# Patient Record
Sex: FEMALE | Race: WHITE | NOT HISPANIC OR LATINO | Employment: OTHER | URBAN - METROPOLITAN AREA
[De-identification: names, ages, dates, MRNs, and addresses within clinical notes are randomized per-mention and may not be internally consistent; named-entity substitution may affect disease eponyms.]

---

## 2017-06-14 ENCOUNTER — APPOINTMENT (OUTPATIENT)
Dept: RADIOLOGY | Facility: MEDICAL CENTER | Age: 77
End: 2017-06-14
Attending: PHYSICIAN ASSISTANT
Payer: COMMERCIAL

## 2018-01-15 ENCOUNTER — HOSPITAL ENCOUNTER (OUTPATIENT)
Dept: RADIOLOGY | Facility: MEDICAL CENTER | Age: 78
End: 2018-01-15
Attending: INTERNAL MEDICINE
Payer: MEDICARE

## 2018-01-15 DIAGNOSIS — R10.33 UMBILICAL PAIN: ICD-10-CM

## 2018-01-15 DIAGNOSIS — R10.9 STOMACH ACHE: ICD-10-CM

## 2018-01-15 DIAGNOSIS — R14.0 ABDOMINAL DISTENTION: ICD-10-CM

## 2018-01-15 DIAGNOSIS — M54.9 DORSALGIA: ICD-10-CM

## 2018-01-15 PROCEDURE — 74018 RADEX ABDOMEN 1 VIEW: CPT

## 2019-04-05 ENCOUNTER — HOSPITAL ENCOUNTER (OUTPATIENT)
Dept: RADIOLOGY | Facility: MEDICAL CENTER | Age: 79
End: 2019-04-05
Attending: NURSE PRACTITIONER
Payer: MEDICARE

## 2019-04-05 DIAGNOSIS — R10.30 LOWER ABDOMINAL PAIN: ICD-10-CM

## 2019-04-05 PROCEDURE — 76700 US EXAM ABDOM COMPLETE: CPT

## 2019-12-30 DIAGNOSIS — C21.0 ANAL CANCER (HCC): ICD-10-CM

## 2019-12-30 DIAGNOSIS — C21.0 SQUAMOUS CELL CANCER, ANUS (HCC): ICD-10-CM

## 2019-12-31 RX ORDER — LORATADINE 10 MG/1
10 TABLET ORAL
COMMUNITY
End: 2020-01-10

## 2019-12-31 RX ORDER — ATORVASTATIN CALCIUM 20 MG/1
20 TABLET, FILM COATED ORAL DAILY
COMMUNITY

## 2019-12-31 RX ORDER — METOPROLOL SUCCINATE 25 MG/1
25 TABLET, EXTENDED RELEASE ORAL
COMMUNITY
Start: 2019-05-07 | End: 2020-05-06

## 2019-12-31 RX ORDER — NITROGLYCERIN 0.4 MG/1
0.4 TABLET SUBLINGUAL
COMMUNITY
End: 2020-10-26

## 2019-12-31 RX ORDER — SUMATRIPTAN 25 MG/1
25 TABLET, FILM COATED ORAL
COMMUNITY
End: 2020-10-26

## 2020-01-03 ENCOUNTER — HOSPITAL ENCOUNTER (OUTPATIENT)
Dept: RADIOLOGY | Facility: MEDICAL CENTER | Age: 80
End: 2020-01-03
Attending: SURGERY
Payer: MEDICARE

## 2020-01-03 VITALS — WEIGHT: 129.41 LBS | BODY MASS INDEX: 23.81 KG/M2 | HEIGHT: 62 IN

## 2020-01-03 DIAGNOSIS — C21.0 ANAL SQUAMOUS CELL CARCINOMA (HCC): ICD-10-CM

## 2020-01-03 PROCEDURE — 700102 HCHG RX REV CODE 250 W/ 637 OVERRIDE(OP): Performed by: RADIOLOGY

## 2020-01-03 PROCEDURE — 700117 HCHG RX CONTRAST REV CODE 255: Performed by: SURGERY

## 2020-01-03 PROCEDURE — 72197 MRI PELVIS W/O & W/DYE: CPT

## 2020-01-03 PROCEDURE — 700111 HCHG RX REV CODE 636 W/ 250 OVERRIDE (IP): Performed by: RADIOLOGY

## 2020-01-03 PROCEDURE — A9270 NON-COVERED ITEM OR SERVICE: HCPCS | Performed by: RADIOLOGY

## 2020-01-03 PROCEDURE — A9576 INJ PROHANCE MULTIPACK: HCPCS | Performed by: SURGERY

## 2020-01-03 RX ORDER — DIAZEPAM 5 MG/1
5 TABLET ORAL
Status: COMPLETED | OUTPATIENT
Start: 2020-01-03 | End: 2020-01-03

## 2020-01-03 RX ADMIN — GLUCAGON 1 MG: 1 INJECTION, POWDER, LYOPHILIZED, FOR SOLUTION INTRAMUSCULAR; INTRAVENOUS at 10:20

## 2020-01-03 RX ADMIN — DIAZEPAM 5 MG: 5 TABLET ORAL at 09:46

## 2020-01-03 RX ADMIN — GADOTERIDOL 10 ML: 279.3 INJECTION, SOLUTION INTRAVENOUS at 11:05

## 2020-01-03 NOTE — DISCHARGE INSTRUCTIONS
MRI ADULT DISCHARGE INSTRUCTIONS    You have been medicated today for your scan. Please follow the instructions below to ensure your safe recovery. If you have any questions or problems, feel free to call us at [phone] or [phone].     1.   Have someone stay with you to assist you as needed.    2.   Do not drive or operate any mechanical devices.    3.   Do not perform any activity that requires concentration. Make no major decisions over the next 24 hours.     4.   Be careful changing positions from laying down to sitting or standing, as you may become dizzy.     5.   Do not drink alcohol for 48 hours.    6.   There are no restrictions for eating your normal meals. Drink fluids.    7.   You may continue your usual medications for pain, tranquilizers, muscle relaxants or sedatives when awake.     8.   Tomorrow, you may continue your normal daily activities.     9.   Pressure dressing on 10 - 15 minutes. If swelling or bleeding occurs when removed, continue placing direct pressure on injection site for another 5 minutes, or until bleeding stops.     Diazepam (VALIUM) Oral tablet  What is this medicine?  You were prescribed DIAZEPAM (dye AZ e dania) for the procedure you had today. This medication is a benzodiazepine. It is used to treat anxiety and nervousness. It also can help treat alcohol withdrawal, relax muscles, and treat certain types of seizures.  This medicine may be used for other purposes; ask your health care provider or pharmacist if you have questions.  What side effects may I notice from receiving this medicine?  Side effects that you should report to your doctor or health care professional as soon as possible:  • allergic reactions like skin rash, itching or hives, swelling of the face, lips, or tongue  • angry, confused, depressed, other mood changes  • breathing problems  • feeling faint or lightheaded, falls  • muscle cramps  • problems with balance, talking,  walking  • restlessness  • tremors  • trouble passing urine or change in the amount of urine  • unusually weak or tired  Side effects that usually do not require medical attention (report to your doctor or health care professional if they continue or are bothersome):  • difficulty sleeping, nightmares  • dizziness, drowsiness, clumsiness, or unsteadiness, a hangover effect  • headache  • nausea, vomiting  This list may not describe all possible side effects. Call your doctor for medical advice about side effects. You may report side effects to FDA at 1-280-VTH-1091.      I have been informed of and understand the above discharge instructions.

## 2020-01-10 ENCOUNTER — HOSPITAL ENCOUNTER (OUTPATIENT)
Dept: RADIOLOGY | Facility: MEDICAL CENTER | Age: 80
End: 2020-01-10
Attending: SURGERY
Payer: MEDICARE

## 2020-01-10 DIAGNOSIS — Z01.812 PRE-OPERATIVE LABORATORY EXAMINATION: ICD-10-CM

## 2020-01-10 DIAGNOSIS — Z01.810 PRE-OPERATIVE CARDIOVASCULAR EXAMINATION: ICD-10-CM

## 2020-01-10 DIAGNOSIS — C21.0 MALIGNANT NEOPLASM OF ANUS (HCC): ICD-10-CM

## 2020-01-10 LAB
EKG IMPRESSION: NORMAL
ERYTHROCYTE [DISTWIDTH] IN BLOOD BY AUTOMATED COUNT: 46.2 FL (ref 35.9–50)
HCT VFR BLD AUTO: 43 % (ref 37–47)
HGB BLD-MCNC: 14 G/DL (ref 12–16)
MCH RBC QN AUTO: 32.3 PG (ref 27–33)
MCHC RBC AUTO-ENTMCNC: 32.6 G/DL (ref 33.6–35)
MCV RBC AUTO: 99.1 FL (ref 81.4–97.8)
PLATELET # BLD AUTO: 272 K/UL (ref 164–446)
PMV BLD AUTO: 10 FL (ref 9–12.9)
RBC # BLD AUTO: 4.34 M/UL (ref 4.2–5.4)
WBC # BLD AUTO: 10.2 K/UL (ref 4.8–10.8)

## 2020-01-10 PROCEDURE — 36415 COLL VENOUS BLD VENIPUNCTURE: CPT

## 2020-01-10 PROCEDURE — 85027 COMPLETE CBC AUTOMATED: CPT

## 2020-01-10 PROCEDURE — 71260 CT THORAX DX C+: CPT

## 2020-01-10 PROCEDURE — 700117 HCHG RX CONTRAST REV CODE 255: Performed by: SURGERY

## 2020-01-10 PROCEDURE — 93010 ELECTROCARDIOGRAM REPORT: CPT | Performed by: INTERNAL MEDICINE

## 2020-01-10 PROCEDURE — 93005 ELECTROCARDIOGRAM TRACING: CPT

## 2020-01-10 RX ORDER — MONTELUKAST SODIUM 10 MG/1
10 TABLET ORAL DAILY
COMMUNITY
End: 2020-10-26

## 2020-01-10 RX ORDER — DILTIAZEM HYDROCHLORIDE 60 MG/1
60 TABLET, FILM COATED ORAL 2 TIMES DAILY
COMMUNITY
End: 2020-09-17

## 2020-01-10 RX ORDER — VITAMIN E 268 MG
400 CAPSULE ORAL DAILY
COMMUNITY
End: 2020-10-26

## 2020-01-10 RX ADMIN — IOHEXOL 100 ML: 350 INJECTION, SOLUTION INTRAVENOUS at 14:40

## 2020-01-10 RX ADMIN — IOHEXOL 25 ML: 240 INJECTION, SOLUTION INTRATHECAL; INTRAVASCULAR; INTRAVENOUS; ORAL at 13:30

## 2020-01-13 ENCOUNTER — ANESTHESIA EVENT (OUTPATIENT)
Dept: SURGERY | Facility: MEDICAL CENTER | Age: 80
End: 2020-01-13
Payer: MEDICARE

## 2020-01-13 ENCOUNTER — ANESTHESIA (OUTPATIENT)
Dept: SURGERY | Facility: MEDICAL CENTER | Age: 80
End: 2020-01-13
Payer: MEDICARE

## 2020-01-13 ENCOUNTER — HOSPITAL ENCOUNTER (OUTPATIENT)
Facility: MEDICAL CENTER | Age: 80
End: 2020-01-13
Attending: SURGERY | Admitting: SURGERY
Payer: MEDICARE

## 2020-01-13 VITALS
HEART RATE: 72 BPM | WEIGHT: 127.43 LBS | BODY MASS INDEX: 23.45 KG/M2 | RESPIRATION RATE: 20 BRPM | DIASTOLIC BLOOD PRESSURE: 52 MMHG | HEIGHT: 62 IN | OXYGEN SATURATION: 98 % | TEMPERATURE: 98.4 F | SYSTOLIC BLOOD PRESSURE: 119 MMHG

## 2020-01-13 PROBLEM — R11.2 PONV (POSTOPERATIVE NAUSEA AND VOMITING): Status: ACTIVE | Noted: 2020-01-13

## 2020-01-13 PROBLEM — Z98.890 PONV (POSTOPERATIVE NAUSEA AND VOMITING): Status: ACTIVE | Noted: 2020-01-13

## 2020-01-13 PROBLEM — C21.0 ANAL CANCER (HCC): Chronic | Status: ACTIVE | Noted: 2020-01-13

## 2020-01-13 PROBLEM — E78.00 HIGH CHOLESTEROL: Chronic | Status: ACTIVE | Noted: 2020-01-13

## 2020-01-13 PROBLEM — J45.909 ASTHMA: Chronic | Status: ACTIVE | Noted: 2020-01-13

## 2020-01-13 PROBLEM — K44.9 HIATAL HERNIA: Status: ACTIVE | Noted: 2020-01-13

## 2020-01-13 LAB
BUN BLD-MCNC: 13 MG/DL (ref 8–22)
CA-I BLD ISE-SCNC: 1.21 MMOL/L (ref 1.1–1.3)
CHLORIDE BLD-SCNC: 105 MMOL/L (ref 96–112)
CO2 BLD-SCNC: 26 MMOL/L (ref 20–33)
CREAT BLD-MCNC: 0.9 MG/DL (ref 0.5–1.4)
GLUCOSE BLD-MCNC: 99 MG/DL (ref 65–99)
HCT VFR BLD CALC: 45 % (ref 37–47)
HGB BLD-MCNC: 15.3 G/DL (ref 12–16)
POTASSIUM BLD-SCNC: 4.1 MMOL/L (ref 3.6–5.5)
SODIUM BLD-SCNC: 141 MMOL/L (ref 135–145)

## 2020-01-13 PROCEDURE — 700105 HCHG RX REV CODE 258: Performed by: SURGERY

## 2020-01-13 PROCEDURE — 160009 HCHG ANES TIME/MIN: Performed by: SURGERY

## 2020-01-13 PROCEDURE — 160046 HCHG PACU - 1ST 60 MINS PHASE II: Performed by: SURGERY

## 2020-01-13 PROCEDURE — 700101 HCHG RX REV CODE 250: Performed by: ANESTHESIOLOGY

## 2020-01-13 PROCEDURE — 160047 HCHG PACU  - EA ADDL 30 MINS PHASE II: Performed by: SURGERY

## 2020-01-13 PROCEDURE — 85014 HEMATOCRIT: CPT

## 2020-01-13 PROCEDURE — 160025 RECOVERY II MINUTES (STATS): Performed by: SURGERY

## 2020-01-13 PROCEDURE — 160207 HCHG ENDO MINUTES - EA ADDL 1 MIN LEVEL 3: Performed by: SURGERY

## 2020-01-13 PROCEDURE — 160048 HCHG OR STATISTICAL LEVEL 1-5: Performed by: SURGERY

## 2020-01-13 PROCEDURE — 700111 HCHG RX REV CODE 636 W/ 250 OVERRIDE (IP): Performed by: ANESTHESIOLOGY

## 2020-01-13 PROCEDURE — 160202 HCHG ENDO MINUTES - 1ST 30 MINS LEVEL 3: Performed by: SURGERY

## 2020-01-13 PROCEDURE — 80047 BASIC METABLC PNL IONIZED CA: CPT

## 2020-01-13 RX ORDER — SODIUM CHLORIDE, SODIUM LACTATE, POTASSIUM CHLORIDE, CALCIUM CHLORIDE 600; 310; 30; 20 MG/100ML; MG/100ML; MG/100ML; MG/100ML
INJECTION, SOLUTION INTRAVENOUS CONTINUOUS
Status: DISCONTINUED | OUTPATIENT
Start: 2020-01-13 | End: 2020-01-13

## 2020-01-13 RX ORDER — METOPROLOL TARTRATE 1 MG/ML
1 INJECTION, SOLUTION INTRAVENOUS
Status: DISCONTINUED | OUTPATIENT
Start: 2020-01-13 | End: 2020-01-13 | Stop reason: HOSPADM

## 2020-01-13 RX ORDER — ONDANSETRON 2 MG/ML
4 INJECTION INTRAMUSCULAR; INTRAVENOUS
Status: DISCONTINUED | OUTPATIENT
Start: 2020-01-13 | End: 2020-01-13 | Stop reason: HOSPADM

## 2020-01-13 RX ORDER — LIDOCAINE HYDROCHLORIDE 20 MG/ML
INJECTION, SOLUTION EPIDURAL; INFILTRATION; INTRACAUDAL; PERINEURAL PRN
Status: DISCONTINUED | OUTPATIENT
Start: 2020-01-13 | End: 2020-01-13 | Stop reason: SURG

## 2020-01-13 RX ORDER — SODIUM CHLORIDE, SODIUM LACTATE, POTASSIUM CHLORIDE, CALCIUM CHLORIDE 600; 310; 30; 20 MG/100ML; MG/100ML; MG/100ML; MG/100ML
INJECTION, SOLUTION INTRAVENOUS CONTINUOUS
Status: DISCONTINUED | OUTPATIENT
Start: 2020-01-13 | End: 2020-01-13 | Stop reason: HOSPADM

## 2020-01-13 RX ORDER — HYDRALAZINE HYDROCHLORIDE 20 MG/ML
5 INJECTION INTRAMUSCULAR; INTRAVENOUS
Status: DISCONTINUED | OUTPATIENT
Start: 2020-01-13 | End: 2020-01-13 | Stop reason: HOSPADM

## 2020-01-13 RX ORDER — LABETALOL HYDROCHLORIDE 5 MG/ML
5 INJECTION, SOLUTION INTRAVENOUS
Status: DISCONTINUED | OUTPATIENT
Start: 2020-01-13 | End: 2020-01-13 | Stop reason: HOSPADM

## 2020-01-13 RX ORDER — HALOPERIDOL 5 MG/ML
0.5 INJECTION INTRAMUSCULAR
Status: DISCONTINUED | OUTPATIENT
Start: 2020-01-13 | End: 2020-01-13 | Stop reason: HOSPADM

## 2020-01-13 RX ADMIN — PROPOFOL 140 MCG/KG/MIN: 10 INJECTION, EMULSION INTRAVENOUS at 10:49

## 2020-01-13 RX ADMIN — PROPOFOL 40 MG: 10 INJECTION, EMULSION INTRAVENOUS at 10:58

## 2020-01-13 RX ADMIN — PROPOFOL 40 MG: 10 INJECTION, EMULSION INTRAVENOUS at 11:07

## 2020-01-13 RX ADMIN — FENTANYL CITRATE 25 MCG: 50 INJECTION, SOLUTION INTRAMUSCULAR; INTRAVENOUS at 11:04

## 2020-01-13 RX ADMIN — FENTANYL CITRATE 25 MCG: 50 INJECTION, SOLUTION INTRAMUSCULAR; INTRAVENOUS at 10:59

## 2020-01-13 RX ADMIN — PROPOFOL 50 MG: 10 INJECTION, EMULSION INTRAVENOUS at 10:48

## 2020-01-13 RX ADMIN — SODIUM CHLORIDE, POTASSIUM CHLORIDE, SODIUM LACTATE AND CALCIUM CHLORIDE: 600; 310; 30; 20 INJECTION, SOLUTION INTRAVENOUS at 10:25

## 2020-01-13 RX ADMIN — LIDOCAINE HYDROCHLORIDE 100 MG: 20 INJECTION, SOLUTION EPIDURAL; INFILTRATION; INTRACAUDAL at 10:47

## 2020-01-13 ASSESSMENT — PAIN SCALES - GENERAL: PAIN_LEVEL: 0

## 2020-01-13 NOTE — ANESTHESIA QCDR
2019 [facility] Clinical Data Registry (for Quality Improvement)     Postoperative nausea/vomiting risk protocol (Adult = 18 yrs and Pediatric 3-17 yrs)- (430 and 463)  General inhalation anesthetic (NOT TIVA) with PONV risk factors: No  Provision of anti-emetic therapy with at least 2 different classes of agents: N/A  Patient DID NOT receive anti-emetic therapy and reason is documented in Medical Record: N/A    Multimodal Pain Management- (477)  Non-emergent surgery AND patient age >= 18: Yes  Use of Multimodal Pain Management, two or more drugs and/or interventions, NOT including systemic opioids: No  Exception: Documented allergy to multiple classes of analgesics: No       Smoking Abstinence (404)  Patient is current smoker (cigarette, pipe, e-cig, marijuanna): No  Elective Surgery:   Abstinence instructions provided prior to day of surgery:   Patient abstained from smoking on day of surgery:     Pre-Op Beta-Blocker in Isolated CABG (44)  Isolated CABG AND patient age >= 18: No  Beta-blocker admin within 24 hours of surgical incision:   Exception:of medical reason(s) for not administering beta blocker within 24 hours prior to surgical incision (e.g., not  indicated,other medical reason):     PACU assessment of acute postoperative pain prior to Anesthesia Care End- Applies to Patients Age = 18- (ABG7)  Initial PACU pain score is which of the following: < 7/10  Patient unable to report pain score: N/A    Post-anesthetic transfer of care checklist/protocol to PACU/ICU- (426 and 427)  Upon conclusion of case, patient transferred to which of the following locations: PACU/Non-ICU  Use of transfer checklist/protocol: Yes  Exclusion: Service Performed in Patient Hospital Room (and thus did not require transfer): N/A  Unplanned admission to ICU related to anesthesia service up through end of PACU care- (MD51)  Unplanned admission to ICU (not initially anticipated at anesthesia start time): No

## 2020-01-13 NOTE — OP REPORT
Operative Report     PreOp Diagnosis:   Anal squamous cell cancer     PostOp Diagnosis:   Same     Procedure(s):  Colonoscopy    Surgeon:  Dulce Jeter M.D.     Assistant:  None      Anesthesiologist:  David Magana    Type of Anesthesia:  Propofol conscious sedation     Specimen:  None     Estimated Blood Loss:  None     Findings:   Anal canal squamous cell carcinoma  Sigmoid diverticulosis    Complications:  None     Indications:  79F with new diagnosis of anal squamous cell carcinoma.  It has been 3 years since her previous colonoscopy and will undergo colonoscopy today as part of her staging.  We discussed the associated risks and complications, that include but are not limited to bleeding, infection, perforation, requirements for additional procedures or surgeries, cardiopulmonary complications, veno thromboembolic complications, etc.  She gave consent to proceed.     Operative Procedure:  The patient was brought to the operating suite and remained on her stretcher.  The patient was placed on cardiopulmonary monitors and anesthesia was induced using propofol administered by anesthesia. The patient was rolled into left lateral decubitus.  The preoperative safety checklist was performed.    A perineal visual exam demonstrated the known anal cancer protruding from the anal verge.  A CONCHITA demonstrated the anal tumor within the anal canal that extended approximately 3cm within the canal.  The colonoscope was advanced to the cecum and the appendiceal orifice and ileocecal valve were identified.  The prep was poor and consisted of green thick liquid throughout the entire colon.  The colonoscope was withdrawn and there was no evidence of any other masses or polyps.  There was sigmoid diverticulosis.    The patient tolerated the procedure well.

## 2020-01-13 NOTE — ANESTHESIA POSTPROCEDURE EVALUATION
Patient: Joys Grove    Procedure Summary     Date:  01/13/20 Room / Location:  [facility] ROOM 24 / SURGERY SAME DAY [facility]    Anesthesia Start:  1044 Anesthesia Stop:      Procedure:  COLONOSCOPY (Anus) Diagnosis:  (ANAL SQUAMOUS CELL CARCINOMA)    Surgeon:  Dulce Jeter M.D. Responsible Provider:  David Magana M.D.    Anesthesia Type:  general, MAC ASA Status:  3          Final Anesthesia Type: general, MAC  Last vitals  BP   Blood Pressure : 119/52    Temp   36.9 °C (98.4 °F)    Pulse   Pulse: 72   Resp   20    SpO2   98 %      Anesthesia Post Evaluation    Patient location during evaluation: PACU  Patient participation: complete - patient participated  Level of consciousness: awake and alert  Pain score: 0    Airway patency: patent  Anesthetic complications: no  Cardiovascular status: hemodynamically stable  Respiratory status: acceptable  Hydration status: euvolemic    PONV: none           Nurse Pain Score: 0 (NPRS)

## 2020-01-13 NOTE — OR NURSING
1118 to pacu from or s/p colonoscopy report pt awake but sleepy report recvd from Dr Magana   1136 pt states comfortable at this time o2 off 100% on roomair   1148 friend to bedside po fluids given   1200 report to toribio roldan who is assuming care of pt

## 2020-01-13 NOTE — ANESTHESIA TIME REPORT
Anesthesia Start and Stop Event Times     Date Time Event    1/13/2020 1037 Ready for Procedure     1044 Anesthesia Start     1122 Anesthesia Stop        Responsible Staff  01/13/20    Name Role Begin End    David Magana M.D. Anesth 1044 1122        Preop Diagnosis (Free Text):  Pre-op Diagnosis     ANAL SQUAMOUS CELL CARCINOMA        Preop Diagnosis (Codes):    Post op Diagnosis  Anal squamous cell carcinoma (HCC)      Premium Reason  Non-Premium    Comments:

## 2020-01-13 NOTE — DISCHARGE INSTRUCTIONS
ACTIVITY: Rest and take it easy for the first 24 hours.  A responsible adult is recommended to remain with you during that time.  It is normal to feel sleepy.  We encourage you to not do anything that requires balance, judgment or coordination.    MILD FLU-LIKE SYMPTOMS ARE NORMAL. YOU MAY EXPERIENCE GENERALIZED MUSCLE ACHES, THROAT IRRITATION, HEADACHE AND/OR SOME NAUSEA.    FOR 24 HOURS DO NOT:  Drive, operate machinery or run household appliances.  Drink beer or alcoholic beverages.   Make important decisions or sign legal documents.    SPECIAL INSTRUCTIONS: *see colonoscopy discharge instructions **    DIET: To avoid nausea, slowly advance diet as tolerated, avoiding spicy or greasy foods for the first day.  Add more substantial food to your diet according to your physician's instructions.  Babies can be fed formula or breast milk as soon as they are hungry.  INCREASE FLUIDS AND FIBER TO AVOID CONSTIPATION.    SURGICAL DRESSING/BATHING: **no restrictions *    FOLLOW-UP APPOINTMENT:  A follow-up appointment should be arranged with your doctor  call to schedule or follow up as already scheduled     You should CALL YOUR PHYSICIAN if you develop:  Fever greater than 101 degrees F.  Pain not relieved by medication, or persistent nausea or vomiting.  Excessive bleeding (blood soaking through dressing) or unexpected drainage from the wound.  Extreme redness or swelling around the incision site, drainage of pus or foul smelling drainage.  Inability to urinate or empty your bladder within 8 hours.  Problems with breathing or chest pain.    You should call 911 if you develop problems with breathing or chest pain.  If you are unable to contact your doctor or surgical center, you should go to the nearest emergency room or urgent care center.    Physician's telephone #: *Dr. Jeter [phone]**    If any questions arise, call your doctor.  If your doctor is not available, please feel free to call the Surgical Center at  [phone].  The Center is open Monday through Friday from 7AM to 7PM.  You can also call the HEALTH HOTLINE open 24 hours/day, 7 days/week and speak to a nurse at [phone], or toll free at [phone].    A registered nurse may call you a few days after your surgery to see how you are doing after your procedure.    MEDICATIONS: Resume taking daily medication.  Take prescribed pain medication with food.  If no medication is prescribed, you may take non-aspirin pain medication if needed.  PAIN MEDICATION CAN BE VERY CONSTIPATING.  Take a stool softener or laxative such as senokot, pericolace, or milk of magnesia if needed.    Prescription given for *none**.  Last pain medication given at *none **.    If your physician has prescribed pain medication that includes Acetaminophen (Tylenol), do not take additional Acetaminophen (Tylenol) while taking the prescribed medication.    Depression / Suicide Risk    As you are discharged from this [facility] facility, it is important to learn how to keep safe from harming yourself.    Recognize the warning signs:  · Abrupt changes in personality, positive or negative- including increase in energy   · Giving away possessions  · Change in eating patterns- significant weight changes-  positive or negative  · Change in sleeping patterns- unable to sleep or sleeping all the time   · Unwillingness or inability to communicate  · Depression  · Unusual sadness, discouragement and loneliness  · Talk of wanting to die  · Neglect of personal appearance   · Rebelliousness- reckless behavior  · Withdrawal from people/activities they love  · Confusion- inability to concentrate     If you or a loved one observes any of these behaviors or has concerns about self-harm, here's what you can do:  · Talk about it- your feelings and reasons for harming yourself  · Remove any means that you might use to hurt yourself (examples: pills, rope, extension cords, firearm)  · Get professional  help from the community (Mental Health, Substance Abuse, psychological counseling)  · Do not be alone:Call your Safe Contact- someone whom you trust who will be there for you.  · Call your local CRISIS HOTLINE 921-5685 or 597-128-2948  · Call your local Children's Mobile Crisis Response Team [place] (097) 347-7760 or www.G[employer]Vaani  · Call the toll free National Suicide Prevention Hotlines   · National Suicide Prevention Lifeline 019-766-JQMJ (7279)  [facility] Line Network 800-SUICIDE (441-5038)    ENDOSCOPY HOME CARE INSTRUCTIONS      COLONOSCOPY OR FLEXIBLE SIGMOIDOSCOPY  1. If you received a barium enema, take a mild laxative such as dulcolax, Carol's M.O., or Milk of Magnesia to clean out the barium.  2. Drink plenty of fluids. Eat a diet high in fiber (whole-grain breads, fresh fruit and vegetables).  3. You may notice a few drops of blood with your first bowel movement. If you develop any large amount of bleeding, black stools, a fever, or abdominal pain, call your doctor right away.  4. Call your doctor for test results   5. Don't drive or drink alcohol for 24 hours. The medication you received will make you too drowsy.  6. No heavy lifting, no Aspirin products or Aspirin for 5 days   7. Additional instructions: *none **  8. Prescriptions: *none **    ·

## 2020-01-13 NOTE — ANESTHESIA PREPROCEDURE EVALUATION
Relevant Problems   ANESTHESIA   (+) PONV (postoperative nausea and vomiting)      PULMONARY   (+) Asthma      GI   (+) Hiatal hernia      Other   (+) Anal cancer (HCC)   (+) Degeneration of lumbar intervertebral disc   (+) High cholesterol       Physical Exam    Airway   Mallampati: II  TM distance: >3 FB  Neck ROM: full       Cardiovascular - normal exam  Rhythm: regular  Rate: normal  (-) murmur     Dental - normal exam         Pulmonary - normal exam  Breath sounds clear to auscultation     Abdominal    Neurological - normal exam    Comments: A&Ox4, Grossly intact             Anesthesia Plan    ASA 3 (anal cancer. chronic anticoagulation. arrythmia.)       Plan - general and MAC       Airway plan will be natural airway        Induction: intravenous    Postoperative Plan: Postoperative administration of opioids is intended.    Pertinent diagnostic labs and testing reviewed    Informed Consent:    Anesthetic plan and risks discussed with patient.    Use of blood products discussed with: patient whom consented to blood products.

## 2020-01-14 ENCOUNTER — PATIENT OUTREACH (OUTPATIENT)
Dept: OTHER | Facility: MEDICAL CENTER | Age: 80
End: 2020-01-14

## 2020-01-14 NOTE — PROGRESS NOTES
On 1/14/2020 at 1238 this ONN called patient. Introduced self and explained role of nurse navigator as added resource and support. Patient states she is doing well. Patient states she has lots of good support. Patient states she does not drive herself to appointments but that she has sons, daughters-in-law, and friends who have all been helping out. Patient states she does not have specific questions at this point but does understand on Thursday 1/16/2020 patient has a consult with radiation oncologist Dr. Casper. Patient denies questions or concerns about appointment or getting to and from appointment. Patient took this ONN's name and number and this ONN encouraged patient to call as needed. This ONN agreed to follow back up with patient after patient meets with Dr. Casper.

## 2020-01-16 ENCOUNTER — HOSPITAL ENCOUNTER (OUTPATIENT)
Dept: RADIOLOGY | Facility: MEDICAL CENTER | Age: 80
End: 2020-01-16
Attending: RADIOLOGY
Payer: MEDICARE

## 2020-01-16 ENCOUNTER — HOSPITAL ENCOUNTER (OUTPATIENT)
Dept: RADIATION ONCOLOGY | Facility: MEDICAL CENTER | Age: 80
End: 2020-01-31
Attending: RADIOLOGY
Payer: MEDICARE

## 2020-01-16 VITALS
HEART RATE: 78 BPM | OXYGEN SATURATION: 97 % | DIASTOLIC BLOOD PRESSURE: 45 MMHG | BODY MASS INDEX: 23.5 KG/M2 | SYSTOLIC BLOOD PRESSURE: 146 MMHG | WEIGHT: 128.48 LBS | TEMPERATURE: 96.7 F

## 2020-01-16 DIAGNOSIS — C20 MALIGNANT NEOPLASM OF RECTUM (HCC): ICD-10-CM

## 2020-01-16 DIAGNOSIS — C21.0 SQUAMOUS CELL CANCER, ANUS (HCC): ICD-10-CM

## 2020-01-16 DIAGNOSIS — C21.0 ANAL CANCER (HCC): ICD-10-CM

## 2020-01-16 PROCEDURE — 99214 OFFICE O/P EST MOD 30 MIN: CPT | Mod: 25 | Performed by: RADIOLOGY

## 2020-01-16 PROCEDURE — 99205 OFFICE O/P NEW HI 60 MIN: CPT | Performed by: RADIOLOGY

## 2020-01-16 PROCEDURE — A9552 F18 FDG: HCPCS

## 2020-01-16 SDOH — HEALTH STABILITY: MENTAL HEALTH: HOW OFTEN DO YOU HAVE A DRINK CONTAINING ALCOHOL?: 4 OR MORE TIMES A WEEK

## 2020-01-16 SDOH — HEALTH STABILITY: MENTAL HEALTH: HOW MANY STANDARD DRINKS CONTAINING ALCOHOL DO YOU HAVE ON A TYPICAL DAY?: 1 OR 2

## 2020-01-16 ASSESSMENT — PAIN SCALES - GENERAL: PAINLEVEL: 1=MINIMAL PAIN

## 2020-01-16 NOTE — CONSULTS
RADIATION ONCOLOGY CONSULT    DATE OF SERVICE: 1/16/2020    IDENTIFICATION: A 79 y.o. female with   Visit Diagnoses     ICD-10-CM   1. Malignant neoplasm of rectum (HCC) C20     Anal cancer (HCC)  Staging form: Anus, AJCC 8th Edition  - Clinical: Stage IIA (cT2, cN0, cM0) - Signed by Sha Casper M.D. on 1/16/2020    She is here at the kind request of Dr. Jeter     HISTORY OF PRESENT ILLNESS:   Patient originally presented in April 2019 with some rectal bleeding.  She was originally seen by Dr. Edilson Quinn who noted anal fissure and hemorrhoids and referred her to Dr. Dulce Jeter ([facility]) for anoscopy which showed hard mass exophytic palpated from the anal verge to the top of the sphincter in the left lateral position fixed and punch biopsy December 18, 2018 shows invasive squamous cell carcinoma moderately differentiated.  She underwent MRI rectum January 3, 2020 which showed a 3.9 x 2 cm anal canal lesion semicircumferential extending from 1:00 to 8 o'clock position extending from the anal verge into the lower rectum.  Anteriorly the lesion abuts the lower vagina without definitive evidence of vaginal invasion.  CT chest abdomen pelvis January 10, 2020 showed no evidence of metastatic disease.  A hypo-dense 1.4 cm nodule below the left hemidiaphragm medial to the spleen it is unlikely to represent a solitary metastatic lesion in this location.  PET CT scan done January 16, 2020 showed abnormal oval-shaped solid mass in the left side of the anus extending to the level of the lower rectum with marketed activity.  No adenopathy or elevated raghav activity noted.  Patient currently is complaining of severe anal pain and occasional bleeding.    PROBLEM LIST:  Patient Active Problem List   Diagnosis   • Degeneration of lumbar intervertebral disc   • Anal cancer (HCC)   • Hiatal hernia   • Asthma   • High cholesterol   • PONV (postoperative nausea and vomiting)        PAST SURGICAL  HISTORY:  Past Surgical History:   Procedure Laterality Date   • COLONOSCOPY  1/13/2020    Procedure: COLONOSCOPY;  Surgeon: Dulce Jeter M.D.;  Location: SURGERY SAME DAY [facility] ORS;  Service: Gen Robotic   • LUMBAR LAMINECTOMY DISKECTOMY  3/3/2016    Procedure: LUMBAR LAMINECTOMY DISKECTOMY FOR MINIMALLY INVASIVE LEFT L4-5 LAMINOTOMY;  Surgeon: Stevo Guthrie M.D.;  Location: SURGERY [facility];  Service:    • CHOLECYSTECTOMY  6/2014   • CATARACT EXTRACTION WITH IOL Bilateral 2012   • HYSTERECTOMY, TOTAL ABDOMINAL  1983   • ABDOMINAL HYSTERECTOMY TOTAL  1983   • TUBAL LIGATION  1975   • APPENDECTOMY     • BICEPS TENDON REPAIR Right 2013, 2014    5 screws placed in second surgery   • BUNIONECTOMY     • PB REMOVAL OF TONSILS,<13 Y/O     • SHOULDER SURGERY Right        CURRENT MEDICATIONS:  Current Outpatient Medications   Medication Sig Dispense Refill   • DILTIAZem (CARDIZEM) 60 MG Tab Take 60 mg by mouth 2 Times a Day.     • montelukast (SINGULAIR) 10 MG Tab Take 10 mg by mouth every day.     • fluticasone-salmeterol (WIXELA INHUB) 500-50 MCG/DOSE AEROSOL POWDER, BREATH ACTIVATED Inhale 1 Puff by mouth every 12 hours.     • Polyethylene Glycol 3350 (MIRALAX PO) Take 1 Dose by mouth every day.     • vitamin D (CHOLECALCIFEROL) 1000 UNIT Tab Take 1,000 Units by mouth every day.     • vitamin e (VITAMIN E) 400 UNIT Cap Take 400 Units by mouth every day.     • CALCIUM PO Take 750 mg by mouth every day.     • Ibuprofen-diphenhydrAMINE HCl (ADVIL PM) 200-25 MG Cap Take 1 Dose by mouth every bedtime.     • apixaban (ELIQUIS) 5mg Tab Take 5 mg by mouth.     • atorvastatin (LIPITOR) 20 MG Tab Take 80 mg by mouth.     • metoprolol SR (TOPROL XL) 25 MG TABLET SR 24 HR Take 25 mg by mouth.     • nitroglycerin (NITROSTAT) 0.4 MG SL Tab Place 0.4 mg under tongue.     • SUMAtriptan (IMITREX) 25 MG Tab tablet Take 25 mg by mouth.     • zolpidem (AMBIEN) 10 MG Tab Take 5 mg by mouth at bedtime as needed for Sleep.        No current facility-administered medications for this encounter.        ALLERGIES:    Codeine and Morphine    FAMILY HISTORY:    family history includes Cancer in her father, maternal grandfather, and mother.    SOCIAL HISTORY:     reports that she has never smoked. She has never used smokeless tobacco. She reports current alcohol use of about 3.0 oz of alcohol per week. She reports that she does not use drugs.    REVIEW OF SYSTEMS:  A review of systems for today's date of service was reviewed and uploaded into the electronic medical record.      PHYSICAL EXAM:    ECOG PERFORMANCE STATUS:  ECOG Performance Review 1/16/2020   ECOG Performance Status Fully active, able to carry on all pre-disease performance without restriction   Some recent data might be hidden     No flowsheet data found.  /45 (BP Location: Left arm, Patient Position: Sitting, BP Cuff Size: Adult)   Pulse 78   Temp 35.9 °C (96.7 °F) (Temporal)   Wt 58.3 kg (128 lb 7.7 oz)   SpO2 97%   BMI 23.50 kg/m²   Physical Exam  Vitals signs reviewed.   Constitutional:       Appearance: Normal appearance.   HENT:      Head: Normocephalic and atraumatic.      Mouth/Throat:      Mouth: Mucous membranes are moist.   Eyes:      Pupils: Pupils are equal, round, and reactive to light.   Neck:      Musculoskeletal: Normal range of motion.   Cardiovascular:      Rate and Rhythm: Normal rate.   Pulmonary:      Effort: Pulmonary effort is normal.   Genitourinary:        Musculoskeletal: Normal range of motion.   Skin:     General: Skin is warm.   Neurological:      General: No focal deficit present.      Mental Status: She is alert.          LABORATORY DATA:   Lab Results   Component Value Date/Time    WBC 10.2 01/10/2020 1205    HEMOGLOBIN 14.0 01/10/2020 1205    HEMATOCRIT 43.0 01/10/2020 1205    MCV 99.1 (H) 01/10/2020 1205    PLATELETCT 272 01/10/2020 1205      No results found for: SODIUM, POTASSIUM, BUN, CREATININE, CALCIUM, ALBUMIN, ASTSGOT,  ALKPHOSPHAT, IFNOTAFR, LDHTOTAL    RADIOLOGY DATA:  Ct-chest,abdomen,pelvis With    Result Date: 1/10/2020  1/10/2020 2:21 PM HISTORY/REASON FOR EXAM:  New diagnosis of anal cancer, staging. TECHNIQUE/EXAM DESCRIPTION: CT scan of the chest, abdomen and pelvis with contrast. Thin-section helical scanning was obtained with intravenous contrast from the lung apices through the pubic symphysis to include the chest, abdomen and pelvis. 100 mL of Omnipaque 350 nonionic contrast was administered intravenously without complication. Low dose optimization technique was utilized for this CT exam including automated exposure control and adjustment of the mA and/or kV according to patient size. COMPARISON: None. FINDINGS: CT CHEST: Lungs: Mild biapical pleural parenchymal scarring. Mild scarring along the right minor fissure. A few areas of atelectasis/scarring in the lower lungs. No suspicious pulmonary nodules or masses. No focal consolidation or pleural effusions. Mediastinum: Unremarkable thyroid. No mediastinal mass. Nodes: No enlarged nodes by size criteria. Heart: Not enlarged. No pericardial effusion. Aorta and great vessels: No aneurysm. Atherosclerosis. CT ABDOMEN/PELVIS: Liver: No mass. No intrahepatic biliary dilatation. Gallbladder: Removed. Common bile duct: Nondilated. Pancreas: Unremarkable. Spleen: No mass. Adrenals: No mass. Kidneys: Tiny bilateral renal hypodensities, too small to characterize, likely simple cysts. Extrarenal pelvis on the right, with mild nonspecific prominence of the right ureter. No renal or ureteral stones. No hydronephrosis. Stomach, small bowel, colon: Small hiatal hernia. No bowel wall thickening or obstruction. Moderate amount of stool throughout the colon. Colonic diverticulosis. Anal lesion was better characterized on the recent MRI. Peritoneal cavity: No ascites. A small hypodense nodule below the left hemidiaphragm, medial to the spleen measures 1.4 x 1 cm and contains a thin  peripheral calcification. Lymph nodes: No enlarged nodes by size criteria. Aorta: No aneurysm. Atherosclerosis. Pelvic organs: Normal bladder. Hysterectomy. MUSCULOSKELETAL STRUCTURES: No acute fracture or destructive lesion. ACDF. Prior right rotator cuff repair. Decreased bone mineralization. Mild compression deformity of T7 vertebral body, likely chronic. Spondylosis.     1.  No definitive evidence of metastatic disease in the chest, abdomen or pelvis. 2.  A hypodense 1.4 cm nodule below the left hemidiaphragm, medial to the spleen. It is unlikely to represent a solitary metastatic lesion in this location. It can be followed. 3.  Anal malignancy was better characterized on recent MRI study.    [id number] Base To Mid-thigh    Result Date: 1/16/2020 1/16/2020 12:51 PM HISTORY/REASON FOR EXAM:  Anal carcinoma staging TECHNIQUE/EXAM DESCRIPTION AND NUMBER OF VIEWS: PET body imaging. Initially, 15.37 mCi F-18 FDG was administered intravenously under standardized conditions. Approximately 45 minutes after FDG administration, the patient was placed in the supine position on the PET CT table. Blood glucose level was 80 mg/dL. Low dose spiral CT imaging was performed from the skull base to the mid thighs. PET imaging was then performed from the skull base to the mid thighs. CT images, PET images, and PET/CT fused images were reviewed on a PACS 3D workstation. The limited non-contrast CT data are used primarily for attenuation correction and anatomic correlation.  Evaluation of solid organs and bowel are especially limited utilizing this technique. COMPARISON: CT 01/10/2020 FINDINGS: Head and neck: No focal areas of abnormal elevated activity are identified in the visualized portion of the head and neck. No abnormally enlarged lymph nodes are identified. No abnormal solid mass is appreciated. Chest: No focal areas of abnormal elevated activity are identified in the lungs, mediastinum, or chest wall. No focal pulmonary  mass is appreciated. There is no evidence of mediastinal or hilar adenopathy. No pleural fluid collection or pleural thickening is identified. No axillary adenopathy or chest wall mass is appreciated. Abdomen: No focal areas of abnormal elevated activity are identified in the abdomen. No focal mass is appreciated in the liver spleen pancreas kidneys or adrenal glands. There is no evidence of abdominal or retroperitoneal adenopathy. No free fluid or peritoneal inflammation is appreciated. Small low-density cyst or nodule just below the left hemidiaphragm medial to the spleen is again identified. There is no internal activity. Surgical clips are noted consistent with prior cholecystectomy. Pelvis: Mass centered to the left of midline in the anus that extends up into the lower edge of the rectum is again identified. There is homogeneous abnormally elevated activity throughout the entire mass measuring up to 19.9 SUV. No other sites of elevated activity are noted in the pelvis. No abnormally enlarged lymph nodes are identified. No other abnormal soft tissue masses are noted. There is diverticulosis again noted.     1.  Abnormal oval-shaped solid mass centered in the left side of the anus extending to the level of the lower rectum is again identified. This mass demonstrates markedly elevated activity consistent with anal carcinoma. 2.  No adenopathy or elevated raghav activity identified. 3.  No PET/CT evidence of distant metastatic disease in the neck chest or abdomen. 4.  Small low-attenuation nodular lesion in the left upper quadrant of the abdomen is again identified. No elevated activity is noted.    Mr-rectal/prostate Protocol    Result Date: 1/3/2020  1/3/2020 10:03 AM HISTORY/REASON FOR EXAM:  Anal cancer, staging TECHNIQUE/EXAM DESCRIPTION: MRI of the pelvis without and with contrast. The study was performed on a Gagan 3.0 Cindy MRI scanner. Sagittal and axial T2; oblique high resolution axial and coronal T2;  oblique axial diffusion-weighted imaging and ADC map with B values of 100, 400, and 1000; oblique axial dynamic postcontrast images of the prostate with kinetic curves; postcontrast T1 fat-sat sequence of the pelvis. 1 mg of glucagon was administered intravenously prior to the exam. 10 mL ProHance contrast was administered intravenously. COMPARISON: None. FINDINGS: There is a 3.9 x 2 cm lesion in the anal canal. The lesion is semicircumferential, extending from 1:00 to 8:00 position. It extends from the anal verge into the lower rectum, with distance from anal verge to its superior margin measuring 3.9 cm. Anteriorly, the lesion abuts the lower vagina, without definitive evidence of vaginal invasion. No invasion of the urethra. No enlarged pelvic lymph nodes are identified. Hysterectomy. Normal bladder. Colonic diverticulosis. No suspicious osseous lesions.     1. A T2, N0 anal cancer measuring 3.9 cm in the greatest dimension. The tumor abuts the lower vagina, without evidence of vaginal invasion. 2. No pelvic lymphadenopathy or suspicious osseous lesions.      IMPRESSION:    A 79 y.o. with  Visit Diagnoses     ICD-10-CM   1. Malignant neoplasm of rectum (HCC) C20     Anal cancer (HCC)  Staging form: Anus, AJCC 8th Edition  - Clinical: Stage IIA (cT2, cN0, cM0) - Signed by Sha Casper M.D. on 1/16/2020        RECOMMENDATIONS:   I explained the role of chemoradiation therapy for this treatment of stage II anal cancer squamous cell carcinoma.  I explained that classically the doublet of 5-FU and mitomycin has been considered the standard of care for locally advanced anal cancer for the past 4 decades.  The largest phase 3 trial completed for locally advanced anal cancer showed that 5-FU mitomycin improved disease-free survival versus 5-FU cisplatin.  In addition more recently RTOG 0529 which was a phase 2 study demonstrated the use of IMRT with 5-FU mitomycin was feasible and reduced grade 2+ hematologic and  grade 3 dermatologic and GI toxicities.  I explained that standard dose chemoradiation therapy is 50.4 Gy in 28 fractions with treatment tailored using dose painted approach to pelvic inguinal lymph nodes as well as primary tumor and mesorectum.     I explained that she could be eligible for the ECOG 2182 trial which is a de-escalation trial randomizing between standard dose radiation 50.4 Gy in 28 fractions treating the inguinal nodes to 42 Rosales in 28 fractions with concurrent chemotherapy versus de-intensified radiation to the primary tumor using 23 fractions.  I explained that typically chemotherapy is mitomycin and 5-FU or capecitabine.  I have reached out to our clinical trial coordinator Henri Levy to review eligibility.    She will see Dr. Vogel next week to discuss chemotherapy options.     Risks and side effects related to the radiation therapy include those which are:  Likely   • Tanning, redness, or darkening of skin in treatment area; including substantial juanita-anal desquamation causing pain which can become severe enough to require a break in treatment.  • Rash, itching or peeling of skin   • Temporary hair loss in the treatment area   • Temporary fatigue   • Abdominal cramps   • Frequent bowel movements, sometime with urgency, or diarrhea  • Rectal cramps and irritation with pain on defecation   • Mild rectal bleeding that does not require treatment  • Bladder irritation with a stinging sensation   • Frequency or urgency of urination   • Small amounts of blood in the urine   • In females, vaginal stenosis causing inability to have sexual activity or long term stenosis.    Less Likely    • Urinary obstruction requiring the placement of a temporary urinary catheter and/or dilatation because of stricture (narrowing)  • Less ability to control urine (increased incontinence)  • Rectal bleeding that requires medication or laser treatment to stop    Rare but serious   • Injury to the bladder, urethra,  bowel, or other tissues in the pelvis or abdomen requiring additional procedure or surgery, such as a colostomy (bag for stool).  • Intestinal obstruction requiring surgery    We will set the patient up with CT simulation with contrast early next week and plan to start 7 to 10 days later.  We will plan on 5 and a half weeks of treatment with concurrent chemotherapy.    Thank you for the opportunity to participate in her care.  If any questions or comments, please do not hesitate in calling.      CC: Dr. Vogel,  Dr. Jeter, Dr. Quinn

## 2020-01-16 NOTE — NON-PROVIDER
Patient was seen today in clinic with Dr. Casper for consult.  Vitals signs and weight were obtained and pain assessment was completed.  Allergies and medications were reviewed with the patient.  Review of systems completed.     Vitals/Pain:  Vitals:    01/16/20 0843   BP: 146/45   BP Location: Left arm   Patient Position: Sitting   BP Cuff Size: Adult   Pulse: 78   Temp: 35.9 °C (96.7 °F)   TempSrc: Temporal   SpO2: 97%   Weight: 58.3 kg (128 lb 7.7 oz)   Pain Score: 1=Minimal Pain        Allergies:   Codeine and Morphine    Current Medications:  Current Outpatient Medications   Medication Sig Dispense Refill   • DILTIAZem (CARDIZEM) 60 MG Tab Take 60 mg by mouth 2 Times a Day.     • montelukast (SINGULAIR) 10 MG Tab Take 10 mg by mouth every day.     • fluticasone-salmeterol (WIXELA INHUB) 500-50 MCG/DOSE AEROSOL POWDER, BREATH ACTIVATED Inhale 1 Puff by mouth every 12 hours.     • Polyethylene Glycol 3350 (MIRALAX PO) Take 1 Dose by mouth every day.     • vitamin D (CHOLECALCIFEROL) 1000 UNIT Tab Take 1,000 Units by mouth every day.     • vitamin e (VITAMIN E) 400 UNIT Cap Take 400 Units by mouth every day.     • CALCIUM PO Take 750 mg by mouth every day.     • Ibuprofen-diphenhydrAMINE HCl (ADVIL PM) 200-25 MG Cap Take 1 Dose by mouth every bedtime.     • apixaban (ELIQUIS) 5mg Tab Take 5 mg by mouth.     • atorvastatin (LIPITOR) 20 MG Tab Take 80 mg by mouth.     • metoprolol SR (TOPROL XL) 25 MG TABLET SR 24 HR Take 25 mg by mouth.     • nitroglycerin (NITROSTAT) 0.4 MG SL Tab Place 0.4 mg under tongue.     • SUMAtriptan (IMITREX) 25 MG Tab tablet Take 25 mg by mouth.     • zolpidem (AMBIEN) 10 MG Tab Take 5 mg by mouth at bedtime as needed for Sleep.       No current facility-administered medications for this encounter.          PCP:  Renita Townsend, Med Ass't

## 2020-01-20 ENCOUNTER — HOSPITAL ENCOUNTER (OUTPATIENT)
Dept: RADIATION ONCOLOGY | Facility: MEDICAL CENTER | Age: 80
End: 2020-01-20

## 2020-01-20 PROCEDURE — 77263 THER RADIOLOGY TX PLNG CPLX: CPT | Performed by: RADIOLOGY

## 2020-01-20 PROCEDURE — 77334 RADIATION TREATMENT AID(S): CPT | Mod: 26 | Performed by: RADIOLOGY

## 2020-01-20 PROCEDURE — 77470 SPECIAL RADIATION TREATMENT: CPT | Performed by: RADIOLOGY

## 2020-01-20 PROCEDURE — 77334 RADIATION TREATMENT AID(S): CPT | Performed by: RADIOLOGY

## 2020-01-20 PROCEDURE — 77290 THER RAD SIMULAJ FIELD CPLX: CPT | Performed by: RADIOLOGY

## 2020-01-20 PROCEDURE — 77470 SPECIAL RADIATION TREATMENT: CPT | Mod: 26 | Performed by: RADIOLOGY

## 2020-01-23 ENCOUNTER — HOSPITAL ENCOUNTER (OUTPATIENT)
Dept: RADIOLOGY | Facility: MEDICAL CENTER | Age: 80
End: 2020-01-23
Attending: INTERNAL MEDICINE
Payer: MEDICARE

## 2020-01-23 DIAGNOSIS — C21.1 MALIGNANT NEOPLASM OF ANAL CANAL (HCC): ICD-10-CM

## 2020-01-23 PROCEDURE — C1751 CATH, INF, PER/CENT/MIDLINE: HCPCS

## 2020-01-23 RX ORDER — 0.9 % SODIUM CHLORIDE 0.9 %
3 VIAL (ML) INJECTION PRN
Status: CANCELLED | OUTPATIENT
Start: 2020-01-27

## 2020-01-23 RX ORDER — SODIUM CHLORIDE 9 MG/ML
INJECTION, SOLUTION INTRAVENOUS CONTINUOUS
Status: CANCELLED | OUTPATIENT
Start: 2020-01-27

## 2020-01-23 RX ORDER — 0.9 % SODIUM CHLORIDE 0.9 %
10 VIAL (ML) INJECTION PRN
Status: CANCELLED | OUTPATIENT
Start: 2020-01-27

## 2020-01-23 RX ORDER — 0.9 % SODIUM CHLORIDE 0.9 %
VIAL (ML) INJECTION PRN
Status: CANCELLED | OUTPATIENT
Start: 2020-01-26

## 2020-01-23 RX ORDER — 0.9 % SODIUM CHLORIDE 0.9 %
20 VIAL (ML) INJECTION PRN
Status: CANCELLED | OUTPATIENT
Start: 2020-02-03

## 2020-01-23 RX ORDER — 0.9 % SODIUM CHLORIDE 0.9 %
10 VIAL (ML) INJECTION PRN
Status: CANCELLED | OUTPATIENT
Start: 2020-01-26

## 2020-01-23 RX ORDER — ONDANSETRON 2 MG/ML
8 INJECTION INTRAMUSCULAR; INTRAVENOUS ONCE
Status: CANCELLED | OUTPATIENT
Start: 2020-01-27

## 2020-01-23 RX ORDER — ONDANSETRON 2 MG/ML
4 INJECTION INTRAMUSCULAR; INTRAVENOUS PRN
Status: CANCELLED | OUTPATIENT
Start: 2020-01-27

## 2020-01-23 RX ORDER — ONDANSETRON 8 MG/1
8 TABLET, ORALLY DISINTEGRATING ORAL PRN
Status: CANCELLED | OUTPATIENT
Start: 2020-01-27

## 2020-01-23 RX ORDER — 0.9 % SODIUM CHLORIDE 0.9 %
VIAL (ML) INJECTION PRN
Status: CANCELLED | OUTPATIENT
Start: 2020-01-27

## 2020-01-23 RX ORDER — PROCHLORPERAZINE MALEATE 10 MG
10 TABLET ORAL EVERY 6 HOURS PRN
Status: CANCELLED | OUTPATIENT
Start: 2020-01-27

## 2020-01-23 RX ORDER — 0.9 % SODIUM CHLORIDE 0.9 %
3 VIAL (ML) INJECTION PRN
Status: CANCELLED | OUTPATIENT
Start: 2020-01-26

## 2020-01-24 PROCEDURE — 77301 RADIOTHERAPY DOSE PLAN IMRT: CPT | Mod: 26 | Performed by: RADIOLOGY

## 2020-01-24 PROCEDURE — 77300 RADIATION THERAPY DOSE PLAN: CPT | Mod: 26 | Performed by: RADIOLOGY

## 2020-01-24 PROCEDURE — 77300 RADIATION THERAPY DOSE PLAN: CPT | Performed by: RADIOLOGY

## 2020-01-24 PROCEDURE — 77338 DESIGN MLC DEVICE FOR IMRT: CPT | Mod: 26 | Performed by: RADIOLOGY

## 2020-01-24 PROCEDURE — 77338 DESIGN MLC DEVICE FOR IMRT: CPT | Performed by: RADIOLOGY

## 2020-01-24 PROCEDURE — 77301 RADIOTHERAPY DOSE PLAN IMRT: CPT | Performed by: RADIOLOGY

## 2020-01-24 NOTE — PROGRESS NOTES
Chart reviewed for provider order, indications and contraindications for peripherally inserted central catheter. Labs reviewed and are within procedure parameters. Nursing care plan includes knowledge deficit, potential for pain and anxiety, potential for infection. Risks and benefits of procedure explained to patient/family emphasizing risk of central bloodstream infection. POC: patient teaching, comfort measures, and sterile technique using five step bundle to prevent CR-BSI. Questions answered. Patient/Family verbalized understanding. Consent obtained/confirmed.     Vessel patency confirmed with ultrasound. Upper arm circumference 10 cm above antecubital on PICC arm is 32 cm.     Sterile procedure followed and patient full-body draped per protocol. 2 cc of 1% lidocaine injected intradermally to insertion site at LUE. 21 gauge Micro-introducer needle used to access Basilic vein. Modified Seldinger technique used to insert 5 fr double lumen 45 cm catheter with blood return noted through each lumen. Each lumen flushed using pulsatile method without resistance with 10 cc normal saline. 3CG/EKG technology used with appropriate waveform printed and placed on chart via [employer]. PICC secured with Statlock at 0 cm tricia. Biopatch and Tegaderm applied over insertion site.      # of attempts: one   BARD Power PICC LOT# [id number]     Time out and LDA documentation completed. Patient condition and procedure outcome reported to unit RN and /or ordering provider via this post-procedure note.     Patient/Family educated re: PICC care. Written post-procedure instructions given to patient/family.

## 2020-01-25 NOTE — PROGRESS NOTES
"Pharmacy Chemotherapy Verification    Patient Name: Josy Grove   Dx: anal cancer       Protocol: Mitomycin/5-FU  Fluorouracil 1000 mg/m2/day IV continuous infusion over 96 hours on Days 1-4 and 29-32  Mitomycin 10 mg/m2 IV on Days 1 and 29  NCCN Guidelines for Anal Carcinoma V.1.2020  Chester TORRES, et al. RACHEL. 2008 Apr 23;299(16):1914-21.   Stevo DECKER, et al. Lancet Oncol. 2013 May;14(6):516-24.     Allergies:  Codeine and Morphine     /47   Pulse 73   Temp 36.2 °C (97.1 °F) (Temporal)   Resp 18   Ht 1.58 m (5' 2.21\")   Wt 58.7 kg (129 lb 6.6 oz)   SpO2 96%   BMI 23.51 kg/m²    Body surface area is 1.61 meters squared.    Labs 1/27/20  ANC ~5470 Plt = 202k Hgb = 13.8   SCr = 0.92 mg/dL CrCl = ~46 ml/min   LFTs = WNL T bili = 0.4     Drug Order   (Drug name, dose, route, IV Fluid & volume, frequency, number of doses) Cycle: 1      Previous treatment: n/a     Medication = mitomycin  Base Dose= 10 mg/m2  Calc Dose: Base Dose x 1.61 m2 = 16.1 mg  Final Dose = 16.1 mg  Route = IV  Fluid & Volume =  mL  Admin Duration = Over 15 min          <10% difference, okay to treat with final dose     Medication = fluorouracil  Base Dose= 1000 mg/m2/day  Calc Dose: Base Dose x 1.61 m2 = 1610 mg/day  Final Dose = 6440 mg  Route = IV  Fluid & Volume = 50 mg/mL =  128.8 (+3 mL overfill)  Admin Duration = Over 96 hours @ 1.3 mL/hr          <10% difference, okay to treat with final dose       By my signature below, I confirm this process was performed independently with the BSA and all final chemotherapy dosing calculations congruent. I have reviewed the above chemotherapy order and that my calculation of the final dose and BSA (when applicable) corroborate those calculations of the  pharmacist. Discrepancies of 10% or greater in the written dose have been addressed and documented within the [facility] Progress notes.    Brad Rocha, PharmD          "

## 2020-01-27 ENCOUNTER — DOCUMENTATION (OUTPATIENT)
Dept: NUTRITION | Facility: MEDICAL CENTER | Age: 80
End: 2020-01-27

## 2020-01-27 ENCOUNTER — OUTPATIENT INFUSION SERVICES (OUTPATIENT)
Dept: ONCOLOGY | Facility: MEDICAL CENTER | Age: 80
End: 2020-01-27
Attending: INTERNAL MEDICINE
Payer: MEDICARE

## 2020-01-27 ENCOUNTER — HOSPITAL ENCOUNTER (OUTPATIENT)
Dept: RADIATION ONCOLOGY | Facility: MEDICAL CENTER | Age: 80
End: 2020-01-27

## 2020-01-27 VITALS
HEIGHT: 62 IN | TEMPERATURE: 97.1 F | HEART RATE: 73 BPM | OXYGEN SATURATION: 96 % | SYSTOLIC BLOOD PRESSURE: 109 MMHG | RESPIRATION RATE: 18 BRPM | BODY MASS INDEX: 23.81 KG/M2 | DIASTOLIC BLOOD PRESSURE: 47 MMHG | WEIGHT: 129.41 LBS

## 2020-01-27 DIAGNOSIS — C21.0 ANAL CANCER (HCC): ICD-10-CM

## 2020-01-27 LAB
ALBUMIN SERPL BCP-MCNC: 4 G/DL (ref 3.2–4.9)
ALBUMIN/GLOB SERPL: 1.3 G/DL
ALP SERPL-CCNC: 53 U/L (ref 30–99)
ALT SERPL-CCNC: 15 U/L (ref 2–50)
ANION GAP SERPL CALC-SCNC: 7 MMOL/L (ref 0–11.9)
AST SERPL-CCNC: 16 U/L (ref 12–45)
BASOPHILS # BLD AUTO: 0.7 % (ref 0–1.8)
BASOPHILS # BLD: 0.07 K/UL (ref 0–0.12)
BILIRUB SERPL-MCNC: 0.4 MG/DL (ref 0.1–1.5)
BUN SERPL-MCNC: 18 MG/DL (ref 8–22)
CALCIUM SERPL-MCNC: 9.2 MG/DL (ref 8.5–10.5)
CHEMOTHERAPY INFUSION START DATE: NORMAL
CHEMOTHERAPY RECORDS: 1.8
CHEMOTHERAPY RECORDS: 5040
CHEMOTHERAPY RECORDS: NORMAL
CHEMOTHERAPY RX CANCER: NORMAL
CHLORIDE SERPL-SCNC: 106 MMOL/L (ref 96–112)
CO2 SERPL-SCNC: 24 MMOL/L (ref 20–33)
CREAT SERPL-MCNC: 0.92 MG/DL (ref 0.5–1.4)
DATE 1ST CHEMO CANCER: NORMAL
EOSINOPHIL # BLD AUTO: 1.29 K/UL (ref 0–0.51)
EOSINOPHIL NFR BLD: 13.6 % (ref 0–6.9)
ERYTHROCYTE [DISTWIDTH] IN BLOOD BY AUTOMATED COUNT: 45.8 FL (ref 35.9–50)
GLOBULIN SER CALC-MCNC: 3.1 G/DL (ref 1.9–3.5)
GLUCOSE SERPL-MCNC: 115 MG/DL (ref 65–99)
HCT VFR BLD AUTO: 41.6 % (ref 37–47)
HGB BLD-MCNC: 13.8 G/DL (ref 12–16)
IMM GRANULOCYTES # BLD AUTO: 0.03 K/UL (ref 0–0.11)
IMM GRANULOCYTES NFR BLD AUTO: 0.3 % (ref 0–0.9)
LYMPHOCYTES # BLD AUTO: 1.75 K/UL (ref 1–4.8)
LYMPHOCYTES NFR BLD: 18.4 % (ref 22–41)
MCH RBC QN AUTO: 32.2 PG (ref 27–33)
MCHC RBC AUTO-ENTMCNC: 33.2 G/DL (ref 33.6–35)
MCV RBC AUTO: 97.2 FL (ref 81.4–97.8)
MONOCYTES # BLD AUTO: 0.9 K/UL (ref 0–0.85)
MONOCYTES NFR BLD AUTO: 9.5 % (ref 0–13.4)
NEUTROPHILS # BLD AUTO: 5.47 K/UL (ref 2–7.15)
NEUTROPHILS NFR BLD: 57.5 % (ref 44–72)
NRBC # BLD AUTO: 0 K/UL
NRBC BLD-RTO: 0 /100 WBC
PLATELET # BLD AUTO: 202 K/UL (ref 164–446)
PMV BLD AUTO: 9.9 FL (ref 9–12.9)
POTASSIUM SERPL-SCNC: 4.1 MMOL/L (ref 3.6–5.5)
PROT SERPL-MCNC: 7.1 G/DL (ref 6–8.2)
RAD ONC ARIA COURSE LAST TREATMENT DATE: NORMAL
RAD ONC ARIA COURSE TREATMENT ELAPSED DAYS: NORMAL
RAD ONC ARIA REFERENCE POINT DOSAGE GIVEN TO DATE: 1.54
RAD ONC ARIA REFERENCE POINT DOSAGE GIVEN TO DATE: 1.8
RAD ONC ARIA REFERENCE POINT ID: NORMAL
RAD ONC ARIA REFERENCE POINT ID: NORMAL
RAD ONC ARIA REFERENCE POINT SESSION DOSAGE GIVEN: 1.54
RAD ONC ARIA REFERENCE POINT SESSION DOSAGE GIVEN: 1.8
RBC # BLD AUTO: 4.28 M/UL (ref 4.2–5.4)
SODIUM SERPL-SCNC: 137 MMOL/L (ref 135–145)
WBC # BLD AUTO: 9.5 K/UL (ref 4.8–10.8)

## 2020-01-27 PROCEDURE — 77280 THER RAD SIMULAJ FIELD SMPL: CPT | Performed by: RADIOLOGY

## 2020-01-27 PROCEDURE — 700111 HCHG RX REV CODE 636 W/ 250 OVERRIDE (IP): Mod: JG | Performed by: INTERNAL MEDICINE

## 2020-01-27 PROCEDURE — 77386 HCHG IMRT DELIVERY COMPLEX: CPT | Performed by: RADIOLOGY

## 2020-01-27 PROCEDURE — 96375 TX/PRO/DX INJ NEW DRUG ADDON: CPT

## 2020-01-27 PROCEDURE — 700105 HCHG RX REV CODE 258: Performed by: INTERNAL MEDICINE

## 2020-01-27 PROCEDURE — 80053 COMPREHEN METABOLIC PANEL: CPT

## 2020-01-27 PROCEDURE — 96413 CHEMO IV INFUSION 1 HR: CPT

## 2020-01-27 PROCEDURE — G0498 CHEMO EXTEND IV INFUS W/PUMP: HCPCS

## 2020-01-27 PROCEDURE — 85025 COMPLETE CBC W/AUTO DIFF WBC: CPT

## 2020-01-27 RX ORDER — ONDANSETRON 2 MG/ML
8 INJECTION INTRAMUSCULAR; INTRAVENOUS ONCE
Status: COMPLETED | OUTPATIENT
Start: 2020-01-27 | End: 2020-01-27

## 2020-01-27 RX ORDER — SODIUM CHLORIDE 9 MG/ML
INJECTION, SOLUTION INTRAVENOUS CONTINUOUS
Status: DISCONTINUED | OUTPATIENT
Start: 2020-01-27 | End: 2020-01-27 | Stop reason: HOSPADM

## 2020-01-27 RX ADMIN — FLUOROURACIL 6440 MG: 50 INJECTION, SOLUTION INTRAVENOUS at 18:25

## 2020-01-27 RX ADMIN — ONDANSETRON 8 MG: 2 INJECTION INTRAMUSCULAR; INTRAVENOUS at 16:44

## 2020-01-27 RX ADMIN — SODIUM CHLORIDE: 9 INJECTION, SOLUTION INTRAVENOUS at 16:33

## 2020-01-27 RX ADMIN — MITOMYCIN 16.1 MG: 20 INJECTION, POWDER, LYOPHILIZED, FOR SOLUTION INTRAVENOUS at 17:33

## 2020-01-27 NOTE — PROGRESS NOTES
"Pharmacy Chemotherapy calculation:    DX: anal squamous cell carcinoma    Cycle 1 Day 1  Previous treatment = none    Regimen: mitomycin + fluorouracil + XRT  Fluorouracil 1000mg/m2/day IV on days 1-4 and 29-32  Mitomycin 10mg/m2(max 20mg) IVP on days 1 and 29  35 day cycle x 1 cycle  NCCN Guidelines for anal cancer V.1.2020  Chester TORRES, et al - KEVIN. 2008 Apr 23;299(16):1914-21. doi: 10.1001/[id number].  blaise Whiteside RD - Lancet Oncol. 2013 May;14(6):516-24. doi: 10.1016/-[id number]. Epub 2013 Apr 9.       /47   Pulse 73   Temp 36.2 °C (97.1 °F) (Temporal)   Resp 18   Ht 1.58 m (5' 2.21\")   Wt 58.7 kg (129 lb 6.6 oz)   SpO2 96%   BMI 23.51 kg/m²   Body surface area is 1.61 meters squared.     All lab results 1/27/20 within treatment parameters.     Mitomycin 10mg/m2 x 1.61m2 = 16.1mg    <10% difference, ok to treat with final dose = 16.1mg IV      Fluorouracil 4000mg/m2 (1000mg/m2/day) x 1.61m2 = 6440mg    <10% difference, ok to treat with final dose = 6440mg IV   CIVI via CADD pump at 1.3mL/hr over 96 hours on days 1-4      MARYLIN Turner Pharm.D.      "

## 2020-01-27 NOTE — PROGRESS NOTES
"Nutrition Services: RD Consultation/ New Chemoradiation Start  Pt presents to appointment with family members. RD met with pt to assess current intake, appetite, and nutritional status. Pt appears nourished.  States appetite has decreased a bit since last year. Mentions will \"graze\" throughout day and snack instead of big meals. Mentions has bene maintaining weight, tried to gain some weight to prepare for treatment, suspects has gained about 3 lbs in the last 2 weeks. States UBW is around 122 lbs. Denies issues with n/v, though reports either constipation or diarrhea. Mentions has constipation more often and is taking miralax to resolve. Interested in trying Boost supplemental drinks. Pt did not express any further nutrition-related questions or concerns at this time.     Assessment:  - Primary Dx: Anal cancer  - PMHx: hiatal hernia, high cholesterol  -Pertinent Meds: miralax, mitomycin  -Pertinent Lab work: reviewed  - Height:  5'2\"  -   Weight: 128 lbs on 1/16/20   -   BMI: 23.5 (WNL classification)  -Recent Weight Change: Per limited weight history per chart review, pt appears to have been maintaining weight. Weighed 127 lbs in 2016.     Plan/Recommendations:  1. Provided and discussed handout regarding general nutrition guidelines as well as nutritional recommendations for patient's with Colorectal Cancer, including tips/tricks should side effects of tx occur.   2. Discussed importance of PO intake/ nutrition in order to maintain weight and preserve lean body mass. Reviewed balanced diet with plant-based focus.   3. Provided and went over food lists including healthy snack ideas as well as foods high in protein.   4. Provided Boost supplementation samples and coupons to trial. Recommended choosing Boost Plus or Ensure Enlive for products with higher calorie/protein content.   5. Reviewed adequate hydration   6. RD provided contact information. Encouraged pt to reach out as questions/concerns arise.     Pt " reports understanding and was receptive. RD following and to make further recommendations as indicated.

## 2020-01-28 ENCOUNTER — PATIENT OUTREACH (OUTPATIENT)
Dept: OTHER | Facility: MEDICAL CENTER | Age: 80
End: 2020-01-28

## 2020-01-28 ENCOUNTER — HOSPITAL ENCOUNTER (OUTPATIENT)
Dept: RADIATION ONCOLOGY | Facility: MEDICAL CENTER | Age: 80
End: 2020-01-28

## 2020-01-28 LAB
CHEMOTHERAPY INFUSION START DATE: NORMAL
CHEMOTHERAPY RECORDS: 1.8
CHEMOTHERAPY RECORDS: 5040
CHEMOTHERAPY RECORDS: NORMAL
CHEMOTHERAPY RX CANCER: NORMAL
DATE 1ST CHEMO CANCER: NORMAL
RAD ONC ARIA COURSE LAST TREATMENT DATE: NORMAL
RAD ONC ARIA COURSE TREATMENT ELAPSED DAYS: NORMAL
RAD ONC ARIA REFERENCE POINT DOSAGE GIVEN TO DATE: 3.09
RAD ONC ARIA REFERENCE POINT DOSAGE GIVEN TO DATE: 3.6
RAD ONC ARIA REFERENCE POINT ID: NORMAL
RAD ONC ARIA REFERENCE POINT ID: NORMAL
RAD ONC ARIA REFERENCE POINT SESSION DOSAGE GIVEN: 1.54
RAD ONC ARIA REFERENCE POINT SESSION DOSAGE GIVEN: 1.8

## 2020-01-28 PROCEDURE — 77014 PR CT GUIDANCE PLACEMENT RAD THERAPY FIELDS: CPT | Mod: 26 | Performed by: RADIOLOGY

## 2020-01-28 PROCEDURE — 77386 HCHG IMRT DELIVERY COMPLEX: CPT | Performed by: RADIOLOGY

## 2020-01-28 NOTE — PROGRESS NOTES
Chemotherapy Verification - PRIMARY RN      Height = 1.58 m Weight = 58.7 kg  BSA = 1.61 m2       Medication: Mitomycin  Dose: 10 mg/m2  Calculated Dose: 16.1 mg                            (In mg/m2, AUC, mg/kg)     Medication: Fluorrouracil  Dose: 4000 mg/m2  Calculated Dose: 6440 mg                             (In mg/m2, AUC, mg/kg)      I confirm this process was performed independently with the BSA and all final chemotherapy dosing calculations congruent.  Any discrepancies of 10% or greater have been addressed with the chemotherapy pharmacist. The resolution of the discrepancy has been documented in the EPIC progress notes.

## 2020-01-28 NOTE — PROGRESS NOTES
Chemotherapy Verification - SECONDARY RN       Height = 1.58 m  Weight = 58.7 kg  BSA = 1.61 m^2       Medication: mitomycin  Dose: 10 mg/m^2  Calculated Dose: 161 mg                            (In mg/m2, AUC, mg/kg)     Medication: fluorouracil  Dose: 4000 mg/m^2 over 96 hours  Calculated Dose: 6440 mg over 96 hours                             (In mg/m2, AUC, mg/kg)      I confirm that this process was performed independently.

## 2020-01-28 NOTE — PROGRESS NOTES
Patient presents to clinic for D1C1 Mitomycin and Fluorouracil CADD pump. Plan of care reviewed with pt and family. Chemotherapy education given, pt and family verbalized understanding. Pt denies any recent infections, fevers, bruising or bleeding. R PICC in place, brisk blood return noted from dual lumens, labs drawn as ordered. Labs reviewed and verified, pt within parameters to receive treatment. Premed administered as ordered. Mytomycin infused over 30 minutes. Pt tolerated infusion well with no s/s of adverse reaction. 5FU CADD pump connected as ordered. Pt to return in 96 hours for pump disconnect. Pt given chemotherapy spill kit and encouraged to call with any questions or concerns. Pt discharged in stable, ambulatory condition.

## 2020-01-29 VITALS
OXYGEN SATURATION: 94 % | TEMPERATURE: 97.2 F | DIASTOLIC BLOOD PRESSURE: 75 MMHG | SYSTOLIC BLOOD PRESSURE: 109 MMHG | BODY MASS INDEX: 23.88 KG/M2 | WEIGHT: 131.4 LBS | HEART RATE: 68 BPM

## 2020-01-29 DIAGNOSIS — C20 MALIGNANT NEOPLASM OF RECTUM (HCC): ICD-10-CM

## 2020-01-29 LAB
CHEMOTHERAPY INFUSION START DATE: NORMAL
CHEMOTHERAPY RECORDS: 1.8
CHEMOTHERAPY RECORDS: 5040
CHEMOTHERAPY RECORDS: NORMAL
CHEMOTHERAPY RX CANCER: NORMAL
DATE 1ST CHEMO CANCER: NORMAL
RAD ONC ARIA COURSE LAST TREATMENT DATE: NORMAL
RAD ONC ARIA COURSE TREATMENT ELAPSED DAYS: NORMAL
RAD ONC ARIA REFERENCE POINT DOSAGE GIVEN TO DATE: 4.63
RAD ONC ARIA REFERENCE POINT DOSAGE GIVEN TO DATE: 5.4
RAD ONC ARIA REFERENCE POINT ID: NORMAL
RAD ONC ARIA REFERENCE POINT ID: NORMAL
RAD ONC ARIA REFERENCE POINT SESSION DOSAGE GIVEN: 1.54
RAD ONC ARIA REFERENCE POINT SESSION DOSAGE GIVEN: 1.8

## 2020-01-29 PROCEDURE — 77386 HCHG IMRT DELIVERY COMPLEX: CPT | Performed by: RADIOLOGY

## 2020-01-29 PROCEDURE — 77336 RADIATION PHYSICS CONSULT: CPT | Performed by: RADIOLOGY

## 2020-01-29 PROCEDURE — 77014 PR CT GUIDANCE PLACEMENT RAD THERAPY FIELDS: CPT | Mod: 26 | Performed by: RADIOLOGY

## 2020-01-29 RX ORDER — HYDROCORTISONE ACETATE 25 MG/1
25 SUPPOSITORY RECTAL EVERY 12 HOURS
Qty: 28 SUPPOSITORY | Refills: 0 | Status: SHIPPED | OUTPATIENT
Start: 2020-01-29 | End: 2020-02-12

## 2020-01-29 ASSESSMENT — PAIN SCALES - GENERAL: PAINLEVEL: 2=MINIMAL-SLIGHT

## 2020-01-29 NOTE — ON TREATMENT VISIT
ON TREATMENT  NOTE  RADIATION ONCOLOGY DEPARTMENT    Patient name:  Josy Grove    Primary Physician:  Jaciel Maravilla M.D. MRN: [id number]  CSN: [id number]   Referring physician:  Dulce Jeter M.D. : 1940, 79 y.o.     ENCOUNTER DATE:  20    DIAGNOSIS:  Visit Diagnoses     ICD-10-CM   1. Malignant neoplasm of rectum (HCC) C20     Anal cancer (HCC)  Staging form: Anus, AJCC 8th Edition  - Clinical: Stage IIA (cT2, cN0, cM0) - Signed by Sha Casper M.D. on 2020      TREATMENT SUMMARY:  [facility]ria Treatment Information        Some values may be hidden. Unless noted otherwise, only the newest values recorded on each date are displayed.         Aria Treatment Summary 20   Course First Treatment Date 2020   Course Last Treatment Date 2020   Anus Plan from Course C1_anus   Fraction 3 of 28   Elapsed Course Days 2 @    Prescribed Fraction Dose 180 cGy   Prescribed Total Dose 5,040 cGy   Anus Reference Point from Course C1_anus   Elapsed Course Days 2 @    Session Dose 180 cGy   Total Dose 540 cGy   Anus CP Reference Point from Course C1_anus   Elapsed Course Days 2 @    Session Dose 154 cGy   Total Dose 463 cGy             SUBJECTIVE:  Mild rectal pain.       VITAL SIGNS:  KPS: 90, Able to carry on normal activity; minor signs or symptoms of disease (ECOG equivalent 0)  Encounter Vitals  Temperature: 36.2 °C (97.2 °F)  Temp src: Temporal  Blood Pressure : 109/75  Pulse: 68  Pulse Oximetry: 94 %  Weight: 59.6 kg (131 lb 6.4 oz)  Pain Score: 2=Minimal-Slight  Pain Assessment 2020   Pain Assessment Chronic Pain Acute Pain   Pain Score 2 1   Pain Loc Groin Rectum   What increases pain? - bowel movement    What decreases pain? - nothing    Some recent data might be hidden          PHYSICAL EXAM:    Physical Exam  Constitutional:       Appearance: Normal appearance.   Abdominal:      General: There is no distension.      Palpations:  Abdomen is soft.   Skin:     Findings: No erythema.   Neurological:      Mental Status: She is alert.          Toxicity Assessment 1/29/2020   Toxicity Assessment Female Pelvis   Fatigue (lethargy, malaise, asthenia) Increased fatigue over baseline, but not altering normal activities   Radiation Dermatitis None   Anorexia None   Colitis None   Constipation None   Dehydration None   Diarrhea w/o Colostomy None   Flatulence None   Nausea None   Proctitis None   Vomiting None   RT - Pain due to RT None   Tumor Pain (onset or exacerbation of tumor pain due to treatment) Mild pain not interfering with function   Dysuria (painful urination) None   Urinary Frequency Normal   Urinary Urgency None   Bladder Spasms Absent   Incontinence None   Urinary Retention Normal   Vaginitis (not due to infection) None   Vaginal Bleeding None   Vaginal Dryness Normal       CURRENT MEDICATIONS:    Current Outpatient Medications:   •  hydrocortisone (ANUSOL-HC) 25 MG Suppos, Insert 1 Suppository in rectum every 12 hours for 14 days., Disp: 28 Suppository, Rfl: 0  •  DILTIAZem (CARDIZEM) 60 MG Tab, Take 60 mg by mouth 2 Times a Day., Disp: , Rfl:   •  montelukast (SINGULAIR) 10 MG Tab, Take 10 mg by mouth every day., Disp: , Rfl:   •  fluticasone-salmeterol (WIXELA INHUB) 500-50 MCG/DOSE AEROSOL POWDER, BREATH ACTIVATED, Inhale 1 Puff by mouth every 12 hours., Disp: , Rfl:   •  vitamin D (CHOLECALCIFEROL) 1000 UNIT Tab, Take 1,000 Units by mouth every day., Disp: , Rfl:   •  vitamin e (VITAMIN E) 400 UNIT Cap, Take 400 Units by mouth every day., Disp: , Rfl:   •  CALCIUM PO, Take 750 mg by mouth every day., Disp: , Rfl:   •  Ibuprofen-diphenhydrAMINE HCl (ADVIL PM) 200-25 MG Cap, Take 1 Dose by mouth every bedtime., Disp: , Rfl:   •  apixaban (ELIQUIS) 5mg Tab, Take 5 mg by mouth., Disp: , Rfl:   •  atorvastatin (LIPITOR) 20 MG Tab, Take 80 mg by mouth., Disp: , Rfl:   •  metoprolol SR (TOPROL XL) 25 MG TABLET SR 24 HR, Take 25 mg by  mouth., Disp: , Rfl:   •  nitroglycerin (NITROSTAT) 0.4 MG SL Tab, Place 0.4 mg under tongue., Disp: , Rfl:   •  SUMAtriptan (IMITREX) 25 MG Tab tablet, Take 25 mg by mouth., Disp: , Rfl:   •  zolpidem (AMBIEN) 10 MG Tab, Take 5 mg by mouth at bedtime as needed for Sleep., Disp: , Rfl:     LABORATORY DATA:   Lab Results   Component Value Date/Time    SODIUM 137 01/27/2020 03:15 PM    POTASSIUM 4.1 01/27/2020 03:15 PM    CHLORIDE 106 01/27/2020 03:15 PM    CO2 24 01/27/2020 03:15 PM    GLUCOSE 115 (H) 01/27/2020 03:15 PM    BUN 18 01/27/2020 03:15 PM    CREATININE 0.92 01/27/2020 03:15 PM         Lab Results   Component Value Date/Time    WBC 9.5 01/27/2020 03:15 PM    HEMOGLOBIN 13.8 01/27/2020 03:15 PM    HEMATOCRIT 41.6 01/27/2020 03:15 PM    MCV 97.2 01/27/2020 03:15 PM    PLATELETCT 202 01/27/2020 03:15 PM        RADIOLOGY DATA:  Ct-chest,abdomen,pelvis With    Result Date: 1/10/2020  1/10/2020 2:21 PM HISTORY/REASON FOR EXAM:  New diagnosis of anal cancer, staging. TECHNIQUE/EXAM DESCRIPTION: CT scan of the chest, abdomen and pelvis with contrast. Thin-section helical scanning was obtained with intravenous contrast from the lung apices through the pubic symphysis to include the chest, abdomen and pelvis. 100 mL of Omnipaque 350 nonionic contrast was administered intravenously without complication. Low dose optimization technique was utilized for this CT exam including automated exposure control and adjustment of the mA and/or kV according to patient size. COMPARISON: None. FINDINGS: CT CHEST: Lungs: Mild biapical pleural parenchymal scarring. Mild scarring along the right minor fissure. A few areas of atelectasis/scarring in the lower lungs. No suspicious pulmonary nodules or masses. No focal consolidation or pleural effusions. Mediastinum: Unremarkable thyroid. No mediastinal mass. Nodes: No enlarged nodes by size criteria. Heart: Not enlarged. No pericardial effusion. Aorta and great vessels: No aneurysm.  Atherosclerosis. CT ABDOMEN/PELVIS: Liver: No mass. No intrahepatic biliary dilatation. Gallbladder: Removed. Common bile duct: Nondilated. Pancreas: Unremarkable. Spleen: No mass. Adrenals: No mass. Kidneys: Tiny bilateral renal hypodensities, too small to characterize, likely simple cysts. Extrarenal pelvis on the right, with mild nonspecific prominence of the right ureter. No renal or ureteral stones. No hydronephrosis. Stomach, small bowel, colon: Small hiatal hernia. No bowel wall thickening or obstruction. Moderate amount of stool throughout the colon. Colonic diverticulosis. Anal lesion was better characterized on the recent MRI. Peritoneal cavity: No ascites. A small hypodense nodule below the left hemidiaphragm, medial to the spleen measures 1.4 x 1 cm and contains a thin peripheral calcification. Lymph nodes: No enlarged nodes by size criteria. Aorta: No aneurysm. Atherosclerosis. Pelvic organs: Normal bladder. Hysterectomy. MUSCULOSKELETAL STRUCTURES: No acute fracture or destructive lesion. ACDF. Prior right rotator cuff repair. Decreased bone mineralization. Mild compression deformity of T7 vertebral body, likely chronic. Spondylosis.     1.  No definitive evidence of metastatic disease in the chest, abdomen or pelvis. 2.  A hypodense 1.4 cm nodule below the left hemidiaphragm, medial to the spleen. It is unlikely to represent a solitary metastatic lesion in this location. It can be followed. 3.  Anal malignancy was better characterized on recent MRI study.    Dw-ekxev-ktizt Base To Mid-thigh    Result Date: 1/16/2020 1/16/2020 12:51 PM HISTORY/REASON FOR EXAM:  Anal carcinoma staging TECHNIQUE/EXAM DESCRIPTION AND NUMBER OF VIEWS: PET body imaging. Initially, 15.37 mCi F-18 FDG was administered intravenously under standardized conditions. Approximately 45 minutes after FDG administration, the patient was placed in the supine position on the PET CT table. Blood glucose level was 80 mg/dL. Low dose  spiral CT imaging was performed from the skull base to the mid thighs. PET imaging was then performed from the skull base to the mid thighs. CT images, PET images, and PET/CT fused images were reviewed on a PACS 3D workstation. The limited non-contrast CT data are used primarily for attenuation correction and anatomic correlation.  Evaluation of solid organs and bowel are especially limited utilizing this technique. COMPARISON: CT 01/10/2020 FINDINGS: Head and neck: No focal areas of abnormal elevated activity are identified in the visualized portion of the head and neck. No abnormally enlarged lymph nodes are identified. No abnormal solid mass is appreciated. Chest: No focal areas of abnormal elevated activity are identified in the lungs, mediastinum, or chest wall. No focal pulmonary mass is appreciated. There is no evidence of mediastinal or hilar adenopathy. No pleural fluid collection or pleural thickening is identified. No axillary adenopathy or chest wall mass is appreciated. Abdomen: No focal areas of abnormal elevated activity are identified in the abdomen. No focal mass is appreciated in the liver spleen pancreas kidneys or adrenal glands. There is no evidence of abdominal or retroperitoneal adenopathy. No free fluid or peritoneal inflammation is appreciated. Small low-density cyst or nodule just below the left hemidiaphragm medial to the spleen is again identified. There is no internal activity. Surgical clips are noted consistent with prior cholecystectomy. Pelvis: Mass centered to the left of midline in the anus that extends up into the lower edge of the rectum is again identified. There is homogeneous abnormally elevated activity throughout the entire mass measuring up to 19.9 SUV. No other sites of elevated activity are noted in the pelvis. No abnormally enlarged lymph nodes are identified. No other abnormal soft tissue masses are noted. There is diverticulosis again noted.     1.  Abnormal  oval-shaped solid mass centered in the left side of the anus extending to the level of the lower rectum is again identified. This mass demonstrates markedly elevated activity consistent with anal carcinoma. 2.  No adenopathy or elevated raghav activity identified. 3.  No PET/CT evidence of distant metastatic disease in the neck chest or abdomen. 4.  Small low-attenuation nodular lesion in the left upper quadrant of the abdomen is again identified. No elevated activity is noted.    Mr-rectal/prostate Protocol    Result Date: 1/3/2020  1/3/2020 10:03 AM HISTORY/REASON FOR EXAM:  Anal cancer, staging TECHNIQUE/EXAM DESCRIPTION: MRI of the pelvis without and with contrast. The study was performed on a Gagan 3.0 Cindy MRI scanner. Sagittal and axial T2; oblique high resolution axial and coronal T2; oblique axial diffusion-weighted imaging and ADC map with B values of 100, 400, and 1000; oblique axial dynamic postcontrast images of the prostate with kinetic curves; postcontrast T1 fat-sat sequence of the pelvis. 1 mg of glucagon was administered intravenously prior to the exam. 10 mL ProHance contrast was administered intravenously. COMPARISON: None. FINDINGS: There is a 3.9 x 2 cm lesion in the anal canal. The lesion is semicircumferential, extending from 1:00 to 8:00 position. It extends from the anal verge into the lower rectum, with distance from anal verge to its superior margin measuring 3.9 cm. Anteriorly, the lesion abuts the lower vagina, without definitive evidence of vaginal invasion. No invasion of the urethra. No enlarged pelvic lymph nodes are identified. Hysterectomy. Normal bladder. Colonic diverticulosis. No suspicious osseous lesions.     1. A T2, N0 anal cancer measuring 3.9 cm in the greatest dimension. The tumor abuts the lower vagina, without evidence of vaginal invasion. 2. No pelvic lymphadenopathy or suspicious osseous lesions.    Ir-picc Line Placement W/ Guidance > Age 5    Result Date:  1/23/2020  HISTORY/REASON FOR EXAM:   PICC placement. TECHNIQUE/EXAM DESCRIPTION AND NUMBER OF VIEWS:   PICC line insertion with ultrasound guidance.  The procedure was performed using maximal sterile barrier technique including sterile gown, mask, cap, and donning of sterile gloves following appropriate hand hygiene and/or sterile scrub. Patient skin site was prepped with 2% Chlorhexidine solution. FINDINGS:  PICC line insertion with Ultrasound Guidance was performed by qualified nursing staff without the assistance of a Radiologist. PICC positioning appropriateness confirmed by 3CG technology; chest xray only needed in the instance 3CG unable to confirm placement.              Ultrasound-guided PICC placement performed by qualified nursing staff as above.       IMPRESSION:  Anal cancer (HCC)  Staging form: Anus, AJCC 8th Edition  - Clinical: Stage IIA (cT2, cN0, cM0) - Signed by Sha Casper M.D. on 1/16/2020      PLAN:  No change in treatment plan    Disposition:  Treatment plan reviewed. Questions answered. Continue therapy outlined. Given rx for judie-sol for rectal pain.     Sha Casper M.D.    Orders Placed This Encounter   • Rad Onc Aria Session Summary   • hydrocortisone (ANUSOL-HC) 25 MG Suppos

## 2020-01-30 ENCOUNTER — HOSPITAL ENCOUNTER (OUTPATIENT)
Dept: RADIATION ONCOLOGY | Facility: MEDICAL CENTER | Age: 80
End: 2020-01-30

## 2020-01-30 LAB
CHEMOTHERAPY INFUSION START DATE: NORMAL
CHEMOTHERAPY RECORDS: 1.8
CHEMOTHERAPY RECORDS: 5040
CHEMOTHERAPY RECORDS: NORMAL
CHEMOTHERAPY RX CANCER: NORMAL
DATE 1ST CHEMO CANCER: NORMAL
RAD ONC ARIA COURSE LAST TREATMENT DATE: NORMAL
RAD ONC ARIA COURSE TREATMENT ELAPSED DAYS: NORMAL
RAD ONC ARIA REFERENCE POINT DOSAGE GIVEN TO DATE: 6.17
RAD ONC ARIA REFERENCE POINT DOSAGE GIVEN TO DATE: 7.2
RAD ONC ARIA REFERENCE POINT ID: NORMAL
RAD ONC ARIA REFERENCE POINT ID: NORMAL
RAD ONC ARIA REFERENCE POINT SESSION DOSAGE GIVEN: 1.54
RAD ONC ARIA REFERENCE POINT SESSION DOSAGE GIVEN: 1.8

## 2020-01-30 PROCEDURE — 77014 PR CT GUIDANCE PLACEMENT RAD THERAPY FIELDS: CPT | Mod: 26 | Performed by: RADIOLOGY

## 2020-01-30 PROCEDURE — 77386 HCHG IMRT DELIVERY COMPLEX: CPT | Performed by: RADIOLOGY

## 2020-01-30 NOTE — PROGRESS NOTES
"Pt presented to [facility] reporting she was told to \"check in\" by Erica VARGHESE as her 5FU pump had \"malfunctioned\" after it was initially started. Settings reviewed and remaining vol 61.7 ml, continuously running at 1.3 ml/hr. In calculating time of remaining vol, pt will not complete by tomorrow. Pt with roughly 48 hours remaining so appt for tomorrow changed and pt to return Monday for pump disconnect. Pt educated on how to stop pump as it will finish Sunday but pt requesting to come Monday as she would like the weekend off and has XRT on Monday. Time of appt on Monday confirmed with pt. Pt to return as scheduled.  "

## 2020-01-31 ENCOUNTER — HOSPITAL ENCOUNTER (OUTPATIENT)
Dept: RADIATION ONCOLOGY | Facility: MEDICAL CENTER | Age: 80
End: 2020-01-31

## 2020-01-31 LAB
CHEMOTHERAPY INFUSION START DATE: NORMAL
CHEMOTHERAPY RECORDS: 1.8
CHEMOTHERAPY RECORDS: 5040
CHEMOTHERAPY RECORDS: NORMAL
CHEMOTHERAPY RX CANCER: NORMAL
DATE 1ST CHEMO CANCER: NORMAL
RAD ONC ARIA COURSE LAST TREATMENT DATE: NORMAL
RAD ONC ARIA COURSE TREATMENT ELAPSED DAYS: NORMAL
RAD ONC ARIA REFERENCE POINT DOSAGE GIVEN TO DATE: 7.72
RAD ONC ARIA REFERENCE POINT DOSAGE GIVEN TO DATE: 9
RAD ONC ARIA REFERENCE POINT ID: NORMAL
RAD ONC ARIA REFERENCE POINT ID: NORMAL
RAD ONC ARIA REFERENCE POINT SESSION DOSAGE GIVEN: 1.54
RAD ONC ARIA REFERENCE POINT SESSION DOSAGE GIVEN: 1.8

## 2020-01-31 PROCEDURE — 77427 RADIATION TX MANAGEMENT X5: CPT | Performed by: RADIOLOGY

## 2020-01-31 PROCEDURE — 77014 PR CT GUIDANCE PLACEMENT RAD THERAPY FIELDS: CPT | Mod: 26 | Performed by: RADIOLOGY

## 2020-01-31 PROCEDURE — 77386 HCHG IMRT DELIVERY COMPLEX: CPT | Performed by: RADIOLOGY

## 2020-02-03 ENCOUNTER — HOSPITAL ENCOUNTER (OUTPATIENT)
Dept: RADIATION ONCOLOGY | Facility: MEDICAL CENTER | Age: 80
End: 2020-02-03

## 2020-02-03 ENCOUNTER — OUTPATIENT INFUSION SERVICES (OUTPATIENT)
Dept: ONCOLOGY | Facility: MEDICAL CENTER | Age: 80
End: 2020-02-03
Attending: INTERNAL MEDICINE
Payer: MEDICARE

## 2020-02-03 ENCOUNTER — HOSPITAL ENCOUNTER (OUTPATIENT)
Dept: RADIATION ONCOLOGY | Facility: MEDICAL CENTER | Age: 80
End: 2020-02-29
Attending: RADIOLOGY
Payer: MEDICARE

## 2020-02-03 VITALS
RESPIRATION RATE: 18 BRPM | BODY MASS INDEX: 22.93 KG/M2 | HEART RATE: 81 BPM | DIASTOLIC BLOOD PRESSURE: 52 MMHG | HEIGHT: 63 IN | WEIGHT: 129.41 LBS | SYSTOLIC BLOOD PRESSURE: 108 MMHG | TEMPERATURE: 98 F | OXYGEN SATURATION: 96 %

## 2020-02-03 DIAGNOSIS — C21.0 ANAL CANCER (HCC): ICD-10-CM

## 2020-02-03 LAB
CHEMOTHERAPY INFUSION START DATE: NORMAL
CHEMOTHERAPY RECORDS: 1.8
CHEMOTHERAPY RECORDS: 5040
CHEMOTHERAPY RECORDS: NORMAL
CHEMOTHERAPY RX CANCER: NORMAL
DATE 1ST CHEMO CANCER: NORMAL
RAD ONC ARIA COURSE LAST TREATMENT DATE: NORMAL
RAD ONC ARIA COURSE TREATMENT ELAPSED DAYS: NORMAL
RAD ONC ARIA REFERENCE POINT DOSAGE GIVEN TO DATE: 10.8
RAD ONC ARIA REFERENCE POINT DOSAGE GIVEN TO DATE: 9.26
RAD ONC ARIA REFERENCE POINT ID: NORMAL
RAD ONC ARIA REFERENCE POINT ID: NORMAL
RAD ONC ARIA REFERENCE POINT SESSION DOSAGE GIVEN: 1.54
RAD ONC ARIA REFERENCE POINT SESSION DOSAGE GIVEN: 1.8

## 2020-02-03 PROCEDURE — 77014 PR CT GUIDANCE PLACEMENT RAD THERAPY FIELDS: CPT | Mod: 26 | Performed by: RADIOLOGY

## 2020-02-03 PROCEDURE — 99211 OFF/OP EST MAY X REQ PHY/QHP: CPT | Mod: 25

## 2020-02-03 PROCEDURE — 77386 HCHG IMRT DELIVERY COMPLEX: CPT | Performed by: RADIOLOGY

## 2020-02-03 RX ORDER — HYDROCODONE BITARTRATE AND ACETAMINOPHEN 5; 325 MG/1; MG/1
1-2 TABLET ORAL
COMMUNITY
Start: 2020-01-29 | End: 2020-03-04

## 2020-02-03 RX ORDER — 0.9 % SODIUM CHLORIDE 0.9 %
20 VIAL (ML) INJECTION PRN
Status: DISCONTINUED | OUTPATIENT
Start: 2020-02-03 | End: 2020-02-03 | Stop reason: HOSPADM

## 2020-02-03 ASSESSMENT — PAIN SCALES - GENERAL: PAINLEVEL: 2=MINIMAL-SLIGHT

## 2020-02-03 NOTE — PROGRESS NOTES
Bran here for 5 FU pump disconnect. See chemotherapy flow sheet for volume / dose administration. Right PICC line flushed per protocol. Dressing changed using sterile technique. Next appointment scheduled. Discharged to self care; no apparent distress noted.

## 2020-02-04 ENCOUNTER — HOSPITAL ENCOUNTER (OUTPATIENT)
Dept: RADIATION ONCOLOGY | Facility: MEDICAL CENTER | Age: 80
End: 2020-02-04

## 2020-02-04 LAB
CHEMOTHERAPY INFUSION START DATE: NORMAL
CHEMOTHERAPY RECORDS: 1.8
CHEMOTHERAPY RECORDS: 5040
CHEMOTHERAPY RECORDS: NORMAL
CHEMOTHERAPY RX CANCER: NORMAL
DATE 1ST CHEMO CANCER: NORMAL
RAD ONC ARIA COURSE LAST TREATMENT DATE: NORMAL
RAD ONC ARIA COURSE TREATMENT ELAPSED DAYS: NORMAL
RAD ONC ARIA REFERENCE POINT DOSAGE GIVEN TO DATE: 10.8
RAD ONC ARIA REFERENCE POINT DOSAGE GIVEN TO DATE: 12.6
RAD ONC ARIA REFERENCE POINT ID: NORMAL
RAD ONC ARIA REFERENCE POINT ID: NORMAL
RAD ONC ARIA REFERENCE POINT SESSION DOSAGE GIVEN: 1.54
RAD ONC ARIA REFERENCE POINT SESSION DOSAGE GIVEN: 1.8

## 2020-02-04 PROCEDURE — 77386 HCHG IMRT DELIVERY COMPLEX: CPT | Performed by: RADIOLOGY

## 2020-02-04 PROCEDURE — 77014 PR CT GUIDANCE PLACEMENT RAD THERAPY FIELDS: CPT | Mod: 26 | Performed by: RADIOLOGY

## 2020-02-05 ENCOUNTER — HOSPITAL ENCOUNTER (OUTPATIENT)
Dept: RADIATION ONCOLOGY | Facility: MEDICAL CENTER | Age: 80
End: 2020-02-05

## 2020-02-05 VITALS
HEART RATE: 65 BPM | BODY MASS INDEX: 23.21 KG/M2 | OXYGEN SATURATION: 97 % | SYSTOLIC BLOOD PRESSURE: 119 MMHG | TEMPERATURE: 97.4 F | DIASTOLIC BLOOD PRESSURE: 62 MMHG | WEIGHT: 129.36 LBS

## 2020-02-05 DIAGNOSIS — C20 MALIGNANT NEOPLASM OF RECTUM (HCC): ICD-10-CM

## 2020-02-05 LAB
CHEMOTHERAPY INFUSION START DATE: NORMAL
CHEMOTHERAPY RECORDS: 1.8
CHEMOTHERAPY RECORDS: 5040
CHEMOTHERAPY RECORDS: NORMAL
CHEMOTHERAPY RX CANCER: NORMAL
DATE 1ST CHEMO CANCER: NORMAL
RAD ONC ARIA COURSE LAST TREATMENT DATE: NORMAL
RAD ONC ARIA COURSE TREATMENT ELAPSED DAYS: NORMAL
RAD ONC ARIA REFERENCE POINT DOSAGE GIVEN TO DATE: 12.17
RAD ONC ARIA REFERENCE POINT DOSAGE GIVEN TO DATE: 13.92
RAD ONC ARIA REFERENCE POINT ID: NORMAL
RAD ONC ARIA REFERENCE POINT ID: NORMAL
RAD ONC ARIA REFERENCE POINT SESSION DOSAGE GIVEN: 1.32
RAD ONC ARIA REFERENCE POINT SESSION DOSAGE GIVEN: 1.37

## 2020-02-05 PROCEDURE — 77336 RADIATION PHYSICS CONSULT: CPT | Performed by: RADIOLOGY

## 2020-02-05 PROCEDURE — 77386 HCHG IMRT DELIVERY COMPLEX: CPT | Performed by: RADIOLOGY

## 2020-02-05 PROCEDURE — 77014 PR CT GUIDANCE PLACEMENT RAD THERAPY FIELDS: CPT | Mod: 26 | Performed by: RADIOLOGY

## 2020-02-05 ASSESSMENT — PAIN SCALES - GENERAL: PAINLEVEL: 2=MINIMAL-SLIGHT

## 2020-02-05 NOTE — ON TREATMENT VISIT
ON TREATMENT  NOTE  RADIATION ONCOLOGY DEPARTMENT    Patient name:  Josy Grove    Primary Physician:  Jaciel Maravilla M.D. MRN: [id number]  CSN: [id number]   Referring physician:  Dulce Jeter M.D. : 1940, 79 y.o.     ENCOUNTER DATE:  20    DIAGNOSIS:  Visit Diagnoses     ICD-10-CM   1. Malignant neoplasm of rectum (HCC) C20     Anal cancer (HCC)  Staging form: Anus, AJCC 8th Edition  - Clinical: Stage IIA (cT2, cN0, cM0) - Signed by Sha Casper M.D. on 2020      TREATMENT SUMMARY:  Aria Treatment Information        Some values may be hidden. Unless noted otherwise, only the newest values recorded on each date are displayed.         Aria Treatment Summary 20   Course First Treatment Date 2020   Course Last Treatment Date 2020   Anus Plan from Course C1_anus   Fraction 8 of 28   Elapsed Course Days 9 @    Prescribed Fraction Dose 180 cGy   Prescribed Total Dose 5,040 cGy   Anus Reference Point from Course C1_anus   Elapsed Course Days 9 @    Session Dose 132 cGy   Total Dose 1,392 cGy   Anus CP Reference Point from Course C1_anus   Elapsed Course Days 9 @    Session Dose 137 cGy   Total Dose 1,217 cGy             SUBJECTIVE:  Mild mucositis      VITAL SIGNS:  KPS: 100, Fully active, able to carry on all pre-disease performed without restriction (ECOG equivalent 0)  Encounter Vitals  Temperature: 36.3 °C (97.4 °F)  Temp src: Temporal  Blood Pressure : 119/62  Pulse: 65  Pulse Oximetry: 97 %  Weight: 58.7 kg (129 lb 5.8 oz)  Pain Score: 2=Minimal-Slight  Pain Assessment 2020 2/3/2020   Pain Assessment Denies Pain -   Pain Score 2 2   Pain Loc Rectum -   What increases pain? BM -   What decreases pain? medication  -   Some recent data might be hidden          PHYSICAL EXAM:    Physical Exam  Constitutional:       Appearance: Normal appearance.   Abdominal:      General: There is no distension.      Palpations: Abdomen is soft.    Skin:     Findings: No erythema.   Neurological:      Mental Status: She is alert.          Toxicity Assessment 2/5/2020 1/29/2020   Toxicity Assessment Female Pelvis Female Pelvis   Fatigue (lethargy, malaise, asthenia) Increased fatigue over baseline, but not altering normal activities Increased fatigue over baseline, but not altering normal activities   Radiation Dermatitis None None   Anorexia Loss of appetite None   Colitis None None   Constipation None None   Dehydration None None   Diarrhea w/o Colostomy None None   Flatulence None None   Nausea - None   Proctitis None None   Vomiting None None   RT - Pain due to RT Mild pain not interfering with function None   Tumor Pain (onset or exacerbation of tumor pain due to treatment) Mild pain not interfering with function Mild pain not interfering with function   Dysuria (painful urination) None None   Urinary Frequency Normal Normal   Urinary Urgency None None   Bladder Spasms Absent Absent   Incontinence None None   Urinary Retention Normal Normal   Vaginitis (not due to infection) None None   Vaginal Bleeding None None   Vaginal Dryness Normal Normal       CURRENT MEDICATIONS:    Current Outpatient Medications:   •  NORCO 5-325 MG Tab per tablet, , Disp: , Rfl:   •  hydrocortisone (ANUSOL-HC) 25 MG Suppos, Insert 1 Suppository in rectum every 12 hours for 14 days., Disp: 28 Suppository, Rfl: 0  •  DILTIAZem (CARDIZEM) 60 MG Tab, Take 60 mg by mouth 2 Times a Day., Disp: , Rfl:   •  montelukast (SINGULAIR) 10 MG Tab, Take 10 mg by mouth every day., Disp: , Rfl:   •  fluticasone-salmeterol (WIXELA INHUB) 500-50 MCG/DOSE AEROSOL POWDER, BREATH ACTIVATED, Inhale 1 Puff by mouth every 12 hours., Disp: , Rfl:   •  vitamin D (CHOLECALCIFEROL) 1000 UNIT Tab, Take 1,000 Units by mouth every day., Disp: , Rfl:   •  vitamin e (VITAMIN E) 400 UNIT Cap, Take 400 Units by mouth every day., Disp: , Rfl:   •  CALCIUM PO, Take 750 mg by mouth every day., Disp: , Rfl:   •   Ibuprofen-diphenhydrAMINE HCl (ADVIL PM) 200-25 MG Cap, Take 1 Dose by mouth every bedtime., Disp: , Rfl:   •  atorvastatin (LIPITOR) 20 MG Tab, Take 80 mg by mouth., Disp: , Rfl:   •  metoprolol SR (TOPROL XL) 25 MG TABLET SR 24 HR, Take 25 mg by mouth., Disp: , Rfl:   •  nitroglycerin (NITROSTAT) 0.4 MG SL Tab, Place 0.4 mg under tongue., Disp: , Rfl:   •  SUMAtriptan (IMITREX) 25 MG Tab tablet, Take 25 mg by mouth., Disp: , Rfl:   •  zolpidem (AMBIEN) 10 MG Tab, Take 5 mg by mouth at bedtime as needed for Sleep., Disp: , Rfl:   •  apixaban (ELIQUIS) 5mg Tab, Take 5 mg by mouth., Disp: , Rfl:     LABORATORY DATA:   Lab Results   Component Value Date/Time    SODIUM 137 01/27/2020 03:15 PM    POTASSIUM 4.1 01/27/2020 03:15 PM    CHLORIDE 106 01/27/2020 03:15 PM    CO2 24 01/27/2020 03:15 PM    GLUCOSE 115 (H) 01/27/2020 03:15 PM    BUN 18 01/27/2020 03:15 PM    CREATININE 0.92 01/27/2020 03:15 PM         Lab Results   Component Value Date/Time    WBC 9.5 01/27/2020 03:15 PM    HEMOGLOBIN 13.8 01/27/2020 03:15 PM    HEMATOCRIT 41.6 01/27/2020 03:15 PM    MCV 97.2 01/27/2020 03:15 PM    PLATELETCT 202 01/27/2020 03:15 PM        RADIOLOGY DATA:  Ct-chest,abdomen,pelvis With    Result Date: 1/10/2020  1/10/2020 2:21 PM HISTORY/REASON FOR EXAM:  New diagnosis of anal cancer, staging. TECHNIQUE/EXAM DESCRIPTION: CT scan of the chest, abdomen and pelvis with contrast. Thin-section helical scanning was obtained with intravenous contrast from the lung apices through the pubic symphysis to include the chest, abdomen and pelvis. 100 mL of Omnipaque 350 nonionic contrast was administered intravenously without complication. Low dose optimization technique was utilized for this CT exam including automated exposure control and adjustment of the mA and/or kV according to patient size. COMPARISON: None. FINDINGS: CT CHEST: Lungs: Mild biapical pleural parenchymal scarring. Mild scarring along the right minor fissure. A few areas of  atelectasis/scarring in the lower lungs. No suspicious pulmonary nodules or masses. No focal consolidation or pleural effusions. Mediastinum: Unremarkable thyroid. No mediastinal mass. Nodes: No enlarged nodes by size criteria. Heart: Not enlarged. No pericardial effusion. Aorta and great vessels: No aneurysm. Atherosclerosis. CT ABDOMEN/PELVIS: Liver: No mass. No intrahepatic biliary dilatation. Gallbladder: Removed. Common bile duct: Nondilated. Pancreas: Unremarkable. Spleen: No mass. Adrenals: No mass. Kidneys: Tiny bilateral renal hypodensities, too small to characterize, likely simple cysts. Extrarenal pelvis on the right, with mild nonspecific prominence of the right ureter. No renal or ureteral stones. No hydronephrosis. Stomach, small bowel, colon: Small hiatal hernia. No bowel wall thickening or obstruction. Moderate amount of stool throughout the colon. Colonic diverticulosis. Anal lesion was better characterized on the recent MRI. Peritoneal cavity: No ascites. A small hypodense nodule below the left hemidiaphragm, medial to the spleen measures 1.4 x 1 cm and contains a thin peripheral calcification. Lymph nodes: No enlarged nodes by size criteria. Aorta: No aneurysm. Atherosclerosis. Pelvic organs: Normal bladder. Hysterectomy. MUSCULOSKELETAL STRUCTURES: No acute fracture or destructive lesion. ACDF. Prior right rotator cuff repair. Decreased bone mineralization. Mild compression deformity of T7 vertebral body, likely chronic. Spondylosis.     1.  No definitive evidence of metastatic disease in the chest, abdomen or pelvis. 2.  A hypodense 1.4 cm nodule below the left hemidiaphragm, medial to the spleen. It is unlikely to represent a solitary metastatic lesion in this location. It can be followed. 3.  Anal malignancy was better characterized on recent MRI study.    Ec-zumpb-qkbbo Base To Mid-thigh    Result Date: 1/16/2020 1/16/2020 12:51 PM HISTORY/REASON FOR EXAM:  Anal carcinoma staging  TECHNIQUE/EXAM DESCRIPTION AND NUMBER OF VIEWS: PET body imaging. Initially, 15.37 mCi F-18 FDG was administered intravenously under standardized conditions. Approximately 45 minutes after FDG administration, the patient was placed in the supine position on the PET CT table. Blood glucose level was 80 mg/dL. Low dose spiral CT imaging was performed from the skull base to the mid thighs. PET imaging was then performed from the skull base to the mid thighs. CT images, PET images, and PET/CT fused images were reviewed on a PACS 3D workstation. The limited non-contrast CT data are used primarily for attenuation correction and anatomic correlation.  Evaluation of solid organs and bowel are especially limited utilizing this technique. COMPARISON: CT 01/10/2020 FINDINGS: Head and neck: No focal areas of abnormal elevated activity are identified in the visualized portion of the head and neck. No abnormally enlarged lymph nodes are identified. No abnormal solid mass is appreciated. Chest: No focal areas of abnormal elevated activity are identified in the lungs, mediastinum, or chest wall. No focal pulmonary mass is appreciated. There is no evidence of mediastinal or hilar adenopathy. No pleural fluid collection or pleural thickening is identified. No axillary adenopathy or chest wall mass is appreciated. Abdomen: No focal areas of abnormal elevated activity are identified in the abdomen. No focal mass is appreciated in the liver spleen pancreas kidneys or adrenal glands. There is no evidence of abdominal or retroperitoneal adenopathy. No free fluid or peritoneal inflammation is appreciated. Small low-density cyst or nodule just below the left hemidiaphragm medial to the spleen is again identified. There is no internal activity. Surgical clips are noted consistent with prior cholecystectomy. Pelvis: Mass centered to the left of midline in the anus that extends up into the lower edge of the rectum is again identified. There is  homogeneous abnormally elevated activity throughout the entire mass measuring up to 19.9 SUV. No other sites of elevated activity are noted in the pelvis. No abnormally enlarged lymph nodes are identified. No other abnormal soft tissue masses are noted. There is diverticulosis again noted.     1.  Abnormal oval-shaped solid mass centered in the left side of the anus extending to the level of the lower rectum is again identified. This mass demonstrates markedly elevated activity consistent with anal carcinoma. 2.  No adenopathy or elevated raghav activity identified. 3.  No PET/CT evidence of distant metastatic disease in the neck chest or abdomen. 4.  Small low-attenuation nodular lesion in the left upper quadrant of the abdomen is again identified. No elevated activity is noted.    Ir-picc Line Placement W/ Guidance > Age 5    Result Date: 1/23/2020  HISTORY/REASON FOR EXAM:   PICC placement. TECHNIQUE/EXAM DESCRIPTION AND NUMBER OF VIEWS:   PICC line insertion with ultrasound guidance.  The procedure was performed using maximal sterile barrier technique including sterile gown, mask, cap, and donning of sterile gloves following appropriate hand hygiene and/or sterile scrub. Patient skin site was prepped with 2% Chlorhexidine solution. FINDINGS:  PICC line insertion with Ultrasound Guidance was performed by qualified nursing staff without the assistance of a Radiologist. PICC positioning appropriateness confirmed by 3CG technology; chest xray only needed in the instance 3CG unable to confirm placement.              Ultrasound-guided PICC placement performed by qualified nursing staff as above.       IMPRESSION:  Anal cancer (HCC)  Staging form: Anus, AJCC 8th Edition  - Clinical: Stage IIA (cT2, cN0, cM0) - Signed by Sha Casper M.D. on 1/16/2020      PLAN:  No change in treatment plan    Disposition:  Treatment plan reviewed. Questions answered. Continue therapy outlined. Given rx for baking soda  [email or web address].    Sha Casper M.D.    No orders of the defined types were placed in this encounter.

## 2020-02-06 ENCOUNTER — HOSPITAL ENCOUNTER (OUTPATIENT)
Dept: RADIATION ONCOLOGY | Facility: MEDICAL CENTER | Age: 80
End: 2020-02-06
Payer: MEDICARE

## 2020-02-06 LAB
CHEMOTHERAPY INFUSION START DATE: NORMAL
CHEMOTHERAPY INFUSION START DATE: NORMAL
CHEMOTHERAPY RECORDS: 1.8
CHEMOTHERAPY RECORDS: 1.8
CHEMOTHERAPY RECORDS: 5040
CHEMOTHERAPY RECORDS: 5040
CHEMOTHERAPY RECORDS: NORMAL
CHEMOTHERAPY RX CANCER: NORMAL
CHEMOTHERAPY RX CANCER: NORMAL
DATE 1ST CHEMO CANCER: NORMAL
DATE 1ST CHEMO CANCER: NORMAL
RAD ONC ARIA COURSE LAST TREATMENT DATE: NORMAL
RAD ONC ARIA COURSE LAST TREATMENT DATE: NORMAL
RAD ONC ARIA COURSE TREATMENT ELAPSED DAYS: NORMAL
RAD ONC ARIA COURSE TREATMENT ELAPSED DAYS: NORMAL
RAD ONC ARIA REFERENCE POINT DOSAGE GIVEN TO DATE: 12.35
RAD ONC ARIA REFERENCE POINT DOSAGE GIVEN TO DATE: 13.89
RAD ONC ARIA REFERENCE POINT DOSAGE GIVEN TO DATE: 14.4
RAD ONC ARIA REFERENCE POINT DOSAGE GIVEN TO DATE: 16.2
RAD ONC ARIA REFERENCE POINT ID: NORMAL
RAD ONC ARIA REFERENCE POINT SESSION DOSAGE GIVEN: 0.17
RAD ONC ARIA REFERENCE POINT SESSION DOSAGE GIVEN: 0.48
RAD ONC ARIA REFERENCE POINT SESSION DOSAGE GIVEN: 1.54
RAD ONC ARIA REFERENCE POINT SESSION DOSAGE GIVEN: 1.8

## 2020-02-06 PROCEDURE — 77386 HCHG IMRT DELIVERY COMPLEX: CPT | Performed by: RADIOLOGY

## 2020-02-06 PROCEDURE — 77014 PR CT GUIDANCE PLACEMENT RAD THERAPY FIELDS: CPT | Mod: 26 | Performed by: RADIOLOGY

## 2020-02-07 ENCOUNTER — HOSPITAL ENCOUNTER (OUTPATIENT)
Dept: RADIATION ONCOLOGY | Facility: MEDICAL CENTER | Age: 80
End: 2020-02-07
Payer: MEDICARE

## 2020-02-07 LAB
CHEMOTHERAPY INFUSION START DATE: NORMAL
CHEMOTHERAPY RECORDS: 1.8
CHEMOTHERAPY RECORDS: 5040
CHEMOTHERAPY RECORDS: NORMAL
CHEMOTHERAPY RX CANCER: NORMAL
DATE 1ST CHEMO CANCER: NORMAL
RAD ONC ARIA COURSE LAST TREATMENT DATE: NORMAL
RAD ONC ARIA COURSE TREATMENT ELAPSED DAYS: NORMAL
RAD ONC ARIA REFERENCE POINT DOSAGE GIVEN TO DATE: 15.43
RAD ONC ARIA REFERENCE POINT DOSAGE GIVEN TO DATE: 18
RAD ONC ARIA REFERENCE POINT ID: NORMAL
RAD ONC ARIA REFERENCE POINT ID: NORMAL
RAD ONC ARIA REFERENCE POINT SESSION DOSAGE GIVEN: 1.54
RAD ONC ARIA REFERENCE POINT SESSION DOSAGE GIVEN: 1.8

## 2020-02-07 PROCEDURE — 77014 PR CT GUIDANCE PLACEMENT RAD THERAPY FIELDS: CPT | Mod: 26 | Performed by: RADIOLOGY

## 2020-02-07 PROCEDURE — 77427 RADIATION TX MANAGEMENT X5: CPT | Performed by: RADIOLOGY

## 2020-02-07 PROCEDURE — 77386 HCHG IMRT DELIVERY COMPLEX: CPT | Performed by: RADIOLOGY

## 2020-02-10 ENCOUNTER — OUTPATIENT INFUSION SERVICES (OUTPATIENT)
Dept: ONCOLOGY | Facility: MEDICAL CENTER | Age: 80
End: 2020-02-10
Attending: INTERNAL MEDICINE
Payer: MEDICARE

## 2020-02-10 ENCOUNTER — HOSPITAL ENCOUNTER (OUTPATIENT)
Dept: RADIATION ONCOLOGY | Facility: MEDICAL CENTER | Age: 80
End: 2020-02-10
Payer: MEDICARE

## 2020-02-10 VITALS
RESPIRATION RATE: 18 BRPM | DIASTOLIC BLOOD PRESSURE: 46 MMHG | WEIGHT: 129.41 LBS | HEIGHT: 62 IN | SYSTOLIC BLOOD PRESSURE: 100 MMHG | BODY MASS INDEX: 23.81 KG/M2 | HEART RATE: 80 BPM | TEMPERATURE: 97.3 F | OXYGEN SATURATION: 96 %

## 2020-02-10 DIAGNOSIS — C21.0 ANAL CANCER (HCC): ICD-10-CM

## 2020-02-10 LAB
CHEMOTHERAPY INFUSION START DATE: NORMAL
CHEMOTHERAPY RECORDS: 1.8
CHEMOTHERAPY RECORDS: 5040
CHEMOTHERAPY RECORDS: NORMAL
CHEMOTHERAPY RX CANCER: NORMAL
DATE 1ST CHEMO CANCER: NORMAL
RAD ONC ARIA COURSE LAST TREATMENT DATE: NORMAL
RAD ONC ARIA COURSE TREATMENT ELAPSED DAYS: NORMAL
RAD ONC ARIA REFERENCE POINT DOSAGE GIVEN TO DATE: 16.98
RAD ONC ARIA REFERENCE POINT DOSAGE GIVEN TO DATE: 19.8
RAD ONC ARIA REFERENCE POINT ID: NORMAL
RAD ONC ARIA REFERENCE POINT ID: NORMAL
RAD ONC ARIA REFERENCE POINT SESSION DOSAGE GIVEN: 1.54
RAD ONC ARIA REFERENCE POINT SESSION DOSAGE GIVEN: 1.8

## 2020-02-10 PROCEDURE — 77014 PR CT GUIDANCE PLACEMENT RAD THERAPY FIELDS: CPT | Mod: 26 | Performed by: RADIOLOGY

## 2020-02-10 PROCEDURE — 77386 HCHG IMRT DELIVERY COMPLEX: CPT | Performed by: RADIOLOGY

## 2020-02-10 PROCEDURE — 99211 OFF/OP EST MAY X REQ PHY/QHP: CPT | Mod: 25

## 2020-02-10 RX ORDER — HEPARIN SODIUM (PORCINE) LOCK FLUSH IV SOLN 100 UNIT/ML 100 UNIT/ML
300 SOLUTION INTRAVENOUS PRN
Status: CANCELLED | OUTPATIENT
Start: 2020-03-23

## 2020-02-10 RX ORDER — 0.9 % SODIUM CHLORIDE 0.9 %
10 VIAL (ML) INJECTION PRN
Status: CANCELLED | OUTPATIENT
Start: 2020-02-29

## 2020-02-10 RX ORDER — 0.9 % SODIUM CHLORIDE 0.9 %
10 VIAL (ML) INJECTION PRN
Status: CANCELLED | OUTPATIENT
Start: 2020-03-23

## 2020-02-10 RX ORDER — 0.9 % SODIUM CHLORIDE 0.9 %
10 VIAL (ML) INJECTION PRN
Status: CANCELLED | OUTPATIENT
Start: 2020-02-10

## 2020-02-10 NOTE — PROGRESS NOTES
Pt arrived ambulatory to [facility] for weekly PICC line dressing change.  POC discussed.  Pt denies new complaints today.  LUE double lumen PICC line in place.  Dressing changed in sterile field, claves changed, and both lines flushed per policy.  Line patent with positive blood return in each lumen.  Pt tolerated well.  Next appointment confirmed.  Pt discharged from [facility] in NAD with family member.

## 2020-02-11 ENCOUNTER — DOCUMENTATION (OUTPATIENT)
Dept: NUTRITION | Facility: MEDICAL CENTER | Age: 80
End: 2020-02-11

## 2020-02-11 ENCOUNTER — HOSPITAL ENCOUNTER (OUTPATIENT)
Dept: RADIATION ONCOLOGY | Facility: MEDICAL CENTER | Age: 80
End: 2020-02-11
Payer: MEDICARE

## 2020-02-11 LAB
CHEMOTHERAPY INFUSION START DATE: NORMAL
CHEMOTHERAPY RECORDS: 1.8
CHEMOTHERAPY RECORDS: 5040
CHEMOTHERAPY RECORDS: NORMAL
CHEMOTHERAPY RX CANCER: NORMAL
DATE 1ST CHEMO CANCER: NORMAL
RAD ONC ARIA COURSE LAST TREATMENT DATE: NORMAL
RAD ONC ARIA COURSE TREATMENT ELAPSED DAYS: NORMAL
RAD ONC ARIA REFERENCE POINT DOSAGE GIVEN TO DATE: 18.52
RAD ONC ARIA REFERENCE POINT DOSAGE GIVEN TO DATE: 21.6
RAD ONC ARIA REFERENCE POINT ID: NORMAL
RAD ONC ARIA REFERENCE POINT ID: NORMAL
RAD ONC ARIA REFERENCE POINT SESSION DOSAGE GIVEN: 1.54
RAD ONC ARIA REFERENCE POINT SESSION DOSAGE GIVEN: 1.8

## 2020-02-11 PROCEDURE — 77386 HCHG IMRT DELIVERY COMPLEX: CPT | Performed by: RADIOLOGY

## 2020-02-11 PROCEDURE — 77014 PR CT GUIDANCE PLACEMENT RAD THERAPY FIELDS: CPT | Mod: 26 | Performed by: RADIOLOGY

## 2020-02-11 NOTE — PROGRESS NOTES
Nutrition Services: Brief Update  Weight: 129 lbs, weighed on 2/10/19 in Infusion  Weight History:  128 lbs on 1/27/20    Weight Change: stable at this time    RD able to visit pt following Xrt. Pt states is having difficulties with diarrhea since yesterday. Also relays poor appetite, though is still eating despite this. Mentions has a lot of support and people making her snacks and encouraging her to eat. Recalls consuming cooked zucchini and carrots with lasagna the other day. Also states is eating yogurt and drinking 1-2 Boost Plus every morning. Reports adequate hydration with water throughout the day. States is taking imodium 1-2x per day and feels that is has been helping. States has been having a  hoarse throat and is wondering if it is related to chemotherapy.     Plan/Recommend:  • Encouraged continue frequent snacks and 3x meals per day  • Discussed weight maintenance as positive  • Discussed foods that can help thicken stool, such as ripe bananas, oatmeal, white rice, benefiber supplementation, etc.  • Supported continuing adequate hydration  • Provided samples of Nuun and Ensure rapid hydration  • Supported continued use of imodium for diarrhea relief.   • Discussed use of sugar-free candies or mints to stimulate saliva production.     RD to continue to monitor throughout treatment as indicated.  Please contact [phone]

## 2020-02-12 ENCOUNTER — HOSPITAL ENCOUNTER (OUTPATIENT)
Dept: RADIATION ONCOLOGY | Facility: MEDICAL CENTER | Age: 80
End: 2020-02-12
Payer: MEDICARE

## 2020-02-12 VITALS
OXYGEN SATURATION: 99 % | HEART RATE: 76 BPM | WEIGHT: 129.5 LBS | BODY MASS INDEX: 23.53 KG/M2 | DIASTOLIC BLOOD PRESSURE: 56 MMHG | SYSTOLIC BLOOD PRESSURE: 104 MMHG

## 2020-02-12 DIAGNOSIS — C21.0 ANAL CANCER (HCC): ICD-10-CM

## 2020-02-12 LAB
CHEMOTHERAPY INFUSION START DATE: NORMAL
CHEMOTHERAPY RECORDS: 1.8
CHEMOTHERAPY RECORDS: 5040
CHEMOTHERAPY RECORDS: NORMAL
CHEMOTHERAPY RX CANCER: NORMAL
DATE 1ST CHEMO CANCER: NORMAL
RAD ONC ARIA COURSE LAST TREATMENT DATE: NORMAL
RAD ONC ARIA COURSE TREATMENT ELAPSED DAYS: NORMAL
RAD ONC ARIA REFERENCE POINT DOSAGE GIVEN TO DATE: 20.06
RAD ONC ARIA REFERENCE POINT DOSAGE GIVEN TO DATE: 23.4
RAD ONC ARIA REFERENCE POINT ID: NORMAL
RAD ONC ARIA REFERENCE POINT ID: NORMAL
RAD ONC ARIA REFERENCE POINT SESSION DOSAGE GIVEN: 1.54
RAD ONC ARIA REFERENCE POINT SESSION DOSAGE GIVEN: 1.8

## 2020-02-12 PROCEDURE — 77386 HCHG IMRT DELIVERY COMPLEX: CPT | Performed by: RADIOLOGY

## 2020-02-12 PROCEDURE — 77014 PR CT GUIDANCE PLACEMENT RAD THERAPY FIELDS: CPT | Mod: 26 | Performed by: RADIOLOGY

## 2020-02-12 PROCEDURE — 77336 RADIATION PHYSICS CONSULT: CPT | Performed by: RADIOLOGY

## 2020-02-12 RX ORDER — HYDROCORTISONE ACETATE 25 MG/1
25 SUPPOSITORY RECTAL EVERY 12 HOURS
Qty: 14 SUPPOSITORY | Refills: 0 | Status: SHIPPED | OUTPATIENT
Start: 2020-02-12 | End: 2020-02-19

## 2020-02-12 ASSESSMENT — PAIN SCALES - GENERAL: PAINLEVEL: 4=SLIGHT-MODERATE PAIN

## 2020-02-12 NOTE — ON TREATMENT VISIT
ON TREATMENT  NOTE  RADIATION ONCOLOGY DEPARTMENT    Patient name:  Josy Grove    Primary Physician:  Jaciel Maravilla M.D. MRN: [id number]  CSN: [id number]   Referring physician:  Dulce Jeter M.D. : 1940, 79 y.o.     ENCOUNTER DATE:  20    DIAGNOSIS:  Visit Diagnoses     ICD-10-CM   1. Anal cancer (HCC) C21.0     Anal cancer (HCC)  Staging form: Anus, AJCC 8th Edition  - Clinical: Stage IIA (cT2, cN0, cM0) - Signed by Sha Casper M.D. on 2020      TREATMENT SUMMARY:  Aria Treatment Information        Some values may be hidden. Unless noted otherwise, only the newest values recorded on each date are displayed.         Aria Treatment Summary 20   Course First Treatment Date 2020   Course Last Treatment Date 2020   Anus Plan from Course C1_anus   Fraction 13 of 28   Elapsed Course Days 16 @    Prescribed Fraction Dose 180 cGy   Prescribed Total Dose 5,040 cGy   Anus Reference Point from Course C1_anus   Elapsed Course Days 16 @    Session Dose 180 cGy   Total Dose 2,340 cGy   Anus CP Reference Point from Course C1_anus   Elapsed Course Days 16 @    Session Dose 154 cGy   Total Dose 2,006 cGy             SUBJECTIVE:  Small area of ulceration on right buttocks nothing internal      VITAL SIGNS:  KPS: 90, Able to carry on normal activity; minor signs or symptoms of disease (ECOG equivalent 0)  Encounter Vitals  Blood Pressure : 104/56  Pulse: 76  Pulse Oximetry: 99 %  Weight: 58.7 kg (129 lb 8 oz)  Pain Score: 4=Slight-Moderate Pain  Pain Assessment 2020   Pain Assessment - Denies Pain   Pain Score 4 2   Pain Loc Pelvic Rectum   What increases pain? - BM   What decreases pain? - medication    Some recent data might be hidden          PHYSICAL EXAM:    Physical Exam  Vitals signs and nursing note reviewed.   Constitutional:       Appearance: Normal appearance.   Neck:      Musculoskeletal: Normal range of motion.   Skin:          Neurological:      General: No focal deficit present.      Mental Status: She is alert.          Toxicity Assessment 2/12/2020 2/5/2020 1/29/2020   Toxicity Assessment Female Pelvis Female Pelvis Female Pelvis   Fatigue (lethargy, malaise, asthenia) Moderate (e.g., decrease in performance status by 1 ECOG level or 20% Karnofsky) or causing difficulty performing some activities Increased fatigue over baseline, but not altering normal activities Increased fatigue over baseline, but not altering normal activities   Radiation Dermatitis Faint erythema or dry desquamation None None   Anorexia Oral intake significantly decreased Loss of appetite None   Colitis None None None   Constipation None None None   Dehydration None None None   Diarrhea w/o Colostomy Increase of <4 stools/day over pre-treatment None None   Flatulence None None None   Nausea Able to eat - None   Proctitis None None None   Vomiting None None None   RT - Pain due to RT Mild pain not interfering with function Mild pain not interfering with function None   Tumor Pain (onset or exacerbation of tumor pain due to treatment) Mild pain not interfering with function Mild pain not interfering with function Mild pain not interfering with function   Dysuria (painful urination) Mild symptoms requiring no intervention None None   Urinary Frequency Normal Normal Normal   Urinary Urgency None None None   Bladder Spasms Absent Absent Absent   Incontinence None None None   Urinary Retention Normal Normal Normal   Vaginitis (not due to infection) Mild, not requiring treatment None None   Vaginal Bleeding None None None   Vaginal Dryness - Normal Normal       CURRENT MEDICATIONS:    Current Outpatient Medications:   •  hydrocortisone (ANUSOL-HC) 25 MG Suppos, Insert 1 Suppository in rectum every 12 hours for 7 days., Disp: 14 Suppository, Rfl: 0  •  NORCO 5-325 MG Tab per tablet, , Disp: , Rfl:   •  hydrocortisone (ANUSOL-HC) 25 MG Suppos, Insert 1 Suppository in  rectum every 12 hours for 14 days., Disp: 28 Suppository, Rfl: 0  •  DILTIAZem (CARDIZEM) 60 MG Tab, Take 60 mg by mouth 2 Times a Day., Disp: , Rfl:   •  montelukast (SINGULAIR) 10 MG Tab, Take 10 mg by mouth every day., Disp: , Rfl:   •  fluticasone-salmeterol (WIXELA INHUB) 500-50 MCG/DOSE AEROSOL POWDER, BREATH ACTIVATED, Inhale 1 Puff by mouth every 12 hours., Disp: , Rfl:   •  vitamin D (CHOLECALCIFEROL) 1000 UNIT Tab, Take 1,000 Units by mouth every day., Disp: , Rfl:   •  vitamin e (VITAMIN E) 400 UNIT Cap, Take 400 Units by mouth every day., Disp: , Rfl:   •  CALCIUM PO, Take 750 mg by mouth every day., Disp: , Rfl:   •  Ibuprofen-diphenhydrAMINE HCl (ADVIL PM) 200-25 MG Cap, Take 1 Dose by mouth every bedtime., Disp: , Rfl:   •  atorvastatin (LIPITOR) 20 MG Tab, Take 80 mg by mouth., Disp: , Rfl:   •  metoprolol SR (TOPROL XL) 25 MG TABLET SR 24 HR, Take 25 mg by mouth., Disp: , Rfl:   •  nitroglycerin (NITROSTAT) 0.4 MG SL Tab, Place 0.4 mg under tongue., Disp: , Rfl:   •  SUMAtriptan (IMITREX) 25 MG Tab tablet, Take 25 mg by mouth., Disp: , Rfl:   •  zolpidem (AMBIEN) 10 MG Tab, Take 5 mg by mouth at bedtime as needed for Sleep., Disp: , Rfl:     LABORATORY DATA:   Lab Results   Component Value Date/Time    SODIUM 137 01/27/2020 03:15 PM    POTASSIUM 4.1 01/27/2020 03:15 PM    CHLORIDE 106 01/27/2020 03:15 PM    CO2 24 01/27/2020 03:15 PM    GLUCOSE 115 (H) 01/27/2020 03:15 PM    BUN 18 01/27/2020 03:15 PM    CREATININE 0.92 01/27/2020 03:15 PM         Lab Results   Component Value Date/Time    WBC 9.5 01/27/2020 03:15 PM    HEMOGLOBIN 13.8 01/27/2020 03:15 PM    HEMATOCRIT 41.6 01/27/2020 03:15 PM    MCV 97.2 01/27/2020 03:15 PM    PLATELETCT 202 01/27/2020 03:15 PM        RADIOLOGY DATA:  Pp-zegfp-wyfzo Base To Mid-thigh    Result Date: 1/16/2020 1/16/2020 12:51 PM HISTORY/REASON FOR EXAM:  Anal carcinoma staging TECHNIQUE/EXAM DESCRIPTION AND NUMBER OF VIEWS: PET body imaging. Initially, 15.37 mCi  F-18 FDG was administered intravenously under standardized conditions. Approximately 45 minutes after FDG administration, the patient was placed in the supine position on the PET CT table. Blood glucose level was 80 mg/dL. Low dose spiral CT imaging was performed from the skull base to the mid thighs. PET imaging was then performed from the skull base to the mid thighs. CT images, PET images, and PET/CT fused images were reviewed on a PACS 3D workstation. The limited non-contrast CT data are used primarily for attenuation correction and anatomic correlation.  Evaluation of solid organs and bowel are especially limited utilizing this technique. COMPARISON: CT 01/10/2020 FINDINGS: Head and neck: No focal areas of abnormal elevated activity are identified in the visualized portion of the head and neck. No abnormally enlarged lymph nodes are identified. No abnormal solid mass is appreciated. Chest: No focal areas of abnormal elevated activity are identified in the lungs, mediastinum, or chest wall. No focal pulmonary mass is appreciated. There is no evidence of mediastinal or hilar adenopathy. No pleural fluid collection or pleural thickening is identified. No axillary adenopathy or chest wall mass is appreciated. Abdomen: No focal areas of abnormal elevated activity are identified in the abdomen. No focal mass is appreciated in the liver spleen pancreas kidneys or adrenal glands. There is no evidence of abdominal or retroperitoneal adenopathy. No free fluid or peritoneal inflammation is appreciated. Small low-density cyst or nodule just below the left hemidiaphragm medial to the spleen is again identified. There is no internal activity. Surgical clips are noted consistent with prior cholecystectomy. Pelvis: Mass centered to the left of midline in the anus that extends up into the lower edge of the rectum is again identified. There is homogeneous abnormally elevated activity throughout the entire mass measuring up to  19.9 SUV. No other sites of elevated activity are noted in the pelvis. No abnormally enlarged lymph nodes are identified. No other abnormal soft tissue masses are noted. There is diverticulosis again noted.     1.  Abnormal oval-shaped solid mass centered in the left side of the anus extending to the level of the lower rectum is again identified. This mass demonstrates markedly elevated activity consistent with anal carcinoma. 2.  No adenopathy or elevated raghav activity identified. 3.  No PET/CT evidence of distant metastatic disease in the neck chest or abdomen. 4.  Small low-attenuation nodular lesion in the left upper quadrant of the abdomen is again identified. No elevated activity is noted.    Ir-picc Line Placement W/ Guidance > Age 5    Result Date: 1/23/2020  HISTORY/REASON FOR EXAM:   PICC placement. TECHNIQUE/EXAM DESCRIPTION AND NUMBER OF VIEWS:   PICC line insertion with ultrasound guidance.  The procedure was performed using maximal sterile barrier technique including sterile gown, mask, cap, and donning of sterile gloves following appropriate hand hygiene and/or sterile scrub. Patient skin site was prepped with 2% Chlorhexidine solution. FINDINGS:  PICC line insertion with Ultrasound Guidance was performed by qualified nursing staff without the assistance of a Radiologist. PICC positioning appropriateness confirmed by 3CG technology; chest xray only needed in the instance 3CG unable to confirm placement.              Ultrasound-guided PICC placement performed by qualified nursing staff as above.       IMPRESSION:  Anal cancer (HCC)  Staging form: Anus, AJCC 8th Edition  - Clinical: Stage IIA (cT2, cN0, cM0) - Signed by Sha Casper M.D. on 1/16/2020      PLAN:  No change in treatment plan    Disposition:  Treatment plan reviewed. Questions answered. Continue therapy outlined.  Given rx for anusol internally and plan for aquaphor externally.    Sha Casper M.D.    Orders Placed This Encounter    • hydrocortisone (ANUSOL-HC) 25 MG Suppos

## 2020-02-13 ENCOUNTER — HOSPITAL ENCOUNTER (OUTPATIENT)
Dept: RADIATION ONCOLOGY | Facility: MEDICAL CENTER | Age: 80
End: 2020-02-13
Payer: MEDICARE

## 2020-02-13 LAB
CHEMOTHERAPY INFUSION START DATE: NORMAL
CHEMOTHERAPY RECORDS: 1.8
CHEMOTHERAPY RECORDS: 5040
CHEMOTHERAPY RECORDS: NORMAL
CHEMOTHERAPY RX CANCER: NORMAL
DATE 1ST CHEMO CANCER: NORMAL
RAD ONC ARIA COURSE LAST TREATMENT DATE: NORMAL
RAD ONC ARIA COURSE TREATMENT ELAPSED DAYS: NORMAL
RAD ONC ARIA REFERENCE POINT DOSAGE GIVEN TO DATE: 21.61
RAD ONC ARIA REFERENCE POINT DOSAGE GIVEN TO DATE: 25.2
RAD ONC ARIA REFERENCE POINT ID: NORMAL
RAD ONC ARIA REFERENCE POINT ID: NORMAL
RAD ONC ARIA REFERENCE POINT SESSION DOSAGE GIVEN: 1.54
RAD ONC ARIA REFERENCE POINT SESSION DOSAGE GIVEN: 1.8

## 2020-02-13 PROCEDURE — 77014 PR CT GUIDANCE PLACEMENT RAD THERAPY FIELDS: CPT | Mod: 26 | Performed by: RADIOLOGY

## 2020-02-13 PROCEDURE — 77386 HCHG IMRT DELIVERY COMPLEX: CPT | Performed by: RADIOLOGY

## 2020-02-14 ENCOUNTER — HOSPITAL ENCOUNTER (OUTPATIENT)
Dept: RADIATION ONCOLOGY | Facility: MEDICAL CENTER | Age: 80
End: 2020-02-14
Payer: MEDICARE

## 2020-02-14 ENCOUNTER — HOSPITAL ENCOUNTER (OUTPATIENT)
Dept: LAB | Facility: MEDICAL CENTER | Age: 80
End: 2020-02-14
Attending: NURSE PRACTITIONER
Payer: MEDICARE

## 2020-02-14 LAB
ALBUMIN SERPL BCP-MCNC: 3.3 G/DL (ref 3.2–4.9)
ALBUMIN/GLOB SERPL: 1.7 G/DL
ALP SERPL-CCNC: 48 U/L (ref 30–99)
ALT SERPL-CCNC: 19 U/L (ref 2–50)
ANION GAP SERPL CALC-SCNC: 9 MMOL/L (ref 0–11.9)
AST SERPL-CCNC: 19 U/L (ref 12–45)
BASOPHILS # BLD AUTO: 1.4 % (ref 0–1.8)
BASOPHILS # BLD: 0.04 K/UL (ref 0–0.12)
BILIRUB SERPL-MCNC: 0.3 MG/DL (ref 0.1–1.5)
BUN SERPL-MCNC: 14 MG/DL (ref 8–22)
CALCIUM SERPL-MCNC: 8.4 MG/DL (ref 8.5–10.5)
CHEMOTHERAPY INFUSION START DATE: NORMAL
CHEMOTHERAPY RECORDS: 1.8
CHEMOTHERAPY RECORDS: 5040
CHEMOTHERAPY RECORDS: NORMAL
CHEMOTHERAPY RX CANCER: NORMAL
CHLORIDE SERPL-SCNC: 109 MMOL/L (ref 96–112)
CO2 SERPL-SCNC: 22 MMOL/L (ref 20–33)
CREAT SERPL-MCNC: 0.87 MG/DL (ref 0.5–1.4)
DATE 1ST CHEMO CANCER: NORMAL
EOSINOPHIL # BLD AUTO: 0.23 K/UL (ref 0–0.51)
EOSINOPHIL NFR BLD: 8 % (ref 0–6.9)
ERYTHROCYTE [DISTWIDTH] IN BLOOD BY AUTOMATED COUNT: 43.7 FL (ref 35.9–50)
GLOBULIN SER CALC-MCNC: 2 G/DL (ref 1.9–3.5)
GLUCOSE SERPL-MCNC: 123 MG/DL (ref 65–99)
HCT VFR BLD AUTO: 33.1 % (ref 37–47)
HGB BLD-MCNC: 11.1 G/DL (ref 12–16)
IMM GRANULOCYTES # BLD AUTO: 0.02 K/UL (ref 0–0.11)
IMM GRANULOCYTES NFR BLD AUTO: 0.7 % (ref 0–0.9)
LYMPHOCYTES # BLD AUTO: 0.72 K/UL (ref 1–4.8)
LYMPHOCYTES NFR BLD: 25 % (ref 22–41)
MCH RBC QN AUTO: 32.2 PG (ref 27–33)
MCHC RBC AUTO-ENTMCNC: 33.5 G/DL (ref 33.6–35)
MCV RBC AUTO: 95.9 FL (ref 81.4–97.8)
MONOCYTES # BLD AUTO: 0.82 K/UL (ref 0–0.85)
MONOCYTES NFR BLD AUTO: 28.5 % (ref 0–13.4)
NEUTROPHILS # BLD AUTO: 1.05 K/UL (ref 2–7.15)
NEUTROPHILS NFR BLD: 36.4 % (ref 44–72)
NRBC # BLD AUTO: 0 K/UL
NRBC BLD-RTO: 0 /100 WBC
PLATELET # BLD AUTO: 90 K/UL (ref 164–446)
PMV BLD AUTO: 9.8 FL (ref 9–12.9)
POTASSIUM SERPL-SCNC: 3.7 MMOL/L (ref 3.6–5.5)
PROT SERPL-MCNC: 5.3 G/DL (ref 6–8.2)
RAD ONC ARIA COURSE LAST TREATMENT DATE: NORMAL
RAD ONC ARIA COURSE TREATMENT ELAPSED DAYS: NORMAL
RAD ONC ARIA REFERENCE POINT DOSAGE GIVEN TO DATE: 23.15
RAD ONC ARIA REFERENCE POINT DOSAGE GIVEN TO DATE: 27
RAD ONC ARIA REFERENCE POINT ID: NORMAL
RAD ONC ARIA REFERENCE POINT ID: NORMAL
RAD ONC ARIA REFERENCE POINT SESSION DOSAGE GIVEN: 1.54
RAD ONC ARIA REFERENCE POINT SESSION DOSAGE GIVEN: 1.8
RBC # BLD AUTO: 3.45 M/UL (ref 4.2–5.4)
SODIUM SERPL-SCNC: 140 MMOL/L (ref 135–145)
WBC # BLD AUTO: 2.9 K/UL (ref 4.8–10.8)

## 2020-02-14 PROCEDURE — 77014 PR CT GUIDANCE PLACEMENT RAD THERAPY FIELDS: CPT | Mod: 26 | Performed by: RADIOLOGY

## 2020-02-14 PROCEDURE — 77386 HCHG IMRT DELIVERY COMPLEX: CPT | Performed by: RADIOLOGY

## 2020-02-14 PROCEDURE — 85025 COMPLETE CBC W/AUTO DIFF WBC: CPT

## 2020-02-14 PROCEDURE — 80053 COMPREHEN METABOLIC PANEL: CPT

## 2020-02-14 PROCEDURE — 77427 RADIATION TX MANAGEMENT X5: CPT | Performed by: RADIOLOGY

## 2020-02-18 ENCOUNTER — OUTPATIENT INFUSION SERVICES (OUTPATIENT)
Dept: ONCOLOGY | Facility: MEDICAL CENTER | Age: 80
End: 2020-02-18
Attending: INTERNAL MEDICINE
Payer: MEDICARE

## 2020-02-18 ENCOUNTER — HOSPITAL ENCOUNTER (OUTPATIENT)
Dept: RADIATION ONCOLOGY | Facility: MEDICAL CENTER | Age: 80
End: 2020-02-18
Payer: MEDICARE

## 2020-02-18 VITALS
HEIGHT: 62 IN | TEMPERATURE: 97.5 F | HEART RATE: 112 BPM | BODY MASS INDEX: 24.02 KG/M2 | WEIGHT: 130.51 LBS | SYSTOLIC BLOOD PRESSURE: 98 MMHG | RESPIRATION RATE: 18 BRPM | DIASTOLIC BLOOD PRESSURE: 54 MMHG | OXYGEN SATURATION: 95 %

## 2020-02-18 LAB
CHEMOTHERAPY INFUSION START DATE: NORMAL
CHEMOTHERAPY RECORDS: 1.8
CHEMOTHERAPY RECORDS: 5040
CHEMOTHERAPY RECORDS: NORMAL
CHEMOTHERAPY RX CANCER: NORMAL
DATE 1ST CHEMO CANCER: NORMAL
RAD ONC ARIA COURSE LAST TREATMENT DATE: NORMAL
RAD ONC ARIA COURSE TREATMENT ELAPSED DAYS: NORMAL
RAD ONC ARIA REFERENCE POINT DOSAGE GIVEN TO DATE: 24.69
RAD ONC ARIA REFERENCE POINT DOSAGE GIVEN TO DATE: 28.8
RAD ONC ARIA REFERENCE POINT ID: NORMAL
RAD ONC ARIA REFERENCE POINT ID: NORMAL
RAD ONC ARIA REFERENCE POINT SESSION DOSAGE GIVEN: 1.54
RAD ONC ARIA REFERENCE POINT SESSION DOSAGE GIVEN: 1.8

## 2020-02-18 PROCEDURE — 77386 HCHG IMRT DELIVERY COMPLEX: CPT | Performed by: RADIOLOGY

## 2020-02-18 PROCEDURE — 77014 PR CT GUIDANCE PLACEMENT RAD THERAPY FIELDS: CPT | Mod: 26 | Performed by: RADIOLOGY

## 2020-02-18 PROCEDURE — 99211 OFF/OP EST MAY X REQ PHY/QHP: CPT

## 2020-02-19 ENCOUNTER — HOSPITAL ENCOUNTER (OUTPATIENT)
Dept: RADIATION ONCOLOGY | Facility: MEDICAL CENTER | Age: 80
End: 2020-02-19
Payer: MEDICARE

## 2020-02-19 VITALS
BODY MASS INDEX: 23.78 KG/M2 | HEART RATE: 77 BPM | TEMPERATURE: 98.2 F | SYSTOLIC BLOOD PRESSURE: 116 MMHG | DIASTOLIC BLOOD PRESSURE: 72 MMHG | OXYGEN SATURATION: 99 % | WEIGHT: 129.25 LBS

## 2020-02-19 DIAGNOSIS — C21.0 ANAL CANCER (HCC): ICD-10-CM

## 2020-02-19 LAB
CHEMOTHERAPY INFUSION START DATE: NORMAL
CHEMOTHERAPY RECORDS: 1.8
CHEMOTHERAPY RECORDS: 5040
CHEMOTHERAPY RECORDS: NORMAL
CHEMOTHERAPY RX CANCER: NORMAL
DATE 1ST CHEMO CANCER: NORMAL
RAD ONC ARIA COURSE LAST TREATMENT DATE: NORMAL
RAD ONC ARIA COURSE TREATMENT ELAPSED DAYS: NORMAL
RAD ONC ARIA REFERENCE POINT DOSAGE GIVEN TO DATE: 26.24
RAD ONC ARIA REFERENCE POINT DOSAGE GIVEN TO DATE: 30.6
RAD ONC ARIA REFERENCE POINT ID: NORMAL
RAD ONC ARIA REFERENCE POINT ID: NORMAL
RAD ONC ARIA REFERENCE POINT SESSION DOSAGE GIVEN: 1.54
RAD ONC ARIA REFERENCE POINT SESSION DOSAGE GIVEN: 1.8

## 2020-02-19 PROCEDURE — 77014 PR CT GUIDANCE PLACEMENT RAD THERAPY FIELDS: CPT | Mod: 26 | Performed by: RADIOLOGY

## 2020-02-19 PROCEDURE — 77386 HCHG IMRT DELIVERY COMPLEX: CPT | Performed by: RADIOLOGY

## 2020-02-19 RX ORDER — CEPHALEXIN 500 MG/1
500 CAPSULE ORAL 4 TIMES DAILY
COMMUNITY
End: 2020-09-17

## 2020-02-19 RX ORDER — ONDANSETRON 8 MG/1
8 TABLET, ORALLY DISINTEGRATING ORAL EVERY 8 HOURS PRN
COMMUNITY
End: 2020-09-17

## 2020-02-19 ASSESSMENT — PAIN SCALES - GENERAL: PAINLEVEL: 5=MODERATE PAIN

## 2020-02-19 NOTE — ON TREATMENT VISIT
ON TREATMENT  NOTE  RADIATION ONCOLOGY DEPARTMENT    Patient name:  Josy Grove    Primary Physician:  Jaciel Maravilla M.D. MRN: [id number]  CSN: [id number]   Referring physician:  Dulce Jeter M.D. : 1940, 79 y.o.     ENCOUNTER DATE:  20    DIAGNOSIS:  Visit Diagnoses     ICD-10-CM   1. Anal cancer (HCC) C21.0     Anal cancer (HCC)  Staging form: Anus, AJCC 8th Edition  - Clinical: Stage IIA (cT2, cN0, cM0) - Signed by Sha Casper M.D. on 2020      TREATMENT SUMMARY:  Aria Treatment Information        Some values may be hidden. Unless noted otherwise, only the newest values recorded on each date are displayed.         Aria Treatment Summary 20   Course First Treatment Date 2020   Course Last Treatment Date 2020   Anus Plan from Course C1_anus   Fraction 17 of 28   Elapsed Course Days  @    Prescribed Fraction Dose 180 cGy   Prescribed Total Dose 5,040 cGy   Anus Reference Point from Course C1_anus   Elapsed Course Days  @    Session Dose 180 cGy   Total Dose 3,060 cGy   Anus CP Reference Point from Course C1_anus   Elapsed Course Days  @    Session Dose 154 cGy   Total Dose 2,624 cGy             SUBJECTIVE:  Increased moist desquamation in vagina and anus      VITAL SIGNS:  KPS: 100, Fully active, able to carry on all pre-disease performed without restriction (ECOG equivalent 0)  Encounter Vitals  Temperature: 36.8 °C (98.2 °F)  Temp src: Temporal  Blood Pressure : 116/72  Pulse: 77  Pulse Oximetry: 99 %  Weight: 58.6 kg (129 lb 4 oz)  Pain Score: 5=Moderate Pain  Pain Assessment 2020   Pain Score 5 4   Pain Loc Rectum Pelvic   What increases pain? bowel movement  -   What decreases pain? supp, pain medication -   Some recent data might be hidden          PHYSICAL EXAM:    Physical Exam  Constitutional:       Appearance: Normal appearance.   Abdominal:      General: There is no distension.      Palpations:  Abdomen is soft.   Skin:     Findings: No erythema.   Neurological:      Mental Status: She is alert.          Toxicity Assessment 2/19/2020 2/12/2020 2/5/2020 1/29/2020   Toxicity Assessment Female Pelvis Female Pelvis Female Pelvis Female Pelvis   Fatigue (lethargy, malaise, asthenia) Moderate (e.g., decrease in performance status by 1 ECOG level or 20% Karnofsky) or causing difficulty performing some activities Moderate (e.g., decrease in performance status by 1 ECOG level or 20% Karnofsky) or causing difficulty performing some activities Increased fatigue over baseline, but not altering normal activities Increased fatigue over baseline, but not altering normal activities   Radiation Dermatitis Faint erythema or dry desquamation Faint erythema or dry desquamation None None   Anorexia Loss of appetite Oral intake significantly decreased Loss of appetite None   Colitis None None None None   Constipation Requiring stool softener or dietary modification None None None   Dehydration Requiring IV fluid replacement (brief) None None None   Diarrhea w/o Colostomy None Increase of <4 stools/day over pre-treatment None None   Flatulence None None None None   Nausea None Able to eat - None   Proctitis None None None None   Vomiting None None None None   RT - Pain due to RT None Mild pain not interfering with function Mild pain not interfering with function None   Tumor Pain (onset or exacerbation of tumor pain due to treatment) Mild pain not interfering with function Mild pain not interfering with function Mild pain not interfering with function Mild pain not interfering with function   Dysuria (painful urination) None Mild symptoms requiring no intervention None None   Urinary Frequency Increase in frequency up to 2x normal Normal Normal Normal   Urinary Urgency None None None None   Bladder Spasms Absent Absent Absent Absent   Incontinence None None None None   Urinary Retention Normal Normal Normal Normal   Vaginitis  (not due to infection) None Mild, not requiring treatment None None   Vaginal Bleeding None None None None   Vaginal Dryness Normal - Normal Normal       CURRENT MEDICATIONS:    Current Outpatient Medications:   •  apixaban (ELIQUIS) 5mg Tab, Take 5 mg by mouth 2 Times a Day., Disp: , Rfl:   •  cephALEXin (KEFLEX) 500 MG Cap, Take 500 mg by mouth 4 times a day., Disp: , Rfl:   •  ondansetron (ZOFRAN ODT) 8 MG TABLET DISPERSIBLE, Take 8 mg by mouth every 8 hours as needed for Nausea., Disp: , Rfl:   •  mupirocin (BACTROBAN) 2 % Ointment, Apply  to affected area(s) every day., Disp: , Rfl:   •  hydrocortisone (ANUSOL-HC) 25 MG Suppos, Insert 1 Suppository in rectum every 12 hours for 7 days., Disp: 14 Suppository, Rfl: 0  •  NORCO 5-325 MG Tab per tablet, , Disp: , Rfl:   •  DILTIAZem (CARDIZEM) 60 MG Tab, Take 60 mg by mouth 2 Times a Day., Disp: , Rfl:   •  montelukast (SINGULAIR) 10 MG Tab, Take 10 mg by mouth every day., Disp: , Rfl:   •  fluticasone-salmeterol (WIXELA INHUB) 500-50 MCG/DOSE AEROSOL POWDER, BREATH ACTIVATED, Inhale 1 Puff by mouth every 12 hours., Disp: , Rfl:   •  vitamin D (CHOLECALCIFEROL) 1000 UNIT Tab, Take 1,000 Units by mouth every day., Disp: , Rfl:   •  vitamin e (VITAMIN E) 400 UNIT Cap, Take 400 Units by mouth every day., Disp: , Rfl:   •  CALCIUM PO, Take 750 mg by mouth every day., Disp: , Rfl:   •  Ibuprofen-diphenhydrAMINE HCl (ADVIL PM) 200-25 MG Cap, Take 1 Dose by mouth every bedtime., Disp: , Rfl:   •  atorvastatin (LIPITOR) 20 MG Tab, Take 80 mg by mouth., Disp: , Rfl:   •  metoprolol SR (TOPROL XL) 25 MG TABLET SR 24 HR, Take 25 mg by mouth., Disp: , Rfl:   •  nitroglycerin (NITROSTAT) 0.4 MG SL Tab, Place 0.4 mg under tongue., Disp: , Rfl:   •  SUMAtriptan (IMITREX) 25 MG Tab tablet, Take 25 mg by mouth., Disp: , Rfl:   •  zolpidem (AMBIEN) 10 MG Tab, Take 5 mg by mouth at bedtime as needed for Sleep., Disp: , Rfl:     LABORATORY DATA:   Lab Results   Component Value  Date/Time    SODIUM 140 02/14/2020 12:23 PM    POTASSIUM 3.7 02/14/2020 12:23 PM    CHLORIDE 109 02/14/2020 12:23 PM    CO2 22 02/14/2020 12:23 PM    GLUCOSE 123 (H) 02/14/2020 12:23 PM    BUN 14 02/14/2020 12:23 PM    CREATININE 0.87 02/14/2020 12:23 PM         Lab Results   Component Value Date/Time    WBC 2.9 (L) 02/14/2020 12:23 PM    HEMOGLOBIN 11.1 (L) 02/14/2020 12:23 PM    HEMATOCRIT 33.1 (L) 02/14/2020 12:23 PM    MCV 95.9 02/14/2020 12:23 PM    PLATELETCT 90 (L) 02/14/2020 12:23 PM        RADIOLOGY DATA:  Ir-picc Line Placement W/ Guidance > Age 5    Result Date: 1/23/2020  HISTORY/REASON FOR EXAM:   PICC placement. TECHNIQUE/EXAM DESCRIPTION AND NUMBER OF VIEWS:   PICC line insertion with ultrasound guidance.  The procedure was performed using maximal sterile barrier technique including sterile gown, mask, cap, and donning of sterile gloves following appropriate hand hygiene and/or sterile scrub. Patient skin site was prepped with 2% Chlorhexidine solution. FINDINGS:  PICC line insertion with Ultrasound Guidance was performed by qualified nursing staff without the assistance of a Radiologist. PICC positioning appropriateness confirmed by 3CG technology; chest xray only needed in the instance 3CG unable to confirm placement.              Ultrasound-guided PICC placement performed by qualified nursing staff as above.       IMPRESSION:  Anal cancer (HCC)  Staging form: Anus, AJCC 8th Edition  - Clinical: Stage IIA (cT2, cN0, cM0) - Signed by Sha Casper M.D. on 1/16/2020      PLAN:  No change in treatment plan    Disposition:  Treatment plan reviewed. Questions answered. Continue therapy outlined. Given lidocaine and silavdene.     Sha Casper M.D.    Orders Placed This Encounter   • apixaban (ELIQUIS) 5mg Tab   • cephALEXin (KEFLEX) 500 MG Cap   • ondansetron (ZOFRAN ODT) 8 MG TABLET DISPERSIBLE   • mupirocin (BACTROBAN) 2 % Ointment

## 2020-02-20 ENCOUNTER — HOSPITAL ENCOUNTER (OUTPATIENT)
Dept: RADIATION ONCOLOGY | Facility: MEDICAL CENTER | Age: 80
End: 2020-02-20
Payer: MEDICARE

## 2020-02-20 LAB
CHEMOTHERAPY INFUSION START DATE: NORMAL
CHEMOTHERAPY RECORDS: 1.8
CHEMOTHERAPY RECORDS: 5040
CHEMOTHERAPY RECORDS: NORMAL
CHEMOTHERAPY RX CANCER: NORMAL
DATE 1ST CHEMO CANCER: NORMAL
RAD ONC ARIA COURSE LAST TREATMENT DATE: NORMAL
RAD ONC ARIA COURSE TREATMENT ELAPSED DAYS: NORMAL
RAD ONC ARIA REFERENCE POINT DOSAGE GIVEN TO DATE: 27.78
RAD ONC ARIA REFERENCE POINT DOSAGE GIVEN TO DATE: 32.4
RAD ONC ARIA REFERENCE POINT ID: NORMAL
RAD ONC ARIA REFERENCE POINT ID: NORMAL
RAD ONC ARIA REFERENCE POINT SESSION DOSAGE GIVEN: 1.54
RAD ONC ARIA REFERENCE POINT SESSION DOSAGE GIVEN: 1.8

## 2020-02-20 PROCEDURE — 77014 PR CT GUIDANCE PLACEMENT RAD THERAPY FIELDS: CPT | Mod: 26 | Performed by: RADIOLOGY

## 2020-02-20 PROCEDURE — 77336 RADIATION PHYSICS CONSULT: CPT | Performed by: RADIOLOGY

## 2020-02-20 PROCEDURE — 77386 HCHG IMRT DELIVERY COMPLEX: CPT | Performed by: RADIOLOGY

## 2020-02-21 ENCOUNTER — HOSPITAL ENCOUNTER (OUTPATIENT)
Dept: RADIATION ONCOLOGY | Facility: MEDICAL CENTER | Age: 80
End: 2020-02-21
Payer: MEDICARE

## 2020-02-21 LAB
CHEMOTHERAPY INFUSION START DATE: NORMAL
CHEMOTHERAPY RECORDS: 1.8
CHEMOTHERAPY RECORDS: 5040
CHEMOTHERAPY RECORDS: NORMAL
CHEMOTHERAPY RX CANCER: NORMAL
DATE 1ST CHEMO CANCER: NORMAL
RAD ONC ARIA COURSE LAST TREATMENT DATE: NORMAL
RAD ONC ARIA COURSE TREATMENT ELAPSED DAYS: NORMAL
RAD ONC ARIA REFERENCE POINT DOSAGE GIVEN TO DATE: 29.32
RAD ONC ARIA REFERENCE POINT DOSAGE GIVEN TO DATE: 34.2
RAD ONC ARIA REFERENCE POINT ID: NORMAL
RAD ONC ARIA REFERENCE POINT ID: NORMAL
RAD ONC ARIA REFERENCE POINT SESSION DOSAGE GIVEN: 1.54
RAD ONC ARIA REFERENCE POINT SESSION DOSAGE GIVEN: 1.8

## 2020-02-21 PROCEDURE — 77386 HCHG IMRT DELIVERY COMPLEX: CPT | Performed by: RADIOLOGY

## 2020-02-21 PROCEDURE — 77014 PR CT GUIDANCE PLACEMENT RAD THERAPY FIELDS: CPT | Mod: 26 | Performed by: RADIOLOGY

## 2020-02-21 RX ORDER — ONDANSETRON 2 MG/ML
4 INJECTION INTRAMUSCULAR; INTRAVENOUS PRN
Status: CANCELLED | OUTPATIENT
Start: 2020-02-24

## 2020-02-21 RX ORDER — PROCHLORPERAZINE MALEATE 10 MG
10 TABLET ORAL EVERY 6 HOURS PRN
Status: CANCELLED | OUTPATIENT
Start: 2020-02-24

## 2020-02-21 RX ORDER — 0.9 % SODIUM CHLORIDE 0.9 %
20 VIAL (ML) INJECTION PRN
Status: CANCELLED | OUTPATIENT
Start: 2020-02-28

## 2020-02-21 RX ORDER — SODIUM CHLORIDE 9 MG/ML
INJECTION, SOLUTION INTRAVENOUS CONTINUOUS
Status: CANCELLED | OUTPATIENT
Start: 2020-02-24

## 2020-02-21 RX ORDER — 0.9 % SODIUM CHLORIDE 0.9 %
10 VIAL (ML) INJECTION PRN
Status: CANCELLED | OUTPATIENT
Start: 2020-02-23

## 2020-02-21 RX ORDER — 0.9 % SODIUM CHLORIDE 0.9 %
10 VIAL (ML) INJECTION PRN
Status: CANCELLED | OUTPATIENT
Start: 2020-02-24

## 2020-02-21 RX ORDER — ONDANSETRON 8 MG/1
8 TABLET, ORALLY DISINTEGRATING ORAL PRN
Status: CANCELLED | OUTPATIENT
Start: 2020-02-24

## 2020-02-21 RX ORDER — 0.9 % SODIUM CHLORIDE 0.9 %
VIAL (ML) INJECTION PRN
Status: CANCELLED | OUTPATIENT
Start: 2020-02-24

## 2020-02-21 RX ORDER — 0.9 % SODIUM CHLORIDE 0.9 %
3 VIAL (ML) INJECTION PRN
Status: CANCELLED | OUTPATIENT
Start: 2020-02-23

## 2020-02-21 RX ORDER — ONDANSETRON 2 MG/ML
8 INJECTION INTRAMUSCULAR; INTRAVENOUS ONCE
Status: CANCELLED | OUTPATIENT
Start: 2020-02-24

## 2020-02-21 RX ORDER — 0.9 % SODIUM CHLORIDE 0.9 %
3 VIAL (ML) INJECTION PRN
Status: CANCELLED | OUTPATIENT
Start: 2020-02-24

## 2020-02-21 RX ORDER — 0.9 % SODIUM CHLORIDE 0.9 %
VIAL (ML) INJECTION PRN
Status: CANCELLED | OUTPATIENT
Start: 2020-02-23

## 2020-02-24 ENCOUNTER — HOSPITAL ENCOUNTER (OUTPATIENT)
Dept: RADIATION ONCOLOGY | Facility: MEDICAL CENTER | Age: 80
End: 2020-02-24
Payer: MEDICARE

## 2020-02-24 ENCOUNTER — OUTPATIENT INFUSION SERVICES (OUTPATIENT)
Dept: ONCOLOGY | Facility: MEDICAL CENTER | Age: 80
End: 2020-02-24
Attending: INTERNAL MEDICINE
Payer: MEDICARE

## 2020-02-24 VITALS
DIASTOLIC BLOOD PRESSURE: 52 MMHG | HEART RATE: 62 BPM | BODY MASS INDEX: 23.12 KG/M2 | WEIGHT: 125.66 LBS | RESPIRATION RATE: 18 BRPM | HEIGHT: 62 IN | OXYGEN SATURATION: 98 % | TEMPERATURE: 98.7 F | SYSTOLIC BLOOD PRESSURE: 111 MMHG

## 2020-02-24 DIAGNOSIS — C21.0 ANAL CANCER (HCC): ICD-10-CM

## 2020-02-24 DIAGNOSIS — D64.9 ANEMIA, UNSPECIFIED TYPE: ICD-10-CM

## 2020-02-24 LAB
ABO + RH BLD: NORMAL
ABO GROUP BLD: NORMAL
ALBUMIN SERPL BCP-MCNC: 2.2 G/DL (ref 3.2–4.9)
ALBUMIN/GLOB SERPL: 1.3 G/DL
ALP SERPL-CCNC: 33 U/L (ref 30–99)
ALT SERPL-CCNC: 12 U/L (ref 2–50)
ANION GAP SERPL CALC-SCNC: 6 MMOL/L (ref 0–11.9)
AST SERPL-CCNC: 10 U/L (ref 12–45)
BARCODED ABORH UBTYP: 7300
BARCODED ABORH UBTYP: 9500
BARCODED PRD CODE UBPRD: NORMAL
BARCODED PRD CODE UBPRD: NORMAL
BARCODED UNIT NUM UBUNT: NORMAL
BARCODED UNIT NUM UBUNT: NORMAL
BASOPHILS # BLD AUTO: 0.4 % (ref 0–1.8)
BASOPHILS # BLD: 0.02 K/UL (ref 0–0.12)
BILIRUB SERPL-MCNC: 0.2 MG/DL (ref 0.1–1.5)
BLD GP AB SCN SERPL QL: NORMAL
BUN SERPL-MCNC: 13 MG/DL (ref 8–22)
CALCIUM SERPL-MCNC: 5.7 MG/DL (ref 8.5–10.5)
CHEMOTHERAPY INFUSION START DATE: NORMAL
CHEMOTHERAPY RECORDS: 1.8
CHEMOTHERAPY RECORDS: 5040
CHEMOTHERAPY RECORDS: NORMAL
CHEMOTHERAPY RX CANCER: NORMAL
CHLORIDE SERPL-SCNC: 122 MMOL/L (ref 96–112)
CO2 SERPL-SCNC: 17 MMOL/L (ref 20–33)
COMPONENT R 8504R: NORMAL
COMPONENT R 8504R: NORMAL
CREAT SERPL-MCNC: 0.54 MG/DL (ref 0.5–1.4)
DATE 1ST CHEMO CANCER: NORMAL
EOSINOPHIL # BLD AUTO: 0.54 K/UL (ref 0–0.51)
EOSINOPHIL NFR BLD: 11.3 % (ref 0–6.9)
ERYTHROCYTE [DISTWIDTH] IN BLOOD BY AUTOMATED COUNT: 49.9 FL (ref 35.9–50)
GLOBULIN SER CALC-MCNC: 1.7 G/DL (ref 1.9–3.5)
GLUCOSE SERPL-MCNC: 85 MG/DL (ref 65–99)
HCT VFR BLD AUTO: 22.8 % (ref 37–47)
HGB BLD-MCNC: 7.5 G/DL (ref 12–16)
IMM GRANULOCYTES # BLD AUTO: 0.02 K/UL (ref 0–0.11)
IMM GRANULOCYTES NFR BLD AUTO: 0.4 % (ref 0–0.9)
LYMPHOCYTES # BLD AUTO: 0.42 K/UL (ref 1–4.8)
LYMPHOCYTES NFR BLD: 8.8 % (ref 22–41)
MCH RBC QN AUTO: 32.6 PG (ref 27–33)
MCHC RBC AUTO-ENTMCNC: 32.9 G/DL (ref 33.6–35)
MCV RBC AUTO: 99.1 FL (ref 81.4–97.8)
MONOCYTES # BLD AUTO: 0.47 K/UL (ref 0–0.85)
MONOCYTES NFR BLD AUTO: 9.8 % (ref 0–13.4)
NEUTROPHILS # BLD AUTO: 3.32 K/UL (ref 2–7.15)
NEUTROPHILS NFR BLD: 69.3 % (ref 44–72)
NRBC # BLD AUTO: 0 K/UL
NRBC BLD-RTO: 0 /100 WBC
PLATELET # BLD AUTO: 157 K/UL (ref 164–446)
PMV BLD AUTO: 9.2 FL (ref 9–12.9)
POTASSIUM SERPL-SCNC: 2.6 MMOL/L (ref 3.6–5.5)
PRODUCT TYPE UPROD: NORMAL
PRODUCT TYPE UPROD: NORMAL
PROT SERPL-MCNC: 3.9 G/DL (ref 6–8.2)
RAD ONC ARIA COURSE LAST TREATMENT DATE: NORMAL
RAD ONC ARIA COURSE TREATMENT ELAPSED DAYS: NORMAL
RAD ONC ARIA REFERENCE POINT DOSAGE GIVEN TO DATE: 30.87
RAD ONC ARIA REFERENCE POINT DOSAGE GIVEN TO DATE: 36
RAD ONC ARIA REFERENCE POINT ID: NORMAL
RAD ONC ARIA REFERENCE POINT ID: NORMAL
RAD ONC ARIA REFERENCE POINT SESSION DOSAGE GIVEN: 1.54
RAD ONC ARIA REFERENCE POINT SESSION DOSAGE GIVEN: 1.8
RBC # BLD AUTO: 2.3 M/UL (ref 4.2–5.4)
RH BLD: NORMAL
SODIUM SERPL-SCNC: 145 MMOL/L (ref 135–145)
UNIT STATUS USTAT: NORMAL
UNIT STATUS USTAT: NORMAL
WBC # BLD AUTO: 4.8 K/UL (ref 4.8–10.8)

## 2020-02-24 PROCEDURE — P9016 RBC LEUKOCYTES REDUCED: HCPCS | Mod: 91

## 2020-02-24 PROCEDURE — 77427 RADIATION TX MANAGEMENT X5: CPT | Performed by: RADIOLOGY

## 2020-02-24 PROCEDURE — 77386 HCHG IMRT DELIVERY COMPLEX: CPT | Performed by: RADIOLOGY

## 2020-02-24 PROCEDURE — 77014 PR CT GUIDANCE PLACEMENT RAD THERAPY FIELDS: CPT | Mod: 26 | Performed by: RADIOLOGY

## 2020-02-24 PROCEDURE — 85025 COMPLETE CBC W/AUTO DIFF WBC: CPT

## 2020-02-24 PROCEDURE — 700111 HCHG RX REV CODE 636 W/ 250 OVERRIDE (IP): Performed by: INTERNAL MEDICINE

## 2020-02-24 PROCEDURE — 700105 HCHG RX REV CODE 258: Performed by: INTERNAL MEDICINE

## 2020-02-24 PROCEDURE — 83735 ASSAY OF MAGNESIUM: CPT

## 2020-02-24 PROCEDURE — 96366 THER/PROPH/DIAG IV INF ADDON: CPT

## 2020-02-24 PROCEDURE — 96365 THER/PROPH/DIAG IV INF INIT: CPT

## 2020-02-24 PROCEDURE — 86900 BLOOD TYPING SEROLOGIC ABO: CPT

## 2020-02-24 PROCEDURE — 700102 HCHG RX REV CODE 250 W/ 637 OVERRIDE(OP): Performed by: INTERNAL MEDICINE

## 2020-02-24 PROCEDURE — 86923 COMPATIBILITY TEST ELECTRIC: CPT | Mod: 91

## 2020-02-24 PROCEDURE — 86901 BLOOD TYPING SEROLOGIC RH(D): CPT

## 2020-02-24 PROCEDURE — A9270 NON-COVERED ITEM OR SERVICE: HCPCS | Performed by: INTERNAL MEDICINE

## 2020-02-24 PROCEDURE — 80053 COMPREHEN METABOLIC PANEL: CPT

## 2020-02-24 PROCEDURE — 36430 TRANSFUSION BLD/BLD COMPNT: CPT

## 2020-02-24 PROCEDURE — 306780 HCHG STAT FOR TRANSFUSION PER CASE

## 2020-02-24 PROCEDURE — 36415 COLL VENOUS BLD VENIPUNCTURE: CPT

## 2020-02-24 PROCEDURE — 306396 CUSHION, WAFFLE ADULT 17X17: Performed by: INTERNAL MEDICINE

## 2020-02-24 PROCEDURE — 86850 RBC ANTIBODY SCREEN: CPT

## 2020-02-24 RX ORDER — POTASSIUM CHLORIDE 20 MEQ/1
40 TABLET, EXTENDED RELEASE ORAL ONCE
Status: COMPLETED | OUTPATIENT
Start: 2020-02-24 | End: 2020-02-24

## 2020-02-24 RX ORDER — ACETAMINOPHEN 325 MG/1
650 TABLET ORAL PRN
Status: CANCELLED | OUTPATIENT
Start: 2020-02-24

## 2020-02-24 RX ORDER — ACETAMINOPHEN 325 MG/1
650 TABLET ORAL ONCE
Status: COMPLETED | OUTPATIENT
Start: 2020-02-24 | End: 2020-02-24

## 2020-02-24 RX ORDER — POTASSIUM CHLORIDE 7.45 MG/ML
10 INJECTION INTRAVENOUS
Status: COMPLETED | OUTPATIENT
Start: 2020-02-24 | End: 2020-02-24

## 2020-02-24 RX ORDER — DIPHENHYDRAMINE HCL 25 MG
25 TABLET ORAL ONCE
Status: CANCELLED | OUTPATIENT
Start: 2020-02-24

## 2020-02-24 RX ORDER — DIPHENHYDRAMINE HYDROCHLORIDE 50 MG/ML
25 INJECTION INTRAMUSCULAR; INTRAVENOUS PRN
Status: CANCELLED | OUTPATIENT
Start: 2020-02-24

## 2020-02-24 RX ORDER — SODIUM CHLORIDE 9 MG/ML
500 INJECTION, SOLUTION INTRAVENOUS ONCE
Status: COMPLETED | OUTPATIENT
Start: 2020-02-24 | End: 2020-02-24

## 2020-02-24 RX ORDER — SODIUM CHLORIDE 9 MG/ML
500 INJECTION, SOLUTION INTRAVENOUS ONCE
Status: CANCELLED | OUTPATIENT
Start: 2020-02-24

## 2020-02-24 RX ORDER — ACETAMINOPHEN 325 MG/1
650 TABLET ORAL ONCE
Status: CANCELLED | OUTPATIENT
Start: 2020-02-24

## 2020-02-24 RX ORDER — DIPHENHYDRAMINE HCL 25 MG
25 TABLET ORAL ONCE
Status: COMPLETED | OUTPATIENT
Start: 2020-02-24 | End: 2020-02-24

## 2020-02-24 RX ADMIN — ACETAMINOPHEN 650 MG: 325 TABLET ORAL at 14:42

## 2020-02-24 RX ADMIN — SODIUM CHLORIDE 500 ML: 9 INJECTION, SOLUTION INTRAVENOUS at 15:15

## 2020-02-24 RX ADMIN — POTASSIUM CHLORIDE 10 MEQ: 10 INJECTION, SOLUTION INTRAVENOUS at 13:19

## 2020-02-24 RX ADMIN — POTASSIUM CHLORIDE 40 MEQ: 1500 TABLET, EXTENDED RELEASE ORAL at 13:17

## 2020-02-24 RX ADMIN — POTASSIUM CHLORIDE 10 MEQ: 10 INJECTION, SOLUTION INTRAVENOUS at 15:29

## 2020-02-24 RX ADMIN — POTASSIUM CHLORIDE 10 MEQ: 10 INJECTION, SOLUTION INTRAVENOUS at 17:00

## 2020-02-24 RX ADMIN — POTASSIUM CHLORIDE 10 MEQ: 10 INJECTION, SOLUTION INTRAVENOUS at 14:27

## 2020-02-24 RX ADMIN — DIPHENHYDRAMINE HCL 25 MG: 25 TABLET ORAL at 14:42

## 2020-02-24 NOTE — PROGRESS NOTES
Here for chemotherapy today. Denies any complaints. PICC in place with brisk blood return. Labs collected and sent. Awaiting results. Continue to monitor.

## 2020-02-24 NOTE — PROGRESS NOTES
Labs from [facility] in lab called in. Potassium critical at 2.6, calcium critical at 5.7, and hemoglobin critical at 7.5. Patient denies any symptoms. Dr. Vogel notified and would like us to do blood products today and replace potassium using IV and PO medications. He would like the patient to start taking calcium 1500mg tonight at home. MD would like us to give chemotherapy today as well. Patient notified of plan and agrees with plan.

## 2020-02-24 NOTE — PROGRESS NOTES
Spoke with Dr. Vogel again about doing everything today. MD fine doing chemotherapy tomorrow and 2 units blood and IV potassium today. Charge notified and schedulers notified.

## 2020-02-24 NOTE — PROGRESS NOTES
"Pharmacy Chemotherapy calculation:    DX: anal squamous cell carcinoma     - delay for 1 day for blood transfusion and electrolyte replacement  Previous treatment = C1 D1 = 1/27/20    Regimen: mitomycin + fluorouracil + XRT  Fluorouracil 1000mg/m2/day IV on days 1-4 and 29-32  Mitomycin 10mg/m2(max 20mg) IVP on days 1 and 29  35 day cycle x 1 cycle  NCCN Guidelines for anal cancer V.1.2020  Chester TORRES, et al - KEVIN. 2008 Apr 23;299(16):1914-21. doi: 10.1001/kevin.[id number].  Stevo DECKER et al - Lancet Oncol. 2013 May;14(6):516-24. doi: 10.1016/-[id number]. Epub 2013 Apr 9.       /96   Pulse 77   Temp 36.4 °C (97.5 °F) (Temporal)   Resp 18   Ht 1.58 m (5' 2.21\")   Wt 57 kg (125 lb 10.6 oz)   SpO2 98%   BMI 22.83 kg/m²   Body surface area is 1.58 meters squared.     All lab results 2/24/20 within treatment parameters. Except hgb < 8   - HOLD chemo 2/24/20 for transfusion and electrolyte replacement.     = 0mg IV      = 0mg IV         MARYLIN Turner Pharm.D.      "

## 2020-02-24 NOTE — PROGRESS NOTES
PICC dressing changed per protocol. Tolerated well. Still awaiting lab results. Continue to monitor.

## 2020-02-25 ENCOUNTER — HOSPITAL ENCOUNTER (OUTPATIENT)
Dept: RADIATION ONCOLOGY | Facility: MEDICAL CENTER | Age: 80
End: 2020-02-25
Payer: MEDICARE

## 2020-02-25 ENCOUNTER — OUTPATIENT INFUSION SERVICES (OUTPATIENT)
Dept: ONCOLOGY | Facility: MEDICAL CENTER | Age: 80
End: 2020-02-25
Attending: INTERNAL MEDICINE
Payer: MEDICARE

## 2020-02-25 VITALS
DIASTOLIC BLOOD PRESSURE: 46 MMHG | TEMPERATURE: 97.2 F | HEART RATE: 77 BPM | WEIGHT: 127.43 LBS | BODY MASS INDEX: 23.45 KG/M2 | SYSTOLIC BLOOD PRESSURE: 110 MMHG | HEIGHT: 62 IN | RESPIRATION RATE: 18 BRPM | OXYGEN SATURATION: 94 %

## 2020-02-25 DIAGNOSIS — C21.0 ANAL CANCER (HCC): ICD-10-CM

## 2020-02-25 LAB
CHEMOTHERAPY INFUSION START DATE: NORMAL
CHEMOTHERAPY RECORDS: 1.8
CHEMOTHERAPY RECORDS: 5040
CHEMOTHERAPY RECORDS: NORMAL
CHEMOTHERAPY RX CANCER: NORMAL
DATE 1ST CHEMO CANCER: NORMAL
MAGNESIUM SERPL-MCNC: 1.4 MG/DL (ref 1.5–2.5)
RAD ONC ARIA COURSE LAST TREATMENT DATE: NORMAL
RAD ONC ARIA COURSE TREATMENT ELAPSED DAYS: NORMAL
RAD ONC ARIA REFERENCE POINT DOSAGE GIVEN TO DATE: 32.41
RAD ONC ARIA REFERENCE POINT DOSAGE GIVEN TO DATE: 37.8
RAD ONC ARIA REFERENCE POINT ID: NORMAL
RAD ONC ARIA REFERENCE POINT ID: NORMAL
RAD ONC ARIA REFERENCE POINT SESSION DOSAGE GIVEN: 1.54
RAD ONC ARIA REFERENCE POINT SESSION DOSAGE GIVEN: 1.8

## 2020-02-25 PROCEDURE — 96366 THER/PROPH/DIAG IV INF ADDON: CPT

## 2020-02-25 PROCEDURE — 96375 TX/PRO/DX INJ NEW DRUG ADDON: CPT

## 2020-02-25 PROCEDURE — 700111 HCHG RX REV CODE 636 W/ 250 OVERRIDE (IP): Performed by: INTERNAL MEDICINE

## 2020-02-25 PROCEDURE — G0498 CHEMO EXTEND IV INFUS W/PUMP: HCPCS

## 2020-02-25 PROCEDURE — 77386 HCHG IMRT DELIVERY COMPLEX: CPT | Performed by: RADIOLOGY

## 2020-02-25 PROCEDURE — 700105 HCHG RX REV CODE 258: Performed by: INTERNAL MEDICINE

## 2020-02-25 PROCEDURE — 96367 TX/PROPH/DG ADDL SEQ IV INF: CPT

## 2020-02-25 PROCEDURE — 77014 PR CT GUIDANCE PLACEMENT RAD THERAPY FIELDS: CPT | Mod: 26 | Performed by: RADIOLOGY

## 2020-02-25 PROCEDURE — 96413 CHEMO IV INFUSION 1 HR: CPT

## 2020-02-25 PROCEDURE — 96409 CHEMO IV PUSH SNGL DRUG: CPT

## 2020-02-25 RX ORDER — 0.9 % SODIUM CHLORIDE 0.9 %
10 VIAL (ML) INJECTION PRN
Status: CANCELLED | OUTPATIENT
Start: 2020-03-30

## 2020-02-25 RX ORDER — HEPARIN SODIUM (PORCINE) LOCK FLUSH IV SOLN 100 UNIT/ML 100 UNIT/ML
300 SOLUTION INTRAVENOUS PRN
Status: CANCELLED | OUTPATIENT
Start: 2020-03-30

## 2020-02-25 RX ORDER — MAGNESIUM SULFATE HEPTAHYDRATE 40 MG/ML
2 INJECTION, SOLUTION INTRAVENOUS ONCE
Status: COMPLETED | OUTPATIENT
Start: 2020-02-25 | End: 2020-02-25

## 2020-02-25 RX ORDER — POTASSIUM CHLORIDE 20 MEQ/1
40 TABLET, EXTENDED RELEASE ORAL ONCE
Status: DISCONTINUED | OUTPATIENT
Start: 2020-02-25 | End: 2020-02-25

## 2020-02-25 RX ORDER — ONDANSETRON 2 MG/ML
8 INJECTION INTRAMUSCULAR; INTRAVENOUS ONCE
Status: COMPLETED | OUTPATIENT
Start: 2020-02-25 | End: 2020-02-25

## 2020-02-25 RX ADMIN — MITOMYCIN 15.9 MG: 20 INJECTION, POWDER, LYOPHILIZED, FOR SOLUTION INTRAVENOUS at 12:56

## 2020-02-25 RX ADMIN — MAGNESIUM SULFATE 2 G: 2 INJECTION INTRAVENOUS at 13:47

## 2020-02-25 RX ADMIN — ONDANSETRON 8 MG: 2 INJECTION INTRAMUSCULAR; INTRAVENOUS at 12:03

## 2020-02-25 RX ADMIN — FLUOROURACIL 6360 MG: 50 INJECTION, SOLUTION INTRAVENOUS at 13:57

## 2020-02-25 NOTE — PROGRESS NOTES
Tolerated all blood products with no reactions. PICC line flushed and secured. Discharged home to self care in no distress at this time. Returns tomorrow for chemo.

## 2020-02-25 NOTE — PROGRESS NOTES
Chemotherapy Verification - SECONDARY RN       Height = 158 cm  Weight = 57.8 kg  BSA = 1.59 m2       Medication: Mitomycin  Dose: 10 mg/m2  Calculated Dose: 15.9 mg                             (In mg/m2, AUC, mg/kg)     Medication: Home infusion 5 FU  Dose: 4000 mg/m2  Calculated Dose: 6360 mg                             (In mg/m2, AUC, mg/kg)      I confirm that this process was performed independently.

## 2020-02-25 NOTE — PROGRESS NOTES
"Pharmacy Chemotherapy calculation:    DX: anal squamous cell carcinoma    Cycle 1 Day 29 - delayed for 1 day for blood transfusion and electrolyte replacement  Previous treatment = C1 D1 = 1/27/20    Regimen: mitomycin + fluorouracil + XRT  Fluorouracil 1000mg/m2/day IV on days 1-4 and 29-32  Mitomycin 10mg/m2(max 20mg) IVP on days 1 and 29  35 day cycle x 1 cycle  NCCN Guidelines for anal cancer V.1.2020  Chester TORRES, et al - KEVIN. 2008 Apr 23;299(16):1914-21. doi: 10.1001/kevin.299.16.1914.  blaise Whiteside RD - Lancet Oncol. 2013 May;14(6):516-24. doi: 10.1016/-[id number]. Epub 2013 Apr 9.       /46   Pulse 77   Temp 36.2 °C (97.2 °F) (Temporal)   Resp 18   Ht 1.58 m (5' 2.21\") Comment: took 1 inch off for shoes  Wt 57.8 kg (127 lb 6.8 oz)   SpO2 94%   BMI 23.15 kg/m²   Body surface area is 1.59 meters squared.     All lab results 2/24/20 within treatment parameters. Except hgb < 8 - transfused 2/24/20, potassium replaced 2/24/20.       Mitomycin 10mg/m2 x 1.59m2 = 15.9mg    <10% difference, ok to treat with final dose = 15.9mg IV      Fluorouracil 4000mg/m2 (1000mg/m2/day) x 1.59m2 = 6360mg    <10% difference, ok to treat with final dose = 6360mg IV   CIVI via CADD pump at 1.3mL/hr over 96 hours on days 29-32      MARYLIN Turner, Pharm.D.      "

## 2020-02-25 NOTE — PROGRESS NOTES
"Pharmacy Chemotherapy Verification:    Patient Name: Josy Grove   Dx: anal cancer       Protocol: Mitomycin/5-FU  Fluorouracil 1000 mg/m2/day IV continuous infusion over 96 hours on Days 1-4 and 29-32  Mitomycin 10 mg/m2 IV on Days 1 and 29  NCCN Guidelines for Anal Carcinoma V.1.2020  Chester TORRES, et al. RACHEL. 2008 Apr 23;299(16):1914-21.   Stevo DECKER, et al. Lancet Oncol. 2013 May;14(6):516-24.     Allergies:  Codeine and Morphine       /46   Pulse 77   Temp 36.2 °C (97.2 °F) (Temporal)   Resp 18   Ht 1.58 m (5' 2.21\") Comment: took 1 inch off for shoes  Wt 57.8 kg (127 lb 6.8 oz)   SpO2 94%   BMI 23.15 kg/m²    Body surface area is 1.59 meters squared.    Labs 02/24/20:  ANC ~3300 Plt = 157k Hgb = 7.5 (2 units PRBC)   SCr = 0.54 mg/dL CrCl = ~59 ml/min (min SCr 0.7 mg/dL)   LFTs = WNL T bili = 0.4  K = 2.6 (KCl 40 mEq IV plus 40 mEq PO) Corrected Ca = 7.14 (Calcium 1500 mg PO)  Mg = 1.4 (Mg 2 gm)   Dr. Vogel was notified of labs, replacement ordered on 2/24.  Ok to proceed with chemo 2/25 w/o repeat labs.     Drug Order   (Drug name, dose, route, IV Fluid & volume, frequency, number of doses) Cycle 1, Day 29      Previous treatment: C1D1 = 01/27/20     Medication = mitomycin (Mutamycin)  Base Dose= 10 mg/m2  Calc Dose: Base Dose x 1.59 m2 = 15.9 mg  Final Dose = 15.9 mg  Route = IV  Fluid & Volume =  mL  Admin Duration = Over 15 min          <10% difference, okay to treat with final dose     Medication = fluorouracil (5-FU)  Base Dose= 1000 mg/m2/day  Calc Dose: Base Dose x 1.59 m2 = 1590 mg/day  Final Dose = 6360 mg  Route = IV  Fluid & Volume = 50 mg/mL =  127.2 (+3 mL overfill)  Admin Duration = Over 96 hours @ 1.3 mL/hr          <10% difference, okay to treat with final dose       By my signature below, I confirm this process was performed independently with the BSA and all final chemotherapy dosing calculations congruent. I have reviewed the above chemotherapy order and that my calculation " of the final dose and BSA (when applicable) corroborate those calculations of the  pharmacist. Discrepancies of 10% or greater in the written dose have been addressed and documented within the EPIC Progress notes.    Troy Delgadillo, PharmD, BCOP

## 2020-02-25 NOTE — PROGRESS NOTES
Chemotherapy Verification - PRIMARY RN      Height = 158cm  Weight = 57.8kg  BSA = 1.59m2       Medication: mitomycin  Dose: 10 mg/m2  Calculated Dose: 15.9mg                                Medication: fluorouracil  Dose: 4000 mg/m2  Calculated Dose: 6360mg over 96 hours                                  I confirm this process was performed independently with the BSA and all final chemotherapy dosing calculations congruent.  Any discrepancies of 10% or greater have been addressed with the chemotherapy pharmacist. The resolution of the discrepancy has been documented in the EPIC progress notes.

## 2020-02-26 ENCOUNTER — HOSPITAL ENCOUNTER (OUTPATIENT)
Dept: RADIATION ONCOLOGY | Facility: MEDICAL CENTER | Age: 80
End: 2020-02-26
Payer: MEDICARE

## 2020-02-26 VITALS
OXYGEN SATURATION: 95 % | SYSTOLIC BLOOD PRESSURE: 113 MMHG | HEART RATE: 60 BPM | BODY MASS INDEX: 22.92 KG/M2 | DIASTOLIC BLOOD PRESSURE: 58 MMHG | WEIGHT: 126.17 LBS | TEMPERATURE: 98.4 F

## 2020-02-26 DIAGNOSIS — C21.0 ANAL CANCER (HCC): ICD-10-CM

## 2020-02-26 LAB
CHEMOTHERAPY INFUSION START DATE: NORMAL
CHEMOTHERAPY RECORDS: 1.8
CHEMOTHERAPY RECORDS: 5040
CHEMOTHERAPY RECORDS: NORMAL
CHEMOTHERAPY RX CANCER: NORMAL
DATE 1ST CHEMO CANCER: NORMAL
RAD ONC ARIA COURSE LAST TREATMENT DATE: NORMAL
RAD ONC ARIA COURSE TREATMENT ELAPSED DAYS: NORMAL
RAD ONC ARIA REFERENCE POINT DOSAGE GIVEN TO DATE: 33.95
RAD ONC ARIA REFERENCE POINT DOSAGE GIVEN TO DATE: 39.6
RAD ONC ARIA REFERENCE POINT ID: NORMAL
RAD ONC ARIA REFERENCE POINT ID: NORMAL
RAD ONC ARIA REFERENCE POINT SESSION DOSAGE GIVEN: 1.54
RAD ONC ARIA REFERENCE POINT SESSION DOSAGE GIVEN: 1.8

## 2020-02-26 PROCEDURE — 77014 PR CT GUIDANCE PLACEMENT RAD THERAPY FIELDS: CPT | Mod: 26 | Performed by: RADIOLOGY

## 2020-02-26 PROCEDURE — 77386 HCHG IMRT DELIVERY COMPLEX: CPT | Performed by: RADIOLOGY

## 2020-02-26 RX ORDER — LIDOCAINE 50 MG/G
OINTMENT TOPICAL
Qty: 35.44 G | Refills: 4 | Status: SHIPPED | OUTPATIENT
Start: 2020-02-26 | End: 2020-02-26 | Stop reason: SDUPTHER

## 2020-02-26 RX ORDER — LIDOCAINE 50 MG/G
OINTMENT TOPICAL
Qty: 35.44 G | Refills: 4 | Status: SHIPPED | OUTPATIENT
Start: 2020-02-26 | End: 2020-03-11

## 2020-02-26 RX ORDER — LIDOCAINE 50 MG/G
OINTMENT TOPICAL PRN
COMMUNITY
End: 2020-02-26 | Stop reason: SDUPTHER

## 2020-02-26 ASSESSMENT — PAIN SCALES - GENERAL: PAINLEVEL: 4=SLIGHT-MODERATE PAIN

## 2020-02-26 NOTE — ON TREATMENT VISIT
ON TREATMENT  NOTE  RADIATION ONCOLOGY DEPARTMENT    Patient name:  Josy Grove    Primary Physician:  Jaciel Maravilla M.D. MRN: [id number]  CSN: [id number]   Referring physician:  Dulce Jeter M.D. : 1940, 79 y.o.     ENCOUNTER DATE:  20    DIAGNOSIS:  Visit Diagnoses     ICD-10-CM   1. Anal cancer (HCC) C21.0     Anal cancer (HCC)  Staging form: Anus, AJCC 8th Edition  - Clinical: Stage IIA (cT2, cN0, cM0) - Signed by Sha Casper M.D. on 2020      TREATMENT SUMMARY:  Aria Treatment Information        Some values may be hidden. Unless noted otherwise, only the newest values recorded on each date are displayed.         Aria Treatment Summary 20   Course First Treatment Date 2020   Course Last Treatment Date 2020   Anus Plan from Course C1_anus   Fraction  28   Elapsed Course Days  @    Prescribed Fraction Dose 180 cGy   Prescribed Total Dose 5,040 cGy   Anus Reference Point from Course C1_anus   Elapsed Course Days  @ [id number]   Session Dose 180 cGy   Total Dose 3,960 cGy   Anus CP Reference Point from Course C1_anus   Elapsed Course Days  @ [id number]   Session Dose 154 cGy   Total Dose 3,395 cGy             SUBJECTIVE:  Increased anal and vaginal pain, using lidocaine and silvadene and norco PO      VITAL SIGNS:  KPS: 100, Fully active, able to carry on all pre-disease performed without restriction (ECOG equivalent 0)  Encounter Vitals  Temperature: 36.9 °C (98.4 °F)  Temp src: Temporal  Blood Pressure : 113/58  Pulse: 60  Pulse Oximetry: 95 %  Weight: 57.2 kg (126 lb 2.7 oz)  Pain Score: 4=Slight-Moderate Pain  Pain Assessment 2020   Pain Assessment Chronic Pain -   Pain Score 4 5   Pain Loc Rectum Rectum   What increases pain? bowel movements bowel movement    What decreases pain? pain medication  supp, pain medication   Some recent data might be hidden          PHYSICAL EXAM:    Physical Exam  Vitals signs reviewed. Exam  conducted with a chaperone present.   Constitutional:       Appearance: Normal appearance.   HENT:      Head: Normocephalic and atraumatic.   Eyes:      Pupils: Pupils are equal, round, and reactive to light.   Genitourinary:      Neurological:      Mental Status: She is alert.          Toxicity Assessment 2/26/2020 2/19/2020 2/12/2020 2/5/2020 1/29/2020   Toxicity Assessment Female Pelvis Female Pelvis Female Pelvis Female Pelvis Female Pelvis   Fatigue (lethargy, malaise, asthenia) Moderate (e.g., decrease in performance status by 1 ECOG level or 20% Karnofsky) or causing difficulty performing some activities Moderate (e.g., decrease in performance status by 1 ECOG level or 20% Karnofsky) or causing difficulty performing some activities Moderate (e.g., decrease in performance status by 1 ECOG level or 20% Karnofsky) or causing difficulty performing some activities Increased fatigue over baseline, but not altering normal activities Increased fatigue over baseline, but not altering normal activities   Radiation Dermatitis Faint erythema or dry desquamation Faint erythema or dry desquamation Faint erythema or dry desquamation None None   Anorexia Loss of appetite Loss of appetite Oral intake significantly decreased Loss of appetite None   Colitis None None None None None   Constipation Requiring stool softener or dietary modification Requiring stool softener or dietary modification None None None   Dehydration None Requiring IV fluid replacement (brief) None None None   Diarrhea w/o Colostomy None None Increase of <4 stools/day over pre-treatment None None   Flatulence None None None None None   Nausea None None Able to eat - None   Proctitis None None None None None   Vomiting None None None None None   RT - Pain due to RT Mild pain not interfering with function None Mild pain not interfering with function Mild pain not interfering with function None   Tumor Pain (onset or exacerbation of tumor pain due to  treatment) Mild pain not interfering with function Mild pain not interfering with function Mild pain not interfering with function Mild pain not interfering with function Mild pain not interfering with function   Dysuria (painful urination) Mild symptoms requiring no intervention None Mild symptoms requiring no intervention None None   Urinary Frequency Increase in frequency up to 2x normal Increase in frequency up to 2x normal Normal Normal Normal   Urinary Urgency Present None None None None   Bladder Spasms Absent Absent Absent Absent Absent   Incontinence None None None None None   Urinary Retention Normal Normal Normal Normal Normal   Vaginitis (not due to infection) None None Mild, not requiring treatment None None   Vaginal Bleeding None None None None None   Vaginal Dryness Normal Normal - Normal Normal       CURRENT MEDICATIONS:    Current Outpatient Medications:   •  lidocaine (XYLOCAINE) 5 % Ointment, Apply to affected area up to three times per day as needed for topical anesthesia, Disp: 35.44 g, Rfl: 4  •  apixaban (ELIQUIS) 5mg Tab, Take 5 mg by mouth 2 Times a Day., Disp: , Rfl:   •  cephALEXin (KEFLEX) 500 MG Cap, Take 500 mg by mouth 4 times a day., Disp: , Rfl:   •  ondansetron (ZOFRAN ODT) 8 MG TABLET DISPERSIBLE, Take 8 mg by mouth every 8 hours as needed for Nausea., Disp: , Rfl:   •  mupirocin (BACTROBAN) 2 % Ointment, Apply  to affected area(s) every day., Disp: , Rfl:   •  NORCO 5-325 MG Tab per tablet, , Disp: , Rfl:   •  DILTIAZem (CARDIZEM) 60 MG Tab, Take 60 mg by mouth 2 Times a Day., Disp: , Rfl:   •  montelukast (SINGULAIR) 10 MG Tab, Take 10 mg by mouth every day., Disp: , Rfl:   •  fluticasone-salmeterol (WIXELA INHUB) 500-50 MCG/DOSE AEROSOL POWDER, BREATH ACTIVATED, Inhale 1 Puff by mouth every 12 hours., Disp: , Rfl:   •  vitamin D (CHOLECALCIFEROL) 1000 UNIT Tab, Take 1,000 Units by mouth every day., Disp: , Rfl:   •  vitamin e (VITAMIN E) 400 UNIT Cap, Take 400 Units by mouth  every day., Disp: , Rfl:   •  CALCIUM PO, Take 750 mg by mouth every day., Disp: , Rfl:   •  Ibuprofen-diphenhydrAMINE HCl (ADVIL PM) 200-25 MG Cap, Take 1 Dose by mouth every bedtime., Disp: , Rfl:   •  atorvastatin (LIPITOR) 20 MG Tab, Take 80 mg by mouth., Disp: , Rfl:   •  metoprolol SR (TOPROL XL) 25 MG TABLET SR 24 HR, Take 25 mg by mouth., Disp: , Rfl:   •  nitroglycerin (NITROSTAT) 0.4 MG SL Tab, Place 0.4 mg under tongue., Disp: , Rfl:   •  SUMAtriptan (IMITREX) 25 MG Tab tablet, Take 25 mg by mouth., Disp: , Rfl:   •  zolpidem (AMBIEN) 10 MG Tab, Take 5 mg by mouth at bedtime as needed for Sleep., Disp: , Rfl:     LABORATORY DATA:   Lab Results   Component Value Date/Time    SODIUM 145 02/24/2020 11:24 AM    POTASSIUM 2.6 (LL) 02/24/2020 11:24 AM    CHLORIDE 122 (H) 02/24/2020 11:24 AM    CO2 17 (L) 02/24/2020 11:24 AM    GLUCOSE 85 02/24/2020 11:24 AM    BUN 13 02/24/2020 11:24 AM    CREATININE 0.54 02/24/2020 11:24 AM         Lab Results   Component Value Date/Time    WBC 4.8 02/24/2020 11:24 AM    HEMOGLOBIN 7.5 (L) 02/24/2020 11:24 AM    HEMATOCRIT 22.8 (L) 02/24/2020 11:24 AM    MCV 99.1 (H) 02/24/2020 11:24 AM    PLATELETCT 157 (L) 02/24/2020 11:24 AM        RADIOLOGY DATA:  No results found.    IMPRESSION:  Anal cancer (HCC)  Staging form: Anus, AJCC 8th Edition  - Clinical: Stage IIA (cT2, cN0, cM0) - Signed by Sha Casper M.D. on 1/16/2020      PLAN:  No change in treatment plan    Disposition:  Treatment plan reviewed. Questions answered. Continue therapy outlined.  Give rx for 5% lidocaine    Sha Casper M.D.    Orders Placed This Encounter   • DISCONTD: lidocaine (XYLOCAINE) 5 % Ointment   • DISCONTD: lidocaine (XYLOCAINE) 5 % Ointment   • lidocaine (XYLOCAINE) 5 % Ointment

## 2020-02-26 NOTE — PROGRESS NOTES
Order received for labs on disconnect day and pt may keep PICC as labs will need to be monitored post completion of chemotherapy. Carmen VARGHESE for cycle 2 updated and will contact pt with POC scheduled for this weekend.

## 2020-02-26 NOTE — PROGRESS NOTES
Pt ambulatory to [facility] for Day 29, cycle 1 of Mitomycin and Fluorouracil CADD pump.  POC reviewed with patient and family.  Pt denies any recent infections, fevers or illness.  MALI PICC line CDI, both ports flushing easily with brisk blood return.  Labs reviewed from 2/24/2020, hgb 7.5, Dr Vogel aware and ok to proceed with treatment.  Premed administered per MD order, Mytomycin infused over 15 minutes, pt tolerated well with no ss adverse reaction.  5FU CADD pump connected as ordered, pt to return in 96 hours for pump disconnect. Magnesium ran from 02/24, level 1.4, replaced per protocol.   Pt tolerated all treatments well with no s/s adverse reaction.  Pt has chemotherapy spill kit at home from previous appointment.  Pt discharged home with family in NAD, will return to [facility] on Saturday for pump disconnect.

## 2020-02-27 ENCOUNTER — HOSPITAL ENCOUNTER (OUTPATIENT)
Dept: RADIATION ONCOLOGY | Facility: MEDICAL CENTER | Age: 80
End: 2020-02-27
Payer: MEDICARE

## 2020-02-27 LAB
CHEMOTHERAPY INFUSION START DATE: NORMAL
CHEMOTHERAPY RECORDS: 1.8
CHEMOTHERAPY RECORDS: 5040
CHEMOTHERAPY RECORDS: NORMAL
CHEMOTHERAPY RX CANCER: NORMAL
DATE 1ST CHEMO CANCER: NORMAL
RAD ONC ARIA COURSE LAST TREATMENT DATE: NORMAL
RAD ONC ARIA COURSE TREATMENT ELAPSED DAYS: NORMAL
RAD ONC ARIA REFERENCE POINT DOSAGE GIVEN TO DATE: 35.5
RAD ONC ARIA REFERENCE POINT DOSAGE GIVEN TO DATE: 41.4
RAD ONC ARIA REFERENCE POINT ID: NORMAL
RAD ONC ARIA REFERENCE POINT ID: NORMAL
RAD ONC ARIA REFERENCE POINT SESSION DOSAGE GIVEN: 1.54
RAD ONC ARIA REFERENCE POINT SESSION DOSAGE GIVEN: 1.8

## 2020-02-27 PROCEDURE — 77014 PR CT GUIDANCE PLACEMENT RAD THERAPY FIELDS: CPT | Mod: 26 | Performed by: RADIOLOGY

## 2020-02-27 PROCEDURE — 77386 HCHG IMRT DELIVERY COMPLEX: CPT | Performed by: RADIOLOGY

## 2020-02-27 PROCEDURE — 77336 RADIATION PHYSICS CONSULT: CPT | Performed by: RADIOLOGY

## 2020-02-28 ENCOUNTER — HOSPITAL ENCOUNTER (OUTPATIENT)
Dept: RADIATION ONCOLOGY | Facility: MEDICAL CENTER | Age: 80
End: 2020-02-28
Payer: MEDICARE

## 2020-02-28 ENCOUNTER — DOCUMENTATION (OUTPATIENT)
Dept: NUTRITION | Facility: MEDICAL CENTER | Age: 80
End: 2020-02-28

## 2020-02-28 LAB
CHEMOTHERAPY INFUSION START DATE: NORMAL
CHEMOTHERAPY RECORDS: 1.8
CHEMOTHERAPY RECORDS: 5040
CHEMOTHERAPY RECORDS: NORMAL
CHEMOTHERAPY RX CANCER: NORMAL
DATE 1ST CHEMO CANCER: NORMAL
RAD ONC ARIA COURSE LAST TREATMENT DATE: NORMAL
RAD ONC ARIA COURSE TREATMENT ELAPSED DAYS: NORMAL
RAD ONC ARIA REFERENCE POINT DOSAGE GIVEN TO DATE: 37.04
RAD ONC ARIA REFERENCE POINT DOSAGE GIVEN TO DATE: 43.2
RAD ONC ARIA REFERENCE POINT ID: NORMAL
RAD ONC ARIA REFERENCE POINT ID: NORMAL
RAD ONC ARIA REFERENCE POINT SESSION DOSAGE GIVEN: 1.54
RAD ONC ARIA REFERENCE POINT SESSION DOSAGE GIVEN: 1.8

## 2020-02-28 PROCEDURE — 77014 PR CT GUIDANCE PLACEMENT RAD THERAPY FIELDS: CPT | Mod: 26 | Performed by: RADIOLOGY

## 2020-02-28 PROCEDURE — 77386 HCHG IMRT DELIVERY COMPLEX: CPT | Performed by: RADIOLOGY

## 2020-02-28 NOTE — PROGRESS NOTES
Nutrition Services: Brief Update  Weight: 126 pounds/57.2 kg  Weight Change: Weight down a total of 2 pounds since start of chemo in the last month, 1.5%. Insignificant but worth noting.     I met with patient this morning prior to her XRT.  Patient reports that her appetite continues to be diminished and that she has struggled with constipation in the last few days.  She reports that her daughter-in-law has been preparing meals for her and that she is an excellent cook so she is encouraged to eat.  She drinks boost supplements most days and drinks 5-6 cups of water + hydration beverages (Nuun and Ensure rapid hydration provided at last RD visit).  I encouraged frequent meals and snacks and advised boost daily.  She reports that apricots helped to relieve her constipation.  Of note, at previous RD visit patient was struggling with diarrhea and was taking imodium.  Discussed importance of hydration.     Plan/Recommend:  · Encouraged continue frequent snacks and 3x meals per day.  · Advised consuming boost supplement 1x/day.   · Supported continuing adequate hydration    RD to continue to monitor.  Please contact [phone]

## 2020-02-29 ENCOUNTER — OUTPATIENT INFUSION SERVICES (OUTPATIENT)
Dept: ONCOLOGY | Facility: MEDICAL CENTER | Age: 80
End: 2020-02-29
Attending: INTERNAL MEDICINE
Payer: MEDICARE

## 2020-02-29 VITALS
WEIGHT: 124.34 LBS | BODY MASS INDEX: 22.88 KG/M2 | HEIGHT: 62 IN | SYSTOLIC BLOOD PRESSURE: 118 MMHG | RESPIRATION RATE: 18 BRPM | HEART RATE: 89 BPM | DIASTOLIC BLOOD PRESSURE: 59 MMHG | OXYGEN SATURATION: 94 % | TEMPERATURE: 97.6 F

## 2020-02-29 DIAGNOSIS — C21.0 ANAL CANCER (HCC): ICD-10-CM

## 2020-02-29 LAB
ALBUMIN SERPL BCP-MCNC: 3.3 G/DL (ref 3.2–4.9)
ALBUMIN/GLOB SERPL: 1.1 G/DL
ALP SERPL-CCNC: 47 U/L (ref 30–99)
ALT SERPL-CCNC: 15 U/L (ref 2–50)
ANION GAP SERPL CALC-SCNC: 11 MMOL/L (ref 0–11.9)
AST SERPL-CCNC: 16 U/L (ref 12–45)
BASOPHILS # BLD AUTO: 0.6 % (ref 0–1.8)
BASOPHILS # BLD: 0.02 K/UL (ref 0–0.12)
BILIRUB SERPL-MCNC: 0.6 MG/DL (ref 0.1–1.5)
BUN SERPL-MCNC: 22 MG/DL (ref 8–22)
CALCIUM SERPL-MCNC: 9.5 MG/DL (ref 8.5–10.5)
CHLORIDE SERPL-SCNC: 104 MMOL/L (ref 96–112)
CO2 SERPL-SCNC: 23 MMOL/L (ref 20–33)
CREAT SERPL-MCNC: 0.87 MG/DL (ref 0.5–1.4)
EOSINOPHIL # BLD AUTO: 0.43 K/UL (ref 0–0.51)
EOSINOPHIL NFR BLD: 12.7 % (ref 0–6.9)
ERYTHROCYTE [DISTWIDTH] IN BLOOD BY AUTOMATED COUNT: 49.1 FL (ref 35.9–50)
GLOBULIN SER CALC-MCNC: 3 G/DL (ref 1.9–3.5)
GLUCOSE SERPL-MCNC: 111 MG/DL (ref 65–99)
HCT VFR BLD AUTO: 41.2 % (ref 37–47)
HGB BLD-MCNC: 13.6 G/DL (ref 12–16)
IMM GRANULOCYTES # BLD AUTO: 0.01 K/UL (ref 0–0.11)
IMM GRANULOCYTES NFR BLD AUTO: 0.3 % (ref 0–0.9)
LYMPHOCYTES # BLD AUTO: 0.35 K/UL (ref 1–4.8)
LYMPHOCYTES NFR BLD: 10.4 % (ref 22–41)
MCH RBC QN AUTO: 31.1 PG (ref 27–33)
MCHC RBC AUTO-ENTMCNC: 33.8 G/DL (ref 33.6–35)
MCV RBC AUTO: 92.3 FL (ref 81.4–97.8)
MONOCYTES # BLD AUTO: 0.32 K/UL (ref 0–0.85)
MONOCYTES NFR BLD AUTO: 9.5 % (ref 0–13.4)
NEUTROPHILS # BLD AUTO: 2.25 K/UL (ref 2–7.15)
NEUTROPHILS NFR BLD: 66.5 % (ref 44–72)
NRBC # BLD AUTO: 0 K/UL
NRBC BLD-RTO: 0 /100 WBC
PLATELET # BLD AUTO: 192 K/UL (ref 164–446)
PMV BLD AUTO: 9.2 FL (ref 9–12.9)
POTASSIUM SERPL-SCNC: 3.5 MMOL/L (ref 3.6–5.5)
PROT SERPL-MCNC: 6.3 G/DL (ref 6–8.2)
RBC # BLD AUTO: 4.27 M/UL (ref 4.2–5.4)
SODIUM SERPL-SCNC: 138 MMOL/L (ref 135–145)
WBC # BLD AUTO: 3.4 K/UL (ref 4.8–10.8)

## 2020-02-29 PROCEDURE — 36592 COLLECT BLOOD FROM PICC: CPT

## 2020-02-29 PROCEDURE — 80053 COMPREHEN METABOLIC PANEL: CPT

## 2020-02-29 PROCEDURE — A9270 NON-COVERED ITEM OR SERVICE: HCPCS | Performed by: INTERNAL MEDICINE

## 2020-02-29 PROCEDURE — 85025 COMPLETE CBC W/AUTO DIFF WBC: CPT

## 2020-02-29 PROCEDURE — 700102 HCHG RX REV CODE 250 W/ 637 OVERRIDE(OP): Performed by: INTERNAL MEDICINE

## 2020-02-29 RX ORDER — LIDOCAINE 50 MG/G
1 OINTMENT TOPICAL PRN
COMMUNITY
Start: 2020-02-28 | End: 2020-06-11 | Stop reason: SDUPTHER

## 2020-02-29 RX ORDER — HYDROCORTISONE ACETATE 25 MG
1 SUPPOSITORY, RECTAL RECTAL PRN
COMMUNITY
Start: 2020-02-12 | End: 2020-03-20 | Stop reason: SDUPTHER

## 2020-02-29 RX ORDER — PROCHLORPERAZINE MALEATE 10 MG
1 TABLET ORAL EVERY 6 HOURS PRN
COMMUNITY
Start: 2020-01-27 | End: 2020-09-17

## 2020-02-29 RX ORDER — SILVER SULFADIAZINE 1 %
1 CREAM (GRAM) TOPICAL PRN
COMMUNITY
Start: 2020-02-19 | End: 2020-09-17

## 2020-02-29 RX ORDER — POTASSIUM CHLORIDE 20 MEQ/1
40 TABLET, EXTENDED RELEASE ORAL ONCE
Status: COMPLETED | OUTPATIENT
Start: 2020-02-29 | End: 2020-02-29

## 2020-02-29 RX ORDER — LIDOCAINE HYDROCHLORIDE 20 MG/ML
15 SOLUTION OROPHARYNGEAL PRN
COMMUNITY
Start: 2020-02-07 | End: 2020-09-17

## 2020-02-29 RX ADMIN — POTASSIUM CHLORIDE 40 MEQ: 1500 TABLET, EXTENDED RELEASE ORAL at 14:35

## 2020-02-29 ASSESSMENT — FIBROSIS 4 INDEX: FIB4 SCORE: 1.45

## 2020-03-01 NOTE — PROGRESS NOTES
Pt presented to infusion for 5FU pump disconnect, lab drawn and poss electrolytes. Pump still had some volume to be given on arrival, labs drawn and dressing changed in sterile fashion. Potassium replaced orally per protocol orders. Pump disconnected once total volume to be delivered had been given. Both lumens flushed and brisk blood return observed, claves changed. Pt is keep PICC line for now until cleared by MD to have removed, printout given with dressing change appts. Left with friend to self care.

## 2020-03-02 ENCOUNTER — APPOINTMENT (OUTPATIENT)
Dept: ONCOLOGY | Facility: MEDICAL CENTER | Age: 80
End: 2020-03-02
Attending: INTERNAL MEDICINE
Payer: MEDICARE

## 2020-03-02 ENCOUNTER — HOSPITAL ENCOUNTER (OUTPATIENT)
Dept: RADIATION ONCOLOGY | Facility: MEDICAL CENTER | Age: 80
End: 2020-03-31
Attending: RADIOLOGY
Payer: MEDICARE

## 2020-03-02 ENCOUNTER — HOSPITAL ENCOUNTER (OUTPATIENT)
Dept: RADIATION ONCOLOGY | Facility: MEDICAL CENTER | Age: 80
End: 2020-03-02
Payer: MEDICARE

## 2020-03-02 LAB
CHEMOTHERAPY INFUSION START DATE: NORMAL
CHEMOTHERAPY RECORDS: 1.8
CHEMOTHERAPY RECORDS: 5040
CHEMOTHERAPY RECORDS: NORMAL
CHEMOTHERAPY RX CANCER: NORMAL
DATE 1ST CHEMO CANCER: NORMAL
RAD ONC ARIA COURSE LAST TREATMENT DATE: NORMAL
RAD ONC ARIA COURSE TREATMENT ELAPSED DAYS: NORMAL
RAD ONC ARIA REFERENCE POINT DOSAGE GIVEN TO DATE: 38.58
RAD ONC ARIA REFERENCE POINT DOSAGE GIVEN TO DATE: 45
RAD ONC ARIA REFERENCE POINT ID: NORMAL
RAD ONC ARIA REFERENCE POINT ID: NORMAL
RAD ONC ARIA REFERENCE POINT SESSION DOSAGE GIVEN: 1.54
RAD ONC ARIA REFERENCE POINT SESSION DOSAGE GIVEN: 1.8

## 2020-03-02 PROCEDURE — 77014 PR CT GUIDANCE PLACEMENT RAD THERAPY FIELDS: CPT | Mod: 26 | Performed by: RADIOLOGY

## 2020-03-02 PROCEDURE — 77427 RADIATION TX MANAGEMENT X5: CPT | Performed by: RADIOLOGY

## 2020-03-02 PROCEDURE — 77386 HCHG IMRT DELIVERY COMPLEX: CPT | Performed by: RADIOLOGY

## 2020-03-03 ENCOUNTER — HOSPITAL ENCOUNTER (OUTPATIENT)
Dept: RADIATION ONCOLOGY | Facility: MEDICAL CENTER | Age: 80
End: 2020-03-03
Payer: MEDICARE

## 2020-03-03 LAB
CHEMOTHERAPY INFUSION START DATE: NORMAL
CHEMOTHERAPY RECORDS: 1.8
CHEMOTHERAPY RECORDS: 5040
CHEMOTHERAPY RECORDS: NORMAL
CHEMOTHERAPY RX CANCER: NORMAL
DATE 1ST CHEMO CANCER: NORMAL
RAD ONC ARIA COURSE LAST TREATMENT DATE: NORMAL
RAD ONC ARIA COURSE TREATMENT ELAPSED DAYS: NORMAL
RAD ONC ARIA REFERENCE POINT DOSAGE GIVEN TO DATE: 40.13
RAD ONC ARIA REFERENCE POINT DOSAGE GIVEN TO DATE: 46.8
RAD ONC ARIA REFERENCE POINT ID: NORMAL
RAD ONC ARIA REFERENCE POINT ID: NORMAL
RAD ONC ARIA REFERENCE POINT SESSION DOSAGE GIVEN: 1.54
RAD ONC ARIA REFERENCE POINT SESSION DOSAGE GIVEN: 1.8

## 2020-03-03 PROCEDURE — 77014 PR CT GUIDANCE PLACEMENT RAD THERAPY FIELDS: CPT | Mod: 26 | Performed by: RADIOLOGY

## 2020-03-03 PROCEDURE — 77386 HCHG IMRT DELIVERY COMPLEX: CPT | Performed by: RADIOLOGY

## 2020-03-04 ENCOUNTER — APPOINTMENT (OUTPATIENT)
Dept: RADIATION ONCOLOGY | Facility: MEDICAL CENTER | Age: 80
End: 2020-03-04
Payer: MEDICARE

## 2020-03-04 VITALS
HEART RATE: 85 BPM | WEIGHT: 124.08 LBS | SYSTOLIC BLOOD PRESSURE: 104 MMHG | DIASTOLIC BLOOD PRESSURE: 60 MMHG | TEMPERATURE: 97.5 F | BODY MASS INDEX: 22.4 KG/M2 | OXYGEN SATURATION: 95 %

## 2020-03-04 DIAGNOSIS — C20 MALIGNANT NEOPLASM OF RECTUM (HCC): ICD-10-CM

## 2020-03-04 DIAGNOSIS — C21.0 ANAL CANCER (HCC): Chronic | ICD-10-CM

## 2020-03-04 LAB
CHEMOTHERAPY INFUSION START DATE: NORMAL
CHEMOTHERAPY RECORDS: 1.8
CHEMOTHERAPY RECORDS: 5040
CHEMOTHERAPY RECORDS: NORMAL
CHEMOTHERAPY RX CANCER: NORMAL
DATE 1ST CHEMO CANCER: NORMAL
RAD ONC ARIA COURSE LAST TREATMENT DATE: NORMAL
RAD ONC ARIA COURSE TREATMENT ELAPSED DAYS: NORMAL
RAD ONC ARIA REFERENCE POINT DOSAGE GIVEN TO DATE: 41.67
RAD ONC ARIA REFERENCE POINT DOSAGE GIVEN TO DATE: 48.6
RAD ONC ARIA REFERENCE POINT ID: NORMAL
RAD ONC ARIA REFERENCE POINT ID: NORMAL
RAD ONC ARIA REFERENCE POINT SESSION DOSAGE GIVEN: 1.54
RAD ONC ARIA REFERENCE POINT SESSION DOSAGE GIVEN: 1.8

## 2020-03-04 PROCEDURE — 77386 HCHG IMRT DELIVERY COMPLEX: CPT | Performed by: RADIOLOGY

## 2020-03-04 PROCEDURE — 77014 PR CT GUIDANCE PLACEMENT RAD THERAPY FIELDS: CPT | Mod: 26 | Performed by: RADIOLOGY

## 2020-03-04 RX ORDER — OXYCODONE AND ACETAMINOPHEN 10; 325 MG/1; MG/1
1 TABLET ORAL EVERY 4 HOURS PRN
Qty: 84 TAB | Refills: 0 | Status: SHIPPED | OUTPATIENT
Start: 2020-03-04 | End: 2020-03-18

## 2020-03-04 RX ORDER — PHENAZOPYRIDINE HYDROCHLORIDE 200 MG/1
200 TABLET, FILM COATED ORAL 3 TIMES DAILY PRN
Qty: 42 TAB | Refills: 0 | Status: SHIPPED | OUTPATIENT
Start: 2020-03-04 | End: 2020-03-18

## 2020-03-04 RX ORDER — PHENAZOPYRIDINE HYDROCHLORIDE 200 MG/1
200 TABLET, FILM COATED ORAL 3 TIMES DAILY PRN
Qty: 42 TAB | Refills: 0 | Status: SHIPPED | OUTPATIENT
Start: 2020-03-04 | End: 2020-03-04 | Stop reason: SDUPTHER

## 2020-03-04 RX ORDER — HYDROCODONE BITARTRATE AND ACETAMINOPHEN 7.5; 325 MG/1; MG/1
TABLET ORAL
COMMUNITY
Start: 2020-03-02 | End: 2020-03-04

## 2020-03-04 ASSESSMENT — PAIN SCALES - GENERAL: PAINLEVEL: 5=MODERATE PAIN

## 2020-03-04 ASSESSMENT — FIBROSIS 4 INDEX: FIB4 SCORE: 1.7

## 2020-03-04 NOTE — ON TREATMENT VISIT
ON TREATMENT  NOTE  RADIATION ONCOLOGY DEPARTMENT    Patient name:  Josy Grove    Primary Physician:  Jaciel Maravilla M.D. MRN: [id number]  CSN: [id number]   Referring physician:  Dulce Jeter M.D. : 1940, 79 y.o.     ENCOUNTER DATE:  20    DIAGNOSIS:  Visit Diagnoses     ICD-10-CM   1. Malignant neoplasm of rectum (HCC) C20   2. Anal cancer (HCC) C21.0     Anal cancer (HCC)  Staging form: Anus, AJCC 8th Edition  - Clinical: Stage IIA (cT2, cN0, cM0) - Signed by hSa Casper M.D. on 2020      TREATMENT SUMMARY:  [facility] Treatment Information        Some values may be hidden. Unless noted otherwise, only the newest values recorded on each date are displayed.         [facility] Treatment Summary 3/4/20   Course First Treatment Date 2020   Course Last Treatment Date 2020   Anus Plan from Course C1_anus   Fraction  of 28   Elapsed Course Days 37 @    Prescribed Fraction Dose 180 cGy   Prescribed Total Dose 5,040 cGy   Anus Reference Point from Course C1_anus   Elapsed Course Days 37 @ [id number]   Session Dose 180 cGy   Total Dose 4,860 cGy   Anus CP Reference Point from Course C1_anus   Elapsed Course Days 37 @    Session Dose 154 cGy   Total Dose 4,167 cGy             SUBJECTIVE:  Increased pain using norco 7.5mg q 4hrs, using 2-3 immodium per day,      VITAL SIGNS:  KPS: 90, Able to carry on normal activity; minor signs or symptoms of disease (ECOG equivalent 0)  Encounter Vitals  Temperature: 36.4 °C (97.5 °F)  Temp src: Temporal  Blood Pressure : 104/60  Pulse: 85  Pulse Oximetry: 95 %  Weight: 56.3 kg (124 lb 1.3 oz)  Pain Score: 5=Moderate Pain  Pain Assessment 3/4/2020 2020   Pain Assessment Chronic Pain Chronic Pain   Pain Score 5 4   Pain Loc Rectum Rectum   What increases pain? bowel movements  bowel movements   What decreases pain? pain medication  pain medication    Some recent data might be hidden          PHYSICAL EXAM:    Physical  Exam  Vitals signs and nursing note reviewed.   Constitutional:       Appearance: Normal appearance.   Neurological:      Mental Status: She is alert.          Toxicity Assessment 3/4/2020 2/26/2020 2/19/2020 2/12/2020 2/5/2020 1/29/2020   Toxicity Assessment Female Pelvis Female Pelvis Female Pelvis Female Pelvis Female Pelvis Female Pelvis   Fatigue (lethargy, malaise, asthenia) Moderate (e.g., decrease in performance status by 1 ECOG level or 20% [place]) or causing difficulty performing some activities Moderate (e.g., decrease in performance status by 1 ECOG level or 20% [place]) or causing difficulty performing some activities Moderate (e.g., decrease in performance status by 1 ECOG level or 20% [place]) or causing difficulty performing some activities Moderate (e.g., decrease in performance status by 1 ECOG level or 20% [place]) or causing difficulty performing some activities Increased fatigue over baseline, but not altering normal activities Increased fatigue over baseline, but not altering normal activities   Radiation Dermatitis Faint erythema or dry desquamation Faint erythema or dry desquamation Faint erythema or dry desquamation Faint erythema or dry desquamation None None   Anorexia Loss of appetite Loss of appetite Loss of appetite Oral intake significantly decreased Loss of appetite None   Colitis None None None None None None   Constipation Requiring stool softener or dietary modification Requiring stool softener or dietary modification Requiring stool softener or dietary modification None None None   Dehydration None None Requiring IV fluid replacement (brief) None None None   Diarrhea w/o Colostomy Increase of 4-6 stools/day, or nocternal stools None None Increase of <4 stools/day over pre-treatment None None   Flatulence None None None None None None   Nausea Able to eat None None Able to eat - None   Proctitis None None None None None None   Vomiting None None None None None None   RT -  Pain due to RT Moderate pain, pain or analgesics interfering with function, but not interfering with activities of daily living Mild pain not interfering with function None Mild pain not interfering with function Mild pain not interfering with function None   Tumor Pain (onset or exacerbation of tumor pain due to treatment) Moderate pain, pain or analgesics interfering with function, but not interfering with activities of daily living Mild pain not interfering with function Mild pain not interfering with function Mild pain not interfering with function Mild pain not interfering with function Mild pain not interfering with function   Dysuria (painful urination) Mild symptoms requiring no intervention Mild symptoms requiring no intervention None Mild symptoms requiring no intervention None None   Urinary Frequency Normal Increase in frequency up to 2x normal Increase in frequency up to 2x normal Normal Normal Normal   Urinary Urgency None Present None None None None   Bladder Spasms Absent Absent Absent Absent Absent Absent   Incontinence None None None None None None   Urinary Retention Hesitency or dribbling, but no significant residual urine and/or retention occuring during the immediate postoperative period Normal Normal Normal Normal Normal   Vaginitis (not due to infection) None None None Mild, not requiring treatment None None   Vaginal Bleeding None None None None None None   Vaginal Dryness Normal Normal Normal - Normal Normal       CURRENT MEDICATIONS:    Current Outpatient Medications:   •  oxyCODONE-acetaminophen (PERCOCET-10)  MG Tab, Take 1 Tab by mouth every four hours as needed for Severe Pain for up to 14 days., Disp: 84 Tab, Rfl: 0  •  phenazopyridine (PYRIDIUM) 200 MG Tab, Take 1 Tab by mouth 3 times a day as needed for up to 14 days., Disp: 42 Tab, Rfl: 0  •  ANUCORT-HC 25 MG Suppos, Insert 1 Suppository in rectum as needed., Disp: , Rfl:   •  prochlorperazine (COMPAZINE) 10 MG Tab, Take 1  tablet by mouth every 6 hours as needed., Disp: , Rfl:   •  lidocaine (XYLOCAINE) 2 % Solution, Take 15 mL by mouth as needed., Disp: , Rfl:   •  SSD 1 % Cream, Apply 1 Application to affected area(s) as needed., Disp: , Rfl:   •  lidocaine (XYLOCAINE) 5 % Ointment, Apply 1 Application to affected area(s) as needed., Disp: , Rfl:   •  lidocaine (XYLOCAINE) 5 % Ointment, Apply to affected area up to three times per day as needed for topical anesthesia, Disp: 35.44 g, Rfl: 4  •  apixaban (ELIQUIS) 5mg Tab, Take 5 mg by mouth 2 Times a Day., Disp: , Rfl:   •  cephALEXin (KEFLEX) 500 MG Cap, Take 500 mg by mouth 4 times a day., Disp: , Rfl:   •  ondansetron (ZOFRAN ODT) 8 MG TABLET DISPERSIBLE, Take 8 mg by mouth every 8 hours as needed for Nausea., Disp: , Rfl:   •  mupirocin (BACTROBAN) 2 % Ointment, Apply  to affected area(s) every day., Disp: , Rfl:   •  DILTIAZem (CARDIZEM) 60 MG Tab, Take 60 mg by mouth 2 Times a Day., Disp: , Rfl:   •  montelukast (SINGULAIR) 10 MG Tab, Take 10 mg by mouth every day., Disp: , Rfl:   •  fluticasone-salmeterol (WIXELA INHUB) 500-50 MCG/DOSE AEROSOL POWDER, BREATH ACTIVATED, Inhale 1 Puff by mouth every 12 hours., Disp: , Rfl:   •  vitamin D (CHOLECALCIFEROL) 1000 UNIT Tab, Take 1,000 Units by mouth every day., Disp: , Rfl:   •  vitamin e (VITAMIN E) 400 UNIT Cap, Take 400 Units by mouth every day., Disp: , Rfl:   •  CALCIUM PO, Take 750 mg by mouth every day., Disp: , Rfl:   •  Ibuprofen-diphenhydrAMINE HCl (ADVIL PM) 200-25 MG Cap, Take 1 Dose by mouth every bedtime., Disp: , Rfl:   •  atorvastatin (LIPITOR) 20 MG Tab, Take 80 mg by mouth., Disp: , Rfl:   •  metoprolol SR (TOPROL XL) 25 MG TABLET SR 24 HR, Take 25 mg by mouth., Disp: , Rfl:   •  nitroglycerin (NITROSTAT) 0.4 MG SL Tab, Place 0.4 mg under tongue., Disp: , Rfl:   •  SUMAtriptan (IMITREX) 25 MG Tab tablet, Take 25 mg by mouth., Disp: , Rfl:   •  zolpidem (AMBIEN) 10 MG Tab, Take 5 mg by mouth at bedtime as needed for  Sleep., Disp: , Rfl:     LABORATORY DATA:   Lab Results   Component Value Date/Time    SODIUM 138 02/29/2020 01:42 PM    POTASSIUM 3.5 (L) 02/29/2020 01:42 PM    CHLORIDE 104 02/29/2020 01:42 PM    CO2 23 02/29/2020 01:42 PM    GLUCOSE 111 (H) 02/29/2020 01:42 PM    BUN 22 02/29/2020 01:42 PM    CREATININE 0.87 02/29/2020 01:42 PM         Lab Results   Component Value Date/Time    WBC 3.4 (L) 02/29/2020 01:42 PM    HEMOGLOBIN 13.6 02/29/2020 01:42 PM    HEMATOCRIT 41.2 02/29/2020 01:42 PM    MCV 92.3 02/29/2020 01:42 PM    PLATELETCT 192 02/29/2020 01:42 PM        RADIOLOGY DATA:  No results found.    IMPRESSION:  Anal cancer (HCC)  Staging form: Anus, AJCC 8th Edition  - Clinical: Stage IIA (cT2, cN0, cM0) - Signed by Sha Casper M.D. on 1/16/2020      PLAN:  No change in treatment plan    Disposition:  Treatment plan reviewed. Questions answered. Continue therapy outlined. Recommend percocet instead of norco for better pain control. Recommend pyridum 300mg TID.     Sha Casper M.D.    Orders Placed This Encounter   • Rad Onc [facility] Session Summary   • DISCONTD: HYDROcodone-acetaminophen (NORCO) 7.5-325 MG per tablet   • DISCONTD: phenazopyridine (PYRIDIUM) 200 MG Tab   • oxyCODONE-acetaminophen (PERCOCET-10)  MG Tab   • phenazopyridine (PYRIDIUM) 200 MG Tab

## 2020-03-05 ENCOUNTER — DOCUMENTATION (OUTPATIENT)
Dept: NUTRITION | Facility: MEDICAL CENTER | Age: 80
End: 2020-03-05

## 2020-03-05 ENCOUNTER — HOSPITAL ENCOUNTER (OUTPATIENT)
Dept: RADIATION ONCOLOGY | Facility: MEDICAL CENTER | Age: 80
End: 2020-03-05
Payer: MEDICARE

## 2020-03-05 LAB
CHEMOTHERAPY INFUSION START DATE: NORMAL
CHEMOTHERAPY INFUSION START DATE: NORMAL
CHEMOTHERAPY INFUSION STOP DATE: NORMAL
CHEMOTHERAPY RECORDS: 1.8
CHEMOTHERAPY RECORDS: 1.8
CHEMOTHERAPY RECORDS: 5040
CHEMOTHERAPY RECORDS: 5040
CHEMOTHERAPY RECORDS: NORMAL
CHEMOTHERAPY RX CANCER: NORMAL
CHEMOTHERAPY RX CANCER: NORMAL
DATE 1ST CHEMO CANCER: NORMAL
DATE 1ST CHEMO CANCER: NORMAL
RAD ONC ARIA COURSE LAST TREATMENT DATE: NORMAL
RAD ONC ARIA COURSE LAST TREATMENT DATE: NORMAL
RAD ONC ARIA COURSE TREATMENT ELAPSED DAYS: NORMAL
RAD ONC ARIA COURSE TREATMENT ELAPSED DAYS: NORMAL
RAD ONC ARIA REFERENCE POINT DOSAGE GIVEN TO DATE: 43.21
RAD ONC ARIA REFERENCE POINT DOSAGE GIVEN TO DATE: 43.21
RAD ONC ARIA REFERENCE POINT DOSAGE GIVEN TO DATE: 50.4
RAD ONC ARIA REFERENCE POINT DOSAGE GIVEN TO DATE: 50.4
RAD ONC ARIA REFERENCE POINT ID: NORMAL
RAD ONC ARIA REFERENCE POINT SESSION DOSAGE GIVEN: 1.54
RAD ONC ARIA REFERENCE POINT SESSION DOSAGE GIVEN: 1.8

## 2020-03-05 PROCEDURE — 77427 RADIATION TX MANAGEMENT X5: CPT | Performed by: RADIOLOGY

## 2020-03-05 PROCEDURE — 77386 HCHG IMRT DELIVERY COMPLEX: CPT | Performed by: RADIOLOGY

## 2020-03-05 PROCEDURE — 77014 PR CT GUIDANCE PLACEMENT RAD THERAPY FIELDS: CPT | Mod: 26 | Performed by: RADIOLOGY

## 2020-03-05 PROCEDURE — 77336 RADIATION PHYSICS CONSULT: CPT | Performed by: RADIOLOGY

## 2020-03-05 NOTE — PROGRESS NOTES
Nutrition Services: Brief Update  Weight: 56.3 kg/124 pounds  Weight Change: down 2 pounds in the last week, 1.5%, significant.     I met with patient and he son this morning following final scheduled radiation treatment.  Patient continues to struggle with ongoing diarrhea at this time. She reports eating smaller portions and that she just started taking Imodium again.  Discussed typical dosing for imodium.  Discussed BRAT style diet and fiber as well as hydration.  Discussed ensure and high calorie/fat foods and encouraged patient to eat every 2 hours, using clock or timer as a reminder.  All questions and concerns were addressed at this time.      Plan/Recommend:  • Advised eating every 2 hours as discussed above.   • Encouraged Ensure (or comparable supplement) minimum of 1x/day.  • Aim for minimum of 6-8 cups of water per day.     RD to continue to monitor throughout treatment.  Please contact [phone]     Cell Phone/PDA (specify)/Clothing

## 2020-03-09 ENCOUNTER — TELEPHONE (OUTPATIENT)
Dept: ONCOLOGY | Facility: MEDICAL CENTER | Age: 80
End: 2020-03-09

## 2020-03-09 NOTE — TELEPHONE ENCOUNTER
Patient unavailable at time of call. Left message with RN's extension and to call back regarding appointment. RN notified schedulers of no show, schedulers to attempt to contact patient as well.

## 2020-03-20 DIAGNOSIS — C21.0 ANAL CANCER (HCC): Chronic | ICD-10-CM

## 2020-03-20 DIAGNOSIS — C21.0 ANAL CARCINOMA (HCC): ICD-10-CM

## 2020-03-20 PROCEDURE — 99212 OFFICE O/P EST SF 10 MIN: CPT | Performed by: RADIOLOGY

## 2020-03-20 RX ORDER — HYDROCORTISONE ACETATE 25 MG
25 SUPPOSITORY, RECTAL RECTAL PRN
Qty: 10 SUPPOSITORY | Refills: 3 | Status: SHIPPED | OUTPATIENT
Start: 2020-03-20 | End: 2020-04-03

## 2020-03-20 NOTE — PROGRESS NOTES
Telephone visit    Patient improving post treatment. Rx for anusol for proctitis eRx to [facility]. PETCT and CONCHITA in 3 months follow up ~June 2020.

## 2020-03-23 ENCOUNTER — APPOINTMENT (OUTPATIENT)
Dept: ONCOLOGY | Facility: MEDICAL CENTER | Age: 80
End: 2020-03-23
Attending: INTERNAL MEDICINE
Payer: MEDICARE

## 2020-03-25 ENCOUNTER — APPOINTMENT (OUTPATIENT)
Dept: ONCOLOGY | Facility: MEDICAL CENTER | Age: 80
End: 2020-03-25
Attending: INTERNAL MEDICINE
Payer: MEDICARE

## 2020-03-25 ENCOUNTER — HOSPITAL ENCOUNTER (OUTPATIENT)
Dept: LAB | Facility: MEDICAL CENTER | Age: 80
End: 2020-03-25
Attending: INTERNAL MEDICINE
Payer: MEDICARE

## 2020-03-25 LAB
ALBUMIN SERPL BCP-MCNC: 3.4 G/DL (ref 3.2–4.9)
ALBUMIN/GLOB SERPL: 1.4 G/DL
ALP SERPL-CCNC: 62 U/L (ref 30–99)
ALT SERPL-CCNC: 18 U/L (ref 2–50)
ANION GAP SERPL CALC-SCNC: 15 MMOL/L (ref 7–16)
AST SERPL-CCNC: 27 U/L (ref 12–45)
BASOPHILS # BLD AUTO: 0.4 % (ref 0–1.8)
BASOPHILS # BLD: 0.02 K/UL (ref 0–0.12)
BILIRUB SERPL-MCNC: 0.3 MG/DL (ref 0.1–1.5)
BUN SERPL-MCNC: 14 MG/DL (ref 8–22)
CALCIUM SERPL-MCNC: 7.9 MG/DL (ref 8.5–10.5)
CHLORIDE SERPL-SCNC: 101 MMOL/L (ref 96–112)
CO2 SERPL-SCNC: 20 MMOL/L (ref 20–33)
CREAT SERPL-MCNC: 0.67 MG/DL (ref 0.5–1.4)
EOSINOPHIL # BLD AUTO: 0.26 K/UL (ref 0–0.51)
EOSINOPHIL NFR BLD: 5.8 % (ref 0–6.9)
ERYTHROCYTE [DISTWIDTH] IN BLOOD BY AUTOMATED COUNT: 58.4 FL (ref 35.9–50)
GLOBULIN SER CALC-MCNC: 2.5 G/DL (ref 1.9–3.5)
GLUCOSE SERPL-MCNC: 92 MG/DL (ref 65–99)
HCT VFR BLD AUTO: 39.7 % (ref 37–47)
HGB BLD-MCNC: 12.7 G/DL (ref 12–16)
IMM GRANULOCYTES # BLD AUTO: 0.04 K/UL (ref 0–0.11)
IMM GRANULOCYTES NFR BLD AUTO: 0.9 % (ref 0–0.9)
LYMPHOCYTES # BLD AUTO: 1.55 K/UL (ref 1–4.8)
LYMPHOCYTES NFR BLD: 34.8 % (ref 22–41)
MCH RBC QN AUTO: 31.2 PG (ref 27–33)
MCHC RBC AUTO-ENTMCNC: 32 G/DL (ref 33.6–35)
MCV RBC AUTO: 97.5 FL (ref 81.4–97.8)
MONOCYTES # BLD AUTO: 0.74 K/UL (ref 0–0.85)
MONOCYTES NFR BLD AUTO: 16.6 % (ref 0–13.4)
NEUTROPHILS # BLD AUTO: 1.85 K/UL (ref 2–7.15)
NEUTROPHILS NFR BLD: 41.5 % (ref 44–72)
NRBC # BLD AUTO: 0 K/UL
NRBC BLD-RTO: 0 /100 WBC
PLATELET # BLD AUTO: 202 K/UL (ref 164–446)
PMV BLD AUTO: 10.5 FL (ref 9–12.9)
POTASSIUM SERPL-SCNC: 4.5 MMOL/L (ref 3.6–5.5)
PROT SERPL-MCNC: 5.9 G/DL (ref 6–8.2)
RBC # BLD AUTO: 4.07 M/UL (ref 4.2–5.4)
SODIUM SERPL-SCNC: 136 MMOL/L (ref 135–145)
WBC # BLD AUTO: 4.5 K/UL (ref 4.8–10.8)

## 2020-03-25 PROCEDURE — 80053 COMPREHEN METABOLIC PANEL: CPT

## 2020-03-25 PROCEDURE — 85025 COMPLETE CBC W/AUTO DIFF WBC: CPT

## 2020-03-30 ENCOUNTER — APPOINTMENT (OUTPATIENT)
Dept: ONCOLOGY | Facility: MEDICAL CENTER | Age: 80
End: 2020-03-30
Attending: INTERNAL MEDICINE
Payer: MEDICARE

## 2020-04-02 ENCOUNTER — TELEPHONE (OUTPATIENT)
Dept: NUTRITION | Facility: MEDICAL CENTER | Age: 80
End: 2020-04-02

## 2020-04-02 NOTE — TELEPHONE ENCOUNTER
Nutrition Services: Brief Update  Weight: 114 pounds (reported by patient via home scale, no clothing)  Weight Change: This is a severe decrease 10 pounds/8% in the last month if correct however I questions complete accuracy given the fact that different scales were used.     I called patient this afternoon for nutrition update.  Patient reports that she has no appetite but is eating despite this since her  has been encouraging her frequently and always puts food in front of her.  There is a bowl out on the counter with pretzels, nuts and dried fruit available to her at all times.  Patient notes that she can not eat large portions but instead has been grazing frequently.  Patient notes that her weight has been fairly stable at home.  She notes occasional nausea but that this is controlled with her prescribed antiemetics.  Patient reports improvement with her bowel movements in the last 3-4 days with occasional diarrhea.  She takes imodium for her diarrhea and has relief.     24 Hour Recall:  Breakfast: 1 Waffle and 1 piece of nelson  Snack: Fruit and grazing on mixed nuts, pretzels and/or dried fruit  Lunch: 1/2 avocado sandwich + few potato chips  Snack: Fruit and grazing on mixed nuts, pretzels and/or dried fruit.  Typically sips on Ensure 1 cup  Dinner: Light option such as casserole and vegetables.     Plan/Recommend:  • Encouraged patient to continue grazing frequently, every 2 hours during the day.  • Encouraged high calorie items such as avocado, nuts, butter, oil etc.  • Encouraged patient to continue with ensure 1x/day minimum.  • Asked that patient not weigh herself daily but to aim for 2x/week and monitor trend.  • Provided RD contact information and encouraged to to call should questions or concerns arise or weight trending down.  Reiterated that we do not want to lose any further weight and our biggest goal is to keep her nourished and stable.     RD to continue to monitor.  Please contact PRN  [phone]

## 2020-05-07 ENCOUNTER — TELEPHONE (OUTPATIENT)
Dept: NUTRITION | Facility: MEDICAL CENTER | Age: 80
End: 2020-05-07

## 2020-05-07 NOTE — TELEPHONE ENCOUNTER
Nutrition Services: Brief Update  Weight: 114 pounds per reported home scale  Weight Change: Stable with home scale reported weight from last month.    I called patient this morning for update.  Patient seems frustrated and emotional on the phone.  Notes that she has no appetite but that she has been drinking two Ensure per day.  Diarrhea is improved and she continues to take imodium.  Patient crying on the phone.  She reports that she has been prescribed an appetite stimulant but that she wants to check with her cardiologist prior to taking these.  I encouraged patient to call us with questions or concerns but seemed eager to get off the phone as she was emotional this morning.     RD to continue [phone]

## 2020-05-14 ENCOUNTER — TELEPHONE (OUTPATIENT)
Dept: RADIATION ONCOLOGY | Facility: MEDICAL CENTER | Age: 80
End: 2020-05-14

## 2020-05-14 DIAGNOSIS — K62.7 RADIATION PROCTITIS: ICD-10-CM

## 2020-05-14 RX ORDER — HYDROCORTISONE 100 MG/60ML
100 SUSPENSION RECTAL EVERY 12 HOURS
Qty: 28 ENEMA | Refills: 1 | Status: SHIPPED | OUTPATIENT
Start: 2020-05-14 | End: 2020-05-28

## 2020-05-22 ENCOUNTER — TELEPHONE (OUTPATIENT)
Dept: NUTRITION | Facility: MEDICAL CENTER | Age: 80
End: 2020-05-22

## 2020-05-22 NOTE — TELEPHONE ENCOUNTER
Nutrition Services: Telephone Encounter  Consult received for nutrition assessment from RN, reports pt having diarrhea that is increasing with Boost/Ensure shakes.    Called pt, reports diarrhea has improved significantly over last 2-3 days since pt has cut out Boost and Ensure shakes. Previously was drinking Boost shake mid morning and Ensure shake in afternoon. Pt reports did not start appetite stimulant. Diet recall as follows:    B - shredded wheat with banana  L - 1/2 sandwich and chips  Snack - peanut butter pretzels or apple  D - much smaller portion, nothing sounds good at this time  Snack - ice cream    Recommendations/Plan:  · Discussed adding in high protein snacks in between meals such as greek yogurt, apple with peanut butter, string cheese, etc.  · Discussed way to increase calories such as adding butter to cooking and using avocado which pt likes.  · Discussed benecalorie supplement and encouraged to try.  · Discussed trying to incorporate Boost/Ensure shakes back into diet slowly in ~ 1 week.    Please have patient contact RD: [phone]    RD will attempt to follow-up during MD visit.

## 2020-06-08 ENCOUNTER — HOSPITAL ENCOUNTER (OUTPATIENT)
Dept: RADIOLOGY | Facility: MEDICAL CENTER | Age: 80
End: 2020-06-08
Attending: RADIOLOGY
Payer: MEDICARE

## 2020-06-08 DIAGNOSIS — C21.0 ANAL CANCER (HCC): Chronic | ICD-10-CM

## 2020-06-08 DIAGNOSIS — C20 MALIGNANT NEOPLASM OF RECTUM (HCC): ICD-10-CM

## 2020-06-08 PROCEDURE — A9552 F18 FDG: HCPCS

## 2020-06-11 ENCOUNTER — HOSPITAL ENCOUNTER (OUTPATIENT)
Dept: LAB | Facility: MEDICAL CENTER | Age: 80
End: 2020-06-11
Attending: INTERNAL MEDICINE
Payer: MEDICARE

## 2020-06-11 ENCOUNTER — DOCUMENTATION (OUTPATIENT)
Dept: NUTRITION | Facility: MEDICAL CENTER | Age: 80
End: 2020-06-11

## 2020-06-11 ENCOUNTER — HOSPITAL ENCOUNTER (OUTPATIENT)
Dept: RADIATION ONCOLOGY | Facility: MEDICAL CENTER | Age: 80
End: 2020-06-30
Attending: RADIOLOGY
Payer: MEDICARE

## 2020-06-11 VITALS
BODY MASS INDEX: 21.1 KG/M2 | WEIGHT: 116.84 LBS | DIASTOLIC BLOOD PRESSURE: 80 MMHG | TEMPERATURE: 98 F | OXYGEN SATURATION: 99 % | HEART RATE: 93 BPM | SYSTOLIC BLOOD PRESSURE: 101 MMHG

## 2020-06-11 DIAGNOSIS — C21.0 ANAL CANCER (HCC): Chronic | ICD-10-CM

## 2020-06-11 LAB
ALBUMIN SERPL BCP-MCNC: 3.9 G/DL (ref 3.2–4.9)
ALBUMIN/GLOB SERPL: 1.5 G/DL
ALP SERPL-CCNC: 56 U/L (ref 30–99)
ALT SERPL-CCNC: 25 U/L (ref 2–50)
ANION GAP SERPL CALC-SCNC: 13 MMOL/L (ref 7–16)
AST SERPL-CCNC: 22 U/L (ref 12–45)
BILIRUB SERPL-MCNC: 0.3 MG/DL (ref 0.1–1.5)
BUN SERPL-MCNC: 14 MG/DL (ref 8–22)
CALCIUM SERPL-MCNC: 9.4 MG/DL (ref 8.5–10.5)
CHLORIDE SERPL-SCNC: 108 MMOL/L (ref 96–112)
CO2 SERPL-SCNC: 23 MMOL/L (ref 20–33)
CREAT SERPL-MCNC: 0.69 MG/DL (ref 0.5–1.4)
GLOBULIN SER CALC-MCNC: 2.6 G/DL (ref 1.9–3.5)
GLUCOSE SERPL-MCNC: 83 MG/DL (ref 65–99)
POTASSIUM SERPL-SCNC: 4.1 MMOL/L (ref 3.6–5.5)
PROT SERPL-MCNC: 6.5 G/DL (ref 6–8.2)
SODIUM SERPL-SCNC: 144 MMOL/L (ref 135–145)

## 2020-06-11 PROCEDURE — 80053 COMPREHEN METABOLIC PANEL: CPT

## 2020-06-11 PROCEDURE — 99212 OFFICE O/P EST SF 10 MIN: CPT | Performed by: RADIOLOGY

## 2020-06-11 RX ORDER — OXYCODONE AND ACETAMINOPHEN 10; 325 MG/1; MG/1
TABLET ORAL
COMMUNITY
Start: 2020-03-18 | End: 2020-09-17

## 2020-06-11 RX ORDER — DICYCLOMINE HYDROCHLORIDE 10 MG/1
10 CAPSULE ORAL
Qty: 120 CAP | Refills: 0 | Status: SHIPPED | OUTPATIENT
Start: 2020-06-11 | End: 2020-07-20

## 2020-06-11 RX ORDER — LIDOCAINE 50 MG/G
OINTMENT TOPICAL
Qty: 1 TUBE | Refills: 3 | Status: ON HOLD | OUTPATIENT
Start: 2020-06-11 | End: 2020-10-23

## 2020-06-11 RX ORDER — DIGOXIN 125 MCG
125 TABLET ORAL
COMMUNITY

## 2020-06-11 RX ORDER — TIOTROPIUM BROMIDE 18 UG/1
18 CAPSULE ORAL; RESPIRATORY (INHALATION) DAILY
COMMUNITY
End: 2021-02-20

## 2020-06-11 ASSESSMENT — FIBROSIS 4 INDEX: FIB4 SCORE: 2.49

## 2020-06-11 ASSESSMENT — PAIN SCALES - GENERAL: PAINLEVEL: NO PAIN

## 2020-06-11 NOTE — PROGRESS NOTES
RADIATION ONCOLOGY FOLLOW-UP    DATE OF SERVICE: 6/11/2020    IDENTIFICATION:   A 79 y.o. female with Malignant neoplasm of rectum (HCC).    Visit Diagnoses     ICD-10-CM   1. Anal cancer (HCC)  C21.0     Anal cancer (HCC)  Staging form: Anus, AJCC 8th Edition  - Clinical: Stage IIA (cT2, cN0, cM0) - Signed by Sha Casper M.D. on 1/16/2020      RADIATION SUMMARY:  [facility] Treatment Information        Some values may be hidden. Unless noted otherwise, only the newest values recorded on each date are displayed.         [facility] Treatment Summary 3/5/20   Course First Treatment Date 01/27/2020    Course Last Treatment Date 03/05/2020    Anus Plan from Course C1_anus   Fraction 28 of 28    Elapsed Course Days 38 @ [id number]    Prescribed Fraction Dose 180 cGy    Prescribed Total Dose 5,040 cGy    Anus Reference Point from Course C1_anus   Elapsed Course Days 38 @ [id number]    Session Dose -    Total Dose 5,040 cGy    Anus CP Reference Point from Course C1_anus   Elapsed Course Days 38 @ [id number]    Session Dose -    Total Dose 4,321 cGy     Some values recorded on this date have been omitted.              HISTORY OF PRESENT ILLNESS:   Patient originally presented in April 2019 with some rectal bleeding.  She was originally seen by Dr. Edilson Quinn who noted anal fissure and hemorrhoids and referred her to Dr. Dulce Jeter ([facility]) for anoscopy which showed hard mass exophytic palpated from the anal verge to the top of the sphincter in the left lateral position fixed and punch biopsy December 18, 2018 shows invasive squamous cell carcinoma moderately differentiated.  She underwent MRI rectum January 3, 2020 which showed a 3.9 x 2 cm anal canal lesion semicircumferential extending from 1:00 to 8 o'clock position extending from the anal verge into the lower rectum.  Anteriorly the lesion abuts the lower vagina without definitive evidence of vaginal invasion.  CT chest abdomen pelvis  January 10, 2020 showed no evidence of metastatic disease.  A hypo-dense 1.4 cm nodule below the left hemidiaphragm medial to the spleen it is unlikely to represent a solitary metastatic lesion in this location.  PET CT scan done January 16, 2020 showed abnormal oval-shaped solid mass in the left side of the anus extending to the level of the lower rectum with marketed activity.  No adenopathy or elevated raghav activity noted.  Patient currently is complaining of severe anal pain and occasional bleeding.  3/20/20  Patient improving post treatment. Rx for anusol for proctitis eRx to [place] [facility]. PETCT and CONCHITA in 3 months follow up ~June 2020.    INTERVAL HISTORY:  Patient still having some loose bowel movements and fecal incontinence as well as some pain in the anal rectal area.  She is using the lidocaine jelly with some relief.  She also complains of some bladder irritation and just general discomfort in her lower abdomen.  PET/CT shows good response with just mild activity in the anal area at this time.    PROBLEM LIST:  Patient Active Problem List   Diagnosis   • Degeneration of lumbar intervertebral disc   • Anal cancer (HCC)   • Hiatal hernia   • Asthma   • High cholesterol   • PONV (postoperative nausea and vomiting)   • Anemia       CURRENT MEDICATIONS:  Current Outpatient Medications   Medication Sig Dispense Refill   • oxyCODONE-acetaminophen (PERCOCET-10)  MG Tab      • digoxin (LANOXIN) 125 MCG Tab Take 125 mcg by mouth every day.     • tiotropium (SPIRIVA) 18 MCG Cap Inhale 18 mcg by mouth every day.     • dicyclomine (BENTYL) 10 MG Cap Take 1 Cap by mouth 4 Times a Day,Before Meals and at Bedtime for 30 days. 120 Cap 0   • lidocaine (XYLOCAINE) 5 % Ointment Apply to affected area 2x per day 1 Tube 3   • apixaban (ELIQUIS) 5mg Tab Take 5 mg by mouth 2 Times a Day.     • DILTIAZem (CARDIZEM) 60 MG Tab Take 60 mg by mouth 2 Times a Day.     • montelukast (SINGULAIR) 10 MG Tab Take 10 mg by  mouth every day.     • vitamin D (CHOLECALCIFEROL) 1000 UNIT Tab Take 1,000 Units by mouth every day.     • vitamin e (VITAMIN E) 400 UNIT Cap Take 400 Units by mouth every day.     • CALCIUM PO Take 750 mg by mouth every day.     • atorvastatin (LIPITOR) 20 MG Tab Take 80 mg by mouth.     • nitroglycerin (NITROSTAT) 0.4 MG SL Tab Place 0.4 mg under tongue.     • SUMAtriptan (IMITREX) 25 MG Tab tablet Take 25 mg by mouth.     • zolpidem (AMBIEN) 10 MG Tab Take 5 mg by mouth at bedtime as needed for Sleep.     • silver sulfADIAZINE (SILVADENE) 1 % Cream Apply 1/8 inch layer to affected area BID (Patient not taking: Reported on 6/11/2020) 400 g 2   • prochlorperazine (COMPAZINE) 10 MG Tab Take 1 tablet by mouth every 6 hours as needed.     • lidocaine (XYLOCAINE) 2 % Solution Take 15 mL by mouth as needed.     • SSD 1 % Cream Apply 1 Application to affected area(s) as needed.     • cephALEXin (KEFLEX) 500 MG Cap Take 500 mg by mouth 4 times a day.     • ondansetron (ZOFRAN ODT) 8 MG TABLET DISPERSIBLE Take 8 mg by mouth every 8 hours as needed for Nausea.     • mupirocin (BACTROBAN) 2 % Ointment Apply  to affected area(s) every day.     • fluticasone-salmeterol (WIXELA INHUB) 500-50 MCG/DOSE AEROSOL POWDER, BREATH ACTIVATED Inhale 1 Puff by mouth every 12 hours.     • Ibuprofen-diphenhydrAMINE HCl (ADVIL PM) 200-25 MG Cap Take 1 Dose by mouth every bedtime.       No current facility-administered medications for this encounter.        ALLERGIES:  Codeine and Morphine    REVIEW OF SYSTEMS:  A review of systems for today's date of service was reviewed and uploaded into the electronic medical record.    PHYSICAL EXAM:  PERFORMANCE STATUS:  ECOG Performance Review 2/26/2020 2/19/2020 2/5/2020 1/29/2020 1/16/2020   ECOG Performance Status Restricted in physically strenuous activity but ambulatory and able to carry out work of a light or sedentary nature, e.g., light house work, office work Restricted in physically strenuous  activity but ambulatory and able to carry out work of a light or sedentary nature, e.g., light house work, office work Restricted in physically strenuous activity but ambulatory and able to carry out work of a light or sedentary nature, e.g., light house work, office work Restricted in physically strenuous activity but ambulatory and able to carry out work of a light or sedentary nature, e.g., light house work, office work Fully active, able to carry on all pre-disease performance without restriction   Some recent data might be hidden     Karnofsky Score 6/11/2020   Karnofsky Score 90   Some recent data might be hidden     /80 (BP Location: Right arm, Patient Position: Sitting, BP Cuff Size: Adult)   Pulse 93   Temp 36.7 °C (98 °F)   Wt 53 kg (116 lb 13.5 oz)   SpO2 99%   BMI 21.10 kg/m²   Physical Exam  Vitals signs reviewed. Exam conducted with a chaperone present.   Constitutional:       Appearance: Normal appearance.   HENT:      Head: Normocephalic.      Mouth/Throat:      Mouth: Mucous membranes are moist.   Eyes:      Pupils: Pupils are equal, round, and reactive to light.   Neck:      Musculoskeletal: Normal range of motion.   Cardiovascular:      Rate and Rhythm: Normal rate.   Pulmonary:      Effort: Pulmonary effort is normal.   Abdominal:      General: Abdomen is flat.   Genitourinary:     Comments: Sphincter tone intact, but lose feeling with post radiation induration  Musculoskeletal: Normal range of motion.   Skin:     General: Skin is warm.   Neurological:      General: No focal deficit present.      Mental Status: She is alert.   Psychiatric:         Mood and Affect: Mood normal.         LABORATORY DATA:   Lab Results   Component Value Date/Time    WBC 4.5 (L) 03/25/2020 1405    WBC 3.4 (L) 02/29/2020 1342    WBC 4.8 02/24/2020 1124    HEMOGLOBIN 12.7 03/25/2020 1405    HEMOGLOBIN 13.6 02/29/2020 1342    HEMOGLOBIN 7.5 (L) 02/24/2020 1124    HEMATOCRIT 39.7 03/25/2020 1405    HEMATOCRIT  41.2 02/29/2020 1342    HEMATOCRIT 22.8 (L) 02/24/2020 1124    MCV 97.5 03/25/2020 1405    MCV 92.3 02/29/2020 1342    MCV 99.1 (H) 02/24/2020 1124    PLATELETCT 202 03/25/2020 1405    PLATELETCT 192 02/29/2020 1342    PLATELETCT 157 (L) 02/24/2020 1124    NEUTS 1.85 (L) 03/25/2020 1405    NEUTS 2.25 02/29/2020 1342    NEUTS 3.32 02/24/2020 1124      Lab Results   Component Value Date/Time    SODIUM 136 03/25/2020 1405    SODIUM 138 02/29/2020 1342    SODIUM 145 02/24/2020 1124    POTASSIUM 4.5 03/25/2020 1405    POTASSIUM 3.5 (L) 02/29/2020 1342    POTASSIUM 2.6 (LL) 02/24/2020 1124    BUN 14 03/25/2020 1405    BUN 22 02/29/2020 1342    BUN 13 02/24/2020 1124    CREATININE 0.67 03/25/2020 1405    CREATININE 0.87 02/29/2020 1342    CREATININE 0.54 02/24/2020 1124    CALCIUM 7.9 (L) 03/25/2020 1405    CALCIUM 9.5 02/29/2020 1342    CALCIUM 5.7 (LL) 02/24/2020 1124    ALBUMIN 3.4 03/25/2020 1405    ALBUMIN 3.3 02/29/2020 1342    ALBUMIN 2.2 (L) 02/24/2020 1124    ASTSGOT 27 03/25/2020 1405    ASTSGOT 16 02/29/2020 1342    ASTSGOT 10 (L) 02/24/2020 1124    ALKPHOSPHAT 62 03/25/2020 1405    ALKPHOSPHAT 47 02/29/2020 1342    ALKPHOSPHAT 33 02/24/2020 1124    IFNOTAFR >60 03/25/2020 1405    IFNOTAFR >60 02/29/2020 1342    IFNOTAFR >60 02/24/2020 1124       RADIOLOGY DATA:  [id number] Base To Mid-thigh    Result Date: 6/8/2020 6/8/2020 11:57 AM HISTORY/REASON FOR EXAM:  Squamous cell carcinoma of the anus, stage II a TECHNIQUE/EXAM DESCRIPTION AND NUMBER OF VIEWS: PET body imaging. Initially, 17.36 mCi F-18 FDG was administered intravenously under standardized conditions. Approximately 45 minutes after FDG administration, the patient was placed in the supine position on the PET CT table. Blood glucose level was 79 mg/dL. Low dose spiral CT imaging was performed from the skull base to the mid thighs. PET imaging was then performed from the skull base to the mid thighs. CT images, PET images, and PET/CT fused images  were reviewed on a PACS 3D workstation. The limited non-contrast CT data are used primarily for attenuation correction and anatomic correlation.  Evaluation of solid organs and bowel are especially limited utilizing this technique. COMPARISON: PET/CT 1/16/2020 FINDINGS: Head and neck: No abnormal focal FDG activity. Chest: No abnormal focal FDG activity. Abdomen and pelvis: Segmental activity in the right colon is likely physiologic. There is mild focal activity related remaining in the anus. Maximum SUV is 3.3, previously 19.9. Musculoskeletal: No abnormal focal FDG activity. Incidental findings on CT: Scattered arterial calcifications. The gallbladder has been removed. Scattered diverticula are in the colon. Soft tissue nodule in the left upper quadrant medial to the spleen is unchanged and does not demonstrate FDG activity.     1.  Mild residual activity in the anus may represent inflammation. Residual tumor cannot be excluded. 2.  No distant metastatic disease is identified.      IMPRESSION:    A 79 y.o. with Malignant neoplasm of rectum (HCC)  Visit Diagnoses     ICD-10-CM   1. Anal cancer (HCC)  C21.0     Anal cancer (HCC)  Staging form: Anus, AJCC 8th Edition  - Clinical: Stage IIA (cT2, cN0, cM0) - Signed by Sha Casper M.D. on 1/16/2020      RECOMMENDATIONS:   No clinical evidence of disease recurrence and PET scan shows good radiologic response.  She does have some post radiation fecal incontinence which could be due to sphincter dysfunction posttreatment.  I have referred her to Dr. Montana Jeter (urogyn) for a pelvic floor assessment to see if he can help with exercises and a nonsurgical approach.  Additionally have given her a recommendation for probiotic and fiber intake to help bulk her stools.  Additionally she can take Azo for some posttreatment bladder irritation at this time.  Finally I recommended Bentyl for her abdominal discomfort and explained that if it continues to be persistent I  would see the GI physicians.  In terms of her long-term surveillance she will see Dr. Dulce Jeter for posttreatment anoscopy.    Thank you for the opportunity to participate in her care.  If any questions or comments, please do not hesitate in calling.    Orders Placed This Encounter   • oxyCODONE-acetaminophen (PERCOCET-10)  MG Tab   • digoxin (LANOXIN) 125 MCG Tab   • tiotropium (SPIRIVA) 18 MCG Cap   • dicyclomine (BENTYL) 10 MG Cap   • lidocaine (XYLOCAINE) 5 % Ointment     CC: Dr. Dulce Jeter

## 2020-06-11 NOTE — PROGRESS NOTES
Nutrition Services: Brief Update  Weight: 53 kg/ 116 lbs, weighed today during MD visit  Weight History:  114 lbs reported per pt home scale on 4/02/20    Weight Change: Appears pt has maintained weight    RD able to visit pt following MD visit. Pt accompanied by friend. States has been more active by talking walks and working in the garden. Relays eating smaller, more frequent meals. States usually has bananas with wheat cereal, a peanut butter and jelly sandwich or egg salad with chips, fruit with almonds, chicken with veggies, and ice cream each night. Mentions feeling like her strength is coming back. Relays still experiencing somewhat loose stools 5-6x per day. Mentions MD recommended fiber. Asks about high calorie foods. Denies additional questions/concerns at this time.    Plan/Recommend:  • Provided explanation and handout on high calorie/protein add-in sheet. Discussed ways to include in foods pt is currently consuming.  • Discussed use of metamucil or benefiber for added soluble fiber to firm stools.   • Supported increase in physical activity as tolerated  • Provided additional copy of contact info, encouraged to reach out as needed.     RD to sign off at this time and remain available PRN. Please consult as needed.   Please contact [phone]

## 2020-06-11 NOTE — NON-PROVIDER
Patient was seen today in clinic with Dr. Casper for follow up.  Vitals signs and weight were obtained and pain assessment was completed.  Allergies and medications were reviewed with the patient.  Review of systems completed.     Vitals/Pain:  Vitals:    06/11/20 1303   BP: 101/80   BP Location: Right arm   Patient Position: Sitting   BP Cuff Size: Adult   Pulse: 93   Temp: 36.7 °C (98 °F)   SpO2: 99%   Weight: 53 kg (116 lb 13.5 oz)   Pain Score: No pain        Allergies:   Codeine and Morphine    Current Medications:  Current Outpatient Medications   Medication Sig Dispense Refill   • oxyCODONE-acetaminophen (PERCOCET-10)  MG Tab      • digoxin (LANOXIN) 125 MCG Tab Take 125 mcg by mouth every day.     • tiotropium (SPIRIVA) 18 MCG Cap Inhale 18 mcg by mouth every day.     • dicyclomine (BENTYL) 10 MG Cap Take 1 Cap by mouth 4 Times a Day,Before Meals and at Bedtime for 30 days. 120 Cap 0   • lidocaine (XYLOCAINE) 5 % Ointment Apply to affected area 2x per day 1 Tube 3   • apixaban (ELIQUIS) 5mg Tab Take 5 mg by mouth 2 Times a Day.     • DILTIAZem (CARDIZEM) 60 MG Tab Take 60 mg by mouth 2 Times a Day.     • montelukast (SINGULAIR) 10 MG Tab Take 10 mg by mouth every day.     • vitamin D (CHOLECALCIFEROL) 1000 UNIT Tab Take 1,000 Units by mouth every day.     • vitamin e (VITAMIN E) 400 UNIT Cap Take 400 Units by mouth every day.     • CALCIUM PO Take 750 mg by mouth every day.     • atorvastatin (LIPITOR) 20 MG Tab Take 80 mg by mouth.     • nitroglycerin (NITROSTAT) 0.4 MG SL Tab Place 0.4 mg under tongue.     • SUMAtriptan (IMITREX) 25 MG Tab tablet Take 25 mg by mouth.     • zolpidem (AMBIEN) 10 MG Tab Take 5 mg by mouth at bedtime as needed for Sleep.     • silver sulfADIAZINE (SILVADENE) 1 % Cream Apply 1/8 inch layer to affected area BID (Patient not taking: Reported on 6/11/2020) 400 g 2   • prochlorperazine (COMPAZINE) 10 MG Tab Take 1 tablet by mouth every 6 hours as needed.     • lidocaine  (XYLOCAINE) 2 % Solution Take 15 mL by mouth as needed.     • SSD 1 % Cream Apply 1 Application to affected area(s) as needed.     • cephALEXin (KEFLEX) 500 MG Cap Take 500 mg by mouth 4 times a day.     • ondansetron (ZOFRAN ODT) 8 MG TABLET DISPERSIBLE Take 8 mg by mouth every 8 hours as needed for Nausea.     • mupirocin (BACTROBAN) 2 % Ointment Apply  to affected area(s) every day.     • fluticasone-salmeterol (WIXELA INHUB) 500-50 MCG/DOSE AEROSOL POWDER, BREATH ACTIVATED Inhale 1 Puff by mouth every 12 hours.     • Ibuprofen-diphenhydrAMINE HCl (ADVIL PM) 200-25 MG Cap Take 1 Dose by mouth every bedtime.       No current facility-administered medications for this encounter.          PCP:  Pcp Pt States None        Ashley Mariscal Med Ass't

## 2020-06-15 PROCEDURE — 99213 OFFICE O/P EST LOW 20 MIN: CPT | Performed by: RADIOLOGY

## 2020-06-24 NOTE — OR NURSING
1200 Received report from Junie VARGHESE, assumed care of pt at this time. Pt resting comfortably in bed.     1210 Pt states she feels ready for discharge, pt meets discharge criteria. Pt dressing with assistance from RN.     1220 Spoke with Dr. Jeter on phone and confirmed pt will be starting elaquis tomorrow.     1225 Pt and friend given instructions for discharge, evidence of understanding demonstrated.     1230 Pt out to car in .    No

## 2020-07-18 DIAGNOSIS — C21.0 ANAL CANCER (HCC): Chronic | ICD-10-CM

## 2020-07-20 RX ORDER — DICYCLOMINE HYDROCHLORIDE 10 MG/1
CAPSULE ORAL
Qty: 120 CAP | Refills: 0 | Status: SHIPPED | OUTPATIENT
Start: 2020-07-20 | End: 2020-09-02

## 2020-09-01 DIAGNOSIS — C21.0 ANAL CANCER (HCC): Chronic | ICD-10-CM

## 2020-09-02 RX ORDER — DICYCLOMINE HYDROCHLORIDE 10 MG/1
CAPSULE ORAL
Qty: 120 CAP | Refills: 0 | Status: SHIPPED | OUTPATIENT
Start: 2020-09-02 | End: 2020-10-13

## 2020-09-14 ENCOUNTER — HOSPITAL ENCOUNTER (OUTPATIENT)
Dept: RADIATION ONCOLOGY | Facility: MEDICAL CENTER | Age: 80
End: 2020-09-30
Attending: INTERNAL MEDICINE
Payer: MEDICARE

## 2020-09-14 VITALS
BODY MASS INDEX: 21.85 KG/M2 | OXYGEN SATURATION: 98 % | DIASTOLIC BLOOD PRESSURE: 64 MMHG | WEIGHT: 121.03 LBS | TEMPERATURE: 97.9 F | SYSTOLIC BLOOD PRESSURE: 146 MMHG | HEART RATE: 68 BPM

## 2020-09-14 DIAGNOSIS — C21.1 CANCER OF ANAL CANAL (HCC): ICD-10-CM

## 2020-09-14 DIAGNOSIS — C21.0 ANAL CANCER (HCC): Chronic | ICD-10-CM

## 2020-09-14 PROCEDURE — 99212 OFFICE O/P EST SF 10 MIN: CPT | Performed by: RADIOLOGY

## 2020-09-14 PROCEDURE — 99214 OFFICE O/P EST MOD 30 MIN: CPT | Performed by: RADIOLOGY

## 2020-09-14 ASSESSMENT — FIBROSIS 4 INDEX: FIB4 SCORE: 1.72

## 2020-09-14 ASSESSMENT — PAIN SCALES - GENERAL: PAINLEVEL: 2=MINIMAL-SLIGHT

## 2020-09-14 NOTE — NON-PROVIDER
Patient was seen today in clinic with Dr. Casper for follow up.  Vitals signs and weight were obtained and pain assessment was completed.  Allergies and medications were reviewed with the patient.  Review of systems completed.     Vitals/Pain:  Vitals:    09/14/20 1133   BP: 146/64   BP Location: Left arm   Patient Position: Sitting   BP Cuff Size: Adult   Pulse: 68   Temp: 36.6 °C (97.9 °F)   SpO2: 98%   Weight: 54.9 kg (121 lb 0.5 oz)   Pain Score: 2=Minimal-Slight        Allergies:   Codeine and Morphine    Current Medications:  Current Outpatient Medications   Medication Sig Dispense Refill   • dicyclomine (BENTYL) 10 MG Cap take 1 capsule by mouth four times a day before meals and at bedtime 120 Cap 0   • digoxin (LANOXIN) 125 MCG Tab Take 125 mcg by mouth every day.     • tiotropium (SPIRIVA) 18 MCG Cap Inhale 18 mcg by mouth every day.     • lidocaine (XYLOCAINE) 5 % Ointment Apply to affected area 2x per day 1 Tube 3   • silver sulfADIAZINE (SILVADENE) 1 % Cream Apply 1/8 inch layer to affected area  g 2   • lidocaine (XYLOCAINE) 2 % Solution Take 15 mL by mouth as needed.     • SSD 1 % Cream Apply 1 Application to affected area(s) as needed.     • apixaban (ELIQUIS) 5mg Tab Take 5 mg by mouth 2 Times a Day.     • ondansetron (ZOFRAN ODT) 8 MG TABLET DISPERSIBLE Take 8 mg by mouth every 8 hours as needed for Nausea.     • mupirocin (BACTROBAN) 2 % Ointment Apply  to affected area(s) every day.     • DILTIAZem (CARDIZEM) 60 MG Tab Take 60 mg by mouth 2 Times a Day.     • montelukast (SINGULAIR) 10 MG Tab Take 10 mg by mouth every day.     • fluticasone-salmeterol (WIXELA INHUB) 500-50 MCG/DOSE AEROSOL POWDER, BREATH ACTIVATED Inhale 1 Puff by mouth every 12 hours.     • vitamin D (CHOLECALCIFEROL) 1000 UNIT Tab Take 1,000 Units by mouth every day.     • vitamin e (VITAMIN E) 400 UNIT Cap Take 400 Units by mouth every day.     • CALCIUM PO Take 750 mg by mouth every day.     • Ibuprofen-diphenhydrAMINE  HCl (ADVIL PM) 200-25 MG Cap Take 1 Dose by mouth every bedtime.     • atorvastatin (LIPITOR) 20 MG Tab Take 80 mg by mouth.     • nitroglycerin (NITROSTAT) 0.4 MG SL Tab Place 0.4 mg under tongue.     • SUMAtriptan (IMITREX) 25 MG Tab tablet Take 25 mg by mouth.     • zolpidem (AMBIEN) 10 MG Tab Take 5 mg by mouth at bedtime as needed for Sleep.     • oxyCODONE-acetaminophen (PERCOCET-10)  MG Tab      • prochlorperazine (COMPAZINE) 10 MG Tab Take 1 tablet by mouth every 6 hours as needed.     • cephALEXin (KEFLEX) 500 MG Cap Take 500 mg by mouth 4 times a day.       No current facility-administered medications for this encounter.          PCP:  Pcp Pt States None        Ashley Mariscal, Med Ass't

## 2020-09-14 NOTE — PROGRESS NOTES
RADIATION ONCOLOGY FOLLOW-UP    DATE OF SERVICE: 9/14/2020    IDENTIFICATION:   A 79 y.o. female with Malignant neoplasm of rectum (HCC).     Visit Diagnoses     ICD-10-CM   1. Anal cancer (HCC)  C21.0   2. Cancer of anal canal (HCC)   C21.1     Anal cancer (HCC)  Staging form: Anus, AJCC 8th Edition  - Clinical: Stage IIA (cT2, cN0, cM0) - Signed by Sha Casper M.D. on 1/16/2020        HISTORY OF PRESENT ILLNESS:   Patient originally presented in April 2019 with some rectal bleeding.  She was originally seen by Dr. Edilson Quinn who noted anal fissure and hemorrhoids and referred her to Dr. Dulce Jeter ([facility]) for anoscopy which showed hard mass exophytic palpated from the anal verge to the top of the sphincter in the left lateral position fixed and punch biopsy December 18, 2018 shows invasive squamous cell carcinoma moderately differentiated.  She underwent MRI rectum January 3, 2020 which showed a 3.9 x 2 cm anal canal lesion semicircumferential extending from 1:00 to 8 o'clock position extending from the anal verge into the lower rectum.  Anteriorly the lesion abuts the lower vagina without definitive evidence of vaginal invasion.  CT chest abdomen pelvis January 10, 2020 showed no evidence of metastatic disease.  A hypo-dense 1.4 cm nodule below the left hemidiaphragm medial to the spleen it is unlikely to represent a solitary metastatic lesion in this location.  PET CT scan done January 16, 2020 showed abnormal oval-shaped solid mass in the left side of the anus extending to the level of the lower rectum with marketed activity.  No adenopathy or elevated raghav activity noted.  Patient currently is complaining of severe anal pain and occasional bleeding.  3/20/20  Patient improving post treatment. Rx for anusol for proctitis eRx to [facility]. PETCT and [facility] in 3 months follow up ~June 2020.    6/11/20  Patient still having some loose bowel movements and fecal  incontinence as well as some pain in the anal rectal area.  She is using the lidocaine jelly with some relief.  She also complains of some bladder irritation and just general discomfort in her lower abdomen.  PET/CT shows good response with just mild activity in the anal area at this time.    INTERVAL HISTORY:  Patient is doing well and her inflammation is improving.  She does not have any diarrhea at this time she does have a little bit of rectal pain which she uses her lidocaine for.  She has no abdominal pain or cystitis at this time.  She was seen by Dr. Jeter who was following her left lateral lesion which shows some induration and is due for an MRI next week for follow-up.      PROBLEM LIST:  Patient Active Problem List   Diagnosis   • Degeneration of lumbar intervertebral disc   • Anal cancer (HCC)   • Hiatal hernia   • Asthma   • High cholesterol   • PONV (postoperative nausea and vomiting)   • Anemia       CURRENT MEDICATIONS:  Current Outpatient Medications   Medication Sig Dispense Refill   • dicyclomine (BENTYL) 10 MG Cap take 1 capsule by mouth four times a day before meals and at bedtime 120 Cap 0   • digoxin (LANOXIN) 125 MCG Tab Take 125 mcg by mouth every day.     • tiotropium (SPIRIVA) 18 MCG Cap Inhale 18 mcg by mouth every day.     • lidocaine (XYLOCAINE) 5 % Ointment Apply to affected area 2x per day 1 Tube 3   • silver sulfADIAZINE (SILVADENE) 1 % Cream Apply 1/8 inch layer to affected area  g 2   • lidocaine (XYLOCAINE) 2 % Solution Take 15 mL by mouth as needed.     • SSD 1 % Cream Apply 1 Application to affected area(s) as needed.     • apixaban (ELIQUIS) 5mg Tab Take 5 mg by mouth 2 Times a Day.     • ondansetron (ZOFRAN ODT) 8 MG TABLET DISPERSIBLE Take 8 mg by mouth every 8 hours as needed for Nausea.     • mupirocin (BACTROBAN) 2 % Ointment Apply  to affected area(s) every day.     • DILTIAZem (CARDIZEM) 60 MG Tab Take 60 mg by mouth 2 Times a Day.     • montelukast (SINGULAIR)  10 MG Tab Take 10 mg by mouth every day.     • fluticasone-salmeterol (WIXELA INHUB) 500-50 MCG/DOSE AEROSOL POWDER, BREATH ACTIVATED Inhale 1 Puff by mouth every 12 hours.     • vitamin D (CHOLECALCIFEROL) 1000 UNIT Tab Take 1,000 Units by mouth every day.     • vitamin e (VITAMIN E) 400 UNIT Cap Take 400 Units by mouth every day.     • CALCIUM PO Take 750 mg by mouth every day.     • Ibuprofen-diphenhydrAMINE HCl (ADVIL PM) 200-25 MG Cap Take 1 Dose by mouth every bedtime.     • atorvastatin (LIPITOR) 20 MG Tab Take 80 mg by mouth.     • nitroglycerin (NITROSTAT) 0.4 MG SL Tab Place 0.4 mg under tongue.     • SUMAtriptan (IMITREX) 25 MG Tab tablet Take 25 mg by mouth.     • zolpidem (AMBIEN) 10 MG Tab Take 5 mg by mouth at bedtime as needed for Sleep.     • oxyCODONE-acetaminophen (PERCOCET-10)  MG Tab      • prochlorperazine (COMPAZINE) 10 MG Tab Take 1 tablet by mouth every 6 hours as needed.     • cephALEXin (KEFLEX) 500 MG Cap Take 500 mg by mouth 4 times a day.       No current facility-administered medications for this encounter.        ALLERGIES:  Codeine and Morphine    REVIEW OF SYSTEMS:  A review of systems for today's date of service was reviewed and uploaded into the electronic medical record.    PHYSICAL EXAM:  PERFORMANCE STATUS:  No flowsheet data found.  Karnofsky Score 9/14/2020 6/11/2020   Karnofsky Score 90 90   Some recent data might be hidden     /64 (BP Location: Left arm, Patient Position: Sitting, BP Cuff Size: Adult)   Pulse 68   Temp 36.6 °C (97.9 °F)   Wt 54.9 kg (121 lb 0.5 oz)   SpO2 98%   BMI 21.85 kg/m²   Physical Exam  Vitals signs reviewed.   Constitutional:       Appearance: Normal appearance.   HENT:      Head: Normocephalic and atraumatic.      Nose: Nose normal.      Mouth/Throat:      Mouth: Mucous membranes are moist.   Eyes:      Pupils: Pupils are equal, round, and reactive to light.   Neck:      Musculoskeletal: Normal range of motion.   Cardiovascular:       Rate and Rhythm: Normal rate.   Pulmonary:      Effort: Pulmonary effort is normal.   Abdominal:      General: Abdomen is flat.   Genitourinary:        Musculoskeletal: Normal range of motion.   Skin:     Capillary Refill: Capillary refill takes less than 2 seconds.   Neurological:      General: No focal deficit present.      Mental Status: She is alert.   Psychiatric:         Mood and Affect: Mood normal.         LABORATORY DATA:   Lab Results   Component Value Date/Time    WBC 4.5 (L) 03/25/2020 1405    WBC 3.4 (L) 02/29/2020 1342    WBC 4.8 02/24/2020 1124    HEMOGLOBIN 12.7 03/25/2020 1405    HEMOGLOBIN 13.6 02/29/2020 1342    HEMOGLOBIN 7.5 (L) 02/24/2020 1124    HEMATOCRIT 39.7 03/25/2020 1405    HEMATOCRIT 41.2 02/29/2020 1342    HEMATOCRIT 22.8 (L) 02/24/2020 1124    MCV 97.5 03/25/2020 1405    MCV 92.3 02/29/2020 1342    MCV 99.1 (H) 02/24/2020 1124    PLATELETCT 202 03/25/2020 1405    PLATELETCT 192 02/29/2020 1342    PLATELETCT 157 (L) 02/24/2020 1124    NEUTS 1.85 (L) 03/25/2020 1405    NEUTS 2.25 02/29/2020 1342    NEUTS 3.32 02/24/2020 1124      Lab Results   Component Value Date/Time    SODIUM 144 06/11/2020 1024    SODIUM 136 03/25/2020 1405    SODIUM 138 02/29/2020 1342    POTASSIUM 4.1 06/11/2020 1024    POTASSIUM 4.5 03/25/2020 1405    POTASSIUM 3.5 (L) 02/29/2020 1342    BUN 14 06/11/2020 1024    BUN 14 03/25/2020 1405    BUN 22 02/29/2020 1342    CREATININE 0.69 06/11/2020 1024    CREATININE 0.67 03/25/2020 1405    CREATININE 0.87 02/29/2020 1342    CALCIUM 9.4 06/11/2020 1024    CALCIUM 7.9 (L) 03/25/2020 1405    CALCIUM 9.5 02/29/2020 1342    ALBUMIN 3.9 06/11/2020 1024    ALBUMIN 3.4 03/25/2020 1405    ALBUMIN 3.3 02/29/2020 1342    ASTSGOT 22 06/11/2020 1024    ASTSGOT 27 03/25/2020 1405    ASTSGOT 16 02/29/2020 1342    ALKPHOSPHAT 56 06/11/2020 1024    ALKPHOSPHAT 62 03/25/2020 1405    ALKPHOSPHAT 47 02/29/2020 1342    IFNOTAFR >60 06/11/2020 1024    IFNOTAFR >60 03/25/2020 1405     IFNOTAFR >60 02/29/2020 1342       RADIOLOGY DATA:  No results found.    IMPRESSION:    A 79 y.o. with Malignant neoplasm of rectum (HCC).   Visit Diagnoses     ICD-10-CM   1. Anal cancer (HCC)  C21.0   2. Cancer of anal canal (HCC)   C21.1     Anal cancer (HCC)  Staging form: Anus, AJCC 8th Edition  - Clinical: Stage IIA (cT2, cN0, cM0) - Signed by Sha Casper M.D. on 1/16/2020    RECOMMENDATIONS:   Patient is doing well and her acute radiation symptoms are resolving.  I did note the left sided residual mass in her anal canal on exam today and agree with Dr. Jeter that an MRI can give us better resolution of this.  I did explain that it can take over a year for the anal mass to entirely resolve and there is some added risk with biopsy of fistula in a previously radiated field.  I discussed that we should review her case in multidisciplinary tumor board after MRI to discuss best next steps.  I did say that if she has no evidence of disease on the scan or if she does have a biopsy which is negative then I would recommend repeating PET CT scan at the end of the year to ensure that all the activity is resolved.    Thank you for the opportunity to participate in her care.  If any questions or comments, please do not hesitate in calling.    Orders Placed This Encounter   • [id number] BASE TO MID-THIGH     CC: Dr. Jeter, Dr. Vogel

## 2020-09-16 ENCOUNTER — TELEPHONE (OUTPATIENT)
Dept: RADIATION ONCOLOGY | Facility: MEDICAL CENTER | Age: 80
End: 2020-09-16

## 2020-09-16 NOTE — TELEPHONE ENCOUNTER
GI Tumor Board Note:    Discussed with patient will proceed with biopsy on Monday with Dr. Jeter as persistent disease on exam.

## 2020-09-17 ENCOUNTER — APPOINTMENT (OUTPATIENT)
Dept: RADIOLOGY | Facility: MEDICAL CENTER | Age: 80
End: 2020-09-17
Attending: SURGERY
Payer: MEDICARE

## 2020-09-17 ENCOUNTER — PRE-ADMISSION TESTING (OUTPATIENT)
Dept: ADMISSIONS | Facility: MEDICAL CENTER | Age: 80
End: 2020-09-17
Attending: SURGERY
Payer: MEDICARE

## 2020-09-17 DIAGNOSIS — Z01.810 PRE-OPERATIVE CARDIOVASCULAR EXAMINATION: ICD-10-CM

## 2020-09-17 DIAGNOSIS — Z01.812 PRE-OPERATIVE LABORATORY EXAMINATION: ICD-10-CM

## 2020-09-17 LAB
COVID ORDER STATUS COVID19: NORMAL
EKG IMPRESSION: NORMAL
ERYTHROCYTE [DISTWIDTH] IN BLOOD BY AUTOMATED COUNT: 55.5 FL (ref 35.9–50)
HCT VFR BLD AUTO: 40.5 % (ref 37–47)
HGB BLD-MCNC: 13.5 G/DL (ref 12–16)
MCH RBC QN AUTO: 34.2 PG (ref 27–33)
MCHC RBC AUTO-ENTMCNC: 33.3 G/DL (ref 33.6–35)
MCV RBC AUTO: 102.5 FL (ref 81.4–97.8)
PLATELET # BLD AUTO: 133 K/UL (ref 164–446)
PMV BLD AUTO: 9.1 FL (ref 9–12.9)
RBC # BLD AUTO: 3.95 M/UL (ref 4.2–5.4)
SARS-COV-2 RNA RESP QL NAA+PROBE: NOTDETECTED
SPECIMEN SOURCE: NORMAL
WBC # BLD AUTO: 5.8 K/UL (ref 4.8–10.8)

## 2020-09-17 PROCEDURE — 93005 ELECTROCARDIOGRAM TRACING: CPT

## 2020-09-17 PROCEDURE — 93010 ELECTROCARDIOGRAM REPORT: CPT | Performed by: INTERNAL MEDICINE

## 2020-09-17 PROCEDURE — 85027 COMPLETE CBC AUTOMATED: CPT

## 2020-09-17 PROCEDURE — 36415 COLL VENOUS BLD VENIPUNCTURE: CPT

## 2020-09-17 PROCEDURE — U0003 INFECTIOUS AGENT DETECTION BY NUCLEIC ACID (DNA OR RNA); SEVERE ACUTE RESPIRATORY SYNDROME CORONAVIRUS 2 (SARS-COV-2) (CORONAVIRUS DISEASE [COVID-19]), AMPLIFIED PROBE TECHNIQUE, MAKING USE OF HIGH THROUGHPUT TECHNOLOGIES AS DESCRIBED BY CMS-2020-01-R: HCPCS

## 2020-09-17 RX ORDER — PSYLLIUM SEED (WITH DEXTROSE)
POWDER (GRAM) ORAL 2 TIMES DAILY
COMMUNITY
End: 2020-10-26

## 2020-09-17 ASSESSMENT — FIBROSIS 4 INDEX: FIB4 SCORE: 1.72

## 2020-09-19 NOTE — PROGRESS NOTES
COVID-19 Pre-surgery screenin. Do you have an undiagnosed respiratory illness or symptoms such as coughing or sneezing? no (Yes/No)  a. Onset of Sx n/a  b. Acute vs. chronic respiratory illness n/a    2. Do you have an unexplained fever greater than 100.4 degrees Fahrenheit or 38 degrees Celsius?     no (Yes/No)    3. Have you had direct exposure to a patient who tested positive for Covid-19?    no (Yes/No)    4. Have you had any loss of your sense of taste or smell? Have you had N/V or sore throat? no    Patient has been informed of visitor policy and asked to wear a mask upon entering the hospital   yes (Yes/No)

## 2020-09-21 ENCOUNTER — HOSPITAL ENCOUNTER (OUTPATIENT)
Facility: MEDICAL CENTER | Age: 80
End: 2020-09-21
Attending: SURGERY | Admitting: SURGERY
Payer: MEDICARE

## 2020-09-21 ENCOUNTER — ANESTHESIA EVENT (OUTPATIENT)
Dept: SURGERY | Facility: MEDICAL CENTER | Age: 80
End: 2020-09-21
Payer: MEDICARE

## 2020-09-21 ENCOUNTER — ANESTHESIA (OUTPATIENT)
Dept: SURGERY | Facility: MEDICAL CENTER | Age: 80
End: 2020-09-21
Payer: MEDICARE

## 2020-09-21 VITALS
HEIGHT: 62 IN | BODY MASS INDEX: 22.27 KG/M2 | TEMPERATURE: 97.3 F | OXYGEN SATURATION: 93 % | SYSTOLIC BLOOD PRESSURE: 124 MMHG | WEIGHT: 121.03 LBS | RESPIRATION RATE: 16 BRPM | HEART RATE: 67 BPM | DIASTOLIC BLOOD PRESSURE: 70 MMHG

## 2020-09-21 DIAGNOSIS — G89.18 POST-OP PAIN: ICD-10-CM

## 2020-09-21 LAB — PATHOLOGY CONSULT NOTE: NORMAL

## 2020-09-21 PROCEDURE — 160048 HCHG OR STATISTICAL LEVEL 1-5: Performed by: SURGERY

## 2020-09-21 PROCEDURE — 88305 TISSUE EXAM BY PATHOLOGIST: CPT

## 2020-09-21 PROCEDURE — A9270 NON-COVERED ITEM OR SERVICE: HCPCS

## 2020-09-21 PROCEDURE — 160041 HCHG SURGERY MINUTES - EA ADDL 1 MIN LEVEL 4: Performed by: SURGERY

## 2020-09-21 PROCEDURE — A6403 STERILE GAUZE>16 <= 48 SQ IN: HCPCS | Performed by: SURGERY

## 2020-09-21 PROCEDURE — 160036 HCHG PACU - EA ADDL 30 MINS PHASE I: Performed by: SURGERY

## 2020-09-21 PROCEDURE — 700111 HCHG RX REV CODE 636 W/ 250 OVERRIDE (IP): Performed by: ANESTHESIOLOGY

## 2020-09-21 PROCEDURE — 160035 HCHG PACU - 1ST 60 MINS PHASE I: Performed by: SURGERY

## 2020-09-21 PROCEDURE — 160002 HCHG RECOVERY MINUTES (STAT): Performed by: SURGERY

## 2020-09-21 PROCEDURE — 160009 HCHG ANES TIME/MIN: Performed by: SURGERY

## 2020-09-21 PROCEDURE — 160029 HCHG SURGERY MINUTES - 1ST 30 MINS LEVEL 4: Performed by: SURGERY

## 2020-09-21 PROCEDURE — 700102 HCHG RX REV CODE 250 W/ 637 OVERRIDE(OP): Performed by: ANESTHESIOLOGY

## 2020-09-21 PROCEDURE — 160025 RECOVERY II MINUTES (STATS): Performed by: SURGERY

## 2020-09-21 PROCEDURE — 700101 HCHG RX REV CODE 250: Performed by: ANESTHESIOLOGY

## 2020-09-21 PROCEDURE — 88341 IMHCHEM/IMCYTCHM EA ADD ANTB: CPT

## 2020-09-21 PROCEDURE — 700105 HCHG RX REV CODE 258: Performed by: SURGERY

## 2020-09-21 PROCEDURE — 160047 HCHG PACU  - EA ADDL 30 MINS PHASE II: Performed by: SURGERY

## 2020-09-21 PROCEDURE — 700101 HCHG RX REV CODE 250: Performed by: SURGERY

## 2020-09-21 PROCEDURE — 88342 IMHCHEM/IMCYTCHM 1ST ANTB: CPT

## 2020-09-21 PROCEDURE — 160046 HCHG PACU - 1ST 60 MINS PHASE II: Performed by: SURGERY

## 2020-09-21 PROCEDURE — 700111 HCHG RX REV CODE 636 W/ 250 OVERRIDE (IP)

## 2020-09-21 PROCEDURE — A9270 NON-COVERED ITEM OR SERVICE: HCPCS | Performed by: ANESTHESIOLOGY

## 2020-09-21 PROCEDURE — 501838 HCHG SUTURE GENERAL: Performed by: SURGERY

## 2020-09-21 PROCEDURE — 700102 HCHG RX REV CODE 250 W/ 637 OVERRIDE(OP)

## 2020-09-21 RX ORDER — SODIUM CHLORIDE, SODIUM LACTATE, POTASSIUM CHLORIDE, CALCIUM CHLORIDE 600; 310; 30; 20 MG/100ML; MG/100ML; MG/100ML; MG/100ML
INJECTION, SOLUTION INTRAVENOUS CONTINUOUS
Status: DISCONTINUED | OUTPATIENT
Start: 2020-09-21 | End: 2020-09-21 | Stop reason: HOSPADM

## 2020-09-21 RX ORDER — CEFOTETAN DISODIUM 2 G/20ML
INJECTION, POWDER, FOR SOLUTION INTRAMUSCULAR; INTRAVENOUS PRN
Status: DISCONTINUED | OUTPATIENT
Start: 2020-09-21 | End: 2020-09-21 | Stop reason: SURG

## 2020-09-21 RX ORDER — CELECOXIB 200 MG/1
200 CAPSULE ORAL ONCE
Status: COMPLETED | OUTPATIENT
Start: 2020-09-21 | End: 2020-09-21

## 2020-09-21 RX ORDER — OXYCODONE HCL 5 MG/5 ML
5 SOLUTION, ORAL ORAL
Status: COMPLETED | OUTPATIENT
Start: 2020-09-21 | End: 2020-09-21

## 2020-09-21 RX ORDER — ONDANSETRON 2 MG/ML
INJECTION INTRAMUSCULAR; INTRAVENOUS PRN
Status: DISCONTINUED | OUTPATIENT
Start: 2020-09-21 | End: 2020-09-21 | Stop reason: SURG

## 2020-09-21 RX ORDER — GABAPENTIN 300 MG/1
300 CAPSULE ORAL ONCE
Status: COMPLETED | OUTPATIENT
Start: 2020-09-21 | End: 2020-09-21

## 2020-09-21 RX ORDER — OXYCODONE HCL 5 MG/5 ML
10 SOLUTION, ORAL ORAL
Status: COMPLETED | OUTPATIENT
Start: 2020-09-21 | End: 2020-09-21

## 2020-09-21 RX ORDER — MIDAZOLAM HYDROCHLORIDE 1 MG/ML
1 INJECTION INTRAMUSCULAR; INTRAVENOUS
Status: DISCONTINUED | OUTPATIENT
Start: 2020-09-21 | End: 2020-09-21 | Stop reason: HOSPADM

## 2020-09-21 RX ORDER — ACETAMINOPHEN 500 MG
1000 TABLET ORAL ONCE
Status: COMPLETED | OUTPATIENT
Start: 2020-09-21 | End: 2020-09-21

## 2020-09-21 RX ORDER — DEXAMETHASONE SODIUM PHOSPHATE 4 MG/ML
INJECTION, SOLUTION INTRA-ARTICULAR; INTRALESIONAL; INTRAMUSCULAR; INTRAVENOUS; SOFT TISSUE PRN
Status: DISCONTINUED | OUTPATIENT
Start: 2020-09-21 | End: 2020-09-21 | Stop reason: SURG

## 2020-09-21 RX ORDER — TRAMADOL HYDROCHLORIDE 50 MG/1
50 TABLET ORAL EVERY 4 HOURS PRN
Qty: 10 TAB | Refills: 0 | Status: SHIPPED | OUTPATIENT
Start: 2020-09-21 | End: 2020-09-26

## 2020-09-21 RX ORDER — BUPIVACAINE HYDROCHLORIDE AND EPINEPHRINE 5; 5 MG/ML; UG/ML
INJECTION, SOLUTION EPIDURAL; INTRACAUDAL; PERINEURAL
Status: DISCONTINUED | OUTPATIENT
Start: 2020-09-21 | End: 2020-09-21 | Stop reason: HOSPADM

## 2020-09-21 RX ORDER — LIDOCAINE HYDROCHLORIDE 20 MG/ML
INJECTION, SOLUTION EPIDURAL; INFILTRATION; INTRACAUDAL; PERINEURAL PRN
Status: DISCONTINUED | OUTPATIENT
Start: 2020-09-21 | End: 2020-09-21 | Stop reason: SURG

## 2020-09-21 RX ORDER — HALOPERIDOL 5 MG/ML
1 INJECTION INTRAMUSCULAR
Status: DISCONTINUED | OUTPATIENT
Start: 2020-09-21 | End: 2020-09-21 | Stop reason: HOSPADM

## 2020-09-21 RX ORDER — LIDOCAINE HYDROCHLORIDE 10 MG/ML
INJECTION, SOLUTION EPIDURAL; INFILTRATION; INTRACAUDAL; PERINEURAL
Status: COMPLETED
Start: 2020-09-21 | End: 2020-09-21

## 2020-09-21 RX ADMIN — PROPOFOL 150 MG: 10 INJECTION, EMULSION INTRAVENOUS at 12:25

## 2020-09-21 RX ADMIN — POVIDONE IODINE 15 ML: 100 SOLUTION TOPICAL at 11:58

## 2020-09-21 RX ADMIN — ONDANSETRON 4 MG: 2 INJECTION INTRAMUSCULAR; INTRAVENOUS at 12:25

## 2020-09-21 RX ADMIN — ACETAMINOPHEN 1000 MG: 500 TABLET ORAL at 11:58

## 2020-09-21 RX ADMIN — CELECOXIB 200 MG: 200 CAPSULE ORAL at 11:58

## 2020-09-21 RX ADMIN — PROPOFOL 50 MG: 10 INJECTION, EMULSION INTRAVENOUS at 12:39

## 2020-09-21 RX ADMIN — Medication 0.5 ML: at 11:59

## 2020-09-21 RX ADMIN — SODIUM CHLORIDE, POTASSIUM CHLORIDE, SODIUM LACTATE AND CALCIUM CHLORIDE: 600; 310; 30; 20 INJECTION, SOLUTION INTRAVENOUS at 12:12

## 2020-09-21 RX ADMIN — OXYCODONE HYDROCHLORIDE 5 MG: 5 SOLUTION ORAL at 13:11

## 2020-09-21 RX ADMIN — GABAPENTIN 300 MG: 300 CAPSULE ORAL at 11:59

## 2020-09-21 RX ADMIN — DEXAMETHASONE SODIUM PHOSPHATE 4 MG: 4 INJECTION, SOLUTION INTRA-ARTICULAR; INTRALESIONAL; INTRAMUSCULAR; INTRAVENOUS; SOFT TISSUE at 12:25

## 2020-09-21 RX ADMIN — LIDOCAINE HYDROCHLORIDE 5 ML: 20 INJECTION, SOLUTION EPIDURAL; INFILTRATION; INTRACAUDAL at 12:25

## 2020-09-21 RX ADMIN — HALOPERIDOL LACTATE 1 MG: 5 INJECTION, SOLUTION INTRAMUSCULAR at 13:15

## 2020-09-21 RX ADMIN — FENTANYL CITRATE 25 MCG: 50 INJECTION INTRAMUSCULAR; INTRAVENOUS at 13:28

## 2020-09-21 RX ADMIN — CEFOTETAN DISODIUM 2 G: 2 INJECTION, POWDER, FOR SOLUTION INTRAMUSCULAR; INTRAVENOUS at 12:25

## 2020-09-21 RX ADMIN — FENTANYL CITRATE 25 MCG: 50 INJECTION INTRAMUSCULAR; INTRAVENOUS at 13:24

## 2020-09-21 RX ADMIN — LIDOCAINE HYDROCHLORIDE 0.5 ML: 10 INJECTION, SOLUTION EPIDURAL; INFILTRATION; INTRACAUDAL at 11:59

## 2020-09-21 ASSESSMENT — PAIN DESCRIPTION - PAIN TYPE: TYPE: SURGICAL PAIN

## 2020-09-21 ASSESSMENT — PAIN SCALES - GENERAL: PAIN_LEVEL: 3

## 2020-09-21 NOTE — OR NURSING
Arrived to Phase II after report from [employer]    S, denies nausea, reports pain 3/10. Ambulated from gurney to bathroom with standby assist.  Void x1.     Surgical site scant amount of drainage.     Alarms on and set to appropriate parameters. Call light within reach, rounding in place. Belongings given to pt.

## 2020-09-21 NOTE — DISCHARGE INSTRUCTIONS
ACTIVITY: Rest and take it easy for the first 24 hours.  A responsible adult is recommended to remain with you during that time.  It is normal to feel sleepy.  We encourage you to not do anything that requires balance, judgment or coordination.    MILD FLU-LIKE SYMPTOMS ARE NORMAL. YOU MAY EXPERIENCE GENERALIZED MUSCLE ACHES, THROAT IRRITATION, HEADACHE AND/OR SOME NAUSEA.    FOR 24 HOURS DO NOT:  Drive, operate machinery or run household appliances.  Drink beer or alcoholic beverages.   Make important decisions or sign legal documents.    SPECIAL INSTRUCTIONS: POST-OPERATIVE HEMORRHOIDECTOMY INSTRUCTIONS      1.  Carefully remove bandage on the day after surgery.  There is a gauze within the anal canal that does not need to be removed and will fall out at the time of the first bowel movement. You may shower. Good hygiene is essential for proper healing.  Wearing soft gauze pads or sanitary napkins in your underwear helps to control fluid drainage, discharge of mucous, and bleeding. Change pads and underwear frequently.    2. There are no specific dietary restrictions associated with this surgery. Avoid foods which make you constipated. Be sure to include wheat bran, fresh fruits and plenty of vegetables in your diet. Drink 7-8 glasses of water per day.    3. Avoid straining, especially a few days after surgery, when you may experience swelling that feels like an incompletely passed stool or a hemorrhoid. Milk of magnesia should be taken every day starting day of surgery until your first bowel movement.  After the first bowel movement, daily Miralax can be taken for the next 2-3 weeks. Additionally, we advise that you use an over-the-counter stool softener, such as Colace twice daily. Please call our office if no bowel movement within 72 hours.    4. Moderate exercise is healthy. You may walk as much as you like. You are permitted to go up and down stairs slowly while using a handrail. Resume normal activities  after first postoperative clinic visit. Avoid heavy lifting or strenuous activity until that time.    5. You may drive when you are comfortable enough to react and move quickly in an emergency and are no longer taking narcotics (usually 1-2 weeks after surgery). Long trips over 25 miles are not advised.    6. Return to work when you feel you are able to work with the restrictions as noted above.    7. For pain, one of the Ibuprofen compounds (Advil, Nuprin, etc.) or Tylenol is suggested. Should these not be effective in managing your discomfort, prescription pain medications should be used sparingly to avoid constipation.     8. Please call the office to report any of the following:  Temperature of 101 or higher.  Signs of infection, possibly including increasing redness, warmth, tenderness, drainage, foul odor at surgery site  Persistent moderate to severe pain  No bowel movement within 72 hours  Inability to void    9.  Please call office at [phone] to make a follow-up appointment in 1 week.      Dulce Jeter M.D.   [facility]  [address] [facility] [address]    DIET: To avoid nausea, slowly advance diet as tolerated, avoiding spicy or greasy foods for the first day.  Add more substantial food to your diet according to your physician's instructions.  Babies can be fed formula or breast milk as soon as they are hungry.  INCREASE FLUIDS AND FIBER TO AVOID CONSTIPATION.    SURGICAL DRESSING/BATHING: Carefully remove bandage on the day after surgery.  There is a gauze within the anal canal that does not need to be removed and will fall out at the time of the first bowel movement. You may shower. Good hygiene is essential for proper healing.  Wearing soft gauze pads or sanitary napkins in your underwear helps to control fluid drainage, discharge of mucous, and bleeding. Change pads and underwear frequently.    FOLLOW-UP APPOINTMENT:  A follow-up appointment should be arranged with your doctor in 1  week; call to schedule.    You should CALL YOUR PHYSICIAN if you develop:  Fever greater than 101 degrees F.  Pain not relieved by medication, or persistent nausea or vomiting.  Excessive bleeding (blood soaking through dressing) or unexpected drainage from the wound.  Extreme redness or swelling around the incision site, drainage of pus or foul smelling drainage.  Inability to urinate or empty your bladder within 8 hours.  Problems with breathing or chest pain.    You should call 911 if you develop problems with breathing or chest pain.  If you are unable to contact your doctor or surgical center, you should go to the nearest emergency room or urgent care center.  Physician's telephone #: Dr. Jeter [phone]    If any questions arise, call your doctor.  If your doctor is not available, please feel free to call the Surgical Center at [phone].  The Center is open Monday through Friday from 7AM to 7PM.  You can also call the [facility] HOTLINE open 24 hours/day, 7 days/week and speak to a nurse at [phone], or toll free at [phone].    A registered nurse may call you a few days after your surgery to see how you are doing after your procedure.    MEDICATIONS: Resume taking daily medication.  Take prescribed pain medication with food.  If no medication is prescribed, you may take non-aspirin pain medication if needed.  PAIN MEDICATION CAN BE VERY CONSTIPATING.  Take a stool softener or laxative such as senokot, pericolace, or milk of magnesia if needed.    Prescription given for tramadol (for pain).  Last pain medication given at 1:11 pm.    If your physician has prescribed pain medication that includes Acetaminophen (Tylenol), do not take additional Acetaminophen (Tylenol) while taking the prescribed medication.    Depression / Suicide Risk    As you are discharged from this [facility] facility, it is important to learn how to keep safe from harming yourself.    Recognize the warning  signs:  · Abrupt changes in personality, positive or negative- including increase in energy   · Giving away possessions  · Change in eating patterns- significant weight changes-  positive or negative  · Change in sleeping patterns- unable to sleep or sleeping all the time   · Unwillingness or inability to communicate  · Depression  · Unusual sadness, discouragement and loneliness  · Talk of wanting to die  · Neglect of personal appearance   · Rebelliousness- reckless behavior  · Withdrawal from people/activities they love  · Confusion- inability to concentrate     If you or a loved one observes any of these behaviors or has concerns about self-harm, here's what you can do:  · Talk about it- your feelings and reasons for harming yourself  · Remove any means that you might use to hurt yourself (examples: pills, rope, extension cords, firearm)  · Get professional help from the community (Mental Health, Substance Abuse, psychological counseling)  · Do not be alone:Call your Safe Contact- someone whom you trust who will be there for you.  · Call your local CRISIS HOTLINE 497-7787 or 848-815-4693  · Call your local Children's Mobile Crisis Response Team [place] (557) 219-7608 or www.[employer]  · Call the toll free National Suicide Prevention Hotlines   · National Suicide Prevention Lifeline 629-794-CGRT (9234)  · National Hope Line Network 800-SUICIDE (567-8966)

## 2020-09-21 NOTE — ANESTHESIA PREPROCEDURE EVALUATION
Relevant Problems   ANESTHESIA   (+) PONV (postoperative nausea and vomiting)      PULMONARY   (+) Asthma      GI   (+) Hiatal hernia      Other   (+) Anal cancer (HCC)       Physical Exam    Airway   Mallampati: II  TM distance: >3 FB  Neck ROM: full       Cardiovascular - normal exam  Rhythm: regular  Rate: normal  (-) murmur     Dental - normal exam           Pulmonary - normal exam  Breath sounds clear to auscultation     Abdominal    Neurological - normal exam                 Anesthesia Plan    ASA 2       Plan - general       Airway plan will be LMA        Induction: intravenous    Postoperative Plan: Postoperative administration of opioids is intended.    Pertinent diagnostic labs and testing reviewed    Informed Consent:    Anesthetic plan and risks discussed with patient.

## 2020-09-21 NOTE — ANESTHESIA POSTPROCEDURE EVALUATION
Patient: Josy Grove    Procedure Summary     Date: 09/21/20 Room / Location: [place] [address] / SURGERY [facility]    Anesthesia Start: 1220 Anesthesia Stop: 1303    Procedures:       EXAM UNDER ANESTHESIA, RECTUM      PROCTOSCOPY- RIGID WITH BIOPSY Diagnosis: (ANAL SQUAMOUS CELL CARCINOMA)    Surgeon: Dulce Jeter M.D. Responsible Provider: Josh Massey M.D.    Anesthesia Type: general ASA Status: 2          Final Anesthesia Type: general  Last vitals  BP        Temp        Pulse       Resp        SpO2          Anesthesia Post Evaluation    Patient location during evaluation: PACU  Patient participation: complete - patient participated  Level of consciousness: awake and alert  Pain score: 3    Airway patency: patent  Anesthetic complications: no  Cardiovascular status: hemodynamically stable  Respiratory status: acceptable  Hydration status: euvolemic    PONV: none           Nurse Pain Score: 0 (NPRS)

## 2020-09-21 NOTE — OR NURSING
Patient is awake, alert, oriented, vital signs stable, denies nausea, gauze packing in place, no new drainage.     Report called to Alexandria VARGHESE PACU II

## 2020-09-21 NOTE — ANESTHESIA TIME REPORT
Anesthesia Start and Stop Event Times     Date Time Event    9/21/2020 1200 Ready for Procedure     1220 Anesthesia Start     1303 Anesthesia Stop        Responsible Staff  09/21/20    Name Role Begin End    Josh Massey M.D. Anesth 1220 1303        Preop Diagnosis (Free Text):  Pre-op Diagnosis     ANAL SQUAMOUS CELL CARCINOMA        Preop Diagnosis (Codes):    Post op Diagnosis  Anal squamous cell carcinoma (HCC)      Premium Reason  Non-Premium    Comments:

## 2020-09-21 NOTE — OP REPORT
Operative Report     Date of Procedure:  9/21/2020     PreOp Diagnosis:   Possible persistent anal canal squamous cell carcinoma     PostOp Diagnosis:   Possible progression of anal squamous cell carcinoma     Procedure(s):  Examination under anesthesia  Rigid proctoscopy  Core biopsies of anal mass    Surgeon:  Dulce Jeter M.D.     Assistant:  None    Anesthesiologist/Type of Anesthesia:  Anesthesiologist: Josh Massey M.D./General     Specimen:  Left lateral anal canal mass biopsies [multiple passes of core biopsy]     Estimated Blood Loss:  5 cc    Wound class:  Dirty, 4     Findings:   -2 cm left lateral circular mass that is indurated and appears to be involving the sphincter, shallow mucosal defect overlying mass  -Friable rectal mucosa without seen abnormality     Complications:  None    Counts:  Correct     Indications:  79-year-old female with history of anal canal squamous cell carcinoma, post chemo radiation, with persistent left lateral anal canal indurated nodule concerning for persistent disease.     Operative Procedure:  The patient was brought to the operating suite and placed in the supine position.  The patient was placed on cardiopulmonary monitors and general endotracheal anesthesia was induced.  2 g of cefotetan were administered.  The patient's arms were padded and placed into the side on padded arm boards.  Bilateral lower extremity SCDs were placed.  The legs were placed in the lithotomy position in the yellowfin stirrups.  Blankets were placed over the upper body and a safety strap.  The perineum was prepped and draped in usual sterile fashion.  The preoperative safety checklist was performed.    A digital rectal exam was performed and the left lateral nodule appeared to be bigger than what I remember from her last clinic visit.  I believe that the nodule is now approximately 2 cm up from 1 cm.  It appears to invade the sphincters on that side.  Rigid proctoscopy was performed  to approximately 10 cm.  The rectum was noted to be quite friable and dry and I was unable to advance beyond this point.  The mucosa appeared normal however there was friability and some mucosal oozing.    The Fansler anoscope was introduced and the area of induration was visualized.  There were mucosal changes such as a shallow ulcer leading into this indurated area.  This area is quite concerning for progression of disease.  Due to the size of the lesion as well as its adherence to the underlying sphincters I decided that an excisional biopsy would not be in her favor as she already has significant issues with incontinence.  I then performed multiple passes with the Leobardo-Cut core biopsy.  These were sent to pathology and permanent.  There was a small oozing area that was oversewn with a 3-0 Vicryl.  There was good hemostasis after this.  0.5% Marcaine with epinephrine for a total of 20 cc was injected around the anal verge.  A piece of Surgicel was left within the anal canal.  Fluff gauze and mesh panties were applied.    The patient tolerated the procedure well.  She was stable and extubated and brought to the recovery room.  We will await her pathology results prior to planning for further treatment.

## 2020-09-21 NOTE — OR NURSING
Pt's VSS; denies N/V; states pain is at tolerable level. D/c orders received. IV removed. Pt changed into clothing on own. Pt up and ambulated to BR, steady gait, voided adequately. Discharge instructions given as well as pain management handout; pt verbalized understanding and questions answered. Patient states ready to d/c home. Rx for Tramadol given to pt. Pt dc'd in w/c with RN in stable condition.

## 2020-09-21 NOTE — ANESTHESIA PROCEDURE NOTES
Airway    Date/Time: 9/21/2020 12:27 PM  Performed by: Josh Massey M.D.  Authorized by: Josh Massey M.D.     Location:  OR  Urgency:  Elective  Indications for Airway Management:  Anesthesia      Spontaneous Ventilation: absent    Sedation Level:  Deep  Preoxygenated: Yes    Mask Difficulty Assessment:  0 - not attempted  Final Airway Type:  Supraglottic airway  Final Supraglottic Airway:  Standard LMA    SGA Size:  3  Number of Attempts at Approach:  2  Ventilation Between Attempts:  None

## 2020-09-28 ENCOUNTER — HOSPITAL ENCOUNTER (OUTPATIENT)
Dept: RADIOLOGY | Facility: MEDICAL CENTER | Age: 80
End: 2020-09-28
Attending: RADIOLOGY
Payer: MEDICARE

## 2020-09-28 DIAGNOSIS — C21.1 CANCER OF ANAL CANAL (HCC): ICD-10-CM

## 2020-09-28 DIAGNOSIS — C21.0 ANAL CANCER (HCC): ICD-10-CM

## 2020-09-28 PROCEDURE — A9552 F18 FDG: HCPCS

## 2020-10-05 ENCOUNTER — HOSPITAL ENCOUNTER (OUTPATIENT)
Dept: RADIOLOGY | Facility: MEDICAL CENTER | Age: 80
End: 2020-10-05
Attending: SURGERY
Payer: MEDICARE

## 2020-10-05 DIAGNOSIS — I82.401 DVT, RECURRENT, LOWER EXTREMITY, ACUTE, RIGHT (HCC): ICD-10-CM

## 2020-10-05 PROCEDURE — 93970 EXTREMITY STUDY: CPT

## 2020-10-13 ENCOUNTER — PRE-ADMISSION TESTING (OUTPATIENT)
Dept: ADMISSIONS | Facility: MEDICAL CENTER | Age: 80
DRG: 330 | End: 2020-10-13
Attending: SURGERY
Payer: MEDICARE

## 2020-10-13 DIAGNOSIS — Z01.812 PRE-OPERATIVE LABORATORY EXAMINATION: ICD-10-CM

## 2020-10-13 LAB
ANION GAP SERPL CALC-SCNC: 7 MMOL/L (ref 7–16)
BUN SERPL-MCNC: 12 MG/DL (ref 8–22)
CALCIUM SERPL-MCNC: 9.4 MG/DL (ref 8.5–10.5)
CHLORIDE SERPL-SCNC: 102 MMOL/L (ref 96–112)
CO2 SERPL-SCNC: 27 MMOL/L (ref 20–33)
COVID ORDER STATUS COVID19: NORMAL
CREAT SERPL-MCNC: 0.77 MG/DL (ref 0.5–1.4)
ERYTHROCYTE [DISTWIDTH] IN BLOOD BY AUTOMATED COUNT: 55.5 FL (ref 35.9–50)
GLUCOSE SERPL-MCNC: 93 MG/DL (ref 65–99)
HCT VFR BLD AUTO: 40.9 % (ref 37–47)
HGB BLD-MCNC: 13.3 G/DL (ref 12–16)
MCH RBC QN AUTO: 34.3 PG (ref 27–33)
MCHC RBC AUTO-ENTMCNC: 32.5 G/DL (ref 33.6–35)
MCV RBC AUTO: 105.4 FL (ref 81.4–97.8)
PLATELET # BLD AUTO: 152 K/UL (ref 164–446)
PMV BLD AUTO: 9.3 FL (ref 9–12.9)
POTASSIUM SERPL-SCNC: 3.7 MMOL/L (ref 3.6–5.5)
RBC # BLD AUTO: 3.88 M/UL (ref 4.2–5.4)
SARS-COV-2 RNA RESP QL NAA+PROBE: NOTDETECTED
SODIUM SERPL-SCNC: 136 MMOL/L (ref 135–145)
SPECIMEN SOURCE: NORMAL
WBC # BLD AUTO: 5.2 K/UL (ref 4.8–10.8)

## 2020-10-13 PROCEDURE — U0003 INFECTIOUS AGENT DETECTION BY NUCLEIC ACID (DNA OR RNA); SEVERE ACUTE RESPIRATORY SYNDROME CORONAVIRUS 2 (SARS-COV-2) (CORONAVIRUS DISEASE [COVID-19]), AMPLIFIED PROBE TECHNIQUE, MAKING USE OF HIGH THROUGHPUT TECHNOLOGIES AS DESCRIBED BY CMS-2020-01-R: HCPCS

## 2020-10-13 PROCEDURE — 80048 BASIC METABOLIC PNL TOTAL CA: CPT

## 2020-10-13 PROCEDURE — 85027 COMPLETE CBC AUTOMATED: CPT

## 2020-10-13 PROCEDURE — C9803 HOPD COVID-19 SPEC COLLECT: HCPCS

## 2020-10-13 PROCEDURE — 36415 COLL VENOUS BLD VENIPUNCTURE: CPT

## 2020-10-13 ASSESSMENT — FIBROSIS 4 INDEX: FIB4 SCORE: 2.61

## 2020-10-14 ENCOUNTER — ANESTHESIA EVENT (OUTPATIENT)
Dept: SURGERY | Facility: MEDICAL CENTER | Age: 80
DRG: 330 | End: 2020-10-14
Payer: MEDICARE

## 2020-10-14 NOTE — OR NURSING
COVID-19 Pre-surgery screenin. Do you have an undiagnosed respiratory illness or symptoms such as coughing or sneezing? No(  a. Onset of Sx No  b. Acute vs. chronic respiratory illness No    2. Do you have an unexplained fever greater than 100.4 degrees Fahrenheit or 38 degrees Celsius?     No     3. Have you had direct exposure to a patient who tested positive for Covid-19?    No     4. Have you had any loss of your sense of taste or smell? Have you had N/V or sore throat? No    Patient has been informed of visitor policy and asked to wear a mask upon entering the hospital   Yes

## 2020-10-15 ENCOUNTER — HOSPITAL ENCOUNTER (INPATIENT)
Facility: MEDICAL CENTER | Age: 80
LOS: 8 days | DRG: 330 | End: 2020-10-23
Attending: SURGERY | Admitting: SURGERY
Payer: MEDICARE

## 2020-10-15 ENCOUNTER — ANESTHESIA (OUTPATIENT)
Dept: SURGERY | Facility: MEDICAL CENTER | Age: 80
DRG: 330 | End: 2020-10-15
Payer: MEDICARE

## 2020-10-15 DIAGNOSIS — G89.18 POST-OP PAIN: ICD-10-CM

## 2020-10-15 LAB
GLUCOSE BLD-MCNC: 89 MG/DL (ref 65–99)
PATHOLOGY CONSULT NOTE: NORMAL

## 2020-10-15 PROCEDURE — 700101 HCHG RX REV CODE 250: Performed by: ANESTHESIOLOGY

## 2020-10-15 PROCEDURE — 0KBK0ZZ EXCISION OF RIGHT ABDOMEN MUSCLE, OPEN APPROACH: ICD-10-PCS | Performed by: PLASTIC SURGERY

## 2020-10-15 PROCEDURE — 502714 HCHG ROBOTIC SURGERY SERVICES: Performed by: SURGERY

## 2020-10-15 PROCEDURE — 8E0W0CZ ROBOTIC ASSISTED PROCEDURE OF TRUNK REGION, OPEN APPROACH: ICD-10-PCS | Performed by: SURGERY

## 2020-10-15 PROCEDURE — 160036 HCHG PACU - EA ADDL 30 MINS PHASE I: Performed by: SURGERY

## 2020-10-15 PROCEDURE — 500257: Performed by: SURGERY

## 2020-10-15 PROCEDURE — 88331 PATH CONSLTJ SURG 1 BLK 1SPC: CPT

## 2020-10-15 PROCEDURE — 700102 HCHG RX REV CODE 250 W/ 637 OVERRIDE(OP): Performed by: SURGERY

## 2020-10-15 PROCEDURE — 160002 HCHG RECOVERY MINUTES (STAT): Performed by: SURGERY

## 2020-10-15 PROCEDURE — 501568 HCHG TROCAR, BLUNTPORT 12MM: Performed by: SURGERY

## 2020-10-15 PROCEDURE — A9270 NON-COVERED ITEM OR SERVICE: HCPCS | Performed by: SURGERY

## 2020-10-15 PROCEDURE — 82962 GLUCOSE BLOOD TEST: CPT

## 2020-10-15 PROCEDURE — 700111 HCHG RX REV CODE 636 W/ 250 OVERRIDE (IP): Performed by: ANESTHESIOLOGY

## 2020-10-15 PROCEDURE — 700105 HCHG RX REV CODE 258: Performed by: SURGERY

## 2020-10-15 PROCEDURE — 160035 HCHG PACU - 1ST 60 MINS PHASE I: Performed by: SURGERY

## 2020-10-15 PROCEDURE — 501571 HCHG TROCAR, SEPARATOR 12X100: Performed by: SURGERY

## 2020-10-15 PROCEDURE — A9270 NON-COVERED ITEM OR SERVICE: HCPCS | Performed by: ANESTHESIOLOGY

## 2020-10-15 PROCEDURE — 700105 HCHG RX REV CODE 258: Performed by: ANESTHESIOLOGY

## 2020-10-15 PROCEDURE — 0D1N0Z4 BYPASS SIGMOID COLON TO CUTANEOUS, OPEN APPROACH: ICD-10-PCS | Performed by: SURGERY

## 2020-10-15 PROCEDURE — 160042 HCHG SURGERY MINUTES - EA ADDL 1 MIN LEVEL 5: Performed by: SURGERY

## 2020-10-15 PROCEDURE — 160031 HCHG SURGERY MINUTES - 1ST 30 MINS LEVEL 5: Performed by: SURGERY

## 2020-10-15 PROCEDURE — 0DTQ0ZZ RESECTION OF ANUS, OPEN APPROACH: ICD-10-PCS | Performed by: SURGERY

## 2020-10-15 PROCEDURE — 501572 HCHG TROCAR, SHIELD OBTU 5X100: Performed by: SURGERY

## 2020-10-15 PROCEDURE — 500868 HCHG NEEDLE, SURGI(VARES): Performed by: SURGERY

## 2020-10-15 PROCEDURE — 770006 HCHG ROOM/CARE - MED/SURG/GYN SEMI*

## 2020-10-15 PROCEDURE — 501570 HCHG TROCAR, SEPARATOR: Performed by: SURGERY

## 2020-10-15 PROCEDURE — 700101 HCHG RX REV CODE 250: Performed by: SURGERY

## 2020-10-15 PROCEDURE — 160048 HCHG OR STATISTICAL LEVEL 1-5: Performed by: SURGERY

## 2020-10-15 PROCEDURE — 700102 HCHG RX REV CODE 250 W/ 637 OVERRIDE(OP): Performed by: ANESTHESIOLOGY

## 2020-10-15 PROCEDURE — 501838 HCHG SUTURE GENERAL: Performed by: SURGERY

## 2020-10-15 PROCEDURE — 0KUM07Z SUPPLEMENT PERINEUM MUSCLE WITH AUTOLOGOUS TISSUE SUBSTITUTE, OPEN APPROACH: ICD-10-PCS | Performed by: PLASTIC SURGERY

## 2020-10-15 PROCEDURE — 501338 HCHG SHEARS, ENDO: Performed by: SURGERY

## 2020-10-15 PROCEDURE — 700111 HCHG RX REV CODE 636 W/ 250 OVERRIDE (IP): Performed by: SURGERY

## 2020-10-15 PROCEDURE — 502703 HCHG DEVICE, LIGASURE V SEALER: Performed by: SURGERY

## 2020-10-15 PROCEDURE — 501835 HCHG SUTURE PLASTIC: Performed by: SURGERY

## 2020-10-15 PROCEDURE — 88309 TISSUE EXAM BY PATHOLOGIST: CPT

## 2020-10-15 PROCEDURE — 501583 HCHG TROCAR, THRD CAN&SEAL 5X100: Performed by: SURGERY

## 2020-10-15 PROCEDURE — 502571 HCHG PACK, LAP CHOLE: Performed by: SURGERY

## 2020-10-15 PROCEDURE — 0DTN0ZZ RESECTION OF SIGMOID COLON, OPEN APPROACH: ICD-10-PCS | Performed by: SURGERY

## 2020-10-15 PROCEDURE — 64486 TAP BLOCK UNIL BY INJECTION: CPT | Performed by: SURGERY

## 2020-10-15 PROCEDURE — 500002 HCHG ADHESIVE, DERMABOND: Performed by: SURGERY

## 2020-10-15 PROCEDURE — 0DTP0ZZ RESECTION OF RECTUM, OPEN APPROACH: ICD-10-PCS | Performed by: SURGERY

## 2020-10-15 PROCEDURE — 88305 TISSUE EXAM BY PATHOLOGIST: CPT

## 2020-10-15 PROCEDURE — 160009 HCHG ANES TIME/MIN: Performed by: SURGERY

## 2020-10-15 RX ORDER — HYDROMORPHONE HYDROCHLORIDE 1 MG/ML
0.2 INJECTION, SOLUTION INTRAMUSCULAR; INTRAVENOUS; SUBCUTANEOUS
Status: DISCONTINUED | OUTPATIENT
Start: 2020-10-15 | End: 2020-10-15 | Stop reason: HOSPADM

## 2020-10-15 RX ORDER — CEFOTETAN DISODIUM 2 G/20ML
INJECTION, POWDER, FOR SOLUTION INTRAMUSCULAR; INTRAVENOUS PRN
Status: DISCONTINUED | OUTPATIENT
Start: 2020-10-15 | End: 2020-10-15 | Stop reason: HOSPADM

## 2020-10-15 RX ORDER — NEOMYCIN SULFATE 500 MG/1
TABLET ORAL
Status: ON HOLD | COMMUNITY
Start: 2020-10-05 | End: 2020-10-23

## 2020-10-15 RX ORDER — METOPROLOL TARTRATE 1 MG/ML
1 INJECTION, SOLUTION INTRAVENOUS
Status: DISCONTINUED | OUTPATIENT
Start: 2020-10-15 | End: 2020-10-15 | Stop reason: HOSPADM

## 2020-10-15 RX ORDER — ZOLPIDEM TARTRATE 5 MG/1
5 TABLET ORAL NIGHTLY PRN
Status: DISCONTINUED | OUTPATIENT
Start: 2020-10-15 | End: 2020-10-23 | Stop reason: HOSPADM

## 2020-10-15 RX ORDER — HYDROMORPHONE HYDROCHLORIDE 1 MG/ML
0.1 INJECTION, SOLUTION INTRAMUSCULAR; INTRAVENOUS; SUBCUTANEOUS
Status: DISCONTINUED | OUTPATIENT
Start: 2020-10-15 | End: 2020-10-15 | Stop reason: HOSPADM

## 2020-10-15 RX ORDER — DOCUSATE SODIUM 100 MG
CAPSULE ORAL
Status: ON HOLD | COMMUNITY
Start: 2020-08-26 | End: 2020-10-23

## 2020-10-15 RX ORDER — HYDROMORPHONE HYDROCHLORIDE 1 MG/ML
0.25 INJECTION, SOLUTION INTRAMUSCULAR; INTRAVENOUS; SUBCUTANEOUS
Status: DISCONTINUED | OUTPATIENT
Start: 2020-10-15 | End: 2020-10-15 | Stop reason: HOSPADM

## 2020-10-15 RX ORDER — CELECOXIB 200 MG/1
200 CAPSULE ORAL 2 TIMES DAILY
Status: DISCONTINUED | OUTPATIENT
Start: 2020-10-15 | End: 2020-10-15

## 2020-10-15 RX ORDER — DEXTROSE MONOHYDRATE, SODIUM CHLORIDE, AND POTASSIUM CHLORIDE 50; 1.49; 4.5 G/1000ML; G/1000ML; G/1000ML
INJECTION, SOLUTION INTRAVENOUS EVERY 6 HOURS
Status: COMPLETED | OUTPATIENT
Start: 2020-10-15 | End: 2020-10-15

## 2020-10-15 RX ORDER — DIGOXIN 250 MCG
125 TABLET ORAL EVERY EVENING
Status: DISCONTINUED | OUTPATIENT
Start: 2020-10-15 | End: 2020-10-23 | Stop reason: HOSPADM

## 2020-10-15 RX ORDER — ACETAMINOPHEN 500 MG
1000 TABLET ORAL EVERY 6 HOURS
Status: DISCONTINUED | OUTPATIENT
Start: 2020-10-15 | End: 2020-10-15

## 2020-10-15 RX ORDER — GABAPENTIN 300 MG/1
300 CAPSULE ORAL 3 TIMES DAILY
Status: DISCONTINUED | OUTPATIENT
Start: 2020-10-15 | End: 2020-10-23 | Stop reason: HOSPADM

## 2020-10-15 RX ORDER — HYDROMORPHONE HYDROCHLORIDE 1 MG/ML
0.25 INJECTION, SOLUTION INTRAMUSCULAR; INTRAVENOUS; SUBCUTANEOUS ONCE
Status: DISCONTINUED | OUTPATIENT
Start: 2020-10-15 | End: 2020-10-15 | Stop reason: HOSPADM

## 2020-10-15 RX ORDER — LIDOCAINE HYDROCHLORIDE 20 MG/ML
INJECTION, SOLUTION EPIDURAL; INFILTRATION; INTRACAUDAL; PERINEURAL PRN
Status: DISCONTINUED | OUTPATIENT
Start: 2020-10-15 | End: 2020-10-15 | Stop reason: SURG

## 2020-10-15 RX ORDER — BUPIVACAINE HYDROCHLORIDE 2.5 MG/ML
INJECTION, SOLUTION EPIDURAL; INFILTRATION; INTRACAUDAL PRN
Status: DISCONTINUED | OUTPATIENT
Start: 2020-10-15 | End: 2020-10-15 | Stop reason: SURG

## 2020-10-15 RX ORDER — HYDROMORPHONE HYDROCHLORIDE 1 MG/ML
.5-1 INJECTION, SOLUTION INTRAMUSCULAR; INTRAVENOUS; SUBCUTANEOUS
Status: DISCONTINUED | OUTPATIENT
Start: 2020-10-15 | End: 2020-10-15

## 2020-10-15 RX ORDER — CELECOXIB 200 MG/1
200 CAPSULE ORAL 2 TIMES DAILY
Status: DISCONTINUED | OUTPATIENT
Start: 2020-10-15 | End: 2020-10-23 | Stop reason: HOSPADM

## 2020-10-15 RX ORDER — OXYCODONE HCL 5 MG/5 ML
10 SOLUTION, ORAL ORAL
Status: COMPLETED | OUTPATIENT
Start: 2020-10-15 | End: 2020-10-15

## 2020-10-15 RX ORDER — LABETALOL HYDROCHLORIDE 5 MG/ML
5 INJECTION, SOLUTION INTRAVENOUS
Status: DISCONTINUED | OUTPATIENT
Start: 2020-10-15 | End: 2020-10-15 | Stop reason: HOSPADM

## 2020-10-15 RX ORDER — METRONIDAZOLE 500 MG/1
TABLET ORAL
Status: ON HOLD | COMMUNITY
Start: 2020-10-08 | End: 2020-10-23

## 2020-10-15 RX ORDER — ONDANSETRON 2 MG/ML
4 INJECTION INTRAMUSCULAR; INTRAVENOUS
Status: COMPLETED | OUTPATIENT
Start: 2020-10-15 | End: 2020-10-15

## 2020-10-15 RX ORDER — APIXABAN 5 MG/1
TABLET, FILM COATED ORAL
COMMUNITY
Start: 2020-09-21 | End: 2020-10-26

## 2020-10-15 RX ORDER — SODIUM CHLORIDE, SODIUM LACTATE, POTASSIUM CHLORIDE, CALCIUM CHLORIDE 600; 310; 30; 20 MG/100ML; MG/100ML; MG/100ML; MG/100ML
INJECTION, SOLUTION INTRAVENOUS CONTINUOUS
Status: DISCONTINUED | OUTPATIENT
Start: 2020-10-15 | End: 2020-10-15

## 2020-10-15 RX ORDER — BUPIVACAINE HYDROCHLORIDE AND EPINEPHRINE 5; 5 MG/ML; UG/ML
INJECTION, SOLUTION EPIDURAL; INTRACAUDAL; PERINEURAL
Status: DISCONTINUED | OUTPATIENT
Start: 2020-10-15 | End: 2020-10-15 | Stop reason: HOSPADM

## 2020-10-15 RX ORDER — ONDANSETRON 2 MG/ML
4 INJECTION INTRAMUSCULAR; INTRAVENOUS EVERY 4 HOURS PRN
Status: DISCONTINUED | OUTPATIENT
Start: 2020-10-15 | End: 2020-10-23 | Stop reason: HOSPADM

## 2020-10-15 RX ORDER — HALOPERIDOL 5 MG/ML
1 INJECTION INTRAMUSCULAR
Status: DISCONTINUED | OUTPATIENT
Start: 2020-10-15 | End: 2020-10-15 | Stop reason: HOSPADM

## 2020-10-15 RX ORDER — ATORVASTATIN CALCIUM 40 MG/1
80 TABLET, FILM COATED ORAL DAILY
Status: DISCONTINUED | OUTPATIENT
Start: 2020-10-15 | End: 2020-10-23 | Stop reason: HOSPADM

## 2020-10-15 RX ORDER — MONTELUKAST SODIUM 10 MG/1
10 TABLET ORAL
Status: DISCONTINUED | OUTPATIENT
Start: 2020-10-15 | End: 2020-10-23 | Stop reason: HOSPADM

## 2020-10-15 RX ORDER — SCOLOPAMINE TRANSDERMAL SYSTEM 1 MG/1
1 PATCH, EXTENDED RELEASE TRANSDERMAL
Status: COMPLETED | OUTPATIENT
Start: 2020-10-15 | End: 2020-10-18

## 2020-10-15 RX ORDER — ACETAMINOPHEN 500 MG
1000 TABLET ORAL EVERY 6 HOURS
Status: DISCONTINUED | OUTPATIENT
Start: 2020-10-15 | End: 2020-10-23 | Stop reason: HOSPADM

## 2020-10-15 RX ORDER — BUDESONIDE AND FORMOTEROL FUMARATE DIHYDRATE 160; 4.5 UG/1; UG/1
2 AEROSOL RESPIRATORY (INHALATION)
Status: DISCONTINUED | OUTPATIENT
Start: 2020-10-15 | End: 2020-10-23 | Stop reason: HOSPADM

## 2020-10-15 RX ORDER — ACETAMINOPHEN 500 MG
1000 TABLET ORAL ONCE
Status: COMPLETED | OUTPATIENT
Start: 2020-10-15 | End: 2020-10-15

## 2020-10-15 RX ORDER — PHENYLEPHRINE HCL IN 0.9% NACL 0.5 MG/5ML
SYRINGE (ML) INTRAVENOUS PRN
Status: DISCONTINUED | OUTPATIENT
Start: 2020-10-15 | End: 2020-10-15 | Stop reason: SURG

## 2020-10-15 RX ORDER — HYDROMORPHONE HYDROCHLORIDE 1 MG/ML
0.4 INJECTION, SOLUTION INTRAMUSCULAR; INTRAVENOUS; SUBCUTANEOUS
Status: DISCONTINUED | OUTPATIENT
Start: 2020-10-15 | End: 2020-10-15 | Stop reason: HOSPADM

## 2020-10-15 RX ORDER — NITROGLYCERIN 0.4 MG/1
0.4 TABLET SUBLINGUAL
Status: DISCONTINUED | OUTPATIENT
Start: 2020-10-15 | End: 2020-10-23 | Stop reason: HOSPADM

## 2020-10-15 RX ORDER — CELECOXIB 200 MG/1
200 CAPSULE ORAL ONCE
Status: COMPLETED | OUTPATIENT
Start: 2020-10-15 | End: 2020-10-15

## 2020-10-15 RX ORDER — ONDANSETRON 2 MG/ML
INJECTION INTRAMUSCULAR; INTRAVENOUS PRN
Status: DISCONTINUED | OUTPATIENT
Start: 2020-10-15 | End: 2020-10-15 | Stop reason: SURG

## 2020-10-15 RX ORDER — SODIUM CHLORIDE, SODIUM LACTATE, POTASSIUM CHLORIDE, CALCIUM CHLORIDE 600; 310; 30; 20 MG/100ML; MG/100ML; MG/100ML; MG/100ML
INJECTION, SOLUTION INTRAVENOUS CONTINUOUS
Status: DISCONTINUED | OUTPATIENT
Start: 2020-10-15 | End: 2020-10-15 | Stop reason: HOSPADM

## 2020-10-15 RX ORDER — DEXAMETHASONE SODIUM PHOSPHATE 4 MG/ML
INJECTION, SOLUTION INTRA-ARTICULAR; INTRALESIONAL; INTRAMUSCULAR; INTRAVENOUS; SOFT TISSUE PRN
Status: DISCONTINUED | OUTPATIENT
Start: 2020-10-15 | End: 2020-10-15 | Stop reason: SURG

## 2020-10-15 RX ORDER — OXYCODONE HCL 5 MG/5 ML
5 SOLUTION, ORAL ORAL
Status: COMPLETED | OUTPATIENT
Start: 2020-10-15 | End: 2020-10-15

## 2020-10-15 RX ORDER — ROCURONIUM BROMIDE 10 MG/ML
INJECTION, SOLUTION INTRAVENOUS PRN
Status: DISCONTINUED | OUTPATIENT
Start: 2020-10-15 | End: 2020-10-15 | Stop reason: SURG

## 2020-10-15 RX ADMIN — ZOLPIDEM TARTRATE 5 MG: 5 TABLET ORAL at 22:16

## 2020-10-15 RX ADMIN — DIGOXIN 125 MCG: 125 TABLET ORAL at 21:58

## 2020-10-15 RX ADMIN — BUPIVACAINE HYDROCHLORIDE 25 ML: 2.5 INJECTION, SOLUTION EPIDURAL; INFILTRATION; INTRACAUDAL; PERINEURAL at 08:01

## 2020-10-15 RX ADMIN — CELECOXIB 200 MG: 200 CAPSULE ORAL at 17:22

## 2020-10-15 RX ADMIN — FENTANYL CITRATE 25 MCG: 50 INJECTION, SOLUTION INTRAMUSCULAR; INTRAVENOUS at 15:33

## 2020-10-15 RX ADMIN — HYDROMORPHONE HYDROCHLORIDE 0.2 MG: 1 INJECTION, SOLUTION INTRAMUSCULAR; INTRAVENOUS; SUBCUTANEOUS at 13:54

## 2020-10-15 RX ADMIN — LIDOCAINE HYDROCHLORIDE: 10 INJECTION, SOLUTION EPIDURAL; INFILTRATION; INTRACAUDAL at 07:03

## 2020-10-15 RX ADMIN — Medication: at 17:15

## 2020-10-15 RX ADMIN — HYDROMORPHONE HYDROCHLORIDE 0.4 MG: 1 INJECTION, SOLUTION INTRAMUSCULAR; INTRAVENOUS; SUBCUTANEOUS at 14:29

## 2020-10-15 RX ADMIN — ACETAMINOPHEN 1000 MG: 500 TABLET ORAL at 17:22

## 2020-10-15 RX ADMIN — DEXAMETHASONE SODIUM PHOSPHATE 8 MG: 4 INJECTION, SOLUTION INTRA-ARTICULAR; INTRALESIONAL; INTRAMUSCULAR; INTRAVENOUS; SOFT TISSUE at 08:07

## 2020-10-15 RX ADMIN — FENTANYL CITRATE 50 MCG: 50 INJECTION, SOLUTION INTRAMUSCULAR; INTRAVENOUS at 14:00

## 2020-10-15 RX ADMIN — ACETAMINOPHEN 1000 MG: 500 TABLET ORAL at 07:02

## 2020-10-15 RX ADMIN — ATORVASTATIN CALCIUM 80 MG: 80 TABLET, FILM COATED ORAL at 21:58

## 2020-10-15 RX ADMIN — Medication 200 MCG: at 08:02

## 2020-10-15 RX ADMIN — FENTANYL CITRATE 50 MCG: 50 INJECTION, SOLUTION INTRAMUSCULAR; INTRAVENOUS at 13:27

## 2020-10-15 RX ADMIN — Medication 100 MCG: at 12:37

## 2020-10-15 RX ADMIN — OXYCODONE HYDROCHLORIDE 10 MG: 5 SOLUTION ORAL at 13:23

## 2020-10-15 RX ADMIN — HYDROMORPHONE HYDROCHLORIDE 0.4 MG: 1 INJECTION, SOLUTION INTRAMUSCULAR; INTRAVENOUS; SUBCUTANEOUS at 14:07

## 2020-10-15 RX ADMIN — FENTANYL CITRATE 50 MCG: 50 INJECTION, SOLUTION INTRAMUSCULAR; INTRAVENOUS at 12:42

## 2020-10-15 RX ADMIN — FENTANYL CITRATE 150 MCG: 50 INJECTION, SOLUTION INTRAMUSCULAR; INTRAVENOUS at 07:50

## 2020-10-15 RX ADMIN — ROCURONIUM BROMIDE 50 MG: 10 INJECTION, SOLUTION INTRAVENOUS at 07:50

## 2020-10-15 RX ADMIN — HYDROMORPHONE HYDROCHLORIDE 0.2 MG: 1 INJECTION, SOLUTION INTRAMUSCULAR; INTRAVENOUS; SUBCUTANEOUS at 13:43

## 2020-10-15 RX ADMIN — FENTANYL CITRATE 25 MCG: 50 INJECTION, SOLUTION INTRAMUSCULAR; INTRAVENOUS at 15:16

## 2020-10-15 RX ADMIN — FENTANYL CITRATE 50 MCG: 50 INJECTION, SOLUTION INTRAMUSCULAR; INTRAVENOUS at 12:27

## 2020-10-15 RX ADMIN — POVIDONE IODINE 15 ML: 100 SOLUTION TOPICAL at 07:03

## 2020-10-15 RX ADMIN — BUPIVACAINE HYDROCHLORIDE 25 ML: 2.5 INJECTION, SOLUTION EPIDURAL; INFILTRATION; INTRACAUDAL; PERINEURAL at 08:04

## 2020-10-15 RX ADMIN — PROPOFOL 110 MG: 10 INJECTION, EMULSION INTRAVENOUS at 07:50

## 2020-10-15 RX ADMIN — LIDOCAINE HYDROCHLORIDE 40 MG: 20 INJECTION, SOLUTION EPIDURAL; INFILTRATION; INTRACAUDAL at 07:50

## 2020-10-15 RX ADMIN — GABAPENTIN 300 MG: 300 CAPSULE ORAL at 17:22

## 2020-10-15 RX ADMIN — POTASSIUM CHLORIDE, DEXTROSE MONOHYDRATE AND SODIUM CHLORIDE: 150; 5; 450 INJECTION, SOLUTION INTRAVENOUS at 17:22

## 2020-10-15 RX ADMIN — ONDANSETRON 4 MG: 2 INJECTION INTRAMUSCULAR; INTRAVENOUS at 13:30

## 2020-10-15 RX ADMIN — HYDROMORPHONE HYDROCHLORIDE 0.4 MG: 1 INJECTION, SOLUTION INTRAMUSCULAR; INTRAVENOUS; SUBCUTANEOUS at 14:17

## 2020-10-15 RX ADMIN — MONTELUKAST 10 MG: 10 TABLET, FILM COATED ORAL at 22:16

## 2020-10-15 RX ADMIN — GLYCOPYRROLATE 1 CAPSULE: 15.6 CAPSULE RESPIRATORY (INHALATION) at 22:02

## 2020-10-15 RX ADMIN — CELECOXIB 200 MG: 200 CAPSULE ORAL at 07:02

## 2020-10-15 RX ADMIN — ONDANSETRON 4 MG: 2 INJECTION INTRAMUSCULAR; INTRAVENOUS at 12:39

## 2020-10-15 RX ADMIN — ACETAMINOPHEN 1000 MG: 500 TABLET ORAL at 23:31

## 2020-10-15 RX ADMIN — SCOLOPAMINE TRANSDERMAL SYSTEM 1 PATCH: 1 PATCH, EXTENDED RELEASE TRANSDERMAL at 07:02

## 2020-10-15 RX ADMIN — HYDROMORPHONE HYDROCHLORIDE 0.2 MG: 1 INJECTION, SOLUTION INTRAMUSCULAR; INTRAVENOUS; SUBCUTANEOUS at 14:38

## 2020-10-15 RX ADMIN — SODIUM CHLORIDE, POTASSIUM CHLORIDE, SODIUM LACTATE AND CALCIUM CHLORIDE: 600; 310; 30; 20 INJECTION, SOLUTION INTRAVENOUS at 07:38

## 2020-10-15 RX ADMIN — CEFOTETAN DISODIUM 2 G: 2 INJECTION, POWDER, FOR SOLUTION INTRAMUSCULAR; INTRAVENOUS at 07:55

## 2020-10-15 RX ADMIN — HYDROMORPHONE HYDROCHLORIDE 0.2 MG: 1 INJECTION, SOLUTION INTRAMUSCULAR; INTRAVENOUS; SUBCUTANEOUS at 13:34

## 2020-10-15 RX ADMIN — Medication 100 MCG: at 11:23

## 2020-10-15 RX ADMIN — FENTANYL CITRATE 25 MCG: 50 INJECTION, SOLUTION INTRAMUSCULAR; INTRAVENOUS at 15:30

## 2020-10-15 RX ADMIN — SODIUM CHLORIDE, POTASSIUM CHLORIDE, SODIUM LACTATE AND CALCIUM CHLORIDE: 600; 310; 30; 20 INJECTION, SOLUTION INTRAVENOUS at 13:31

## 2020-10-15 ASSESSMENT — FIBROSIS 4 INDEX: FIB4 SCORE: 2.29

## 2020-10-15 ASSESSMENT — COGNITIVE AND FUNCTIONAL STATUS - GENERAL
WALKING IN HOSPITAL ROOM: A LITTLE
DRESSING REGULAR LOWER BODY CLOTHING: A LITTLE
PERSONAL GROOMING: A LITTLE
HELP NEEDED FOR BATHING: A LITTLE
MOVING TO AND FROM BED TO CHAIR: A LOT
DAILY ACTIVITIY SCORE: 17
TOILETING: A LOT
MOBILITY SCORE: 15
CLIMB 3 TO 5 STEPS WITH RAILING: A LITTLE
MOVING FROM LYING ON BACK TO SITTING ON SIDE OF FLAT BED: A LOT
STANDING UP FROM CHAIR USING ARMS: A LOT
DRESSING REGULAR UPPER BODY CLOTHING: A LITTLE
SUGGESTED CMS G CODE MODIFIER DAILY ACTIVITY: CK
EATING MEALS: A LITTLE
SUGGESTED CMS G CODE MODIFIER MOBILITY: CK
TURNING FROM BACK TO SIDE WHILE IN FLAT BAD: A LITTLE

## 2020-10-15 ASSESSMENT — LIFESTYLE VARIABLES
TOTAL SCORE: 0
HAVE PEOPLE ANNOYED YOU BY CRITICIZING YOUR DRINKING: NO
AVERAGE NUMBER OF DAYS PER WEEK YOU HAVE A DRINK CONTAINING ALCOHOL: 4
EVER FELT BAD OR GUILTY ABOUT YOUR DRINKING: NO
HOW MANY TIMES IN THE PAST YEAR HAVE YOU HAD 5 OR MORE DRINKS IN A DAY: 0
DOES PATIENT WANT TO STOP DRINKING: NO
HAVE YOU EVER FELT YOU SHOULD CUT DOWN ON YOUR DRINKING: NO
ON A TYPICAL DAY WHEN YOU DRINK ALCOHOL HOW MANY DRINKS DO YOU HAVE: 1
TOTAL SCORE: 0
CONSUMPTION TOTAL: NEGATIVE
ALCOHOL_USE: YES
TOTAL SCORE: 0
EVER HAD A DRINK FIRST THING IN THE MORNING TO STEADY YOUR NERVES TO GET RID OF A HANGOVER: NO

## 2020-10-15 ASSESSMENT — PAIN SCALES - GENERAL: PAIN_LEVEL: 6

## 2020-10-15 ASSESSMENT — PAIN DESCRIPTION - PAIN TYPE
TYPE: ACUTE PAIN;SURGICAL PAIN
TYPE: ACUTE PAIN;SURGICAL PAIN

## 2020-10-15 ASSESSMENT — PATIENT HEALTH QUESTIONNAIRE - PHQ9
1. LITTLE INTEREST OR PLEASURE IN DOING THINGS: NOT AT ALL
2. FEELING DOWN, DEPRESSED, IRRITABLE, OR HOPELESS: NOT AT ALL
SUM OF ALL RESPONSES TO PHQ9 QUESTIONS 1 AND 2: 0

## 2020-10-15 NOTE — OP REPORT
DATE OF SERVICE:  10/15/2020    PREOPERATIVE DIAGNOSIS:  Squamous cell carcinoma of the anus, status post   abdominoperineal resection with a large pelvic wound that has been radiated.      POSTOPERATIVE DIAGNOSIS:  Squamous cell carcinoma of the anus, status post   abdominoperineal resection with a large pelvic wound that has been radiated.      PROCEDURE PERFORMED:  Myocutaneous flap in the form of a right rectus   abdominis myocutaneous flap for perineal reconstruction.      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  Plan is for an abdominoperineal resection.  Dr. Jeter has asked for healthy soft tissue of the previously irradiated bed to   assist with wound closure, wound healing and prevention of perineal   herniation.  The patient was made aware of the risks, benefits, alternatives   preoperatively.  The patient knew that we would not know exactly what the   operative plan was going to be until we got into the abdomen and saw the   defect.  The patient consented to proceed with surgery.      OPERATIVE DESCRIPTION:  After induction of the general endotracheal   anesthesia, the patient's abdomen and perineum were prepped and draped   sterilely.  Dr. Jeter performed the abdominoperineal resection and this is   dictated separately.  The size of the ellipse was measured.  This size was   transferred to the superior most aspect of the rectus abdominis muscle.  This   ellipse was incised and soft tissue dissected down to the level of the   anterior rectus sheath using cautery.  A separate midline incision was made   and skin inferior to the flap was elevated off the anterior rectus sheath.  A   vertical incision was made in the anterior rectus sheath.  Anterior rectus   sheath was elevated off the rectus abdominis muscle.  Tendinous inscriptions   were encountered were very  carefully teased free of the muscle.  Any bleeding   was treated with grasping of the bleeding vessel and placing surgical clips.    The muscle was divided from the costal margin.  The muscle was elevated off   the posterior rectus sheath.  Lateral blood vessels and nerves were clipped   and divided, as the flap was elevated.  The pedicle was identified and spared.    Just superior to the pubic ramus, the peritoneum was grasped and the   abdominal cavity was entered atraumatically without injury to the small bowel.    The patient was placed in Trendelenburg position.  The small bowel was   pushed cephalad and the muscle was delivered into the pelvic defect.  The   muscle was oriented so there was no twisting or kinking of the pedicle.  The   skin flap was brought through and was healthy. The subcutaneous tissue   was sutured to the anterior aspect of the muscle, so there would be no undue   pulling and tugging on the skin paddle.  With the myocutaneous flap now   delivered through the pelvis, the skin was inspected.  It was oriented   appropriately and the skin was pink and was without signs of arterial or   venous compromise.  The peritoneum was closed with a Vicryl suture.  The   anterior rectus sheath was approximated with a running #1 PDS suture.    Subcutaneous tissue was closed in layers and the skin was approximated with   staples.  In the perineum, the muscle was oriented to make sure there is no   chance of perineal herniation.  The subcutaneous tissue was approximated to   the subcutaneous tissue of the buttock region using interrupted 2-0 Vicryl   suture.  The skin was approximated with a running 3-0 chromic suture.  Dr. Jeter is at this part maturing the colostomy.  The patient will have fluffs   and mesh panties placed as her dressing on the perineum.       ____________________________________     MD JOSE CHAVEZ / CHRIS    DD:  10/15/2020 12:24:12  DT:  10/15/2020 12:49:16    D#:  [id number]   Job#:  [id number]

## 2020-10-15 NOTE — OR NURSING
Dr Dejesus at bedside to discuss patient plan for pain.  Patient ok to send to floor.  Will cont meds thru Dilaudid pca ordered by Dr Jeter

## 2020-10-15 NOTE — H&P
Surgery Plastic History & Physical Note    Date  10/15/2020    Primary Care Physician  Pcp Pt States None    CC  Anal/rectal carcinoma    HPI  This is a 79 y.o. female who presented with anal/rectal cancer for APR.  Pt will have a presumed perineal defect that we will reconstruct.  I have discussed options with the patient at length    Past Medical History:   Diagnosis Date   • Arrhythmia     afib   • Arthritis     low back, right hand   • Asthma     inhalers daily   • Cancer (HCC) 1983    uterine   • Cancer (HCC)     anal / chemo / radiation   • Cataract     repaired   • Gynecological disorder    • Heart burn     history of   • Hiatus hernia syndrome    • High cholesterol    • IBS (irritable bowel syndrome)     due to radiation   • Indigestion    • Pain     anus   • Pneumonia 2000   • PONV (postoperative nausea and vomiting)        Past Surgical History:   Procedure Laterality Date   • PB SURG DIAGNOSTIC EXAM, ANORECTAL  9/21/2020    Procedure: EXAM UNDER ANESTHESIA, RECTUM;  Surgeon: Dulce Jeter M.D.;  Location: SURGERY [facility];  Service: General   • PROCTOSCOPY  9/21/2020    Procedure: PROCTOSCOPY- RIGID WITH BIOPSY;  Surgeon: Dulce Jeter M.D.;  Location: SURGERY [facility];  Service: General   • COLONOSCOPY  1/13/2020    Procedure: COLONOSCOPY;  Surgeon: Dulce Jeter M.D.;  Location: SURGERY SAME DAY [facility];  Service: Gen Robotic   • LUMBAR LAMINECTOMY DISKECTOMY  3/3/2016    Procedure: LUMBAR LAMINECTOMY DISKECTOMY FOR MINIMALLY INVASIVE LEFT L4-5 LAMINOTOMY;  Surgeon: Stevo Guthrie M.D.;  Location: SURGERY [facility];  Service:    • CHOLECYSTECTOMY  6/2014   • CATARACT EXTRACTION WITH IOL Bilateral 2012   • HYSTERECTOMY, TOTAL ABDOMINAL  1983   • ABDOMINAL HYSTERECTOMY TOTAL  1983   • TUBAL LIGATION  1975   • APPENDECTOMY     • BICEPS TENDON REPAIR Right 2013, 2014    5 screws placed in second surgery   • BUNIONECTOMY     • PB REMOVAL OF TONSILS,<13 Y/O     • SHOULDER  SURGERY Right        Current Facility-Administered Medications   Medication Dose Route Frequency Provider Last Rate Last Dose   • scopolamine (TRANSDERM-SCOP) patch 1 Patch  1 Patch Transdermal Q72HRS Dulce Jeter M.D.   1 Patch at 10/15/20 0702   • lidocaine (XYLOCAINE) 1 % injection 0.5 mL  0.5 mL Intradermal Once PRN Dulce Jeter M.D.       • lactated ringers infusion   Intravenous Continuous Dulce Jeter M.D.           Social History     Socioeconomic History   • Marital status:      Spouse name: Klaus    • Number of children: 6   • Years of education: Not on file   • Highest education level: Not on file   Occupational History   • Not on file   Social Needs   • Financial resource strain: Not on file   • Food insecurity     Worry: Not on file     Inability: Not on file   • Transportation needs     Medical: Not on file     Non-medical: Not on file   Tobacco Use   • Smoking status: Never Smoker   • Smokeless tobacco: Never Used   Substance and Sexual Activity   • Alcohol use: Yes     Alcohol/week: 3.0 oz     Types: 5 Glasses of wine per week     Frequency: 4 or more times a week     Drinks per session: 1 or 2     Comment: 1 a day   • Drug use: No   • Sexual activity: Not on file   Lifestyle   • Physical activity     Days per week: Not on file     Minutes per session: Not on file   • Stress: Not on file   Relationships   • Social connections     Talks on phone: Not on file     Gets together: Not on file     Attends Oriental orthodox service: Not on file     Active member of club or organization: Not on file     Attends meetings of clubs or organizations: Not on file     Relationship status: Not on file   • Intimate partner violence     Fear of current or ex partner: Not on file     Emotionally abused: Not on file     Physically abused: Not on file     Forced sexual activity: Not on file   Other Topics Concern   • Not on file   Social History Narrative    Patient lives in [place], , retired, 6  children.       Family History   Problem Relation Age of Onset   • Cancer Mother    • Cancer Father    • Cancer Maternal Grandfather        Allergies  Codeine    Review of Systems  Non contributory    Physical Exam    Vital Signs  Blood Pressure : 133/65   Temperature: 36.3 °C (97.4 °F)   Pulse: 85   Respiration: 16   Pulse Oximetry: 96 %     abd with old appy scar which is encroaching on the lat aspect of the rectus  Lungs are clear  Heart is rr    Labs:  Recent Labs     10/13/20  1347   WBC 5.2   RBC 3.88*   HEMOGLOBIN 13.3   HEMATOCRIT 40.9   .4*   MCH 34.3*   MCHC 32.5*   RDW 55.5*   PLATELETCT 152*   MPV 9.3     Recent Labs     10/13/20  1347   SODIUM 136   POTASSIUM 3.7   CHLORIDE 102   CO2 27   GLUCOSE 93   BUN 12   CREATININE 0.77   CALCIUM 9.4               Radiology:  No orders to display         Assessment/Plan:  Presumed perineal defect, i will assist with closure.  Probable flap.  Procedure(s):  RESECTION, ABDOMINOPERINEAL, ROBOT-ASSISTED, USING DA AUGIE XI  CREATION, COLOSTOMY, LAPAROSCOPIC - FOR END COLOSTOMY  REPAIR, WOUND, USING MYOCUTANEOUS FLAP- MUSCLE FLAP TO PERINEUM

## 2020-10-15 NOTE — ANESTHESIA PROCEDURE NOTES
Airway    Date/Time: 10/15/2020 7:51 AM  Performed by: Adolfo Goncalves M.D.  Authorized by: Adolfo Goncalves M.D.     Location:  OR  Urgency:  Elective  Indications for Airway Management:  Anesthesia      Spontaneous Ventilation: absent    Sedation Level:  Deep  Preoxygenated: Yes    Patient Position:  Sniffing  Mask Difficulty Assessment:  1 - vent by mask  Final Airway Type:  Endotracheal airway  Final Endotracheal Airway:  ETT  Cuffed: Yes    Technique Used for Successful ETT Placement:  Direct laryngoscopy    Insertion Site:  Oral  Blade Type:  Lyn  Laryngoscope Blade/Videolaryngoscope Blade Size:  2  ETT Size (mm):  6.5  Measured from:  Teeth  ETT to Teeth (cm):  22  Placement Verified by: auscultation and capnometry    Cormack-Lehane Classification:  Grade I - full view of glottis  Number of Attempts at Approach:  1

## 2020-10-15 NOTE — OR NURSING
Patient c/o 9/10 pain.  States she has a high tolerance to pain meds.  Cont to give meds as ordered. To update Dr Goncalves

## 2020-10-15 NOTE — OR NURSING
Patient cont to c/o 8-9/10 pain.  Dr Dejesus and Dr Jeter updated.  To keep patient in recovery until they are finished with OR case.  Plan to send patient to t4-2 when appropriate.  Family updated with patient status and plan.  Belongings brought to bedside

## 2020-10-15 NOTE — OR NURSING
Patient pca orders verified by Dr Jeter.  Patient ok to go to floor on [facility].  Able to tolerate sips of apple juice with no nausea.  Report called to Carmen VARGHESE

## 2020-10-15 NOTE — OR SURGEON
Immediate Post OP Note    PreOp Diagnosis:perineal wound post APR/radiation    PostOp Diagnosis: same    Procedure(s):  RESECTION, ABDOMINOPERINEAL, ROBOT-ASSISTED, USING DA AUGIE XI - Wound Class: Clean  CREATION, COLOSTOMY, LAPAROSCOPIC - FOR END COLOSTOMY - Wound Class: Contaminated  REPAIR, WOUND, USING MYOCUTANEOUS FLAP- MUSCLE FLAP TO PERINEUM - Wound Class: Clean    Surgeon(s):  MEENA Cordova M.D. Scott W Wrye, M.D.    Anesthesiologist/Type of Anesthesia:  Anesthesiologist: Adolfo Goncalves M.D./General    Surgical Staff:  Assistant: PEREZ Leigh  Circulator: Hal Adame R.N.  Relief Circulator: Michelle Loomis R.N.  Scrub Person: Hernan Mustafa; Josh Mays    Specimens removed if any:  ID Type Source Tests Collected by Time Destination   A : Anterior margin Tissue Other FROZEN SECTION REQUEST Marvin Cortés M.D. 10/15/2020  9:52 AM    B : Rectum, sigmoid, anus Tissue Other PATHOLOGY SPECIMEN Dulce Jeter M.D. 10/15/2020 11:12 AM        Estimated Blood Loss: 10 ml    Findings: perineal wound    Complications: none        10/15/2020 12:14 PM Mark Reyes M.D.

## 2020-10-15 NOTE — ANESTHESIA PREPROCEDURE EVALUATION
Relevant Problems   ANESTHESIA   (+) PONV (postoperative nausea and vomiting)      PULMONARY   (+) Asthma      GI   (+) Hiatal hernia      Other   (+) Anal cancer (HCC)   (+) Degeneration of lumbar intervertebral disc   (+) High cholesterol       Physical Exam    Airway   Mallampati: II  TM distance: >3 FB  Neck ROM: full       Cardiovascular - normal exam  Rhythm: regular  Rate: normal  (-) murmur     Dental - normal exam           Pulmonary - normal exam  Breath sounds clear to auscultation     Abdominal    Neurological - normal exam                 Anesthesia Plan    ASA 3 (anal cancer)   ASA physical status 3 criteria: other (comment)    Plan - general and peripheral nerve block     Peripheral nerve block will be post-op pain control  Airway plan will be ETT        Induction: intravenous    Postoperative Plan: Postoperative administration of opioids is intended.    Pertinent diagnostic labs and testing reviewed    Informed Consent:    Anesthetic plan and risks discussed with patient.    Use of blood products discussed with: patient whom consented to blood products.

## 2020-10-15 NOTE — ANESTHESIA TIME REPORT
Anesthesia Start and Stop Event Times     Date Time Event    10/15/2020 0724 Ready for Procedure     0744 Anesthesia Start     1309 Anesthesia Stop        Responsible Staff  10/15/20    Name Role Begin End    Adolfo Goncalves M.D. Anesth 0744 1309        Preop Diagnosis (Free Text):  Pre-op Diagnosis     ANAL SQUAMOUS CELL CARCINOMA        Preop Diagnosis (Codes):    Post op Diagnosis  Anal squamous cell carcinoma (HCC)      Premium Reason  Non-Premium    Comments:

## 2020-10-15 NOTE — ANESTHESIA POSTPROCEDURE EVALUATION
Patient: Josy Grove    Procedure Summary     Date: 10/15/20 Room / Location: [place] [address] / SURGERY [facility]    Anesthesia Start: 0744 Anesthesia Stop: 1309    Procedures:       RESECTION, ABDOMINOPERINEAL, ROBOT-ASSISTED, USING DA AUGIE XI (N/A Abdomen)      CREATION, COLOSTOMY, LAPAROSCOPIC - FOR END COLOSTOMY (Left Abdomen)      REPAIR, WOUND, USING MYOCUTANEOUS FLAP- MUSCLE FLAP TO PERINEUM (N/A Perineum) Diagnosis: (ANAL SQUAMOUS CELL CARCINOMA)    Surgeon: Dulce Jeter M.D.; Mark Reyes M.D. Responsible Provider: Adolfo Goncalves M.D.    Anesthesia Type: general, peripheral nerve block ASA Status: 3          Final Anesthesia Type: general, peripheral nerve block  Last vitals  BP   Blood Pressure : 121/52    Temp   36 °C (96.8 °F)    Pulse   Pulse: 66   Resp   18    SpO2   100 %      Anesthesia Post Evaluation    Patient location during evaluation: PACU  Patient participation: complete - patient participated  Level of consciousness: awake and alert  Pain score: 6    Airway patency: patent  Anesthetic complications: no  Cardiovascular status: hemodynamically stable  Respiratory status: acceptable  Hydration status: euvolemic    PONV: none

## 2020-10-15 NOTE — OR NURSING
Patient A+Ox4. States pain 10/10 in ABD only. States at home she takes 2 pills when she is having pain and tolerates it well.  Patient a-fib pattern and Dr Goncalves at bedside states she has history no EKG order at this time.  Patient denies chest pain or arm pain.  No SOB.  Cont to give meds as ordered.  Update Dr Ellis as needed

## 2020-10-15 NOTE — OP REPORT
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 Robotic abdominal perineal resection with creation of end colostomy [Abdominal Surgeon: Dulce Jeter, Perineal Surgeon: Marvin Cortés]  2. Myocutaneous flap in the form of a right rectus   abdominis myocutaneous flap for perineal reconstruction performed by Dr. Mark Reyes    Surgeon:  Dulce Jeter M.D.     Assistant:  Jo Harman    Anesthesiologist/Type of Anesthesia:  Anesthesiologist: Adolfo Goncalves M.D./General     Specimen:  Sigmoid colon, rectum, and anus  Frozen section of anterior margin of perineal dissection just deep to the vagina     Estimated Blood Loss:  100 cc    Wound class:  Clean contaminated, 2     Findings:   Large 3 cm left lateral anal canal mass in keeping with her progression of anal canal squamous cell carcinoma post chemoradiotherapy  No evidence of metastatic disease     Complications:  None    Counts:  Correct     Indications:  79-year-old woman who was previously diagnosed with an anal canal squamous cell carcinoma and he was treated with nigra protocol for chemoradiotherapy.  She was followed with physical exam surveillance monthly after her treatments.  She was noted to have an initial small stable left lateral nodule that then started to progress and was biopsied and demonstrated squamous cell carcinoma in keeping with progression of disease following chemoradiotherapy.  She was therefore brought to the operating room for salvage APR and flap repair.     Operative Procedure:  The patient was brought to the operating suite and placed in the supine position.  The patient was placed on cardiopulmonary monitors and general endotracheal anesthesia was induced.  2 g of cefotetan were administered.  The patient's arms were padded and tucked at the sides.  SCDs were placed on  bilateral lower extremities and placed in the lithotomy position in the yellowfin stirrups.  A Loving catheter was placed under sterile technique.  Bilateral tap blocks were performed by anesthesia.  A safety chest strap was applied with a towel and tape.  The rectum was then prepped with diluted Betadine through a rigid proctoscope.  Both myself and Dr. Cortés examined the anal canal prior to closing the anal verge with a running 0 Vicryl.  On exam she was noted to have the large left lateral mass within the sphincter in keeping with her squamous cell carcinoma.  This area was noted to be quite close to the vagina but there felt like there was a good plane between the 2.    The abdomen and perineum were prepped and draped in the usual sterile fashion.  The planned location of the colostomy was marked with her close on in the sitting standing and lying positions in the preoperative area.  This area was scratched and used as a port site during the case.  The patient had been scheduled to be marked and undergo preoperative teaching with the wound care clinic however they were unable to see her in the 2 weeks preoperatively.  The abdominal cavity was entered with a 5 mm Optiview technique with a Visiport trocar and a 0 degree 5 mm camera.  This was done just to the right of the midline just above the umbilicus through the rectus muscle.  The peritoneal cavity was entered without issue.  The pneumoperitoneum was obtained to 15 mmHg.  There was no evidence of injury from trocar insertion.  Plan to robotic trocar sites were marked out 1 handbreadth apart mostly in a straight line across the abdomen to the left on the right with a slight skew towards the left side.  8 mm robotic trochars were placed in those 4 locations.  The most right lateral one was upsized to a 12 mm.  The previously placed 5 mm trocar was replaced with a 8 mm trocar and the assistant port was placed in the right upper quadrant.  The patient was placed  in 22 degrees of Trendelenburg and 15 degrees of left side up.  The robot was then docked.  The abdomen was surveyed and there was no evidence of metastatic disease or intra-abdominal disease.  The rectum was noted to be slightly thickened possibly from previous radiation.  The rectosigmoid junction was grasped and retracted medially.  A lateral approach was initiated by incising the peritoneum along the mesocolon just adjacent to the white line of Toldt.  Combination of sharp and blunt dissection was used to press the retroperitoneal structures away from the mesocolon overlying the sacral promontory.  The ureter was identified at this location.  The rectosigmoid was then retracted anteriorly with the third arm.  A medial lateral approach was then initiated by incising the peritoneum just over the sacral promontory at the point of division between the retroperitoneal structures and the mesocolon.  A combination of sharp and blunt dissection was used to develop the areole or plane and to progress laterally and identify the ureter.  This dissection was then continued in a cephalad direction and the LIZA was cleared off of its surrounding fatty tissues.  The nerve fibers were identified and were ensured to be swept posteriorly and protected.  A window was made around the LIZA for a high ligation through the avascular window just cephalad to the LIZA.  A 60 mm robotic stapler with a white cartridge was used to ligate and divide the LIZA pedicle for high ligation.  There was no evidence of bleeding.  The dissection was then continued medially using the vessel sealer on the medial peritoneum.  The IMV was identified and was encircled.  It was divided also for a high ligation using the vessel sealer with 2 seals on the staying side and 2 seals on the going side.  There was no evidence of bleeding after division.  Blunt dissection was then used to sweep the retroperitoneal structures including Gerota's fascia away from the  overlying colon mesentery.  This was carried out to the lateral peritoneum.  The colon was then retracted medially and the vessel sealer was used to divide the remaining peritoneum up to the mid candidate.  At this point it felt that there was good mobilization to create her colostomy.  The rectosigmoid junction was grasped and elevated anteriorly with the third arm.  The posterior dissection was then initiated sharply using the hot scissors.  This was continued in the avascular plane posteriorly to perform a TME.  This was continued posteriorly until it was felt that the lateral dissection should be started on both sides.  These were each carried up in the areole or plane from the posterior dissection.  Attention was then turned to the anterior dissection and the planned point of transection along the cul-de-sac was marked out with the electrocautery for planning.  The peritoneum was incised.  The lateral stalks on both sides were coagulated with the bipolar.  They were then divided.  As the patient was quite thin the ureters could be seen on the lateral sidewalls bilaterally under the protective peritoneum.  Attention was made to stay away from them.  The dissection was then continued again from the posterior around lateral on both sides and extending to the anterior.  A sponge stick was placed within the vagina to identify it as the patient had previously had a hysterectomy.  The plane was opened just posterior to the vagina in the rectovaginal septum.  A combination of sharp and blunt dissection was used in this area to complete the circumferential dissection.    During this time Dr. Cortés performed the perineal dissection and is dictated separately.  While doing my anterior dissection I was able to enter the perineal plane as was identified by the lap pad left in place by Dr. Cortés.  This dissection was then continued circumferentially to complete the abdominal perineal resection from the robotic side.  Once  the rectum was completely free the pelvis was examined and there was no evidence of bleeding.  Attention was then turned back to the divided LIZA pedicle.  The distal sigmoid was grasped and elevated anteriorly.  The vessel sealer was then used to divide the mesocolon just cephalad to the LIZA divided pedicle.  This was continued up through the mesocolon to the planned point of transection.  A blue cartridge on the robotic 60 mm stapler was then used to divide the colon.  A laparoscopic grasper was placed on the apex of the staple line.  The sigmoid rectum and anus were then removed through the perineal incision.  This was sent to pathology.  Of note Dr. Castañeda had taken a small anterior sample off of the anterior rectal dissection and sent it for frozen section as it was quite close to the posterior vaginal wall.  The frozen section came back negative.    The pelvis was irrigated from the abdomen and down through the perineum.  There was no evidence of bleeding.  Lap pads were placed within the peritoneal cavity.    At this time Dr. Reyes joined us in the OR and I assisted him for his portion of the surgery which is dictated separately.  Once the flap was completed Dr. SMITH close the fascia and overlying skin.    The planned location of the colostomy was identified.  A [place] clamp was placed on the area.  It was a skin was elevated and a disc of skin was removed as well as a coning subcutaneous tissue dissection.  The retractors were placed and a cruciate incision was made along the anterior rectus sheath.  This was performed over a previous port site.  The muscles were divided laterally.  The posterior rectus sheath was divided in the same fashion.  This was made to approximate 2.5 cm.  A Russell was used to retrieve the end of the colon which was still on the laparoscopic grasper.  It was pulled through the opening.  The patient's fascia was noted to be quite thin and during this maneuver had been stretched.  2-0 PDS  sutures were placed on the anterior and posterior rectus sheath to narrow the opening so that a finger could be admitted beside the colon.  The abdomen was reinsufflated and the colon was examined and there was no evidence of tension or twisting.  A [place] was placed externally to tricia the proper orientation.  The 12 mm robot trocar site was then closed with a suture passer and an 0 Vicryl.  The pneumoperitoneum was evacuated and all the ports were removed.      Gowns and gloves were changed and a closing tray was used.  The skin was closed at each of the 3 remaining port sites using 4-0 Monocryl and Dermabond.  A dry dressing was placed over the midline incision.  The staple line was then removed using electrocautery from the colon.  The colostomy was performed in a brooked fashion with the initial four-quadrant sutures.  These were then tied down and in between sutures with 3-0 Vicryl were placed circumferentially.  The stoma appliance was applied.  The stoma was warm and pink.    The patient tolerated the procedure well.  She was stable and extubated and brought to the recovery room.

## 2020-10-16 LAB
ANION GAP SERPL CALC-SCNC: 6 MMOL/L (ref 7–16)
BASOPHILS # BLD AUTO: 0.1 % (ref 0–1.8)
BASOPHILS # BLD: 0.01 K/UL (ref 0–0.12)
BUN SERPL-MCNC: 13 MG/DL (ref 8–22)
CALCIUM SERPL-MCNC: 8.1 MG/DL (ref 8.5–10.5)
CHLORIDE SERPL-SCNC: 107 MMOL/L (ref 96–112)
CO2 SERPL-SCNC: 24 MMOL/L (ref 20–33)
CREAT SERPL-MCNC: 0.72 MG/DL (ref 0.5–1.4)
EOSINOPHIL # BLD AUTO: 0 K/UL (ref 0–0.51)
EOSINOPHIL NFR BLD: 0 % (ref 0–6.9)
ERYTHROCYTE [DISTWIDTH] IN BLOOD BY AUTOMATED COUNT: 54.2 FL (ref 35.9–50)
GLUCOSE SERPL-MCNC: 112 MG/DL (ref 65–99)
HCT VFR BLD AUTO: 33.1 % (ref 37–47)
HGB BLD-MCNC: 11 G/DL (ref 12–16)
IMM GRANULOCYTES # BLD AUTO: 0.07 K/UL (ref 0–0.11)
IMM GRANULOCYTES NFR BLD AUTO: 0.7 % (ref 0–0.9)
LYMPHOCYTES # BLD AUTO: 0.86 K/UL (ref 1–4.8)
LYMPHOCYTES NFR BLD: 9.1 % (ref 22–41)
MCH RBC QN AUTO: 34.8 PG (ref 27–33)
MCHC RBC AUTO-ENTMCNC: 33.2 G/DL (ref 33.6–35)
MCV RBC AUTO: 104.7 FL (ref 81.4–97.8)
MONOCYTES # BLD AUTO: 1 K/UL (ref 0–0.85)
MONOCYTES NFR BLD AUTO: 10.6 % (ref 0–13.4)
NEUTROPHILS # BLD AUTO: 7.46 K/UL (ref 2–7.15)
NEUTROPHILS NFR BLD: 79.5 % (ref 44–72)
NRBC # BLD AUTO: 0 K/UL
NRBC BLD-RTO: 0 /100 WBC
PLATELET # BLD AUTO: 113 K/UL (ref 164–446)
PMV BLD AUTO: 9.2 FL (ref 9–12.9)
POTASSIUM SERPL-SCNC: 4 MMOL/L (ref 3.6–5.5)
RBC # BLD AUTO: 3.16 M/UL (ref 4.2–5.4)
SODIUM SERPL-SCNC: 137 MMOL/L (ref 135–145)
WBC # BLD AUTO: 9.4 K/UL (ref 4.8–10.8)

## 2020-10-16 PROCEDURE — 97161 PT EVAL LOW COMPLEX 20 MIN: CPT

## 2020-10-16 PROCEDURE — 770006 HCHG ROOM/CARE - MED/SURG/GYN SEMI*

## 2020-10-16 PROCEDURE — 700102 HCHG RX REV CODE 250 W/ 637 OVERRIDE(OP): Performed by: SURGERY

## 2020-10-16 PROCEDURE — A9270 NON-COVERED ITEM OR SERVICE: HCPCS | Performed by: SURGERY

## 2020-10-16 PROCEDURE — 700111 HCHG RX REV CODE 636 W/ 250 OVERRIDE (IP): Performed by: SURGERY

## 2020-10-16 PROCEDURE — 94640 AIRWAY INHALATION TREATMENT: CPT

## 2020-10-16 PROCEDURE — 85025 COMPLETE CBC W/AUTO DIFF WBC: CPT

## 2020-10-16 PROCEDURE — 36415 COLL VENOUS BLD VENIPUNCTURE: CPT

## 2020-10-16 PROCEDURE — 80048 BASIC METABOLIC PNL TOTAL CA: CPT

## 2020-10-16 RX ADMIN — GABAPENTIN 300 MG: 300 CAPSULE ORAL at 04:40

## 2020-10-16 RX ADMIN — ACETAMINOPHEN 1000 MG: 500 TABLET ORAL at 18:05

## 2020-10-16 RX ADMIN — GABAPENTIN 300 MG: 300 CAPSULE ORAL at 12:26

## 2020-10-16 RX ADMIN — ZOLPIDEM TARTRATE 5 MG: 5 TABLET ORAL at 20:30

## 2020-10-16 RX ADMIN — ACETAMINOPHEN 1000 MG: 500 TABLET ORAL at 12:25

## 2020-10-16 RX ADMIN — BUDESONIDE AND FORMOTEROL FUMARATE DIHYDRATE 2 PUFF: 160; 4.5 AEROSOL RESPIRATORY (INHALATION) at 07:30

## 2020-10-16 RX ADMIN — CELECOXIB 200 MG: 200 CAPSULE ORAL at 04:40

## 2020-10-16 RX ADMIN — GLYCOPYRROLATE 1 CAPSULE: 15.6 CAPSULE RESPIRATORY (INHALATION) at 07:30

## 2020-10-16 RX ADMIN — MONTELUKAST 10 MG: 10 TABLET, FILM COATED ORAL at 20:30

## 2020-10-16 RX ADMIN — GABAPENTIN 300 MG: 300 CAPSULE ORAL at 18:05

## 2020-10-16 RX ADMIN — ATORVASTATIN CALCIUM 80 MG: 80 TABLET, FILM COATED ORAL at 20:30

## 2020-10-16 RX ADMIN — ACETAMINOPHEN 1000 MG: 500 TABLET ORAL at 04:40

## 2020-10-16 RX ADMIN — ENOXAPARIN SODIUM 40 MG: 40 INJECTION SUBCUTANEOUS at 10:36

## 2020-10-16 RX ADMIN — DIGOXIN 125 MCG: 125 TABLET ORAL at 20:30

## 2020-10-16 RX ADMIN — CELECOXIB 200 MG: 200 CAPSULE ORAL at 18:05

## 2020-10-16 ASSESSMENT — PAIN DESCRIPTION - PAIN TYPE
TYPE: ACUTE PAIN
TYPE: SURGICAL PAIN
TYPE: ACUTE PAIN;SURGICAL PAIN
TYPE: ACUTE PAIN

## 2020-10-16 ASSESSMENT — COGNITIVE AND FUNCTIONAL STATUS - GENERAL
MOBILITY SCORE: 18
SUGGESTED CMS G CODE MODIFIER MOBILITY: CK
MOVING TO AND FROM BED TO CHAIR: A LITTLE
MOVING FROM LYING ON BACK TO SITTING ON SIDE OF FLAT BED: A LITTLE
WALKING IN HOSPITAL ROOM: A LITTLE
STANDING UP FROM CHAIR USING ARMS: A LITTLE
TURNING FROM BACK TO SIDE WHILE IN FLAT BAD: A LITTLE
CLIMB 3 TO 5 STEPS WITH RAILING: A LITTLE

## 2020-10-16 ASSESSMENT — GAIT ASSESSMENTS
ASSISTIVE DEVICE: FRONT WHEEL WALKER
GAIT LEVEL OF ASSIST: MINIMAL ASSIST
DISTANCE (FEET): 75
DEVIATION: DECREASED BASE OF SUPPORT

## 2020-10-16 NOTE — PROGRESS NOTES
"Surgical Progress Note:    POD1 -   Robotic APR  Rectus myocutaneous flap perineal repair (by Dr. Reyes)    S:  - Feeling well  - Was not offered food yet  - No gas/stools  - Pain well managed with PCA  - Has not ambulated yet  - No chest pain, extremity pain, or difficulty breathing    Vitals:  BP (!) 96/42 Comment: RN notified  Pulse 76   Temp 36.7 °C (98 °F) (Temporal)   Resp 15   Ht 1.6 m (5' 3\")   Wt 52 kg (114 lb 10.2 oz)   SpO2 95%   BMI 20.31 kg/m²     I/O:     Intake/Output Summary (Last 24 hours) at 10/16/2020 0853  Last data filed at 10/16/2020 0800  Gross per 24 hour   Intake 1479.9 ml   Output 1225 ml   Net 254.9 ml       P/E:  Constitutional: Appears well, no distress  Head/Neck: Normocephalic, atraumatic, neck supple  Cardiovascular: Normal rate and rhythm  Pulmonary/Chest: Normal respiratory effort, no wheezes  Abdominal: Soft, appropriately tender, moderate distension, incision(s) clean/dry/intact, stoma pink  Musculoskeletal: No deficits  Neurological:  Alert, oriented  Skin:  Warm and dry, no erythema  Extremities: No edema, no evidence of DVT  Perineum: mild serosang drainage, posterior apex dusky    Labs:  Recent Labs     10/13/20  1347 10/16/20  0643   WBC 5.2 9.4   RBC 3.88* 3.16*   HEMOGLOBIN 13.3 11.0*   HEMATOCRIT 40.9 33.1*   .4* 104.7*   MCH 34.3* 34.8*   RDW 55.5* 54.2*   PLATELETCT 152* 113*   MPV 9.3 9.2   NEUTSPOLYS  --  79.50*   LYMPHOCYTES  --  9.10*   MONOCYTES  --  10.60   EOSINOPHILS  --  0.00   BASOPHILS  --  0.10     Recent Labs     10/13/20  1347 10/16/20  0643   SODIUM 136 137   POTASSIUM 3.7 4.0   CHLORIDE 102 107   CO2 27 24   GLUCOSE 93 112*   BUN 12 13         A/P:   - Advance diet as tolerated to soft/low residue  - Incentive spirometry q1h while awake  - Ambulate at least QID, up in chair for all meals, PT/OT consulted  - Multimodal analgesia, try to avoid narcotics, wean PCA today and hopefully DC tomorrow  - Lovenox/SCDs  - Wound care consulted for stoma " teaching/care  - Dr. Reyes following flap - thank you  - Inder to be removed this am  - Spoke to nurse about a few things that were missed for ERAS (francois was still in at 9am, SCDs were not turned on, patient has not had any diet and is not up in chair for meals)        Dulce Jeter M.D.  [facility]  [phone]

## 2020-10-16 NOTE — THERAPY
Physical Therapy   Initial Evaluation     Patient Name: Josy Grove  Age:  79 y.o., Sex:  female  Medical Record #: [id number]  Today's Date: 10/16/2020     Precautions: Fall Risk    Assessment  Patient is 79 y.o. female with a diagnosis of Anal squamous cell Carcinoma.  Additional factors influencing patient status / progress ***.      Plan    Recommend Physical Therapy {Frequency & Duration:582709} for the following treatments:  {PT Treatments:797606}    DC Equipment Recommendations: None(already owns FWW)  Discharge Recommendations: Recommend home health for continued physical therapy services       Subjective    ***     Objective    ***

## 2020-10-16 NOTE — PROGRESS NOTES
"Received report from NOC RN.   Pt A & o x4.   Resting in bed.   States pain 2/10, PCA rate verified with RADHA VARGHESE.     Pt denies nausea or vomiting. No SOB or cough.     Abdominal midline dressing has some staining on it, otherwise clean and intact.   Florinda incisions noted to be weeping serosang fluid. \"Football\" shaped flap noted to be slightly different color from surrounding skin.  Roll gauze dressing removed and replaced with ABD in briefs to manage drainage. Will change PRN.    Ostomy appliance 2 3/4\" and CDI. Stoma is red, budded greater than one inch, round. Serosang output noted in bag.     MD Jeter at bedside. All nursing concerns discussed with MD including hypotension an drainage from surgical site.  MD reinforced importance of up to chair for meals, activity as tolerated, SCD use, and ERAS protocol with nursing.    Loving removed per MD order. 10 cc fluid removed from bulb, tip intact upon inspection, patient tolerated well.     Patient calls appropriately. Fall education reinforced, fall interventions in place. Pt able to teach back the importance of calling for needs and not ambulating alone. Plan of care for the day discussed with patient.     Bed alarm in use on chair and bed. Waffle overlay on mattress and in chair.       0930: PCA rate change with Laura VARGHESE. Pt expressed feeling very fatigued, unable to get to chair at this time. Will reattempt throughout the day.     1405: Pt up to toilet to void. 125 of yellow urine with some blood noted in hat. Pt tolerating diet. Up to chair and ambulating with a X2 assist, unsteady gait. Pain well controlled today.   "

## 2020-10-16 NOTE — PROGRESS NOTES
POD 1  No complaints  No issues last night  AVSS  Abd-dressings with normal amount of drainage, normal incisional tenderness  Flap is slightly mottled    A/P-will need to keep a close eye on the flap otherwise all looks good.  No restriction on mobilization. Dr. Valderrama will be covering this weekend

## 2020-10-16 NOTE — CARE PLAN
Problem: Communication  Goal: The ability to communicate needs accurately and effectively will improve  Outcome: PROGRESSING AS EXPECTED  Note: Pt asking questions about ostomy care and function. Educated on initial ostomy instructions.     Problem: Pain Management  Goal: Pain level will decrease to patient's comfort goal  Outcome: PROGRESSING AS EXPECTED  Note: Pt educated on use of PCA to control pain. Pt states pain is now manageable.

## 2020-10-16 NOTE — CARE PLAN
Problem: Communication  Goal: The ability to communicate needs accurately and effectively will improve  Outcome: PROGRESSING AS EXPECTED  Note: Introduced to staff, oriented to room, POC discussed, post op interventions discussed. MD to bedside to discuss POC, all questions answered.     Problem: Safety  Goal: Will remain free from falls  Outcome: PROGRESSING AS EXPECTED  Note: Fall assessment complete, bed alarm on, CNA updated on POC. Pt calls appropriately.

## 2020-10-16 NOTE — OP REPORT
DATE OF SERVICE:  10/15/2020    PREOPERATIVE DIAGNOSIS:  Squamous cell cancer of the anal canal.    POSTOPERATIVE DIAGNOSIS:  Squamous cell cancer of the anal canal.    PROCEDURE:  Robotic abdominoperineal resection.    SURGEON:  Dulce Jeter MD    CO-SURGEON:  Marvin Cortés MD    ASSISTANT:  BÁRBARA Morrow    ANESTHESIA:  General endotracheal anesthesia.    ESTIMATED BLOOD LOSS:  During the perineal portion of the procedure, 25 mL.    SPECIMENS:  Sigmoid colon, rectum, and anal canal.    COMPLICATIONS:  None.    CONDITION:  Stable.    INDICATIONS FOR PROCEDURE:  This is a 79-year-old female who presents with a   squamous cell cancer of the distal anal canal, which is refractory to   definitive chemoradiotherapy.  Risks, benefits, alternatives of APR were   discussed with her and she consents to the plan procedure.  This is a   dictation for the perineal portion of an abdominoperineal resection.    OPERATIVE FINDINGS:  Anal canal sewn shut, removed en bloc with the sphincter   muscles and surrounding indurated tissue.  Posterior vaginal wall intact.    Frozen section biopsies in the anterior margin negative.    OPERATIVE TECHNIQUES:  After informed consent was obtained, the patient was   taken to the operating room, placed in supine position.  After adequate   endotracheal anesthesia was achieved, she was switched to lithotomy position   and the anus and perineum were prepped and draped in sterile fashion.  The   perineal dissection was begun by making a circular incision on the perianal   skin.  This was carried full thickness through the skin into the ischiorectal   fat to either side.  The sphincter complex was left en bloc with the distal   anal canal.  We dissected laterally and posteriorly through the fatty plane   using a LigaSure device.  Anteriorly, we used cautery to separate the tissues   off of the posterior vaginal wall.  Additional cautery was applied for   hemostasis.  We stayed close  the vaginal wall getting as wider margin as   possible.  This was carried out for approximately until we past the anal canal   into the distal rectum.  Posteriorly, we dissected through the anal coccygeal   ligament into the presacral space.  We opened the pelvic floor to the right   lateral side of this using a LigaSure device completing the dissection and the   shape of the distal pelvis.    The anterior margin was threatened as there was a narrow distance between this   and the posterior vaginal wall.  Using sharp scissors, we removed 2 biopsies   from this threatened location.  These were sent for frozen section and showed   no evidence of a cancer.    Additional cautery was applied for hemostasis.  The abdominal portion of the   dissection was completed as will be dictated separately by Dr. Jeter.  The   specimen was then extracted through the anal canal.  See separate dictated   operative reports for details the remainder of the case.  All instruments   counts were correct at the end of this portion of the procedure.       ____________________________________     MD JEWEL Kirkpatrick / CHRIS    DD:  10/15/2020 16:44:53  DT:  10/15/2020 17:06:35    D#:  [id number]  Job#:  [id number]

## 2020-10-16 NOTE — PROGRESS NOTES
report received. Assessment completed.  Pt is A&O x4. Pt on room air.   Pt utilizing PCA for pain, pt educated on use.  Pt has mild nausea.   - numbness, - tingling.  Skin has midline incision, new colostomy, football shaped incision in perineum with sutures, gauze and spandis.   LDA colostomy and francois catheter in place.   Last BM PTA . -flatus,   +void.  GI Soft diet. Tolerates well.   Pt up x1.  Call light and belongings within reach. All needs met at this time. Fall Precautions and hourly rounding in place.

## 2020-10-16 NOTE — THERAPY
Physical Therapy   Initial Evaluation     Patient Name: Josy Grove  Age:  79 y.o., Sex:  female  Medical Record #: [id number]  Today's Date: 10/16/2020     Precautions: Fall Risk    Assessment  Patient is 79 y.o. female with a diagnosis of Anal squamous cell carcinoma.  Additional factors influencing patient status / progress : today, pt needs min A out of bed, min A transfers with weight back on heels at first, improves with longer ambulation. Pt will have 24/7 family assist at home, will likely progress well with daily ambulation x 2 in halls using FWW with nsg supervision.      Plan    Recommend Physical Therapy 3 times per week until therapy goals are met for the following treatments:  Bed Mobility, Gait Training and Therapeutic Activities    DC Equipment Recommendations: None(already owns FWW)  Discharge Recommendations: Recommend home health for continued physical therapy services          Objective       10/16/20 1225   Prior Living Situation   Prior Services None   Housing / Facility 1 Story House   Steps Into Home 1   Equipment Owned Front-Wheel Walker;Single Point Cane   Lives with - Patient's Self Care Capacity Spouse  (retired, home as needed, can assist as needed)   Comments spouse does driving, groceries, laundry, cooking. Sister coming to help until Nov, then daughter coming. Will have 24/7 assist.    Prior Level of Functional Mobility   Bed Mobility Independent   Transfer Status Independent   Ambulation Independent   Stairs Independent   Gait Analysis   Gait Level Of Assist Minimal Assist   Assistive Device Front Wheel Walker   Distance (Feet) 75   Bed Mobility    Supine to Sit Minimal Assist   Scooting Supervised   Comments ed done re log roll    Functional Mobility   Sit to Stand Minimal Assist   Bed, Chair, Wheelchair Transfer Minimal Assist   Short Term Goals    Short Term Goal # 1 Pt will perform bed mobility with supervision in 6 visits with flat bed, no rail.    Short Term Goal # 2 Pt will  transfer with supervision in 6 visits to improve functional indep.    Short Term Goal # 3 Pt will ambulate s 250 feet using FWW with supervision in 6 visits to improve indep function.    Anticipated Discharge Equipment and Recommendations   DC Equipment Recommendations None  (already owns FWW)   Discharge Recommendations Recommend home health for continued physical therapy services

## 2020-10-16 NOTE — PROGRESS NOTES
"Pt [place] admitted to room T411-2 via transport in [facility] from PACU at 1722.  Pt /74   Pulse 69   Temp 36.4 °C (97.5 °F) (Temporal)   Resp 15   Ht 1.6 m (5' 3\")   Wt 52 kg (114 lb 10.2 oz)   SpO2 100%   BMI 20.31 kg/m²    pain reported at 9 on a scale of 0-10. Oriented to room call light and smoking policy.  Reviewed plan of care (equipment, incentive spirometer, sequential compression devices, medications, activity, diet, fall precautions, skin care, and pain) with patient and family. Welcome packet given and reviewed with patient , all questions answered. Education provided on oral hygiene program.   2 RN skin check complete.   Devices in place SCD's bilaterally, SpO2 finger probe, PIV line, silicone MASK, francois.  Skin assessed under devices, Waffle mattress placed and Q2 hour turns initiated      The following interventions in place   incision - well approximated with sutures to perineum foot ball shaped, no evidence of opening areas, bloody drainage gauze fluff changed and new spandi placed.   Skin beneath folds checked,     Preventative measures in place:  -q 2hr turns with use of pillows to support repositioning  -heels floated on pillows    -bony prominences floated on pillows  -prophylactic mepilex dressings over hip,   -diet regular   -mobilization  -repositioning of devices            "

## 2020-10-17 LAB
ANION GAP SERPL CALC-SCNC: 7 MMOL/L (ref 7–16)
BASOPHILS # BLD AUTO: 0.2 % (ref 0–1.8)
BASOPHILS # BLD: 0.02 K/UL (ref 0–0.12)
BUN SERPL-MCNC: 15 MG/DL (ref 8–22)
CALCIUM SERPL-MCNC: 8.3 MG/DL (ref 8.5–10.5)
CHLORIDE SERPL-SCNC: 105 MMOL/L (ref 96–112)
CO2 SERPL-SCNC: 27 MMOL/L (ref 20–33)
CREAT SERPL-MCNC: 0.86 MG/DL (ref 0.5–1.4)
EOSINOPHIL # BLD AUTO: 0.04 K/UL (ref 0–0.51)
EOSINOPHIL NFR BLD: 0.5 % (ref 0–6.9)
ERYTHROCYTE [DISTWIDTH] IN BLOOD BY AUTOMATED COUNT: 55.7 FL (ref 35.9–50)
GLUCOSE SERPL-MCNC: 78 MG/DL (ref 65–99)
HCT VFR BLD AUTO: 37.1 % (ref 37–47)
HGB BLD-MCNC: 11.9 G/DL (ref 12–16)
IMM GRANULOCYTES # BLD AUTO: 0.12 K/UL (ref 0–0.11)
IMM GRANULOCYTES NFR BLD AUTO: 1.5 % (ref 0–0.9)
LYMPHOCYTES # BLD AUTO: 0.93 K/UL (ref 1–4.8)
LYMPHOCYTES NFR BLD: 11.5 % (ref 22–41)
MCH RBC QN AUTO: 34.4 PG (ref 27–33)
MCHC RBC AUTO-ENTMCNC: 32.1 G/DL (ref 33.6–35)
MCV RBC AUTO: 107.2 FL (ref 81.4–97.8)
MONOCYTES # BLD AUTO: 0.8 K/UL (ref 0–0.85)
MONOCYTES NFR BLD AUTO: 9.9 % (ref 0–13.4)
NEUTROPHILS # BLD AUTO: 6.19 K/UL (ref 2–7.15)
NEUTROPHILS NFR BLD: 76.4 % (ref 44–72)
NRBC # BLD AUTO: 0 K/UL
NRBC BLD-RTO: 0 /100 WBC
PLATELET # BLD AUTO: 119 K/UL (ref 164–446)
PMV BLD AUTO: 9.2 FL (ref 9–12.9)
POTASSIUM SERPL-SCNC: 3.2 MMOL/L (ref 3.6–5.5)
RBC # BLD AUTO: 3.46 M/UL (ref 4.2–5.4)
SODIUM SERPL-SCNC: 139 MMOL/L (ref 135–145)
WBC # BLD AUTO: 8.1 K/UL (ref 4.8–10.8)

## 2020-10-17 PROCEDURE — 80048 BASIC METABOLIC PNL TOTAL CA: CPT

## 2020-10-17 PROCEDURE — 700111 HCHG RX REV CODE 636 W/ 250 OVERRIDE (IP): Performed by: SURGERY

## 2020-10-17 PROCEDURE — 85025 COMPLETE CBC W/AUTO DIFF WBC: CPT

## 2020-10-17 PROCEDURE — 700102 HCHG RX REV CODE 250 W/ 637 OVERRIDE(OP): Performed by: SURGERY

## 2020-10-17 PROCEDURE — 94760 N-INVAS EAR/PLS OXIMETRY 1: CPT

## 2020-10-17 PROCEDURE — A9270 NON-COVERED ITEM OR SERVICE: HCPCS | Performed by: SURGERY

## 2020-10-17 PROCEDURE — 700105 HCHG RX REV CODE 258: Performed by: SURGERY

## 2020-10-17 PROCEDURE — 770006 HCHG ROOM/CARE - MED/SURG/GYN SEMI*

## 2020-10-17 PROCEDURE — 94640 AIRWAY INHALATION TREATMENT: CPT

## 2020-10-17 RX ORDER — POTASSIUM CHLORIDE 20 MEQ/1
20 TABLET, EXTENDED RELEASE ORAL ONCE
Status: COMPLETED | OUTPATIENT
Start: 2020-10-17 | End: 2020-10-17

## 2020-10-17 RX ORDER — SODIUM CHLORIDE, SODIUM LACTATE, POTASSIUM CHLORIDE, CALCIUM CHLORIDE 600; 310; 30; 20 MG/100ML; MG/100ML; MG/100ML; MG/100ML
INJECTION, SOLUTION INTRAVENOUS
Status: ACTIVE
Start: 2020-10-17 | End: 2020-10-18

## 2020-10-17 RX ORDER — SODIUM CHLORIDE, SODIUM LACTATE, POTASSIUM CHLORIDE, AND CALCIUM CHLORIDE .6; .31; .03; .02 G/100ML; G/100ML; G/100ML; G/100ML
500 INJECTION, SOLUTION INTRAVENOUS ONCE
Status: COMPLETED | OUTPATIENT
Start: 2020-10-17 | End: 2020-10-17

## 2020-10-17 RX ADMIN — GLYCOPYRROLATE 1 CAPSULE: 15.6 CAPSULE RESPIRATORY (INHALATION) at 20:19

## 2020-10-17 RX ADMIN — MONTELUKAST 10 MG: 10 TABLET, FILM COATED ORAL at 20:31

## 2020-10-17 RX ADMIN — ACETAMINOPHEN 1000 MG: 500 TABLET ORAL at 05:09

## 2020-10-17 RX ADMIN — GLYCOPYRROLATE 1 CAPSULE: 15.6 CAPSULE RESPIRATORY (INHALATION) at 07:45

## 2020-10-17 RX ADMIN — SODIUM CHLORIDE, POTASSIUM CHLORIDE, SODIUM LACTATE AND CALCIUM CHLORIDE 500 ML: 600; 310; 30; 20 INJECTION, SOLUTION INTRAVENOUS at 14:11

## 2020-10-17 RX ADMIN — ATORVASTATIN CALCIUM 80 MG: 80 TABLET, FILM COATED ORAL at 20:31

## 2020-10-17 RX ADMIN — ACETAMINOPHEN 1000 MG: 500 TABLET ORAL at 11:47

## 2020-10-17 RX ADMIN — ACETAMINOPHEN 1000 MG: 500 TABLET ORAL at 00:29

## 2020-10-17 RX ADMIN — BUDESONIDE AND FORMOTEROL FUMARATE DIHYDRATE 2 PUFF: 160; 4.5 AEROSOL RESPIRATORY (INHALATION) at 07:44

## 2020-10-17 RX ADMIN — ACETAMINOPHEN 1000 MG: 500 TABLET ORAL at 18:32

## 2020-10-17 RX ADMIN — BUDESONIDE AND FORMOTEROL FUMARATE DIHYDRATE 2 PUFF: 160; 4.5 AEROSOL RESPIRATORY (INHALATION) at 20:18

## 2020-10-17 RX ADMIN — GABAPENTIN 300 MG: 300 CAPSULE ORAL at 18:32

## 2020-10-17 RX ADMIN — ZOLPIDEM TARTRATE 5 MG: 5 TABLET ORAL at 20:49

## 2020-10-17 RX ADMIN — GABAPENTIN 300 MG: 300 CAPSULE ORAL at 05:09

## 2020-10-17 RX ADMIN — CELECOXIB 200 MG: 200 CAPSULE ORAL at 05:09

## 2020-10-17 RX ADMIN — CELECOXIB 200 MG: 200 CAPSULE ORAL at 18:32

## 2020-10-17 RX ADMIN — POTASSIUM CHLORIDE 20 MEQ: 1500 TABLET, EXTENDED RELEASE ORAL at 14:10

## 2020-10-17 RX ADMIN — DIGOXIN 125 MCG: 125 TABLET ORAL at 18:32

## 2020-10-17 RX ADMIN — ENOXAPARIN SODIUM 40 MG: 40 INJECTION SUBCUTANEOUS at 09:22

## 2020-10-17 RX ADMIN — ACETAMINOPHEN 1000 MG: 500 TABLET ORAL at 23:31

## 2020-10-17 RX ADMIN — GABAPENTIN 300 MG: 300 CAPSULE ORAL at 11:47

## 2020-10-17 ASSESSMENT — PAIN DESCRIPTION - PAIN TYPE
TYPE: SURGICAL PAIN
TYPE: ACUTE PAIN;SURGICAL PAIN
TYPE: SURGICAL PAIN
TYPE: SURGICAL PAIN
TYPE: ACUTE PAIN
TYPE: ACUTE PAIN;SURGICAL PAIN

## 2020-10-17 NOTE — PROGRESS NOTES
"Surgical Progress Note:    POD2 -   Robotic APR  Rectus myocutaneous flap perineal repair (by Dr. Reyes)    S:  - Feeling well  -Tolerating diet  -Gas and soft stool within colostomy bag  - Pain well managed with PCA  - Ambulating well  - No chest pain, extremity pain, or difficulty breathing    Vitals:  BP (!) 82/44 Comment: RN notified/ Manual BP  Pulse 70   Temp 36.8 °C (98.2 °F) (Temporal)   Resp 16   Ht 1.6 m (5' 3\")   Wt 52 kg (114 lb 10.2 oz)   SpO2 96%   BMI 20.31 kg/m²     I/O:     Intake/Output Summary (Last 24 hours) at 10/16/2020 0853  Last data filed at 10/16/2020 0800  Gross per 24 hour   Intake 1479.9 ml   Output 1225 ml   Net 254.9 ml       P/E:  Constitutional: Appears well, no distress  Head/Neck: Normocephalic, atraumatic, neck supple  Cardiovascular: Normal rate and rhythm  Pulmonary/Chest: Normal respiratory effort, no wheezes  Abdominal: Soft, mildly tender, no distension, incision(s) clean/dry/intact, stoma pink and productive  Musculoskeletal: No deficits  Neurological:  Alert, oriented  Skin:  Warm and dry, no erythema  Extremities: No edema, no evidence of DVT  Perineum: mild serosang drainage, skin completely black    Labs:  Recent Labs     10/16/20  0643   WBC 9.4   RBC 3.16*   HEMOGLOBIN 11.0*   HEMATOCRIT 33.1*   .7*   MCH 34.8*   RDW 54.2*   PLATELETCT 113*   MPV 9.2   NEUTSPOLYS 79.50*   LYMPHOCYTES 9.10*   MONOCYTES 10.60   EOSINOPHILS 0.00   BASOPHILS 0.10     Recent Labs     10/16/20  0643   SODIUM 137   POTASSIUM 4.0   CHLORIDE 107   CO2 24   GLUCOSE 112*   BUN 13         A/P:   - N.p.o. for likely return to the OR for revision of flap  - Notified Dr. Reyes of flap ischemia and he will contact Dr. Valderrama for assessment and possible return to the OR    - Incentive spirometry q1h while awake  - Ambulate at least QID, up in chair for all meals, PT/OT consulted  - Multimodal analgesia, try to avoid narcotics -we will keep PCA today with possibility of returning to the OR  - " Lovenox/SCDs  - Wound care consulted for stoma teaching/care  - ROGE.dc blood work is still not been performed despite calling lab twice -they are drawing it now        Dulce Jeter M.D.  [facility]  [phone]

## 2020-10-17 NOTE — PROGRESS NOTES
MD Jeter an MD Reyes updated regarding changing color of juanita flap.     MD Corona saxena texted at [phone]. MD Reyes Paged at [phone]. Informed DR Valderrama will be returning call.     Lavelle returned call at [phone]. No follow up required per MD, continue existing POC.

## 2020-10-17 NOTE — CARE PLAN
Problem: Bowel/Gastric:  Goal: Will not experience complications related to bowel motility  Outcome: PROGRESSING SLOWER THAN EXPECTED  Note: Pt eating GI soft diet, ambulating to bathroom, ostomy has not had output.     Problem: Knowledge Deficit  Goal: Knowledge of disease process/condition, treatment plan, diagnostic tests, and medications will improve  Outcome: PROGRESSING AS EXPECTED  Note: Pt accepting of education. Pt learning about ostomy and asking questions of RN.

## 2020-10-17 NOTE — WOUND TEAM
"[facility]  Inpatient Services  New Ostomy Management & Teaching    HPI:  Reviewed  PMH: Reviewed   SH: Reviewed    Subjective: \"So the fecal matter will go into the bag now.\"    Objective: no stool or flatus, scant serosanguinous drainage in appliance. Appliance intact.    Colostomy 10/15/20 End/Segovia's Pouch LUQ (Active)   Stomal Appliance Assessment Clean;Dry;Intact;Changed 10/16/20 1600   Stoma Assessment Edema;Red;Intact 10/16/20 1600   Stoma Shape Budded Greater Than One Inch;Round 10/16/20 1600   Stoma Size (in) 1.75 10/16/20 1600   Peristomal Assessment Clean;Dry;Intact 10/16/20 1600   Mucocutaneous Junction Intact 10/16/20 1600   Treatment Appliance Changed;Site care;Cleansed with water/washcloth 10/16/20 1600   Peristomal Protectant Paste Ring 10/16/20 1600   Stomal Appliance 2 3/4\" (70mm) CTF;Paste Ring, 2\" 10/16/20 1600   Output (mL) 0 mL 10/16/20 1600   WOUND RN ONLY - Stomal Appliance  2 Piece;Paste Ring, 2\";2 3/4\" (70mm) CTF;Transparent Pouch Lock & Roll 10/16/20 1600   Appliance (Pouch) # [id number] 10/16/20 1600   Appliance Brand Wanda 10/16/20 1600   Appliance Supplier [employer] 10/16/20 1600   Secure Start completed Yes 10/16/20 1600   Ostomy Care Resources Provided UOAA Tip Sheet 10/16/20 1600        Interventions: using \"Understanding Your Colostomy\" booklet educated patient and her son about what a colostomy is. Explained how stoma is made and about the parts of the appliance and their purpose. Educated patient about frequency of appliance changes and emptying the pouch. Explained that the stool would be about the same consistency as it was before she had the rectal cancer, and that the frequency of bowel movements could eventually be about the same. Patient and son watched as ostomy care performed. Explained the push-pull method of removing the appliance, then demonstrated it to patient as appliance removed. Cleaned stoma and peristomal skin with warm water and washcloth, " "explaining rationale behind not using soap. Used measuring guide from educational folder to measure stoma and it is as the 1.75\" line. Cut out barrier at 1.75\" line while patient observed, explained about using back of scissor for smoother cut. Explained rationale behind using paste ring, placed paste ring to barrier, made sure peristomal skin was clean and dry then applied barrier to skin. Rubbed barrier to help it adhere while explaining why. Cut medial edge of barrier to give island dressing over incision more room. Showed patient how to snap on pouch and explained about angling the pouch for emptying into either a receptacle or into the toilet when she urinates. snapped on pouch, demonstrated 2 x to patient and son how to close, open, then close end of pouch. Secure Start completed. Son is supposed to call tomorrow morning to set up a time for himself or family member to be present for teaching.      Pt education: Questions and concerns addressed. Patient on PCA but she paid attention and asked good questions. She did not have preop teaching.  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    Plan: Ostomy nurses to continue to follow for ostomy needs and teaching     Anticipated discharge needs: Supplies, supplier information, possible HH or outpatient ostomy clinic   "

## 2020-10-17 NOTE — PROGRESS NOTES
"BP (!) 82/44 Comment: RN notified/ Manual BP  Pulse 70   Temp 36.8 °C (98.2 °F) (Temporal)   Resp 16   Ht 1.6 m (5' 3\")   Wt 52 kg (114 lb 10.2 oz)   SpO2 96%   BMI 20.31 kg/m²     Dr. Jeter paged regarding patient being hypotensive. Patient reports feeling a little drowsy. No new orders received, MD said will come to bedside later.   "

## 2020-10-17 NOTE — PROGRESS NOTES
report received. Assessment completed.  Pt is A&O x4. Pt on room air.   Pt utilizing PCA for pain, pt educated on use.  Pt denies nausea.   - numbness, - tingling.  Skin has midline incision, new colostomy, football shaped incision in perineum with sutures, gauze and spandis, flap on perineum is dark purple, physician aware..    LDA colostomy.   Last BM PTA . -flatus,   +void.  GI Soft diet. Tolerates well.   Pt up x1.  Call light and belongings within reach. All needs met at this time. Fall Precautions and hourly rounding in place.

## 2020-10-17 NOTE — PROGRESS NOTES
"BP (!) 82/44   Pulse 70   Temp 36.8 °C (98.2 °F) (Temporal)   Resp 16   Ht 1.6 m (5' 3\")   Wt 52 kg (114 lb 10.2 oz)   SpO2 96%     I/O last 3 completed shifts:  In: 1787 [P.O.:1350; I.V.:437]  Out: 775 [Urine:775]    Pt well.  Skin paddle on flap looks dusky but not necrotic at this time. Will observe and let declare itself  "

## 2020-10-17 NOTE — PROGRESS NOTES
"Report received. Assessment complete. Pt A&O x4    Vital Signs: BP (!) 92/54   Pulse 70   Temp 36.3 °C (97.3 °F) (Temporal)   Resp 16   Ht 1.6 m (5' 3\")   Wt 52 kg (114 lb 10.2 oz)   SpO2 96%   BMI 20.31 kg/m²     Pain: Patient has 1/10 pain. PCA running. Patient educated. Will work on weaning off.Pain managed with prescribed medications as needed.    Neuro: Denies numbness/tingling    Cardiac: Denies chest pain. Regular heart sounds    Vascular pulses +2 BUE, BLE. No edema noted    Respiratory: Patient denies SOB. On RA. Sating 90%<. Reinforced education on IS.     GI: +ostomy in place. Tolerating GI soft diet. Denies N/V.    : Patient voiding adequately.    MSK: Patient ambulates with x2 assist using FWW, unsteady wobbly. Bed alarm on. Patient calling appropriately    Skin: MLI closed w/ staples island dressing in place CDI. x3 lap sites EFREN w/ dermabond. Ostomy RUQ CDI. Stoma budded, red  Rectal flap purple, moderate bloody drainage. Sutures in tact    Discharge Barriers/Plan: Pending medical clearance     Fall precautions in place. Treaded socks on. Bed locked in lowest position. Call light and personal belongings at bedside within reach. Reinforced use of call light to patient. Hourly rounding in place.  All needs met at this time and POC discussed.     "

## 2020-10-18 LAB
ANION GAP SERPL CALC-SCNC: 3 MMOL/L (ref 7–16)
BASOPHILS # BLD AUTO: 0.2 % (ref 0–1.8)
BASOPHILS # BLD: 0.01 K/UL (ref 0–0.12)
BUN SERPL-MCNC: 13 MG/DL (ref 8–22)
CALCIUM SERPL-MCNC: 8.2 MG/DL (ref 8.5–10.5)
CHLORIDE SERPL-SCNC: 109 MMOL/L (ref 96–112)
CO2 SERPL-SCNC: 26 MMOL/L (ref 20–33)
CREAT SERPL-MCNC: 0.7 MG/DL (ref 0.5–1.4)
EOSINOPHIL # BLD AUTO: 0.04 K/UL (ref 0–0.51)
EOSINOPHIL NFR BLD: 0.8 % (ref 0–6.9)
ERYTHROCYTE [DISTWIDTH] IN BLOOD BY AUTOMATED COUNT: 55.5 FL (ref 35.9–50)
GLUCOSE SERPL-MCNC: 86 MG/DL (ref 65–99)
HCT VFR BLD AUTO: 33.8 % (ref 37–47)
HGB BLD-MCNC: 11 G/DL (ref 12–16)
IMM GRANULOCYTES # BLD AUTO: 0.04 K/UL (ref 0–0.11)
IMM GRANULOCYTES NFR BLD AUTO: 0.8 % (ref 0–0.9)
LYMPHOCYTES # BLD AUTO: 0.76 K/UL (ref 1–4.8)
LYMPHOCYTES NFR BLD: 14.9 % (ref 22–41)
MCH RBC QN AUTO: 34.3 PG (ref 27–33)
MCHC RBC AUTO-ENTMCNC: 32.5 G/DL (ref 33.6–35)
MCV RBC AUTO: 105.3 FL (ref 81.4–97.8)
MONOCYTES # BLD AUTO: 0.44 K/UL (ref 0–0.85)
MONOCYTES NFR BLD AUTO: 8.6 % (ref 0–13.4)
NEUTROPHILS # BLD AUTO: 3.8 K/UL (ref 2–7.15)
NEUTROPHILS NFR BLD: 74.7 % (ref 44–72)
NRBC # BLD AUTO: 0 K/UL
NRBC BLD-RTO: 0 /100 WBC
PLATELET # BLD AUTO: 131 K/UL (ref 164–446)
PMV BLD AUTO: 9.4 FL (ref 9–12.9)
POTASSIUM SERPL-SCNC: 4 MMOL/L (ref 3.6–5.5)
RBC # BLD AUTO: 3.21 M/UL (ref 4.2–5.4)
SODIUM SERPL-SCNC: 138 MMOL/L (ref 135–145)
WBC # BLD AUTO: 5.1 K/UL (ref 4.8–10.8)

## 2020-10-18 PROCEDURE — 36415 COLL VENOUS BLD VENIPUNCTURE: CPT

## 2020-10-18 PROCEDURE — 770006 HCHG ROOM/CARE - MED/SURG/GYN SEMI*

## 2020-10-18 PROCEDURE — A9270 NON-COVERED ITEM OR SERVICE: HCPCS | Performed by: SURGERY

## 2020-10-18 PROCEDURE — 94640 AIRWAY INHALATION TREATMENT: CPT

## 2020-10-18 PROCEDURE — 700111 HCHG RX REV CODE 636 W/ 250 OVERRIDE (IP): Performed by: SURGERY

## 2020-10-18 PROCEDURE — 700102 HCHG RX REV CODE 250 W/ 637 OVERRIDE(OP): Performed by: SURGERY

## 2020-10-18 PROCEDURE — 85025 COMPLETE CBC W/AUTO DIFF WBC: CPT

## 2020-10-18 PROCEDURE — 80048 BASIC METABOLIC PNL TOTAL CA: CPT

## 2020-10-18 RX ORDER — OXYCODONE HYDROCHLORIDE 5 MG/1
5 TABLET ORAL EVERY 4 HOURS PRN
Status: DISCONTINUED | OUTPATIENT
Start: 2020-10-18 | End: 2020-10-23 | Stop reason: HOSPADM

## 2020-10-18 RX ADMIN — ATORVASTATIN CALCIUM 80 MG: 80 TABLET, FILM COATED ORAL at 20:05

## 2020-10-18 RX ADMIN — ZOLPIDEM TARTRATE 5 MG: 5 TABLET ORAL at 20:32

## 2020-10-18 RX ADMIN — GLYCOPYRROLATE 1 CAPSULE: 15.6 CAPSULE RESPIRATORY (INHALATION) at 19:54

## 2020-10-18 RX ADMIN — CELECOXIB 200 MG: 200 CAPSULE ORAL at 05:49

## 2020-10-18 RX ADMIN — ACETAMINOPHEN 1000 MG: 500 TABLET ORAL at 12:46

## 2020-10-18 RX ADMIN — DIGOXIN 125 MCG: 125 TABLET ORAL at 17:44

## 2020-10-18 RX ADMIN — OXYCODONE 5 MG: 5 TABLET ORAL at 12:46

## 2020-10-18 RX ADMIN — MONTELUKAST 10 MG: 10 TABLET, FILM COATED ORAL at 20:05

## 2020-10-18 RX ADMIN — GLYCOPYRROLATE 1 CAPSULE: 15.6 CAPSULE RESPIRATORY (INHALATION) at 07:12

## 2020-10-18 RX ADMIN — GABAPENTIN 300 MG: 300 CAPSULE ORAL at 12:46

## 2020-10-18 RX ADMIN — BUDESONIDE AND FORMOTEROL FUMARATE DIHYDRATE 2 PUFF: 160; 4.5 AEROSOL RESPIRATORY (INHALATION) at 07:11

## 2020-10-18 RX ADMIN — OXYCODONE 5 MG: 5 TABLET ORAL at 17:45

## 2020-10-18 RX ADMIN — GABAPENTIN 300 MG: 300 CAPSULE ORAL at 05:48

## 2020-10-18 RX ADMIN — GABAPENTIN 300 MG: 300 CAPSULE ORAL at 17:45

## 2020-10-18 RX ADMIN — BUDESONIDE AND FORMOTEROL FUMARATE DIHYDRATE 2 PUFF: 160; 4.5 AEROSOL RESPIRATORY (INHALATION) at 19:54

## 2020-10-18 RX ADMIN — ENOXAPARIN SODIUM 40 MG: 40 INJECTION SUBCUTANEOUS at 09:40

## 2020-10-18 RX ADMIN — ACETAMINOPHEN 1000 MG: 500 TABLET ORAL at 23:13

## 2020-10-18 RX ADMIN — ACETAMINOPHEN 1000 MG: 500 TABLET ORAL at 17:45

## 2020-10-18 RX ADMIN — CELECOXIB 200 MG: 200 CAPSULE ORAL at 17:44

## 2020-10-18 RX ADMIN — ACETAMINOPHEN 1000 MG: 500 TABLET ORAL at 05:49

## 2020-10-18 ASSESSMENT — PAIN DESCRIPTION - PAIN TYPE
TYPE: SURGICAL PAIN
TYPE: ACUTE PAIN
TYPE: SURGICAL PAIN
TYPE: SURGICAL PAIN

## 2020-10-18 NOTE — PROGRESS NOTES
"Report received. Assessment complete. Pt A&O x4. Alert sitting up in chair    Vital Signs: /53   Pulse 75   Temp 37.3 °C (99.1 °F) (Temporal)   Resp 18   Ht 1.6 m (5' 3\")   Wt 52 kg (114 lb 10.2 oz)   SpO2 97%   BMI 20.31 kg/m²     Pain: Patient has 4/10 pain. PCA running. Patient educated. Pain managed with prescribed medications as needed.    Neuro: Denies numbness/tingling    Cardiac: Denies chest pain. Regular heart sounds    Vascular pulses +2 BUE, BLE. No edema noted    Respiratory: Patient denies SOB. On RA. Sating 90%<. Reinforced education on IS.     GI: +ostomy in place, consistent brown output. Tolerating GI soft diet. Denies N/V.    : Patient voiding adequately.    MSK: Patient ambulates with x1 assist using FWW. Bed alarm on. Patient calling appropriately    Skin: MLI closed w/ island dressing in place CDI. x3 lap sites EFREN w/ dermabond. Ostomy RUQ CDI. Stoma budded, red  Rectal flap purple, moderate bloody drainage. Sutures in tact    Discharge Barriers/Plan: Pending medical clearance. Monitoring surgical flap    Fall precautions in place. Treaded socks on. Bed locked in lowest position. Call light and personal belongings at bedside within reach. Reinforced use of call light to patient. Hourly rounding in place.  All needs met at this time and POC discussed.     "

## 2020-10-18 NOTE — PROGRESS NOTES
"Surgical Progress Note:    POD3 -   Robotic APR  Rectus myocutaneous flap perineal repair (by Dr. Reyes)    S:  - Feeling well  -Tolerating diet  -Gas and soft stool within colostomy bag  - Pain well managed with PCA  - Ambulating well  - No chest pain, extremity pain, or difficulty breathing    Vitals:  /53   Pulse 75   Temp 37.3 °C (99.1 °F) (Temporal)   Resp 18   Ht 1.6 m (5' 3\")   Wt 52 kg (114 lb 10.2 oz)   SpO2 97%   BMI 20.31 kg/m²     I/O:     Intake/Output Summary (Last 24 hours) at 10/16/2020 0853  Last data filed at 10/16/2020 0800  Gross per 24 hour   Intake 1479.9 ml   Output 1225 ml   Net 254.9 ml       P/E:  Constitutional: Appears well, no distress  Head/Neck: Normocephalic, atraumatic, neck supple  Cardiovascular: Normal rate and rhythm  Pulmonary/Chest: Normal respiratory effort, no wheezes  Abdominal: Soft, mildly tender, no distension, incision(s) clean/dry/intact, stoma pink and productive  Musculoskeletal: No deficits  Neurological:  Alert, oriented  Skin:  Warm and dry, no erythema  Extremities: No edema, no evidence of DVT  Perineum: mild serosang drainage, skin black but warm with a tiny amount of pink anteriorly    Labs:  Recent Labs     10/16/20  0643 10/17/20  1234 10/18/20  0701   WBC 9.4 8.1 5.1   RBC 3.16* 3.46* 3.21*   HEMOGLOBIN 11.0* 11.9* 11.0*   HEMATOCRIT 33.1* 37.1 33.8*   .7* 107.2* 105.3*   MCH 34.8* 34.4* 34.3*   RDW 54.2* 55.7* 55.5*   PLATELETCT 113* 119* 131*   MPV 9.2 9.2 9.4   NEUTSPOLYS 79.50* 76.40* 74.70*   LYMPHOCYTES 9.10* 11.50* 14.90*   MONOCYTES 10.60 9.90 8.60   EOSINOPHILS 0.00 0.50 0.80   BASOPHILS 0.10 0.20 0.20     Recent Labs     10/16/20  0643 10/17/20  1234 10/18/20  0701   SODIUM 137 139 138   POTASSIUM 4.0 3.2* 4.0   CHLORIDE 107 105 109   CO2 24 27 26   GLUCOSE 112* 78 86   BUN 13 15 13         A/P:   - Diet soft/low fiber  - Incentive spirometry q1h while awake  - Ambulate at least QID in arguello, up in chair for all meals, PT/OT " consulted  - Multimodal analgesia, try to avoid narcotics - DC PCA  - Lovenox/SCDs  - Wound care consulted for stoma teaching/care  - Plan for DC home in next 1-2 days        Dulce Jeter M.D.  [facility]  [phone]

## 2020-10-18 NOTE — CARE PLAN
Problem: Communication  Goal: The ability to communicate needs accurately and effectively will improve  Outcome: PROGRESSING AS EXPECTED     Problem: Safety  Goal: Will remain free from injury  Outcome: PROGRESSING AS EXPECTED     Problem: Mobility  Goal: Risk for activity intolerance will decrease  Outcome: PROGRESSING AS EXPECTED

## 2020-10-18 NOTE — WOUND TEAM
"[facility]  Inpatient Services  New Ostomy Management & Teaching    HPI:  Reviewed  PMH: Reviewed   SH: Reviewed    Subjective: \"I know I need to start changing this too.\"    Objective: + gas, + stool, appliance intact.  Colostomy 10/15/20 End/Segovia's Pouch LUQ (Active)   Stomal Appliance Assessment Changed    Stoma Assessment Edema;Pink    Stoma Shape Round;Budded Greater Than One Inch    Stoma Size (in) 1.63    Peristomal Assessment Intact    Mucocutaneous Junction Intact    Treatment Appliance Changed    Peristomal Protectant Paste Ring    Stomal Appliance 2 3/4\" (70mm) CTF    Output (mL) 100 mL    Output Color Green    WOUND RN ONLY - Stomal Appliance  2 Piece;2 3/4\" (70mm) CTF    Appliance (Pouch) # [id number]    Appliance Brand [place]    Secure Start completed Yes    Ostomy Care Resources Provided UOAA Tip Sheet                       Interventions: Patient seen at bedside with son aYir. Booklet went over with patient. Demonstrated how to \"burp\" bag. Yair requested to watch. Pt reports she learns best watching someone show her but understands she needs to do hands on. Pt reports she will try hands on tomorrow. Showed pt and son how to remove appliance by gently pushing skin away from barrier. Cleansed stoma and peristomal skin with warm wash cloth and dried. Pre made template used to measure stoma, pt measured 1 5/8\". Traced template to barrier making it off centered due to the close proximity to midline incision. Cut barrier then smoothed out edges.  Cut edges so that the border would not be applied over stapled midline incision. No paste ring applied since pt will have another teaching tomorrow. Explained purpose of paste ring to pt and son. Barrier applied, pouch applied and end of bag closed.    Pt education: Questions and concerns addressed. Remains on PCA. Pt reports she learns best watching but is willing to do hands on at next teaching.     Plan: Ostomy nurses to continue to follow for " ostomy needs and teaching. Pt needs extra supplies and please bring perineal bottle to next teaching. Pt's  and friend will be coming at 2:00 on Sunday for teaching. Pt reports that they will be assisting with her care once she discharges.     Anticipated discharge needs: Supplies, supplier information, possible HH or outpatient ostomy clinic

## 2020-10-18 NOTE — PROGRESS NOTES
"/53   Pulse 75   Temp 37.3 °C (99.1 °F) (Temporal)   Resp 18   Ht 1.6 m (5' 3\")   Wt 52 kg (114 lb 10.2 oz)   SpO2 97%     I/O last 3 completed shifts:  In: 1527 [P.O.:1020; I.V.:7]  Out: 100     Pt well  Skin paddle maybe looks a little better today? Will keep watching for now.    "

## 2020-10-18 NOTE — PROGRESS NOTES
Bedside report received.  Assessment completed.  A&O x 4.   On 0L O2. Denies SOB.  Denies N/V and new numbness or tingling.  Patient ambulates with standby assist. Bed alarm on.   Pain managed with prescribed medications.  Tolerating GI soft diet.  + void, + flatus, last BM 10/17.  Surgical incisions: midline incision, LUQ ostomy, perineal sutures    Reviewed plan with of care with patient. Call light and personal belongings with in reach. Hourly rounding in place. All needs met at this time.

## 2020-10-19 ENCOUNTER — ANESTHESIA (OUTPATIENT)
Dept: SURGERY | Facility: MEDICAL CENTER | Age: 80
DRG: 330 | End: 2020-10-19
Payer: MEDICARE

## 2020-10-19 ENCOUNTER — ANESTHESIA EVENT (OUTPATIENT)
Dept: SURGERY | Facility: MEDICAL CENTER | Age: 80
DRG: 330 | End: 2020-10-19
Payer: MEDICARE

## 2020-10-19 LAB
ANION GAP SERPL CALC-SCNC: 5 MMOL/L (ref 7–16)
BASOPHILS # BLD AUTO: 0.6 % (ref 0–1.8)
BASOPHILS # BLD: 0.03 K/UL (ref 0–0.12)
BUN SERPL-MCNC: 12 MG/DL (ref 8–22)
CALCIUM SERPL-MCNC: 8.3 MG/DL (ref 8.5–10.5)
CHLORIDE SERPL-SCNC: 107 MMOL/L (ref 96–112)
CO2 SERPL-SCNC: 23 MMOL/L (ref 20–33)
CREAT SERPL-MCNC: 0.73 MG/DL (ref 0.5–1.4)
EOSINOPHIL # BLD AUTO: 0.08 K/UL (ref 0–0.51)
EOSINOPHIL NFR BLD: 1.7 % (ref 0–6.9)
ERYTHROCYTE [DISTWIDTH] IN BLOOD BY AUTOMATED COUNT: 53 FL (ref 35.9–50)
GLUCOSE SERPL-MCNC: 94 MG/DL (ref 65–99)
HCT VFR BLD AUTO: 34.2 % (ref 37–47)
HGB BLD-MCNC: 11.2 G/DL (ref 12–16)
IMM GRANULOCYTES # BLD AUTO: 0.06 K/UL (ref 0–0.11)
IMM GRANULOCYTES NFR BLD AUTO: 1.3 % (ref 0–0.9)
LYMPHOCYTES # BLD AUTO: 0.94 K/UL (ref 1–4.8)
LYMPHOCYTES NFR BLD: 20.1 % (ref 22–41)
MCH RBC QN AUTO: 33.7 PG (ref 27–33)
MCHC RBC AUTO-ENTMCNC: 32.7 G/DL (ref 33.6–35)
MCV RBC AUTO: 103 FL (ref 81.4–97.8)
MONOCYTES # BLD AUTO: 0.44 K/UL (ref 0–0.85)
MONOCYTES NFR BLD AUTO: 9.4 % (ref 0–13.4)
NEUTROPHILS # BLD AUTO: 3.13 K/UL (ref 2–7.15)
NEUTROPHILS NFR BLD: 66.9 % (ref 44–72)
NRBC # BLD AUTO: 0 K/UL
NRBC BLD-RTO: 0 /100 WBC
PLATELET # BLD AUTO: 161 K/UL (ref 164–446)
PMV BLD AUTO: 9.4 FL (ref 9–12.9)
POTASSIUM SERPL-SCNC: 3.9 MMOL/L (ref 3.6–5.5)
RBC # BLD AUTO: 3.32 M/UL (ref 4.2–5.4)
SODIUM SERPL-SCNC: 135 MMOL/L (ref 135–145)
WBC # BLD AUTO: 4.7 K/UL (ref 4.8–10.8)

## 2020-10-19 PROCEDURE — 0JBB0ZZ EXCISION OF PERINEUM SUBCUTANEOUS TISSUE AND FASCIA, OPEN APPROACH: ICD-10-PCS | Performed by: PLASTIC SURGERY

## 2020-10-19 PROCEDURE — 302102 BAG OST N IMG 2.25IN 2PC (FECAL): Performed by: SURGERY

## 2020-10-19 PROCEDURE — 700111 HCHG RX REV CODE 636 W/ 250 OVERRIDE (IP): Performed by: STUDENT IN AN ORGANIZED HEALTH CARE EDUCATION/TRAINING PROGRAM

## 2020-10-19 PROCEDURE — 302111 WAFER OST 2.25IN N IMG RD 2 PC (BARRIER): Performed by: SURGERY

## 2020-10-19 PROCEDURE — 700102 HCHG RX REV CODE 250 W/ 637 OVERRIDE(OP): Performed by: STUDENT IN AN ORGANIZED HEALTH CARE EDUCATION/TRAINING PROGRAM

## 2020-10-19 PROCEDURE — 160048 HCHG OR STATISTICAL LEVEL 1-5: Performed by: PLASTIC SURGERY

## 2020-10-19 PROCEDURE — 36415 COLL VENOUS BLD VENIPUNCTURE: CPT

## 2020-10-19 PROCEDURE — A6403 STERILE GAUZE>16 <= 48 SQ IN: HCPCS | Performed by: PLASTIC SURGERY

## 2020-10-19 PROCEDURE — 770006 HCHG ROOM/CARE - MED/SURG/GYN SEMI*

## 2020-10-19 PROCEDURE — A9270 NON-COVERED ITEM OR SERVICE: HCPCS | Performed by: STUDENT IN AN ORGANIZED HEALTH CARE EDUCATION/TRAINING PROGRAM

## 2020-10-19 PROCEDURE — 160038 HCHG SURGERY MINUTES - EA ADDL 1 MIN LEVEL 2: Performed by: PLASTIC SURGERY

## 2020-10-19 PROCEDURE — 700111 HCHG RX REV CODE 636 W/ 250 OVERRIDE (IP): Performed by: PLASTIC SURGERY

## 2020-10-19 PROCEDURE — 160027 HCHG SURGERY MINUTES - 1ST 30 MINS LEVEL 2: Performed by: PLASTIC SURGERY

## 2020-10-19 PROCEDURE — A9270 NON-COVERED ITEM OR SERVICE: HCPCS | Performed by: SURGERY

## 2020-10-19 PROCEDURE — 85025 COMPLETE CBC W/AUTO DIFF WBC: CPT

## 2020-10-19 PROCEDURE — 700105 HCHG RX REV CODE 258: Performed by: STUDENT IN AN ORGANIZED HEALTH CARE EDUCATION/TRAINING PROGRAM

## 2020-10-19 PROCEDURE — 0KRM07Z REPLACEMENT OF PERINEUM MUSCLE WITH AUTOLOGOUS TISSUE SUBSTITUTE, OPEN APPROACH: ICD-10-PCS | Performed by: PLASTIC SURGERY

## 2020-10-19 PROCEDURE — 700102 HCHG RX REV CODE 250 W/ 637 OVERRIDE(OP): Performed by: SURGERY

## 2020-10-19 PROCEDURE — 302098 PASTE RING (FLAT): Performed by: SURGERY

## 2020-10-19 PROCEDURE — 80048 BASIC METABOLIC PNL TOTAL CA: CPT

## 2020-10-19 PROCEDURE — 160036 HCHG PACU - EA ADDL 30 MINS PHASE I: Performed by: PLASTIC SURGERY

## 2020-10-19 PROCEDURE — 700101 HCHG RX REV CODE 250: Performed by: STUDENT IN AN ORGANIZED HEALTH CARE EDUCATION/TRAINING PROGRAM

## 2020-10-19 PROCEDURE — 501838 HCHG SUTURE GENERAL: Performed by: PLASTIC SURGERY

## 2020-10-19 PROCEDURE — 160009 HCHG ANES TIME/MIN: Performed by: PLASTIC SURGERY

## 2020-10-19 PROCEDURE — 160002 HCHG RECOVERY MINUTES (STAT): Performed by: PLASTIC SURGERY

## 2020-10-19 PROCEDURE — 94640 AIRWAY INHALATION TREATMENT: CPT

## 2020-10-19 PROCEDURE — 160035 HCHG PACU - 1ST 60 MINS PHASE I: Performed by: PLASTIC SURGERY

## 2020-10-19 RX ORDER — HYDROMORPHONE HYDROCHLORIDE 1 MG/ML
0.1 INJECTION, SOLUTION INTRAMUSCULAR; INTRAVENOUS; SUBCUTANEOUS
Status: DISCONTINUED | OUTPATIENT
Start: 2020-10-19 | End: 2020-10-19 | Stop reason: HOSPADM

## 2020-10-19 RX ORDER — SODIUM CHLORIDE, SODIUM LACTATE, POTASSIUM CHLORIDE, CALCIUM CHLORIDE 600; 310; 30; 20 MG/100ML; MG/100ML; MG/100ML; MG/100ML
INJECTION, SOLUTION INTRAVENOUS
Status: DISCONTINUED | OUTPATIENT
Start: 2020-10-19 | End: 2020-10-19 | Stop reason: SURG

## 2020-10-19 RX ORDER — BUPIVACAINE HYDROCHLORIDE 2.5 MG/ML
INJECTION, SOLUTION EPIDURAL; INFILTRATION; INTRACAUDAL
Status: DISCONTINUED | OUTPATIENT
Start: 2020-10-19 | End: 2020-10-19 | Stop reason: HOSPADM

## 2020-10-19 RX ORDER — CEFAZOLIN SODIUM 1 G/3ML
INJECTION, POWDER, FOR SOLUTION INTRAMUSCULAR; INTRAVENOUS PRN
Status: DISCONTINUED | OUTPATIENT
Start: 2020-10-19 | End: 2020-10-19 | Stop reason: SURG

## 2020-10-19 RX ORDER — KETOROLAC TROMETHAMINE 30 MG/ML
INJECTION, SOLUTION INTRAMUSCULAR; INTRAVENOUS PRN
Status: DISCONTINUED | OUTPATIENT
Start: 2020-10-19 | End: 2020-10-19 | Stop reason: SURG

## 2020-10-19 RX ORDER — HYDROMORPHONE HYDROCHLORIDE 1 MG/ML
0.4 INJECTION, SOLUTION INTRAMUSCULAR; INTRAVENOUS; SUBCUTANEOUS
Status: DISCONTINUED | OUTPATIENT
Start: 2020-10-19 | End: 2020-10-19 | Stop reason: HOSPADM

## 2020-10-19 RX ORDER — ONDANSETRON 2 MG/ML
4 INJECTION INTRAMUSCULAR; INTRAVENOUS
Status: DISCONTINUED | OUTPATIENT
Start: 2020-10-19 | End: 2020-10-19 | Stop reason: HOSPADM

## 2020-10-19 RX ORDER — METOCLOPRAMIDE HYDROCHLORIDE 5 MG/ML
INJECTION INTRAMUSCULAR; INTRAVENOUS PRN
Status: DISCONTINUED | OUTPATIENT
Start: 2020-10-19 | End: 2020-10-19 | Stop reason: SURG

## 2020-10-19 RX ORDER — DEXAMETHASONE SODIUM PHOSPHATE 4 MG/ML
INJECTION, SOLUTION INTRA-ARTICULAR; INTRALESIONAL; INTRAMUSCULAR; INTRAVENOUS; SOFT TISSUE PRN
Status: DISCONTINUED | OUTPATIENT
Start: 2020-10-19 | End: 2020-10-19 | Stop reason: SURG

## 2020-10-19 RX ORDER — LIDOCAINE HYDROCHLORIDE 20 MG/ML
JELLY TOPICAL PRN
Status: DISCONTINUED | OUTPATIENT
Start: 2020-10-19 | End: 2020-10-19 | Stop reason: SURG

## 2020-10-19 RX ORDER — OXYCODONE HCL 5 MG/5 ML
10 SOLUTION, ORAL ORAL
Status: COMPLETED | OUTPATIENT
Start: 2020-10-19 | End: 2020-10-19

## 2020-10-19 RX ORDER — HYDROMORPHONE HYDROCHLORIDE 1 MG/ML
0.2 INJECTION, SOLUTION INTRAMUSCULAR; INTRAVENOUS; SUBCUTANEOUS
Status: DISCONTINUED | OUTPATIENT
Start: 2020-10-19 | End: 2020-10-19 | Stop reason: HOSPADM

## 2020-10-19 RX ORDER — OXYCODONE HCL 5 MG/5 ML
5 SOLUTION, ORAL ORAL
Status: COMPLETED | OUTPATIENT
Start: 2020-10-19 | End: 2020-10-19

## 2020-10-19 RX ORDER — LIDOCAINE HYDROCHLORIDE 20 MG/ML
INJECTION, SOLUTION EPIDURAL; INFILTRATION; INTRACAUDAL; PERINEURAL PRN
Status: DISCONTINUED | OUTPATIENT
Start: 2020-10-19 | End: 2020-10-19 | Stop reason: SURG

## 2020-10-19 RX ORDER — ONDANSETRON 2 MG/ML
INJECTION INTRAMUSCULAR; INTRAVENOUS PRN
Status: DISCONTINUED | OUTPATIENT
Start: 2020-10-19 | End: 2020-10-19 | Stop reason: SURG

## 2020-10-19 RX ADMIN — CELECOXIB 200 MG: 200 CAPSULE ORAL at 16:57

## 2020-10-19 RX ADMIN — HYDROMORPHONE HYDROCHLORIDE 0.4 MG: 1 INJECTION, SOLUTION INTRAMUSCULAR; INTRAVENOUS; SUBCUTANEOUS at 20:00

## 2020-10-19 RX ADMIN — SODIUM CHLORIDE, POTASSIUM CHLORIDE, SODIUM LACTATE AND CALCIUM CHLORIDE: 600; 310; 30; 20 INJECTION, SOLUTION INTRAVENOUS at 18:40

## 2020-10-19 RX ADMIN — OXYCODONE 5 MG: 5 TABLET ORAL at 14:05

## 2020-10-19 RX ADMIN — ACETAMINOPHEN 1000 MG: 500 TABLET ORAL at 23:07

## 2020-10-19 RX ADMIN — HYDROMORPHONE HYDROCHLORIDE 0.4 MG: 1 INJECTION, SOLUTION INTRAMUSCULAR; INTRAVENOUS; SUBCUTANEOUS at 19:50

## 2020-10-19 RX ADMIN — ACETAMINOPHEN 1000 MG: 500 TABLET ORAL at 06:12

## 2020-10-19 RX ADMIN — FENTANYL CITRATE 50 MCG: 50 INJECTION, SOLUTION INTRAMUSCULAR; INTRAVENOUS at 18:54

## 2020-10-19 RX ADMIN — OXYCODONE HYDROCHLORIDE 10 MG: 5 SOLUTION ORAL at 19:50

## 2020-10-19 RX ADMIN — METOCLOPRAMIDE 20 MG: 5 INJECTION, SOLUTION INTRAMUSCULAR; INTRAVENOUS at 18:51

## 2020-10-19 RX ADMIN — KETOROLAC TROMETHAMINE 30 MG: 30 INJECTION, SOLUTION INTRAMUSCULAR at 19:22

## 2020-10-19 RX ADMIN — LIDOCAINE HYDROCHLORIDE 1 ML: 20 JELLY TOPICAL at 18:50

## 2020-10-19 RX ADMIN — GABAPENTIN 300 MG: 300 CAPSULE ORAL at 16:58

## 2020-10-19 RX ADMIN — BUDESONIDE AND FORMOTEROL FUMARATE DIHYDRATE 2 PUFF: 160; 4.5 AEROSOL RESPIRATORY (INHALATION) at 08:17

## 2020-10-19 RX ADMIN — ACETAMINOPHEN 1000 MG: 500 TABLET ORAL at 16:57

## 2020-10-19 RX ADMIN — GABAPENTIN 300 MG: 300 CAPSULE ORAL at 14:05

## 2020-10-19 RX ADMIN — HYDROMORPHONE HYDROCHLORIDE 0.4 MG: 1 INJECTION, SOLUTION INTRAMUSCULAR; INTRAVENOUS; SUBCUTANEOUS at 20:21

## 2020-10-19 RX ADMIN — HYDROMORPHONE HYDROCHLORIDE 0.4 MG: 1 INJECTION, SOLUTION INTRAMUSCULAR; INTRAVENOUS; SUBCUTANEOUS at 20:28

## 2020-10-19 RX ADMIN — GABAPENTIN 300 MG: 300 CAPSULE ORAL at 06:12

## 2020-10-19 RX ADMIN — HYDROMORPHONE HYDROCHLORIDE 0.2 MG: 1 INJECTION, SOLUTION INTRAMUSCULAR; INTRAVENOUS; SUBCUTANEOUS at 20:36

## 2020-10-19 RX ADMIN — ONDANSETRON 4 MG: 2 INJECTION INTRAMUSCULAR; INTRAVENOUS at 18:51

## 2020-10-19 RX ADMIN — FENTANYL CITRATE 50 MCG: 50 INJECTION, SOLUTION INTRAMUSCULAR; INTRAVENOUS at 19:14

## 2020-10-19 RX ADMIN — OXYCODONE 5 MG: 5 TABLET ORAL at 08:57

## 2020-10-19 RX ADMIN — PROPOFOL 150 MG: 10 INJECTION, EMULSION INTRAVENOUS at 18:48

## 2020-10-19 RX ADMIN — CEFAZOLIN 2 G: 330 INJECTION, POWDER, FOR SOLUTION INTRAMUSCULAR; INTRAVENOUS at 18:51

## 2020-10-19 RX ADMIN — HYDROMORPHONE HYDROCHLORIDE 0.2 MG: 1 INJECTION, SOLUTION INTRAMUSCULAR; INTRAVENOUS; SUBCUTANEOUS at 20:07

## 2020-10-19 RX ADMIN — DIGOXIN 125 MCG: 125 TABLET ORAL at 21:24

## 2020-10-19 RX ADMIN — LIDOCAINE HYDROCHLORIDE 60 MG: 20 INJECTION, SOLUTION EPIDURAL; INFILTRATION; INTRACAUDAL at 18:48

## 2020-10-19 RX ADMIN — ACETAMINOPHEN 1000 MG: 500 TABLET ORAL at 14:05

## 2020-10-19 RX ADMIN — CELECOXIB 200 MG: 200 CAPSULE ORAL at 06:12

## 2020-10-19 RX ADMIN — DEXAMETHASONE SODIUM PHOSPHATE 8 MG: 4 INJECTION, SOLUTION INTRA-ARTICULAR; INTRALESIONAL; INTRAMUSCULAR; INTRAVENOUS; SOFT TISSUE at 18:51

## 2020-10-19 ASSESSMENT — PAIN DESCRIPTION - PAIN TYPE
TYPE: SURGICAL PAIN

## 2020-10-19 ASSESSMENT — PAIN SCALES - GENERAL: PAIN_LEVEL: 7

## 2020-10-19 NOTE — PROGRESS NOTES
POD 4  Events noted  Pt feels good  AVSS  Flap continues to be dusky, some parts likely are non viable, no sign of infection    A/P-skin paddle of flap not entirely healthy, I will take the patient to OR tonight to debride.  It is likely that the muscle is completely viable and will still serve its original reconstructive purpose.  Assuming the muscle is healthy the patient can be discharged tomorrow as was the plan by Dr. Jeter.

## 2020-10-19 NOTE — PROGRESS NOTES
Bedside report received.  Assessment completed.  A&O x 4.   On 0L O2. Denies SOB.  Denies N/V and new numbness or tingling.  Patient ambulates with standby assist. Bed alarm removed as patient is a low fall risk.   Pain managed with prescribed medications.  Tolerating GI soft diet.  + void, + flatus, last BM 10/17.  Surgical incisions: midline incision, LUQ ostomy, perineal sutures     Reviewed plan with of care with patient. Call light and personal belongings with in reach. Hourly rounding in place. All needs met at this time.

## 2020-10-19 NOTE — PROGRESS NOTES
"Surgical Progress Note:    POD4 -   Robotic APR  Rectus myocutaneous flap perineal repair (by Dr. Reyes)    S:  - Feeling well  -Tolerating diet  - Gas and soft stool within colostomy bag  - Pain well managed  - Ambulating well in room only so far  - No chest pain, extremity pain, or difficulty breathing    Vitals:  /63   Pulse (!) 52   Temp 36.8 °C (98.3 °F) (Temporal)   Resp 17   Ht 1.6 m (5' 3\")   Wt 52 kg (114 lb 10.2 oz)   SpO2 95%   BMI 20.31 kg/m²     I/O:     Intake/Output Summary (Last 24 hours) at 10/16/2020 0853  Last data filed at 10/16/2020 0800  Gross per 24 hour   Intake 1479.9 ml   Output 1225 ml   Net 254.9 ml       P/E:  Constitutional: Appears well, no distress  Head/Neck: Normocephalic, atraumatic, neck supple  Cardiovascular: Normal rate and rhythm  Pulmonary/Chest: Normal respiratory effort, no wheezes  Abdominal: Soft, mildly tender, mild distension, incision(s) clean/dry/intact, stoma pink and productive with edema  Musculoskeletal: No deficits  Neurological:  Alert, oriented  Skin:  Warm and dry, no erythema  Extremities: No edema, no evidence of DVT  Perineum: mild serosang drainage, skin black but warm with a tiny amount of pink anteriorly    Labs:  Recent Labs     10/17/20  1234 10/18/20  0701 10/19/20  0405   WBC 8.1 5.1 4.7*   RBC 3.46* 3.21* 3.32*   HEMOGLOBIN 11.9* 11.0* 11.2*   HEMATOCRIT 37.1 33.8* 34.2*   .2* 105.3* 103.0*   MCH 34.4* 34.3* 33.7*   RDW 55.7* 55.5* 53.0*   PLATELETCT 119* 131* 161*   MPV 9.2 9.4 9.4   NEUTSPOLYS 76.40* 74.70* 66.90   LYMPHOCYTES 11.50* 14.90* 20.10*   MONOCYTES 9.90 8.60 9.40   EOSINOPHILS 0.50 0.80 1.70   BASOPHILS 0.20 0.20 0.60     Recent Labs     10/17/20  1234 10/18/20  0701 10/19/20  0405   SODIUM 139 138 135   POTASSIUM 3.2* 4.0 3.9   CHLORIDE 105 109 107   CO2 27 26 23   GLUCOSE 78 86 94   BUN 15 13 12         A/P:   - Dr. Reyes taking patient back to OR later today for debridement of necrotic skin on flap    - NPO for OR, " then return to soft/low fiber diet  - Incentive spirometry q1h while awake  - Ambulate at least QID in arguello, up in chair for all meals, PT/OT consulted  - Multimodal analgesia, try to avoid narcotics  - Lovenox/SCDs  - Wound care consulted for stoma teaching/care        Dulce Jeter M.D.  [facility]  [phone]

## 2020-10-19 NOTE — CARE PLAN
Problem: Safety  Goal: Will remain free from injury  Outcome: PROGRESSING AS EXPECTED     Problem: Pain Management  Goal: Pain level will decrease to patient's comfort goal  Outcome: PROGRESSING AS EXPECTED     Problem: Skin Integrity  Goal: Risk for impaired skin integrity will decrease  Outcome: PROGRESSING AS EXPECTED     Problem: Mobility  Goal: Risk for activity intolerance will decrease  Outcome: PROGRESSING AS EXPECTED

## 2020-10-19 NOTE — WOUND TEAM
"[facility]  Inpatient Services  New Ostomy Management & Teaching    HPI:  Reviewed  PMH: Reviewed   SH: Reviewed    Subjective: \"I will get this.\"    Objective: appliance intact, stool and flatus present    Colostomy 10/15/20 End/Segovia's Pouch LUQ (Active)   Stomal Appliance Assessment Changed    Stoma Assessment Edema;Pink    Stoma Shape Round;Budded Greater Than One Inch    Stoma Size (in) 1.63    Peristomal Assessment Intact    Mucocutaneous Junction Intact    Treatment Cleansed with water/washcloth;Appliance Changed    Stomal Appliance 2 3/4\" (70mm) CTF    Output (mL) 10 mL    Output Color Green    WOUND RN ONLY - Stomal Appliance  2 Piece;2 3/4\" (70mm) CTF;Transparent Pouch Lock & Roll    Appliance (Pouch) # [id number]    Appliance Brand Wanda    Appliance Supplier [employer]    Secure Start completed Yes    Ostomy Care Resources Provided UOAA Tip Sheet         Interventions: Patient has tried to read booklett. Met with spouse and family friend. Demonstrated and performed how to remove appliance by gently pushing skin away from barrier. Cleaned stoma and  skin with warm moist wash cloth and dried. [place] template used to measure stoma, offset to medial side and cut barrier to 1 5/8\".    Cut edges so that the border would not be applied over stapled midline incision. No paste ring applied since pt will have another teaching tomorrow. Explained purpose of paste ring  And why not used today. Barrier applied, started pouch application and pt finished with assistance. She had already closed end.    Pt education: Questions and concerns addressed. Pt had ambulated to bathroom with walker.    Plan: Ostomy nurses to continue to follow for ostomy needs and teaching. Pt needs med belt, extra supplies and  perineal bottle to next teaching. Pt's  and friend will be coming at 2:00 on Monday for teaching. Need to tricia catalog.     Anticipated discharge needs: Supplies, supplier information, possible HH " or outpatient ostomy clinic

## 2020-10-19 NOTE — PROGRESS NOTES
"Report received. Assessment complete. Pt A&O x4. Alert sitting up in chair    Vital Signs: /58   Pulse 78   Temp 36.8 °C (98.3 °F) (Temporal)   Resp 18   Ht 1.6 m (5' 3\")   Wt 52 kg (114 lb 10.2 oz)   SpO2 95%   BMI 20.31 kg/m²     Pain: Patient has 4/10 pain. Pain managed with prescribed medications as needed.    Neuro: Denies numbness/tingling    Cardiac: Denies chest pain. Regular heart sounds    Vascular pulses +2 BUE, BLE. No edema noted    Respiratory: Patient denies SOB. On RA. Sating 90%<. Reinforced education on IS.     GI: +ostomy in place, consistent brown output. Tolerating GI soft diet. Denies N/V.    : Patient voiding adequately.    MSK: Patient ambulates with x1 assist using FWW. Bed alarm on. Patient calling appropriately    Skin: MLI closed w/ island dressing in place CDI. x3 lap sites EFREN w/ dermabond. Ostomy RUQ CDI. Stoma budded, pink  Rectal flap purple, moderate bloody drainage. Sutures in tact    Discharge Barriers/Plan: Plan to go down to OR for I&D today at 1830.    Fall precautions in place. Treaded socks on. Bed locked in lowest position. Call light and personal belongings at bedside within reach. Reinforced use of call light to patient. Hourly rounding in place.  All needs met at this time and POC discussed.     "

## 2020-10-20 LAB
ANION GAP SERPL CALC-SCNC: 6 MMOL/L (ref 7–16)
BASOPHILS # BLD AUTO: 0.2 % (ref 0–1.8)
BASOPHILS # BLD: 0.01 K/UL (ref 0–0.12)
BUN SERPL-MCNC: 16 MG/DL (ref 8–22)
CALCIUM SERPL-MCNC: 8.2 MG/DL (ref 8.5–10.5)
CHLORIDE SERPL-SCNC: 106 MMOL/L (ref 96–112)
CO2 SERPL-SCNC: 22 MMOL/L (ref 20–33)
CREAT SERPL-MCNC: 0.62 MG/DL (ref 0.5–1.4)
EOSINOPHIL # BLD AUTO: 0 K/UL (ref 0–0.51)
EOSINOPHIL NFR BLD: 0 % (ref 0–6.9)
ERYTHROCYTE [DISTWIDTH] IN BLOOD BY AUTOMATED COUNT: 53.7 FL (ref 35.9–50)
GLUCOSE SERPL-MCNC: 137 MG/DL (ref 65–99)
HCT VFR BLD AUTO: 34.7 % (ref 37–47)
HGB BLD-MCNC: 11.4 G/DL (ref 12–16)
IMM GRANULOCYTES # BLD AUTO: 0.08 K/UL (ref 0–0.11)
IMM GRANULOCYTES NFR BLD AUTO: 1.4 % (ref 0–0.9)
LYMPHOCYTES # BLD AUTO: 0.61 K/UL (ref 1–4.8)
LYMPHOCYTES NFR BLD: 10.9 % (ref 22–41)
MCH RBC QN AUTO: 34.1 PG (ref 27–33)
MCHC RBC AUTO-ENTMCNC: 32.9 G/DL (ref 33.6–35)
MCV RBC AUTO: 103.9 FL (ref 81.4–97.8)
MONOCYTES # BLD AUTO: 0.15 K/UL (ref 0–0.85)
MONOCYTES NFR BLD AUTO: 2.7 % (ref 0–13.4)
NEUTROPHILS # BLD AUTO: 4.75 K/UL (ref 2–7.15)
NEUTROPHILS NFR BLD: 84.8 % (ref 44–72)
NRBC # BLD AUTO: 0 K/UL
NRBC BLD-RTO: 0 /100 WBC
PLATELET # BLD AUTO: 173 K/UL (ref 164–446)
PMV BLD AUTO: 9.1 FL (ref 9–12.9)
POTASSIUM SERPL-SCNC: 4.5 MMOL/L (ref 3.6–5.5)
RBC # BLD AUTO: 3.34 M/UL (ref 4.2–5.4)
SODIUM SERPL-SCNC: 134 MMOL/L (ref 135–145)
WBC # BLD AUTO: 5.6 K/UL (ref 4.8–10.8)

## 2020-10-20 PROCEDURE — A9270 NON-COVERED ITEM OR SERVICE: HCPCS | Performed by: SURGERY

## 2020-10-20 PROCEDURE — 700102 HCHG RX REV CODE 250 W/ 637 OVERRIDE(OP): Performed by: SURGERY

## 2020-10-20 PROCEDURE — 80048 BASIC METABOLIC PNL TOTAL CA: CPT

## 2020-10-20 PROCEDURE — 700111 HCHG RX REV CODE 636 W/ 250 OVERRIDE (IP): Performed by: SURGERY

## 2020-10-20 PROCEDURE — 85025 COMPLETE CBC W/AUTO DIFF WBC: CPT

## 2020-10-20 PROCEDURE — 94640 AIRWAY INHALATION TREATMENT: CPT

## 2020-10-20 PROCEDURE — 36415 COLL VENOUS BLD VENIPUNCTURE: CPT

## 2020-10-20 PROCEDURE — 770006 HCHG ROOM/CARE - MED/SURG/GYN SEMI*

## 2020-10-20 RX ADMIN — ATORVASTATIN CALCIUM 80 MG: 80 TABLET, FILM COATED ORAL at 20:40

## 2020-10-20 RX ADMIN — OXYCODONE 5 MG: 5 TABLET ORAL at 13:10

## 2020-10-20 RX ADMIN — BUDESONIDE AND FORMOTEROL FUMARATE DIHYDRATE 2 PUFF: 160; 4.5 AEROSOL RESPIRATORY (INHALATION) at 07:23

## 2020-10-20 RX ADMIN — GABAPENTIN 300 MG: 300 CAPSULE ORAL at 17:43

## 2020-10-20 RX ADMIN — ZOLPIDEM TARTRATE 5 MG: 5 TABLET ORAL at 20:40

## 2020-10-20 RX ADMIN — ENOXAPARIN SODIUM 40 MG: 40 INJECTION SUBCUTANEOUS at 08:23

## 2020-10-20 RX ADMIN — ACETAMINOPHEN 1000 MG: 500 TABLET ORAL at 05:26

## 2020-10-20 RX ADMIN — MONTELUKAST 10 MG: 10 TABLET, FILM COATED ORAL at 20:40

## 2020-10-20 RX ADMIN — CELECOXIB 200 MG: 200 CAPSULE ORAL at 05:26

## 2020-10-20 RX ADMIN — ACETAMINOPHEN 1000 MG: 500 TABLET ORAL at 23:00

## 2020-10-20 RX ADMIN — BUDESONIDE AND FORMOTEROL FUMARATE DIHYDRATE 2 PUFF: 160; 4.5 AEROSOL RESPIRATORY (INHALATION) at 21:00

## 2020-10-20 RX ADMIN — OXYCODONE 5 MG: 5 TABLET ORAL at 17:42

## 2020-10-20 RX ADMIN — CELECOXIB 200 MG: 200 CAPSULE ORAL at 17:43

## 2020-10-20 RX ADMIN — OXYCODONE 5 MG: 5 TABLET ORAL at 03:40

## 2020-10-20 RX ADMIN — GABAPENTIN 300 MG: 300 CAPSULE ORAL at 11:42

## 2020-10-20 RX ADMIN — DIGOXIN 125 MCG: 125 TABLET ORAL at 17:43

## 2020-10-20 RX ADMIN — OXYCODONE 5 MG: 5 TABLET ORAL at 08:26

## 2020-10-20 RX ADMIN — ACETAMINOPHEN 1000 MG: 500 TABLET ORAL at 11:42

## 2020-10-20 RX ADMIN — GLYCOPYRROLATE 1 CAPSULE: 15.6 CAPSULE RESPIRATORY (INHALATION) at 21:00

## 2020-10-20 RX ADMIN — ACETAMINOPHEN 1000 MG: 500 TABLET ORAL at 17:42

## 2020-10-20 RX ADMIN — GABAPENTIN 300 MG: 300 CAPSULE ORAL at 05:26

## 2020-10-20 ASSESSMENT — COGNITIVE AND FUNCTIONAL STATUS - GENERAL
MOBILITY SCORE: 20
DRESSING REGULAR LOWER BODY CLOTHING: A LITTLE
WALKING IN HOSPITAL ROOM: A LITTLE
MOVING FROM LYING ON BACK TO SITTING ON SIDE OF FLAT BED: A LITTLE
CLIMB 3 TO 5 STEPS WITH RAILING: A LITTLE
SUGGESTED CMS G CODE MODIFIER DAILY ACTIVITY: CJ
DAILY ACTIVITIY SCORE: 22
HELP NEEDED FOR BATHING: A LITTLE
SUGGESTED CMS G CODE MODIFIER MOBILITY: CJ
STANDING UP FROM CHAIR USING ARMS: A LITTLE

## 2020-10-20 ASSESSMENT — PAIN DESCRIPTION - PAIN TYPE
TYPE: SURGICAL PAIN
TYPE: SURGICAL PAIN
TYPE: ACUTE PAIN
TYPE: ACUTE PAIN;SURGICAL PAIN
TYPE: SURGICAL PAIN

## 2020-10-20 NOTE — OR SURGEON
Immediate Post OP Note    PreOp Diagnosis: necrotic skin paddle of rectus myocutaneous flap    PostOp Diagnosis: same    Procedure(s):  IRRIGATION AND DEBRIDEMENT, WOUND-PERINEAL WOUND -   Debridement of necrotic skin and subcutaneous tissue  Reconstruct the pelvic floor with a rectus abdominis muscle flap  Closure of 15 cm of wound in a layered fashion    Wound Class: Clean Contaminated    Surgeon(s):  Mark Reyes M.D.    Anesthesiologist/Type of Anesthesia:  Anesthesiologist: Jonh Wills M.D./General    Surgical Staff:  Circulator: Hanna Santa R.N.; Varun Walden R.N.  Scrub Person: Leah Ch    Specimens removed if any:  * No specimens in log *    Estimated Blood Loss:minimal    Findings: necrotic skin flap, herniation of colon thru pelvis    Complications: none        10/19/2020 7:30 PM Mark Reyes M.D.

## 2020-10-20 NOTE — OP REPORT
DATE OF SERVICE:  10/19/2020    PREOPERATIVE DIAGNOSIS:  Perineal reconstruction of questionable viability.    POSTOPERATIVE DIAGNOSIS:  Nonviable skin paddle with 100% healthy muscle.    There was herniation of colon through the pelvis causing a perineal hernia as   well.    PROCEDURE PERFORMED:  1.  Debridement of skin and subcutaneous tissue of rectus abdominis   myocutaneous flap.  2.  Pelvic floor reconstruction utilizing the rectus abdominis muscle flap to   obliterate the communication between the pelvic brim and the perineum.  3.  Layered closure of a 15 cm wound of the perineum.    SURGEON:  Mark Reyes MD    ASSISTANT:  None.    ANESTHESIA:  Dr. Jonh Wills    OPERATIVE INDICATIONS:  A 79-year-old female.  She had reconstruction of her   perineum utilizing a rectus abdominis myocutaneous flap.  The skin flap did   not appear to be 100% viable perioperatively.  It got progressively worse.    The plan was for debridement and closure of the wound.  The patient understood   and agreed to proceed.    OPERATIVE DESCRIPTION:  After induction of general endotracheal anesthesia,   the patient was placed in lithotomy position in stirrups and was placed in   Trendelenburg positioning.  The operative site was prepped and draped   sterilely.  There was some bulging of the perineum, which indicated possible   perineal herniation of the bowel.  The chromic sutures securing the skin   paddle were cut as were the subcutaneous tissues as well.  The skin paddle was   cut around the edge and there was no bleeding.  The fat did not appear to be   healthy either.  Skin and fat were gradually excised using a scalpel.  As we   got close to the muscle, the fat did begin to bleed but at this point, the   skin paddle had been completely removed.  Because of this, we opted to remove   all of the subcutaneous tissue off the muscle.  The muscle itself was red and   pink and robust in appearance and was obviously quite healthy.  There  was some   colon that had herniated past the rectus abdominis muscle and this was   reduced into the abdominal cavity manually.  The perineum and pelvis were   irrigated with copious amounts of warm normal saline.  The muscle was sutured   to the pelvic rim at the peritoneal reflection using interrupted 0 Vicryl   suture.  This created a sling of rectus muscle, which served as a barrier from   the pelvis to the perineum.  This was very sound after reconstruction.  When   this was done, the subcutaneous tissue was irrigated and closed in layers   using 0 Vicryl suture in the subcutaneous tissue, 2-0 Vicryl suture in the   subcutaneous tissue beneath the skin and 3-0 Vicryl suture in the deep dermal   layer.  A 3-0 chromic suture was run on the skin as the final layer.  This   resulted in a very sound closure with healthy tissue.  A dressing of fluffs   and mesh panties was placed.  The patient was extubated and taken to recovery   room in good condition.       ____________________________________     MD JOSE CHAVEZ / CHRIS    DD:  10/19/2020 20:00:43  DT:  10/19/2020 23:06:52    D#:  [id number]  Job#:  [id number]

## 2020-10-20 NOTE — DISCHARGE PLANNING
Care Transition Team Discharge Planning                   Discharge Plan:  Home with Help    Pt has spouse and Pt's Sister and daughter are coming and can provide 24/7 care once Pt discharges to Home.    Pt is S/P Irrigation and Debridement, Perineal Wound.  Will continue to follow and assist as needed.

## 2020-10-20 NOTE — PROGRESS NOTES
"Surgical Progress Note:    POD5 -   Robotic APR  Rectus myocutaneous flap perineal repair (by Dr. Reyes)  POD1 -  Debridement of necrotic skin and subcutaneous tissue, reconstruction of pelvic floor with rectus abdominis muscle flap, primary closure of skin    S:  - Feeling well  -Tolerating diet  - Gas and soft stool within colostomy bag  - Pain well managed  - Ambulating well in room only so far  - No chest pain, extremity pain, or difficulty breathing    Vitals:  BP (!) 99/59 Comment: RN notifie  Pulse 80   Temp 36.6 °C (97.9 °F) (Temporal)   Resp 16   Ht 1.6 m (5' 3\")   Wt 52 kg (114 lb 10.2 oz)   SpO2 98%   BMI 20.31 kg/m²     I/O:     Intake/Output Summary (Last 24 hours) at 10/16/2020 0853  Last data filed at 10/16/2020 0800  Gross per 24 hour   Intake 1479.9 ml   Output 1225 ml   Net 254.9 ml       P/E:  Constitutional: Appears well, no distress  Head/Neck: Normocephalic, atraumatic, neck supple  Cardiovascular: Normal rate and rhythm  Pulmonary/Chest: Normal respiratory effort, no wheezes  Abdominal: Soft, mildly tender, mild distension, incision(s) clean/dry/intact, stoma pink and productive with edema  Musculoskeletal: No deficits  Neurological:  Alert, oriented  Skin:  Warm and dry, no erythema  Extremities: No edema, no evidence of DVT  Perineum: mild serosang drainage, skin well approximated    Labs:  Recent Labs     10/18/20  0701 10/19/20  0405 10/20/20  0629   WBC 5.1 4.7* 5.6   RBC 3.21* 3.32* 3.34*   HEMOGLOBIN 11.0* 11.2* 11.4*   HEMATOCRIT 33.8* 34.2* 34.7*   .3* 103.0* 103.9*   MCH 34.3* 33.7* 34.1*   RDW 55.5* 53.0* 53.7*   PLATELETCT 131* 161* 173   MPV 9.4 9.4 9.1   NEUTSPOLYS 74.70* 66.90 84.80*   LYMPHOCYTES 14.90* 20.10* 10.90*   MONOCYTES 8.60 9.40 2.70   EOSINOPHILS 0.80 1.70 0.00   BASOPHILS 0.20 0.60 0.20     Recent Labs     10/18/20  0701 10/19/20  0405 10/20/20  0629   SODIUM 138 135 134*   POTASSIUM 4.0 3.9 4.5   CHLORIDE 109 107 106   CO2 26 23 22   GLUCOSE 86 94 137* "   BUN 13 12 16         A/P:   - Soft/low fiber diet  - Incentive spirometry q1h while awake  - Ambulate at least QID in arguello, up in chair for all meals, PT/OT consulted  - Multimodal analgesia, try to avoid narcotics  - Lovenox/SCDs  - Wound care consulted for stoma teaching/care  - Plan for DC home in 1-2 days - patient quite anxious about discharge home so will continue to support        Dulce Jeter M.D.  [facility]  [phone]

## 2020-10-20 NOTE — ANESTHESIA PREPROCEDURE EVALUATION
Anes H&P:  PAST MEDICAL HISTORY:   79 y.o. female who presents for Procedure(s):  IRRIGATION AND DEBRIDEMENT, WOUND-PERINEAL WOUND.  She has current and past medical problems significant for:    Past Medical History:   Diagnosis Date   • Arrhythmia     afib   • Arthritis     low back, right hand   • Asthma     inhalers daily   • Cancer (HCC) 1983    uterine   • Cancer (HCC)     anal / chemo / radiation   • Cataract     repaired   • Gynecological disorder    • Heart burn     history of   • Hiatus hernia syndrome    • High cholesterol    • IBS (irritable bowel syndrome)     due to radiation   • Indigestion    • Pain     anus   • Pneumonia 2000   • PONV (postoperative nausea and vomiting)        SMOKING/ALCOHOL/RECREATIONAL DRUG USE:  Social History     Tobacco Use   • Smoking status: Never Smoker   • Smokeless tobacco: Never Used   Substance Use Topics   • Alcohol use: Yes     Alcohol/week: 3.0 oz     Types: 5 Glasses of wine per week     Frequency: 4 or more times a week     Drinks per session: 1 or 2     Comment: 1 a day   • Drug use: No     Social History     Substance and Sexual Activity   Drug Use No       PAST SURGICAL HISTORY:  Past Surgical History:   Procedure Laterality Date   • PB LAP, SURG COLOSTOMY Left 10/15/2020    Procedure: CREATION, COLOSTOMY, LAPAROSCOPIC - FOR END COLOSTOMY;  Surgeon: Dulce Jeter M.D.;  Location: [facility];  Service: Gen Robotic   • ABDOMINAL PERINEAL RESECTION ROBOTIC XI N/A 10/15/2020    Procedure: RESECTION, ABDOMINOPERINEAL, ROBOT-ASSISTED, USING DA AUGIE XI;  Surgeon: Dulce Jeter M.D.;  Location: [facility];  Service: Gen Robotic   • MYOCUTANEOUS FLAP N/A 10/15/2020    Procedure: REPAIR, WOUND, USING MYOCUTANEOUS FLAP- MUSCLE FLAP TO PERINEUM;  Surgeon: Mark Reyes M.D.;  Location: [facility];  Service: Plastics   • PB SURG DIAGNOSTIC EXAM, ANORECTAL  9/21/2020    Procedure: EXAM UNDER ANESTHESIA, RECTUM;  Surgeon: Dulce Jeter  M.D.;  Location: SURGERY [facility];  Service: General   • PROCTOSCOPY  9/21/2020    Procedure: PROCTOSCOPY- RIGID WITH BIOPSY;  Surgeon: Dulce Jeter M.D.;  Location: SURGERY [facility];  Service: General   • COLONOSCOPY  1/13/2020    Procedure: COLONOSCOPY;  Surgeon: Dulce Jeter M.D.;  Location: SURGERY SAME DAY [facility];  Service: Gen Robotic   • LUMBAR LAMINECTOMY DISKECTOMY  3/3/2016    Procedure: LUMBAR LAMINECTOMY DISKECTOMY FOR MINIMALLY INVASIVE LEFT L4-5 LAMINOTOMY;  Surgeon: Stevo Guthrie M.D.;  Location: SURGERY [facility] ORS;  Service:    • CHOLECYSTECTOMY  6/2014   • CATARACT EXTRACTION WITH IOL Bilateral 2012   • HYSTERECTOMY, TOTAL ABDOMINAL  1983   • ABDOMINAL HYSTERECTOMY TOTAL  1983   • TUBAL LIGATION  1975   • APPENDECTOMY     • BICEPS TENDON REPAIR Right 2013, 2014    5 screws placed in second surgery   • BUNIONECTOMY     • PB REMOVAL OF TONSILS,<13 Y/O     • SHOULDER SURGERY Right        ALLERGIES:   Allergies   Allergen Reactions   • Codeine Nausea       MEDICATIONS:  No current facility-administered medications on file prior to encounter.      Current Outpatient Medications on File Prior to Encounter   Medication Sig Dispense Refill   • ELIQUIS 5 MG Tab TAKE 1 TABLET BY MOUTH TWICE A DAY     • metroNIDAZOLE (FLAGYL) 500 MG Tab      • neomycin (MYCIFRADIN) 500 MG Tab      • STOOL SOFTENER 100 MG Cap TK ONE C PO  BID PRF AFTER SURGERY.     • Psyllium (METAMUCIL) 28.3 % Powder Take  by mouth 2 Times a Day. 1 teaspoon bid     • fluticasone-salmeterol (WIXELA INHUB) 500-50 MCG/DOSE AEROSOL POWDER, BREATH ACTIVATED Inhale 1 Puff by mouth every 12 hours.     • digoxin (LANOXIN) 125 MCG Tab Take 125 mcg by mouth every day.     • tiotropium (SPIRIVA) 18 MCG Cap Inhale 18 mcg by mouth every day.     • lidocaine (XYLOCAINE) 5 % Ointment Apply to affected area 2x per day (Patient taking differently: Apply to affected area 2x per day- rectum) 1 Tube 3   • montelukast (SINGULAIR) 10  MG Tab Take 10 mg by mouth every day.     • vitamin D (CHOLECALCIFEROL) 1000 UNIT Tab Take 1,000 Units by mouth every day.     • vitamin e (VITAMIN E) 400 UNIT Cap Take 400 Units by mouth every day.     • CALCIUM PO Take 750 mg by mouth every day.     • atorvastatin (LIPITOR) 20 MG Tab Take 80 mg by mouth.     • nitroglycerin (NITROSTAT) 0.4 MG SL Tab Place 0.4 mg under tongue.     • SUMAtriptan (IMITREX) 25 MG Tab tablet Take 25 mg by mouth.     • zolpidem (AMBIEN) 10 MG Tab Take 5 mg by mouth at bedtime as needed for Sleep.         LABS:  Lab Results   Component Value Date/Time    HEMOGLOBIN 11.2 (L) 10/19/2020 0405    HEMATOCRIT 34.2 (L) 10/19/2020 0405    WBC 4.7 (L) 10/19/2020 0405     Lab Results   Component Value Date/Time    SODIUM 135 10/19/2020 0405    POTASSIUM 3.9 10/19/2020 0405    CHLORIDE 107 10/19/2020 0405    CO2 23 10/19/2020 0405    GLUCOSE 94 10/19/2020 0405    BUN 12 10/19/2020 0405    CALCIUM 8.3 (L) 10/19/2020 0405       SARS-CoV2 result: Negative      PREVIOUS ANESTHETICS: See EMR  __________________________________________      Relevant Problems   ANESTHESIA   (+) PONV (postoperative nausea and vomiting)      PULMONARY   (+) Asthma      NEURO (within normal limits)      CARDIAC   (+) Arrhythmia      GI   (+) Hiatal hernia       (within normal limits)      ENDO (within normal limits)      Other   (+) Anal cancer (HCC)       Physical Exam    Airway   Mallampati: II  TM distance: >3 FB  Neck ROM: full       Cardiovascular - normal exam  Rhythm: regular  Rate: normal  (-) murmur  Comments: AFIB not appreciated   Dental   Comments: Multiple chipped and missing    Very poor dentition   Pulmonary - normal exam  Breath sounds clear to auscultation     Abdominal    Neurological - normal exam               Anesthesia Plan    ASA 2       Plan - general       Airway plan will be LMA        Induction: intravenous    Postoperative Plan: Postoperative administration of opioids is intended.    Pertinent  diagnostic labs and testing reviewed    Informed Consent:    Anesthetic plan and risks discussed with patient.

## 2020-10-20 NOTE — ANESTHESIA PROCEDURE NOTES
Airway    Date/Time: 10/19/2020 6:50 PM  Performed by: Jonh Wills M.D.  Authorized by: Jonh Wills M.D.     Location:  OR  Urgency:  Elective  Indications for Airway Management:  Anesthesia      Spontaneous Ventilation: absent    Sedation Level:  Deep  Preoxygenated: Yes    Mask Difficulty Assessment:  0 - not attempted  Final Airway Type:  Supraglottic airway  Final Supraglottic Airway:  Standard LMA    SGA Size:  3  Number of Attempts at Approach:  1

## 2020-10-20 NOTE — ANESTHESIA TIME REPORT
Anesthesia Start and Stop Event Times     Date Time Event    10/19/2020 1839 Ready for Procedure     1840 Anesthesia Start     1934 Anesthesia Stop        Responsible Staff  10/19/20    Name Role Begin End    Jonh Wills M.D. Anesth 1840 1934        Preop Diagnosis (Free Text):  Pre-op Diagnosis     necrotic skin on flap         Preop Diagnosis (Codes):    Post op Diagnosis  Skin necrosis (HCC)      Premium Reason  B. 1st Call    Comments:

## 2020-10-20 NOTE — ANESTHESIA POSTPROCEDURE EVALUATION
Patient: Josy Grove    Procedure Summary     Date: 10/19/20 Room / Location: [place] [address] / SURGERY [facility]    Anesthesia Start: 1840 Anesthesia Stop: 1934    Procedure: IRRIGATION AND DEBRIDEMENT, WOUND-PERINEAL WOUND (N/A Perineum) Diagnosis: (necrotic skin on flap )    Surgeon: Mark Reyes M.D. Responsible Provider: Jonh Wills M.D.    Anesthesia Type: general ASA Status: 2          Final Anesthesia Type: general  Last vitals  BP   Blood Pressure : 123/56    Temp   36.1 °C (97 °F)    Pulse   Pulse: 68   Resp   16    SpO2   98 %      Anesthesia Post Evaluation    Patient location during evaluation: PACU  Patient participation: complete - patient participated  Level of consciousness: awake and alert  Pain score: 7  Inpatient treatment ongoing.  Airway patency: patent  Anesthetic complications: no  Cardiovascular status: hemodynamically stable  Respiratory status: acceptable  Hydration status: euvolemic    PONV: none           Nurse Pain Score: 7 (NPRS)

## 2020-10-20 NOTE — WOUND TEAM
"[facility]  Inpatient Services  New Ostomy Management & Teaching    HPI:  Reviewed  PMH: Reviewed   SH: Reviewed    Subjective: \"I don't know if I'll get this.\"    Objective: appliance intact, stool and flatus present    Colostomy 10/15/20 End/Segovia's Pouch LUQ (Active)   Stomal Appliance Assessment Changed    Stoma Assessment Edema;Pink    Stoma Shape Round;Budded Greater Than One Inch    Stoma Size (in) 1.63    Peristomal Assessment Intact    Mucocutaneous Junction Intact    Treatment Cleansed with water/washcloth;Appliance Changed    Peristomal Protectant Paste Ring    Stomal Appliance 2 3/4\" (70mm) CTF    Output (mL) scant    Output Color Brown    WOUND RN ONLY - Stomal Appliance  2 Piece;2 3/4\" (70mm) CTF;Transparent Pouch Lock & Roll    Appliance (Pouch) # [id number]    Appliance Brand Traer    Appliance Supplier [employer]    Secure Start completed Yes    Ostomy Care Resources Provided [facility] Tip Sheet         Interventions:  Met with spouse, Klaus, and family friend, Lizzie. Klaus removed appliance by gently pushing skin away from barrier. He cleaned stoma and  skin with warm moist wash cloth and dried. [place] template used to measure stoma, offset to medial side and he cut barrier to 1 5/8\".    He cut edge so that the border would not be applied over stapled midline incision. No paste ring applied to see if it is needed.  Discussed paste ring and why it wasn't used today. He applied the barrier and attached pouch and closed end. Klaus was able to perform care with cueing. Demonstrated how to \"burp\" pouch, clean.     Pt education: Questions and concerns addressed.     Plan: Ostomy nurses to continue to follow for ostomy needs and teaching. Pt needs [facility] next teaching.     Anticipated discharge needs: Supplies, supplier information, possible HH or outpatient ostomy clinic   "

## 2020-10-20 NOTE — PROGRESS NOTES
Bedside report received.  Assessment completed.  A&O x 4.   On 0L O2. Denies SOB.  Denies N/V and new numbness or tingling.  Patient ambulates with standby assist. Bed alarm n/a.  Pain managed with prescribed medications.  Tolerating GI soft diet.  + void, + flatus, last BM 10/18. Ostomy has had very little output on 10/19  Surgical incisions: midline incision, LUQ ostomy, perineal sutures     Reviewed plan with of care with patient. Call light and personal belongings with in reach. Hourly rounding in place. All needs met at this time.

## 2020-10-20 NOTE — CARE PLAN
Problem: Discharge Barriers/Planning  Goal: Patient's continuum of care needs will be met  Outcome: PROGRESSING SLOWER THAN EXPECTED   Pain control and wound healing  Problem: Mobility  Goal: Risk for activity intolerance will decrease  Outcome: PROGRESSING SLOWER THAN EXPECTED   Patient educated on importance of mobilizing in hallway with staff assist more. Plans made to walk following meals.

## 2020-10-21 LAB
ANION GAP SERPL CALC-SCNC: 7 MMOL/L (ref 7–16)
BASOPHILS # BLD AUTO: 0.2 % (ref 0–1.8)
BASOPHILS # BLD: 0.01 K/UL (ref 0–0.12)
BUN SERPL-MCNC: 19 MG/DL (ref 8–22)
CALCIUM SERPL-MCNC: 8.4 MG/DL (ref 8.5–10.5)
CHLORIDE SERPL-SCNC: 106 MMOL/L (ref 96–112)
CO2 SERPL-SCNC: 24 MMOL/L (ref 20–33)
CREAT SERPL-MCNC: 0.77 MG/DL (ref 0.5–1.4)
EOSINOPHIL # BLD AUTO: 0.03 K/UL (ref 0–0.51)
EOSINOPHIL NFR BLD: 0.5 % (ref 0–6.9)
ERYTHROCYTE [DISTWIDTH] IN BLOOD BY AUTOMATED COUNT: 55 FL (ref 35.9–50)
GLUCOSE SERPL-MCNC: 105 MG/DL (ref 65–99)
HCT VFR BLD AUTO: 32.6 % (ref 37–47)
HGB BLD-MCNC: 10.8 G/DL (ref 12–16)
IMM GRANULOCYTES # BLD AUTO: 0.12 K/UL (ref 0–0.11)
IMM GRANULOCYTES NFR BLD AUTO: 2.2 % (ref 0–0.9)
LYMPHOCYTES # BLD AUTO: 1.13 K/UL (ref 1–4.8)
LYMPHOCYTES NFR BLD: 20.7 % (ref 22–41)
MCH RBC QN AUTO: 34.6 PG (ref 27–33)
MCHC RBC AUTO-ENTMCNC: 33.1 G/DL (ref 33.6–35)
MCV RBC AUTO: 104.5 FL (ref 81.4–97.8)
MONOCYTES # BLD AUTO: 0.49 K/UL (ref 0–0.85)
MONOCYTES NFR BLD AUTO: 9 % (ref 0–13.4)
NEUTROPHILS # BLD AUTO: 3.68 K/UL (ref 2–7.15)
NEUTROPHILS NFR BLD: 67.4 % (ref 44–72)
NRBC # BLD AUTO: 0 K/UL
NRBC BLD-RTO: 0 /100 WBC
PLATELET # BLD AUTO: 192 K/UL (ref 164–446)
PMV BLD AUTO: 9.1 FL (ref 9–12.9)
POTASSIUM SERPL-SCNC: 4 MMOL/L (ref 3.6–5.5)
RBC # BLD AUTO: 3.12 M/UL (ref 4.2–5.4)
SODIUM SERPL-SCNC: 137 MMOL/L (ref 135–145)
WBC # BLD AUTO: 5.5 K/UL (ref 4.8–10.8)

## 2020-10-21 PROCEDURE — 700111 HCHG RX REV CODE 636 W/ 250 OVERRIDE (IP): Performed by: SURGERY

## 2020-10-21 PROCEDURE — 85025 COMPLETE CBC W/AUTO DIFF WBC: CPT

## 2020-10-21 PROCEDURE — 97166 OT EVAL MOD COMPLEX 45 MIN: CPT

## 2020-10-21 PROCEDURE — 94640 AIRWAY INHALATION TREATMENT: CPT

## 2020-10-21 PROCEDURE — 770006 HCHG ROOM/CARE - MED/SURG/GYN SEMI*

## 2020-10-21 PROCEDURE — 36415 COLL VENOUS BLD VENIPUNCTURE: CPT

## 2020-10-21 PROCEDURE — 302094 BELT OSTOMY (HOLLISTER): Performed by: SURGERY

## 2020-10-21 PROCEDURE — A9270 NON-COVERED ITEM OR SERVICE: HCPCS | Performed by: SURGERY

## 2020-10-21 PROCEDURE — 80048 BASIC METABOLIC PNL TOTAL CA: CPT

## 2020-10-21 PROCEDURE — 97110 THERAPEUTIC EXERCISES: CPT

## 2020-10-21 PROCEDURE — 700102 HCHG RX REV CODE 250 W/ 637 OVERRIDE(OP): Performed by: SURGERY

## 2020-10-21 RX ADMIN — ACETAMINOPHEN 1000 MG: 500 TABLET ORAL at 05:43

## 2020-10-21 RX ADMIN — BUDESONIDE AND FORMOTEROL FUMARATE DIHYDRATE 2 PUFF: 160; 4.5 AEROSOL RESPIRATORY (INHALATION) at 19:52

## 2020-10-21 RX ADMIN — GLYCOPYRROLATE 1 CAPSULE: 15.6 CAPSULE RESPIRATORY (INHALATION) at 07:29

## 2020-10-21 RX ADMIN — GABAPENTIN 300 MG: 300 CAPSULE ORAL at 11:46

## 2020-10-21 RX ADMIN — ZOLPIDEM TARTRATE 5 MG: 5 TABLET ORAL at 22:13

## 2020-10-21 RX ADMIN — BUDESONIDE AND FORMOTEROL FUMARATE DIHYDRATE 2 PUFF: 160; 4.5 AEROSOL RESPIRATORY (INHALATION) at 07:29

## 2020-10-21 RX ADMIN — OXYCODONE 5 MG: 5 TABLET ORAL at 05:43

## 2020-10-21 RX ADMIN — OXYCODONE 5 MG: 5 TABLET ORAL at 09:48

## 2020-10-21 RX ADMIN — OXYCODONE 5 MG: 5 TABLET ORAL at 21:17

## 2020-10-21 RX ADMIN — ACETAMINOPHEN 1000 MG: 500 TABLET ORAL at 11:46

## 2020-10-21 RX ADMIN — ACETAMINOPHEN 1000 MG: 500 TABLET ORAL at 17:02

## 2020-10-21 RX ADMIN — DIGOXIN 125 MCG: 125 TABLET ORAL at 17:02

## 2020-10-21 RX ADMIN — GABAPENTIN 300 MG: 300 CAPSULE ORAL at 05:43

## 2020-10-21 RX ADMIN — ATORVASTATIN CALCIUM 80 MG: 80 TABLET, FILM COATED ORAL at 21:17

## 2020-10-21 RX ADMIN — GLYCOPYRROLATE 1 CAPSULE: 15.6 CAPSULE RESPIRATORY (INHALATION) at 19:52

## 2020-10-21 RX ADMIN — ENOXAPARIN SODIUM 40 MG: 40 INJECTION SUBCUTANEOUS at 08:12

## 2020-10-21 RX ADMIN — CELECOXIB 200 MG: 200 CAPSULE ORAL at 17:02

## 2020-10-21 RX ADMIN — OXYCODONE 5 MG: 5 TABLET ORAL at 14:25

## 2020-10-21 RX ADMIN — GABAPENTIN 300 MG: 300 CAPSULE ORAL at 17:02

## 2020-10-21 RX ADMIN — MONTELUKAST 10 MG: 10 TABLET, FILM COATED ORAL at 21:17

## 2020-10-21 RX ADMIN — CELECOXIB 200 MG: 200 CAPSULE ORAL at 05:43

## 2020-10-21 ASSESSMENT — PAIN DESCRIPTION - PAIN TYPE
TYPE: ACUTE PAIN
TYPE: SURGICAL PAIN

## 2020-10-21 ASSESSMENT — ACTIVITIES OF DAILY LIVING (ADL): TOILETING: INDEPENDENT

## 2020-10-21 ASSESSMENT — COGNITIVE AND FUNCTIONAL STATUS - GENERAL
MOBILITY SCORE: 18
SUGGESTED CMS G CODE MODIFIER MOBILITY: CK
MOVING FROM LYING ON BACK TO SITTING ON SIDE OF FLAT BED: A LITTLE
PERSONAL GROOMING: A LITTLE
EATING MEALS: A LITTLE
HELP NEEDED FOR BATHING: A LOT
DRESSING REGULAR LOWER BODY CLOTHING: A LOT
DRESSING REGULAR UPPER BODY CLOTHING: A LITTLE
MOVING TO AND FROM BED TO CHAIR: A LOT
CLIMB 3 TO 5 STEPS WITH RAILING: A LITTLE
STANDING UP FROM CHAIR USING ARMS: A LITTLE
TOILETING: A LITTLE
DAILY ACTIVITIY SCORE: 16
TURNING FROM BACK TO SIDE WHILE IN FLAT BAD: A LITTLE
SUGGESTED CMS G CODE MODIFIER DAILY ACTIVITY: CK

## 2020-10-21 ASSESSMENT — GAIT ASSESSMENTS
GAIT LEVEL OF ASSIST: MODIFIED INDEPENDENT
DISTANCE (FEET): 300
ASSISTIVE DEVICE: FRONT WHEEL WALKER
DEVIATION: BRADYKINETIC

## 2020-10-21 NOTE — THERAPY
Physical Therapy   Daily Treatment     Patient Name: Josy Grove  Age:  79 y.o., Sex:  female  Medical Record #: [id number]  Today's Date: 10/21/2020     Precautions: Fall Risk, Other (See Comments)(colostomy)    Assessment    Tolerated session well. Good control with dynamic activities with gait, and demonstrated good receptivity of pt education received. Bed mobility presents as primary limitation during session, pt able to complete with spv however significantly increased pain with activity. Pt verbalized owning a recliner at home to mitigate current bed mobility issue until stronger. Will follow with acute PT to address bed mobility, recommend home health given pt current level of function. Will follow.      Plan    Continue current treatment plan.    DC Equipment Recommendations: None  Discharge Recommendations: Recommend home health for continued physical therapy services         Objective       10/21/20 1210   Precautions   Precautions Fall Risk   Vitals   O2 (LPM) 0   O2 Delivery Device None - Room Air  (as found)   Pain 0 - 10 Group   Therapist Pain Assessment Post Activity Pain Same as Prior to Activity;Nurse Notified  (agreeable to session)   Cognition    Cognition / Consciousness WDL   Level of Consciousness Alert   Comments pleasant and cooperative   Passive ROM Lower Body   Passive ROM Lower Body WDL   Comments as observed with activity   Strength Lower Body   Lower Body Strength  WDL   Comments as observed with activity   Sensation Lower Body   Lower Extremity Sensation   Not Tested   Neuro-Muscular Treatments   Neuro-Muscular Treatments Other (See Comments)   Comments DGI elements: head turns left/right/up/down, eaving, stepping over obstacles, changes in speed   Balance   Sitting Balance (Static) Fair +   Sitting Balance (Dynamic) Fair +   Standing Balance (Static) Fair +   Standing Balance (Dynamic) Fair +   Weight Shift Sitting Fair   Weight Shift Standing Fair   Skilled Intervention Verbal  "Cuing;Compensatory Strategies;Sequencing   Comments No LOB in session, able to compelte dnamic activites with ambulation without issue, good control and mechanics   Gait Analysis   Gait Level Of Assist Modified Independent   Assistive Device Front Wheel Walker   Distance (Feet) 300   # of Times Distance was Traveled 1   Deviation Bradykinetic  (tends to walk within the FWW on the L side)   # of Stairs Climbed 3   Level of Assist with Stairs Supervised  (SBA)   Weight Bearing Status full   Vision Deficits Impacting Mobility denies   Skilled Intervention Verbal Cuing;Compensatory Strategies   Comments Good control throughout, pt verbalized \"this is how i walked before all this happened\".    Bed Mobility    Supine to Sit   (NT, found standing with RN)   Sit to Supine Supervised  (slow, increased perineal pain with this, used rail)   Skilled Intervention Verbal Cuing;Compensatory Strategies;Sequencing   Comments incresed pain with sit>supine, pt verbalized that is the hardest thing for her to do and her bed at home is higher up. Educated about sleeping in a recliner if possible for first couple of weeks until able to do bed mobility without issues. Pt agreed to this, stated she could also have her son build steps for her to get into bed. Educated about laying on side with pillow b/w knees to assist with perineal healing, mobility, and health   Functional Mobility   Sit to Stand   (NT, found standing with FWW from RN)   Short Term Goals    Short Term Goal # 1 Pt will perform bed mobility with supervision in 6 visits with flat bed, no rail.    Goal Outcome # 1 Progressing as expected   Short Term Goal # 2 Pt will transfer sit<>stand with FWW and supervision in 6 visits to improve functional indep.    Goal Outcome # 2 Progressing as expected   Short Term Goal # 3 Pt will ambulate s 250 feet using FWW with supervision in 6 visits to improve indep function.    Goal Outcome # 3 Goal met   Education Group   Gait Training " Patient Response Patient;Acceptance;Explanation;Verbal Demonstration;Action Demonstration   Stair Training Patient Response Patient;Acceptance;Explanation;Verbal Demonstration;Action Demonstration   Additional Comments not straining with BW's, not holding breath to reduce pressure in system, laying on side with pillows to improve vaginal health, sleeping in recliner

## 2020-10-21 NOTE — PROGRESS NOTES
Bedside report received.  Assessment completed.  A&O x 4.   On 0L O2. Denies SOB.  Denies N/V and new numbness or tingling.  Patient ambulates with standby assist with front wheel walker for longer ambulations. Bed alarm n/a.  Pain managed with prescribed medications.  Tolerating GI soft diet.  + void, + flatus, last BM 10/18. Ostomy has had very little output for a couple of days now  Surgical incisions: midline incision, LUQ ostomy, perineal sutures     Reviewed plan with of care with patient. Call light and personal belongings with in reach. Hourly rounding in place. All needs met at this time.

## 2020-10-21 NOTE — CARE PLAN
Problem: Bowel/Gastric:  Goal: Normal bowel function is maintained or improved  Outcome: PROGRESSING AS EXPECTED     Problem: Pain Management  Goal: Pain level will decrease to patient's comfort goal  Outcome: PROGRESSING AS EXPECTED     Problem: Mobility  Goal: Risk for activity intolerance will decrease  Outcome: PROGRESSING AS EXPECTED

## 2020-10-21 NOTE — PROGRESS NOTES
POD 6/2  No events  Pt 'a bit sore' at operative site in perineum  Abdomen is soft  Perineal incision looks good, no drainage, no sign of infection    A/P pt looks good, no issues from my standpoint.  OK to mobilize, shower and DC per Dr. Jeter.

## 2020-10-21 NOTE — CARE PLAN
Problem: Pain Management  Goal: Pain level will decrease to patient's comfort goal  Outcome: PROGRESSING SLOWER THAN EXPECTED   Patient will communicate pain level to nursing and nursing will provide both pharmacologic and non-pharmacological interventions as needed   Problem: Mobility  Goal: Risk for activity intolerance will decrease  Outcome: PROGRESSING SLOWER THAN EXPECTED   Patient will sit in chair for all meals and ambulate in hallway with staff ALYSSA

## 2020-10-21 NOTE — THERAPY
"Occupational Therapy   Initial Evaluation     Patient Name: Josy Grove  Age:  79 y.o., Sex:  female  Medical Record #: [id number]  Today's Date: 10/21/2020     Precautions  Precautions: Fall Risk, Other (See Comments)(colostomy)  Comments: s/p abdominoperineal resection    Assessment  Patient is 79 y.o. female with a diagnosis anal squamous cell carcinoma, s/p abdominoperineal resection. PMH significant for asthma, hiatal hernia. Pt demo increased pain, decreased activity tolerance, decreased strength, decreased balance, and decreased knowledge of LB ADL compensatory strategies, limiting pt's safety/indep with ADLs/light IADLs/mobility. Pt would benefit from skilled OT services in the acute care setting to address these deficits.     Plan    Recommend Occupational Therapy 3 times per week until therapy goals are met for the following treatments:  Adaptive Equipment, Community Re-integration, Neuro Re-Education / Balance, Self Care/Activities of Daily Living, Therapeutic Activities and Therapeutic Exercises.    DC Equipment Recommendations: Unable to determine at this time(pt may need LB dressing AE)  Discharge Recommendations: Recommend home health for continued occupational therapy services     Subjective    \"My sister will be coming to help\"     Objective     10/21/20 1422   Prior Living Situation   Prior Services None   Housing / Facility 1 Story House   Steps Into Home 1   Steps In Home 0   Bathroom Set up Walk In Shower;Bathtub / Shower Combination  (pt reports she prefers baths)   Equipment Owned Front-Wheel Walker;Single Point Cane;Grab Bar(s) In Tub / Shower   Lives with - Patient's Self Care Capacity Spouse   Comments Pt reports spouse still works 4 hours/day, and her sister will stay with her and provide 24/7 support   Prior Level of ADL Function   Self Feeding Independent   Grooming / Hygiene Independent   Bathing Independent   Dressing Independent   Toileting Independent   Prior Level of IADL Function "   Medication Management Independent   Laundry   ( assist)   Kitchen Mobility Independent   Finances Independent   Home Management   ( assist)   Shopping   ( assist)   Prior Level Of Mobility Independent Without Device in Home   Cognition    Cognition / Consciousness WDL   Level of Consciousness Alert   Comments pleasant and cooperative, motivated   Balance Assessment   Sitting Balance (Static) Fair +   Sitting Balance (Dynamic) Fair +   Standing Balance (Static) Fair +   Standing Balance (Dynamic) Fair +   Weight Shift Sitting Fair   Weight Shift Standing Fair   Comments FWW, limited by pain   Bed Mobility    Supine to Sit Supervised   Sit to Supine Supervised   Scooting Supervised   Comments increased time needed d/t increased pain   ADL Assessment   Grooming Supervision;Standing  (wash hands at sink)   Bathing   (pt educated on handheld showerhead for seated showers)   Lower Body Dressing   (Pt receptive to learning to use AE for LB dressing)   Toileting Supervision   Functional Mobility   Sit to Stand Supervised   Toilet Transfers Minimal Assist   Transfer Method Stand Step  (FWW)   Mobility bed>toilet>hallways>bed, FWW, min VCs for normal gait pattern as pt was intially taking baby steps to guard against pain   Activity Tolerance   Sitting Edge of Bed 2 mins   Standing 15 mins   Patient / Family Goals   Patient / Family Goal #1 to get better   Short Term Goals   Short Term Goal # 1 Pt will complete LB dressing at min A with AE x 5 sessions   Short Term Goal # 2 Pt will complete hygiene/grooming tasks standing at sink at supervision x 5 sessions   Anticipated Discharge Equipment and Recommendations   DC Equipment Recommendations Unable to determine at this time  (pt may need LB dressing AE)   Discharge Recommendations Recommend home health for continued occupational therapy services

## 2020-10-21 NOTE — WOUND TEAM
"[facility]  Inpatient Services  New Ostomy Management & Teaching    HPI:  Reviewed  PMH: Reviewed   SH: Reviewed    Subjective: \"I can do hands on today.\"    Objective: appliance intact, stool and flatus present    Colostomy 10/15/20 End/Segovia's Pouch LUQ (Active)   Stomal Appliance Assessment Intact    Stoma Assessment Beefy red;Edema    Stoma Shape Budded Greater Than One Inch;Round    Stoma Size (in) 1.63    Peristomal Assessment Intact    Mucocutaneous Junction Intact    Treatment Appliance Changed;Cleansed with water/washcloth    Peristomal Protectant Paste Ring    Stomal Appliance 2 1/4\" (57mm) CTF    Output (mL) 20 mL    Output Color Brown    WOUND RN ONLY - Stomal Appliance  2 Piece;2 1/4\" (57mm) CTF    Appliance (Pouch) # [id number]    Appliance Brand [place]    Appliance Supplier [employer]    Secure Start completed Yes    Ostomy Care Resources Provided [facility] Tip Sheet    WOUND NURSE ONLY - Time Spent with Patient (mins) 75       Interventions:  Patient wanting to do hands on today, no family at bedside today.  patient removed appliance by gently pushing skin away from barrier.  Noted barrier to not be leaking without paste ring.  Assisted her to clean stoma and skin with warm moist wash cloth and dried. [place] template used to measure stoma, offset to medial side, patient cut barrier to 1 5/8\".  Assisted her to cut edge so that the border would not be applied over stapled midline incision.  No paste ring applied to see if it is needed.  Discussed paste ring and why it wasn't used today.  Plan to use on Friday when patient supposed to DC.  Assisted her to apply barrier to skin, she rubbed to adhere and attached and closed pouch.  Catalog marked.       Pt education: Questions and concerns addressed.     Plan: Ostomy nurses to continue to follow for ostomy needs and teaching. Belt ordered.  Plan to DC Friday, patient will update RN if and when family can meet.     Anticipated discharge needs: " Supplies, supplier information, possible HH or outpatient ostomy clinic

## 2020-10-21 NOTE — PROGRESS NOTES
"Surgical Progress Note:    POD6 -   Robotic APR  Rectus myocutaneous flap perineal repair (by Dr. Reyes)  POD2 -  Debridement of necrotic skin and subcutaneous tissue, reconstruction of pelvic floor with rectus abdominis muscle flap, primary closure of skin    S:  - Feeling well, bit more pain today at perineum  -Tolerating diet  - Gas and soft stool within colostomy bag  - Pain well managed  - Ambulating well  - No chest pain, extremity pain, or difficulty breathing    Vitals:  BP (!) 92/54   Pulse 72   Temp 36.9 °C (98.4 °F) (Temporal)   Resp 16   Ht 1.6 m (5' 3\")   Wt 52 kg (114 lb 10.2 oz)   SpO2 91%   BMI 20.31 kg/m²     I/O:     Intake/Output Summary (Last 24 hours) at 10/16/2020 0853  Last data filed at 10/16/2020 0800  Gross per 24 hour   Intake 1479.9 ml   Output 1225 ml   Net 254.9 ml       P/E:  Constitutional: Appears well, no distress  Head/Neck: Normocephalic, atraumatic, neck supple  Cardiovascular: Normal rate and rhythm  Pulmonary/Chest: Normal respiratory effort, no wheezes  Abdominal: Soft, mildly tender, mild distension, incision(s) clean/dry/intact, stoma pink and productive with edema  Musculoskeletal: No deficits  Neurological:  Alert, oriented  Skin:  Warm and dry, no erythema  Extremities: No edema, no evidence of DVT  Perineum: mild serosang drainage, skin well approximated    Labs:  Recent Labs     10/19/20  0405 10/20/20  0629 10/21/20  0551   WBC 4.7* 5.6 5.5   RBC 3.32* 3.34* 3.12*   HEMOGLOBIN 11.2* 11.4* 10.8*   HEMATOCRIT 34.2* 34.7* 32.6*   .0* 103.9* 104.5*   MCH 33.7* 34.1* 34.6*   RDW 53.0* 53.7* 55.0*   PLATELETCT 161* 173 192   MPV 9.4 9.1 9.1   NEUTSPOLYS 66.90 84.80* 67.40   LYMPHOCYTES 20.10* 10.90* 20.70*   MONOCYTES 9.40 2.70 9.00   EOSINOPHILS 1.70 0.00 0.50   BASOPHILS 0.60 0.20 0.20     Recent Labs     10/19/20  0405 10/20/20  0629 10/21/20  0551   SODIUM 135 134* 137   POTASSIUM 3.9 4.5 4.0   CHLORIDE 107 106 106   CO2 23 22 24   GLUCOSE 94 137* 105*   BUN " 12 16 19         A/P:   - Soft/low fiber diet  - Incentive spirometry q1h while awake  - Ambulate at least QID in [place], up in chair for all meals, PT/OT consulted  - Multimodal analgesia, try to avoid narcotics  - Lovenox/SCDs  - Wound care consulted for stoma teaching/care  - Plan for DC home in 1-2 days - patient quite anxious about discharge home so will continue to support        Dulce Jeter M.D.  [facility]  [phone]

## 2020-10-22 PROCEDURE — 770006 HCHG ROOM/CARE - MED/SURG/GYN SEMI*

## 2020-10-22 PROCEDURE — 700102 HCHG RX REV CODE 250 W/ 637 OVERRIDE(OP): Performed by: SURGERY

## 2020-10-22 PROCEDURE — 94640 AIRWAY INHALATION TREATMENT: CPT

## 2020-10-22 PROCEDURE — A9270 NON-COVERED ITEM OR SERVICE: HCPCS | Performed by: SURGERY

## 2020-10-22 PROCEDURE — 700111 HCHG RX REV CODE 636 W/ 250 OVERRIDE (IP): Performed by: SURGERY

## 2020-10-22 RX ADMIN — ACETAMINOPHEN 1000 MG: 500 TABLET ORAL at 16:45

## 2020-10-22 RX ADMIN — GABAPENTIN 300 MG: 300 CAPSULE ORAL at 05:10

## 2020-10-22 RX ADMIN — GLYCOPYRROLATE 1 CAPSULE: 15.6 CAPSULE RESPIRATORY (INHALATION) at 19:32

## 2020-10-22 RX ADMIN — GABAPENTIN 300 MG: 300 CAPSULE ORAL at 16:46

## 2020-10-22 RX ADMIN — DIGOXIN 125 MCG: 125 TABLET ORAL at 16:46

## 2020-10-22 RX ADMIN — MONTELUKAST 10 MG: 10 TABLET, FILM COATED ORAL at 21:20

## 2020-10-22 RX ADMIN — BUDESONIDE AND FORMOTEROL FUMARATE DIHYDRATE 2 PUFF: 160; 4.5 AEROSOL RESPIRATORY (INHALATION) at 07:10

## 2020-10-22 RX ADMIN — CELECOXIB 200 MG: 200 CAPSULE ORAL at 16:45

## 2020-10-22 RX ADMIN — ZOLPIDEM TARTRATE 5 MG: 5 TABLET ORAL at 21:20

## 2020-10-22 RX ADMIN — OXYCODONE 5 MG: 5 TABLET ORAL at 09:16

## 2020-10-22 RX ADMIN — OXYCODONE 5 MG: 5 TABLET ORAL at 17:59

## 2020-10-22 RX ADMIN — ACETAMINOPHEN 1000 MG: 500 TABLET ORAL at 13:12

## 2020-10-22 RX ADMIN — ACETAMINOPHEN 1000 MG: 500 TABLET ORAL at 05:10

## 2020-10-22 RX ADMIN — GABAPENTIN 300 MG: 300 CAPSULE ORAL at 13:12

## 2020-10-22 RX ADMIN — OXYCODONE 5 MG: 5 TABLET ORAL at 13:12

## 2020-10-22 RX ADMIN — CELECOXIB 200 MG: 200 CAPSULE ORAL at 05:10

## 2020-10-22 RX ADMIN — OXYCODONE 5 MG: 5 TABLET ORAL at 22:13

## 2020-10-22 RX ADMIN — BUDESONIDE AND FORMOTEROL FUMARATE DIHYDRATE 2 PUFF: 160; 4.5 AEROSOL RESPIRATORY (INHALATION) at 19:32

## 2020-10-22 RX ADMIN — ATORVASTATIN CALCIUM 80 MG: 80 TABLET, FILM COATED ORAL at 21:20

## 2020-10-22 RX ADMIN — OXYCODONE 5 MG: 5 TABLET ORAL at 02:48

## 2020-10-22 RX ADMIN — GLYCOPYRROLATE 1 CAPSULE: 15.6 CAPSULE RESPIRATORY (INHALATION) at 07:10

## 2020-10-22 RX ADMIN — ENOXAPARIN SODIUM 40 MG: 40 INJECTION SUBCUTANEOUS at 09:16

## 2020-10-22 ASSESSMENT — PAIN DESCRIPTION - PAIN TYPE
TYPE: SURGICAL PAIN

## 2020-10-22 NOTE — PROGRESS NOTES
Received report from day shift RN. Assumed patient care  AOx4  Pain rated at 5/10, medicated per MAR  Room air  Left ostomy with appliance intact, +output +flatus  Midline incision with staples in place EFREN  Perineal incisions with sutures in place  No complaints of nausea at this time, tolerating regular diet  Ambulates SBA with FWW  +void  Call light within reach  Bed locked and in low position   POC discussed with patient  All needs met at this time.

## 2020-10-22 NOTE — PROGRESS NOTES
"Report received. Assessment complete. Pt A&O x4. Pleasant, up in chair for breakfast    Vital Signs: BP (!) 94/52   Pulse 92   Temp 36.8 °C (98.3 °F) (Oral)   Resp 18   Ht 1.6 m (5' 3\")   Wt 52 kg (114 lb 10.2 oz)   SpO2 98%   BMI 20.31 kg/m²     Pain: Patient has 5/10 pain. Pain managed with prescribed medications as needed.    Neuro: Denies numbness/tingling    Cardiac: Denies chest pain. Regular heart sounds    Vascular pulses +2 BUE, BLE. No edema noted    Respiratory: Patient denies SOB. On RA. Sating 90%<. Reinforced education on IS. Best 1500    GI: +output via ostomy. Patient performs ostomy care independently. Family and friends have been coming for teaching. Tolerating regular diet. Denies N/V.    : Patient voiding adequately.    MSK: Patient ambulates with x1 assist. Bed alarm NA. Patient calling appropriately    Skin: MLI EFREN with staples CDI. Ostomy LLQ stoma pink in tact. Incision to the rectum CDI EFREN with sutures pink, CDI no drainage    Discharge Barriers/Plan: Plan to DC tomorrow per patient request     Fall precautions in place. Treaded socks on. Bed locked in lowest position. Call light and personal belongings at bedside within reach. Reinforced use of call light to patient. Hourly rounding in place.  All needs met at this time and POC discussed.     "

## 2020-10-22 NOTE — PROGRESS NOTES
POD 7/3  No events  AVSS  Operative sites without signs of complication    A/P-Pt will be DC'd tomorrow.  Pt already has an appointment with Dr. Jeter next week, I will see her the following week.  No changes or recommendations from my standpoint.

## 2020-10-22 NOTE — DISCHARGE PLANNING
Care Transition Team Assessment    This RN ROSAURA met with Pt at bedside for this assessment. Pt verified accuracy of the Demographics. Pt lives with her  in a one story house. Per Pt her Sister will stay with her for a few weeks to be able to provide care and support. Pt has Prescription and Insurance coverage. Pt has a FWW at home.     Information Source  Orientation : Oriented x 4  Information Given By: Patient  Who is responsible for making decisions for patient? : Patient    Readmission Evaluation  Is this a readmission?: Yes - unplanned readmission  Was an appointment arranged for you prior to discharge?: Yes, attended appointment  Were there new prescriptions you were supposed to fill after you were discharged?: Yes, prescriptions filled  Did you understand your discharge instructions?: Yes  Did you have enough support after your last discharge?: Yes    Elopement Risk  Legal Hold: No  Ambulatory or Self Mobile in Wheelchair: No-Not an Elopement Risk  Disoriented: No  Psychiatric Symptoms: None  History of Wandering: No  Elopement this Admit: No  Vocalizing Wanting to Leave: No  Displays Behaviors, Body Language Wanting to Leave: No-Not at Risk for Elopement  Elopement Risk: Not at Risk for Elopement    Interdisciplinary Discharge Planning  Lives with - Patient's Self Care Capacity: Spouse  Patient or legal guardian wants to designate a caregiver: No  Support Systems: Spouse / Significant Other  Housing / Facility: 1 Story House  Do You Take your Prescribed Medications Regularly: Yes  Able to Return to Previous ADL's: Yes  Mobility Issues: No  Prior Services: None  Patient Prefers to be Discharged to:: (Home with Help)  Assistance Needed: Unknown at this Time  Durable Medical Equipment: Walker    Discharge Preparedness  What is your plan after discharge?: Home with help  What are your discharge supports?: Spouse  Prior Functional Level: Ambulatory, Independent with Activities of Daily Living, Independent  with Medication Management    Functional Assesment  Prior Functional Level: Ambulatory, Independent with Activities of Daily Living, Independent with Medication Management    Finances  Financial Barriers to Discharge: No  Prescription Coverage: Yes    Vision / Hearing Impairment  Vision Impairment : Yes  Right Eye Vision: Impaired, Wears Glasses  Left Eye Vision: Impaired, Wears Glasses  Hearing Impairment : No  Hearing Impairment: Both Ears, Hearing Device(s) Available     Advance Directive  Advance Directive?: DPOA for Health Care    Domestic Abuse  Have you ever been the victim of abuse or violence?: No  Physical Abuse or Sexual Abuse: No  Verbal Abuse or Emotional Abuse: No  Possible Abuse/Neglect Reported to:: Not Applicable    Psychological Assessment  History of Substance Abuse: None  History of Psychiatric Problems: No  Non-compliant with Treatment: No  Newly Diagnosed Illness: Yes    Discharge Risks or Barriers  Discharge risks or barriers?: Other (Pending Medical Clearance)    Anticipated Discharge Information  Discharge Disposition: D/T to home/self-care w/planned acute care hosp IP readmit (72)  Discharge Address: [address] [place] [address]  Discharge Contact Phone Number: [phone]

## 2020-10-22 NOTE — CARE PLAN
Problem: Safety  Goal: Will remain free from injury  Outcome: PROGRESSING AS EXPECTED     Problem: Pain Management  Goal: Pain level will decrease to patient's comfort goal  Outcome: PROGRESSING AS EXPECTED     Problem: Mobility  Goal: Risk for activity intolerance will decrease  Outcome: PROGRESSING AS EXPECTED

## 2020-10-22 NOTE — PROGRESS NOTES
"Surgical Progress Note:    POD7 -   Robotic APR  Rectus myocutaneous flap perineal repair (by Dr. Reyes)  POD3 -  Debridement of necrotic skin and subcutaneous tissue, reconstruction of pelvic floor with rectus abdominis muscle flap, primary closure of skin    S:  - Feeling well  -Tolerating diet  - Gas and soft stool within colostomy bag  - Pain well managed  - Ambulating well  - No chest pain, extremity pain, or difficulty breathing    Vitals:  /73   Pulse 76   Temp 36.8 °C (98.3 °F) (Temporal)   Resp 18   Ht 1.6 m (5' 3\")   Wt 52 kg (114 lb 10.2 oz)   SpO2 90%   BMI 20.31 kg/m²     I/O:     Intake/Output Summary (Last 24 hours) at 10/16/2020 0853  Last data filed at 10/16/2020 0800  Gross per 24 hour   Intake 1479.9 ml   Output 1225 ml   Net 254.9 ml       P/E:  Constitutional: Appears well, no distress  Head/Neck: Normocephalic, atraumatic, neck supple  Cardiovascular: Normal rate and rhythm  Pulmonary/Chest: Normal respiratory effort, no wheezes  Abdominal: Soft, mildly tender, mild distension, incision(s) clean/dry/intact, stoma pink and productive with edema  Musculoskeletal: No deficits  Neurological:  Alert, oriented  Skin:  Warm and dry, no erythema  Extremities: No edema, no evidence of DVT  Perineum: no drainage, skin well approximated    Labs:  Recent Labs     10/20/20  0629 10/21/20  0551   WBC 5.6 5.5   RBC 3.34* 3.12*   HEMOGLOBIN 11.4* 10.8*   HEMATOCRIT 34.7* 32.6*   .9* 104.5*   MCH 34.1* 34.6*   RDW 53.7* 55.0*   PLATELETCT 173 192   MPV 9.1 9.1   NEUTSPOLYS 84.80* 67.40   LYMPHOCYTES 10.90* 20.70*   MONOCYTES 2.70 9.00   EOSINOPHILS 0.00 0.50   BASOPHILS 0.20 0.20     Recent Labs     10/20/20  0629 10/21/20  0551   SODIUM 134* 137   POTASSIUM 4.5 4.0   CHLORIDE 106 106   CO2 22 24   GLUCOSE 137* 105*   BUN 16 19         A/P:   - Soft/low fiber diet  - Incentive spirometry q1h while awake  - Ambulate at least QID in [place], up in chair for all meals, PT/OT consulted  - Multimodal " analgesia, try to avoid narcotics  - Lovenox/SCDs  - Wound care consulted for stoma teaching/care  - Plan for DC home tomorrow - patient anxious about discharge home so will continue to support        Dulce Jeter M.D.  [facility]  [phone]

## 2020-10-22 NOTE — CARE PLAN
Problem: Communication  Goal: The ability to communicate needs accurately and effectively will improve  Outcome: PROGRESSING AS EXPECTED     Patient able to communicate needs effectively to staff     Problem: Safety  Goal: Will remain free from falls  Outcome: PROGRESSING AS EXPECTED     Patient educated to call for assistance before getting out of bed, verbalizes understanding.

## 2020-10-23 ENCOUNTER — PHARMACY VISIT (OUTPATIENT)
Dept: PHARMACY | Facility: MEDICAL CENTER | Age: 80
End: 2020-10-23
Payer: COMMERCIAL

## 2020-10-23 VITALS
HEIGHT: 63 IN | DIASTOLIC BLOOD PRESSURE: 53 MMHG | RESPIRATION RATE: 16 BRPM | OXYGEN SATURATION: 93 % | BODY MASS INDEX: 20.31 KG/M2 | TEMPERATURE: 98.6 F | SYSTOLIC BLOOD PRESSURE: 94 MMHG | WEIGHT: 114.64 LBS | HEART RATE: 75 BPM

## 2020-10-23 PROCEDURE — 94640 AIRWAY INHALATION TREATMENT: CPT

## 2020-10-23 PROCEDURE — 700111 HCHG RX REV CODE 636 W/ 250 OVERRIDE (IP): Performed by: SURGERY

## 2020-10-23 PROCEDURE — RXMED WILLOW AMBULATORY MEDICATION CHARGE: Performed by: SURGERY

## 2020-10-23 PROCEDURE — 700102 HCHG RX REV CODE 250 W/ 637 OVERRIDE(OP): Performed by: SURGERY

## 2020-10-23 PROCEDURE — A9270 NON-COVERED ITEM OR SERVICE: HCPCS | Performed by: SURGERY

## 2020-10-23 RX ORDER — OXYCODONE HYDROCHLORIDE 5 MG/1
5 TABLET ORAL EVERY 4 HOURS PRN
Qty: 20 TAB | Refills: 0 | Status: ON HOLD | OUTPATIENT
Start: 2020-10-23 | End: 2020-10-28 | Stop reason: SDUPTHER

## 2020-10-23 RX ORDER — GABAPENTIN 300 MG/1
300 CAPSULE ORAL 3 TIMES DAILY
Qty: 12 CAP | Refills: 0 | Status: SHIPPED | OUTPATIENT
Start: 2020-10-23 | End: 2020-10-27

## 2020-10-23 RX ADMIN — ENOXAPARIN SODIUM 40 MG: 40 INJECTION SUBCUTANEOUS at 09:14

## 2020-10-23 RX ADMIN — GABAPENTIN 300 MG: 300 CAPSULE ORAL at 13:04

## 2020-10-23 RX ADMIN — OXYCODONE 5 MG: 5 TABLET ORAL at 09:14

## 2020-10-23 RX ADMIN — OXYCODONE 5 MG: 5 TABLET ORAL at 05:05

## 2020-10-23 RX ADMIN — ACETAMINOPHEN 1000 MG: 500 TABLET ORAL at 05:05

## 2020-10-23 RX ADMIN — OXYCODONE 5 MG: 5 TABLET ORAL at 13:04

## 2020-10-23 RX ADMIN — GLYCOPYRROLATE 1 CAPSULE: 15.6 CAPSULE RESPIRATORY (INHALATION) at 07:43

## 2020-10-23 RX ADMIN — BUDESONIDE AND FORMOTEROL FUMARATE DIHYDRATE 2 PUFF: 160; 4.5 AEROSOL RESPIRATORY (INHALATION) at 07:44

## 2020-10-23 RX ADMIN — CELECOXIB 200 MG: 200 CAPSULE ORAL at 05:05

## 2020-10-23 RX ADMIN — GABAPENTIN 300 MG: 300 CAPSULE ORAL at 05:05

## 2020-10-23 RX ADMIN — ACETAMINOPHEN 1000 MG: 500 TABLET ORAL at 13:04

## 2020-10-23 ASSESSMENT — PATIENT HEALTH QUESTIONNAIRE - PHQ9
SUM OF ALL RESPONSES TO PHQ9 QUESTIONS 1 AND 2: 0
1. LITTLE INTEREST OR PLEASURE IN DOING THINGS: NOT AT ALL

## 2020-10-23 ASSESSMENT — PAIN DESCRIPTION - PAIN TYPE
TYPE: SURGICAL PAIN

## 2020-10-23 NOTE — DISCHARGE PLANNING
Meds-to-Beds: Discharge prescription orders listed below delivered to patient's bedside. RN Dimas notified. Patient and patient's SO counseled. Patient's SO presented valid photo ID for oxycodone. Patient elected to have co-payment billed to patient account.       [place]   Home Medication Instructions LISSETH:[id number]    Printed on:10/23/20 1151   Medication Information                      gabapentin (NEURONTIN) 300 MG Cap  Take 1 Capsule by mouth 3 times a day for 4 days.             oxyCODONE immediate-release (ROXICODONE) 5 MG Tab  Take 1 Tab by mouth every four hours as needed for up to 7 days.               Laura Anguiano, PharmD

## 2020-10-23 NOTE — PROGRESS NOTES
Discharging Patient home per physician order.  Discharged with  and son.  Demonstrated understanding of discharge instructions, follow up appointments, home medications, prescriptions, home care for surgical wound, and nursing care instructions for ostomy care,.  Ambulating SB assistance, voiding without difficulty, pain well controlled, tolerating oral medications, oxygen saturation greater than 90% , tolerating diet.   Educational handouts  given and discussed.  Verbalized understanding of discharge instructions and educational handouts.  All questions answered.  Belongings with patient at time of discharge.

## 2020-10-23 NOTE — DISCHARGE INSTRUCTIONS
Surgery Discharge Instructions:    1. DIET: Upon discharge from the hospital you may resume your normal preoperative diet. Depending on how you are feeling and whether you have nausea or not, you may wish to stay with a bland diet for the first few days. However, you can advance your diet as quickly as you feel ready.    2. ACTIVITIES: You have a ten pound weight lifting restriction for four to six weeks after surgery.  This means no lifting anything heavier than a gallon of milk.  Routine activities such as walking and using the stairs are safe.  You may sleep in any position that is comfortable. Avoid strenuous activities and exercise that involves twisting, bending, and, running.    3. DRIVING: You may drive whenever you are off pain medications and are able to perform the activities needed to drive, i.e. turning, bending, twisting, wearing a seat belt, etc.    4. BATHING: You may shower two days following your surgery, but do not submerge in a bath or a pool until you after your first postoperative visit.    5. BOWEL FUNCTION: You may develop either frequent or loose stools after meals. This usually corrects itself after a few days, to a few weeks.    You may develop constipation. The combination of pain medication and decreased activity level can cause constipation in otherwise normal patients. If you feel this is occurring, increase your fluid intake and take an over the counter laxative (Milk of Magnesia, Miralax, or Magnesium Citrate).  If this is not successful, take an over the counter Fleet enema and repeat the laxative until effect.    6. PAIN MEDICATION: You may be given a prescription for pain medication at discharge. Please take these as directed. It is important to remember not to take medications on an empty stomach as this may cause nausea.  Wean the use of pain medication as soon as possible.  If narcotics were prescribed, try to avoid their use and try non-narcotic medications, such as tylenol  first.    7. CALL IF YOU HAVE: (1) Fevers of more than 101 F (38.5 C), (2) New chest or leg pain, (3) Worsening abdominal pain, (4) Persistent vomiting, (5) Large quantity bleeding, (6) Drainage from incision that is foul smelling, increased pain at the wound, wound edges that have pulled apart, redness or swelling at the incision site. Please do not hesitate to call with any other questions.     8. FOLLOW-UP APPOINTMENT: Contact my office at [phone] for a follow-up appointment in 1 to 2 weeks following your procedure.    If you have any additional questions, please do not hesitate to call the office and speak to either myself or the physician on call.    Office address:  [address] [place], [place] [address]e [facility], [address] [place] [address]      Dulce Jeter M.D.   [facility]  General Surgery  Colon and Rectal Surgeon              Anal Cancer    Anal cancer is a disease where cancer cells form in the tissue of the anus. The anus the last part of the large intestine, where stool leaves the body. The most common type of anal cancer is called squamous cell carcinoma.  What are the causes?  The cause of this condition is not known.  What increases the risk?  You are more likely to develop this condition if:  · You have HPV (human papillomavirus).  · You engage in sexual practices that increase your risk of HPV. These include:  ? Having many sexual partners.  ? Having anal sex.  ? Being male and having sex with other males.  · You smoke cigarettes.  · You have a history of cervical, vaginal, or vulvar cancer.  · You have a weakened immune system due to:  ? Medicines called immunosuppressants.  ? Infection with HIV (human immunodeficiency virus) or AIDS (acquired immunodeficiency syndrome).  · You have an autoimmune disorder like Crohn disease or psoriasis.  · You have a history of STIs (sexually transmitted infections).  What are the signs or symptoms?  Symptoms of this condition include:  · Pain or pressure around the  anus.  · Bleeding from the anus or rectum.  · A lump near the anus.  · A change in bowel habits.  · Itching around the anus.  · Discharge from the anus.  · Swollen lymph nodes around the anus or groin.  How is this diagnosed?  This condition is diagnosed based on a review of your medical history and the results of a physical exam and tests. Exams and tests may include:  · A digital rectal exam. During this exam, a gloved, lubricated finger is inserted into the anus and rectum to check for lumps.  · Anoscopy. During this test, a hollow tube is inserted into the anus and rectum. A health care provider looks through the tube for lumps and signs of disease.  · Proctoscopy. During this test, a lighted, hollow tube is inserted into the rectum. A health care provider looks through this tube to see the lower part of the colon.  · Endoanal or endorectal ultrasound. During this test, a small probe is inserted into the anus or rectum.  · Biopsy. During this test, a tissue sample is taken from the anus and rectum to be examined under a microscope for signs of cancer.  Your health care provider may refer you to an expert who specializes in diagnosing and treating anal cancer. If you are diagnosed with cancer, you may need to have more tests to see if the cancer has spread to other parts of the body. These tests can include:  · X-ray.  · CT scan.  · MRI.  · PET scan.  How is this treated?  Treatment for this condition depends on where the tumor is, the type of cancer, and how much the cancer has spread in the body (the stage of the cancer). Treatment can include any combination of the following:  · Surgery. This may be done to remove the tumor and any lymph nodes that are infected with cancer. If the cancer is severe, surgery may be done to remove the anus, rectum, and part of the colon.  · Radiation therapy. This treatment uses high energy radiation or X-rays to kill cancer cells or stop them from growing.  · Chemotherapy. This  treatment involves drugs that kill cancer cells or stop them from growing.  Follow these instructions at home:  Learning about your cancer  · Learn about your cancer and your treatment options. Make sure you understand the potential side effects of treatment.  · Ask about getting a second opinion. This can help you make a more informed decision about your treatment options.  Eating and drinking  · Drink enough fluid to keep your urine clear or pale yellow.  · Limit alcohol intake to no more than 1 drink a day for nonpregnant women and 2 drinks a day for men. One drink equals 12 oz of beer, 5 oz of wine, or 1½ oz of hard liquor.  · Eat a healthy diet. When planning meals:  ? Aim to get 2 ½ cups of fruits and vegetables a day.  ? Choose high-fiber foods like whole-grain breads and cereals.  Activity  · During and after treatment, return to your normal activities as told by your health care provider. Ask your health care provider what activities are safe for you.  · Exercise regularly. Aim for 30 minutes of moderate-intensity exercise 5 days a week, such as walking or yoga.  · Talk with your health care provider before starting any exercise routine. This is important.  General instructions    · Take over-the-counter and prescription medicines only as told by your health care provider.  · Do not use any products that contain nicotine or tobacco, such as cigarettes and e-cigarettes. If you need help quitting, ask your health care provider.  · Keep all follow-up visits as told by your health care provider. This is important.  How is this prevented?  You cannot prevent this condition completely, but you can lower your risk of developing it by:  · Getting the HPV vaccine.  · Avoiding infection with HPV and HIV. You can do this by:  ? Limiting your number of sexual partners.  ? Using protection, like condoms, during all sexual activity. Note that condoms cannot completely protect you from HPV.  · Not smoking. If you need  help quitting, ask your health care provider.  Contact a health care provider if:  · You have bleeding or discharge from your anus.  · You have pain or pressure near your anus.  · You have a change in bowel habits or diarrhea.  · You have nausea or vomiting.  Get help right away if:  · You have a fever.  · You have chest pains or shortness of breath.  · You have a severe headache with a stiff neck.  · You have bloody or cloudy urine.  · You are confused.  · You have any swelling in your legs or arms or around a wound.  Summary  · Anal cancer is a disease where cancer cells form in the tissue of the anus.  · You are more likely to develop anal cancer if you are infected with HPV, you smoke cigarettes, or if you have a weakened immune system due to medicines, HIV, or AIDS.  · Treatment for anal cancer can include surgery, radiation therapy, or chemotherapy.  · You can lower your risk for anal cancer by getting the HPV vaccine, not smoking, and avoiding infection with HPV and HIV.  This information is not intended to replace advice given to you by your health care provider. Make sure you discuss any questions you have with your health care provider.  Document Released: 11/06/2017 Document Revised: 11/06/2017 Document Reviewed: 11/06/2017  [employer] Patient Education © 2020 [employer]            Colostomy Home Guide, Adult    Colostomy surgery is done to create an opening in the front of the abdomen for stool (feces) to leave the body through an ostomy (stoma). Part of the large intestine is attached to the stoma. A bag, also called a pouch, is fitted over the stoma. Stool and gas will collect in the bag.  After surgery, you will need to empty and change your colostomy bag as needed. You will also need to care for your stoma.  How to care for the stoma  Your stoma should look pink, red, and moist, like the inside of your cheek. Soon after surgery, the stoma may be swollen, but this swelling will go away within 6  weeks. To care for the stoma:  · Keep the skin around the stoma clean and dry.  · Use a clean, soft washcloth to gently wash the stoma and the skin around it. Clean using a circular motion, and wipe away from the stoma opening, not toward it.  ? Use warm water and only use cleansers recommended by your health care provider.  ? Rinse the stoma area with plain water.  ? Dry the area around the stoma well.  · Use stoma powder or ointment on your skin only as told by your health care provider. Do not use any other powders, gels, wipes, or creams on the skin around the stoma.  · Check the stoma area every day for signs of infection. Check for:  ? New or worsening redness, swelling, or pain.  ? New or increased fluid or blood.  ? Pus or warmth.  · Measure the stoma opening regularly and record the size. Watch for changes. (It is normal for the stoma to get smaller as swelling goes away.) Share this information with your health care provider.  How to empty the colostomy bag    Empty your bag at bedtime and whenever it is one-third to one-half full. Do not let the bag get more than half-full with stool or gas. The bag could leak if it gets too full. Some colostomy bags have a built-in gas release valve that releases gas often throughout the day.  Follow these basic steps:  1. Wash your hands with soap and water.  2. Sit far back on the toilet seat.  3. Put several pieces of toilet paper into the toilet water. This will prevent splashing as you empty stool into the toilet.  4. Remove the clip or the hook-and-loop fastener from the tail end of the bag.  5. Unroll the tail, then empty the stool into the toilet.  6. Clean the tail with toilet paper or a moist towelette.  7. Reroll the tail, and close it with the clip or the hook-and-loop fastener.  8. Wash your hands again.  How to change the colostomy bag  Change your bag every 3-4 days or as often as told by your health care provider. Also change the bag if it is leaking or   from the skin, or if your skin around the stoma looks or feels irritated. Irritated skin may be a sign that the bag is leaking.  Always have colostomy supplies with you, and follow these basic steps:  1. Wash your hands with soap and water. Have paper towels or tissues nearby to clean any discharge.  2. Remove the old bag and skin barrier. Use your fingers or a warm cloth to gently push the skin away from the barrier.  3. Clean the stoma area with water or with mild soap and water, as directed. Use water to rinse away any soap.  4. Dry the skin. You may use the cool setting on a hair dryer to do this.  5. Use a tracing pattern (template) to cut the skin barrier to the size needed.  6. If you are using a two-piece bag, attach the bag and the skin barrier to each other. Add the barrier ring, if you use one.  7. If directed, apply stoma powder or skin barrier gel to the skin.  8. Warm the skin barrier with your hands, or blow with a hair dryer for 5-10 seconds.  9. Remove the paper from the adhesive strip of the skin barrier.  10. Press the adhesive strip onto the skin around the stoma.  11. Gently rub the skin barrier onto the skin. This creates heat that helps the barrier to stick.  12. Apply stoma tape to the edges of the skin barrier, if desired.  13. Wash your hands again.  General recommendations  · Avoid wearing tight clothes or having anything press directly on your stoma or bag. Change your clothing whenever it is soiled or damp.  · You may shower or bathe with the bag on or off. Do not use harsh or oily soaps or lotions. Dry the skin and bag after bathing.  · Store all supplies in a cool, dry place. Do not leave supplies in extreme heat because some parts can melt or not stick as well.  · Whenever you leave home, take extra clothing and an extra skin barrier and bag with you.  · If your bag gets wet, you can dry it with a hair dryer on the cool setting.  · To prevent odor, you may put drops of  ostomy deodorizer in the bag.  · If recommended by your health care provider, put ostomy lubricant inside the bag. This helps stool to slide out of the bag more easily and completely.  Contact a health care provider if:  · You have new or worsening redness, swelling, or pain around your stoma.  · You have new or increased fluid or blood coming from your stoma.  · Your stoma feels warm to the touch.  · You have pus coming from your stoma.  · Your stoma extends in or out farther than normal.  · You need to change your bag every day.  · You have a fever.  Get help right away if:  · Your stool is bloody.  · You have nausea or you vomit.  · You have trouble breathing.  Summary  · Measure your stoma opening regularly and record the size. Watch for changes.  · Empty your bag at bedtime and whenever it is one-third to one-half full. Do not let the bag get more than half-full with stool or gas.  · Change your bag every 3-4 days or as often as told by your health care provider.  · Whenever you leave home, take extra clothing and an extra skin barrier and bag with you.  This information is not intended to replace advice given to you by your health care provider. Make sure you discuss any questions you have with your health care provider.  Document Released: 12/20/2004 Document Revised: 04/08/2020 Document Reviewed: 06/13/2018  [employer] Patient Education © 2020 [employer]            Colostomy Surgery, Adult, Care After    This sheet gives you information about how to care for yourself after your procedure. Your health care provider may also give you more specific instructions. If you have problems or questions, contact your health care provider.  What can I expect after the procedure?  After the procedure, it is common to have:  · Swelling at the opening that was created during the procedure (stoma).  · Slight bleeding around the stoma.  · Redness around the stoma.  Follow these instructions at home:  Activity  · Rest as  needed while the stoma area heals.  · Return to your normal activities as told by your health care provider. Ask your health care provider what activities are safe for you.  · Avoid strenuous activity and abdominal exercises for 3 weeks or for as long as told by your health care provider.  · Do not lift anything that is heavier than 10 lb (4.5 kg), or the limit that you are told, until your health care provider says that it is safe.  Incision care  · Follow instructions from your health care provider about how to take care of your incision. Make sure you:  ? Wash your hands with soap and water before you change your bandage (dressing). If soap and water are not available, use hand .  ? Change your dressing as told by your health care provider.  ? Leave stitches (sutures), skin glue, or adhesive strips in place. These skin closures may need to stay in place for 2 weeks or longer. If adhesive strip edges start to loosen and curl up, you may trim the loose edges. Do not remove adhesive strips completely unless your health care provider tells you to do that.  Stoma care  · Keep the stoma area clean.  · Clean and dry the skin around the stoma each time you change the colostomy bag. To clean the stoma area:  ? Use warm water and only use cleansers that are recommended by your health care provider.  ? Rinse the stoma area with plain water.  ? Dry the area well.  · Use stoma powder or skin barrier film on your skin only as told by your health care provider. Do not use any other powders, gels, wipes, or creams on the skin around the stoma.  · Check the stoma area every day for signs of infection. Check for:  ? More redness, swelling, or pain.  ? More fluid or blood.  ? Pus or warmth.  · Measure the stoma opening regularly and record the size. Watch for changes. Share this information with your health care provider.  Bathing  · Do not take baths, swim, or use a hot tub until your health care provider approves. Ask  your health care provider if you may take showers. You may be able to shower with or without the colostomy bag in place. If you bathe with the bag on, dry the bag afterward.  · Avoid using harsh or oily soaps when you bathe.  Colostomy bag care  · Follow instructions from your health care provider about how to empty or change the colostomy bag.  · Keep colostomy supplies with you at all times.  · Store all supplies in a cool, dry place.  · Empty the colostomy bag:  ? Whenever it is one-third to one-half full.  ? At bedtime.  · Replace the bag every 3-4 days for the first 6 weeks, then every 4-7 days.  Driving  · Follow driving restrictions as told by your health care provider.  · Do not drive or use heavy machinery while taking prescription pain medicine.  General instructions  · Follow instructions from your health care provider about eating or drinking restrictions.  · Take over-the-counter and prescription medicines only as told by your health care provider.  · Avoid wearing clothes that are tight directly over your stoma area.  · Do not use any products that contain nicotine or tobacco, such as cigarettes and e-cigarettes. If you need help quitting, ask your health care provider.  · If you are a woman, ask your health care provider about becoming pregnant and about using birth control. Medicines may not be absorbed normally after the procedure.  · Keep all follow-up visits as told by your health care provider. This is important.  Contact a health care provider if you have:  · Trouble caring for your stoma or changing the colostomy bag.  · Nausea or vomiting.  · A fever.  · More redness, swelling, or pain at the site of your stoma or around your anus.  · More fluid or blood coming from your stoma or your anus.  · Warmth around your stoma area.  · Pus coming from your stoma.  · A change in the size or appearance of the stoma.  · Abdominal pain, bloating, pressure, or cramping.  · Stool more often or less often  than your health care provider tells you to expect.  · Very little urine production. This may be a sign of dehydration.  Get help right away if you have:  · Abdominal pain that does not go away or becomes severe.  · Frequent vomiting.  · No stool draining through the stoma.  · Chest pain or an irregular heartbeat.  Summary  · Follow instructions from your health care provider about how to take care of your incision and stoma.  · Contact a health care provider if you have trouble caring for your stoma or changing the colostomy bag.  · Get help right away if you have abdominal pain that does not go away or becomes severe or if you have no stool draining through the stoma.  · Keep all follow-up visits as told by your health care provider. This is important.  This information is not intended to replace advice given to you by your health care provider. Make sure you discuss any questions you have with your health care provider.  Document Released: 05/09/2012 Document Revised: 04/16/2019 Document Reviewed: 04/16/2019  [employer] Patient Education © 2020 [employer]          Discharge Instructions    Discharged to home by car with relative. Discharged via wheelchair, hospital escort: Yes.  Special equipment needed: Not Applicable    Be sure to schedule a follow-up appointment with your primary care doctor or any specialists as instructed.     Discharge Plan:   Diet Plan: Discussed  Activity Level: Discussed  Confirmed Follow up Appointment: Patient to Call and Schedule Appointment  Confirmed Symptoms Management: Discussed  Medication Reconciliation Updated: Yes  Influenza Vaccine Indication: Not indicated: Previously immunized this influenza season and > 8 years of age    I understand that a diet low in cholesterol, fat, and sodium is recommended for good health. Unless I have been given specific instructions below for another diet, I accept this instruction as my diet prescription.       Depression / Suicide Risk    As  you are discharged from this [facility] facility, it is important to learn how to keep safe from harming yourself.    Recognize the warning signs:  · Abrupt changes in personality, positive or negative- including increase in energy   · Giving away possessions  · Change in eating patterns- significant weight changes-  positive or negative  · Change in sleeping patterns- unable to sleep or sleeping all the time   · Unwillingness or inability to communicate  · Depression  · Unusual sadness, discouragement and loneliness  · Talk of wanting to die  · Neglect of personal appearance   · Rebelliousness- reckless behavior  · Withdrawal from people/activities they love  · Confusion- inability to concentrate     If you or a loved one observes any of these behaviors or has concerns about self-harm, here's what you can do:  · Talk about it- your feelings and reasons for harming yourself  · Remove any means that you might use to hurt yourself (examples: pills, rope, extension cords, firearm)  · Get professional help from the community (Mental Health, Substance Abuse, psychological counseling)  · Do not be alone:Call your Safe Contact- someone whom you trust who will be there for you.  · Call your local CRISIS HOTLINE 526-7110 or 689-814-0789  · Call your local Children's Mobile Crisis Response Team [place] (302) 013-6619 or www.[employer]  · Call the toll free National Suicide Prevention Hotlines   · National Suicide Prevention Lifeline 594-768-GVIL (6098)  · National Hope Line Network 800-SUICIDE (342-6073)

## 2020-10-23 NOTE — CARE PLAN
Problem: Safety  Goal: Will remain free from injury  Outcome: PROGRESSING AS EXPECTED     Problem: Infection  Goal: Will remain free from infection  Outcome: PROGRESSING AS EXPECTED     Problem: Bowel/Gastric:  Goal: Normal bowel function is maintained or improved  Outcome: PROGRESSING AS EXPECTED     Problem: Discharge Barriers/Planning  Goal: Patient's continuum of care needs will be met  Outcome: PROGRESSING AS EXPECTED     Problem: Pain Management  Goal: Pain level will decrease to patient's comfort goal  Outcome: PROGRESSING AS EXPECTED     Problem: Mobility  Goal: Risk for activity intolerance will decrease  Outcome: PROGRESSING AS EXPECTED

## 2020-10-23 NOTE — PROGRESS NOTES
"Report received. Assessment complete. Pt A&O x4. Pleasant, up in chair for breakfast    Vital Signs: BP (!) 94/53 Comment: nurse notified  Pulse 75   Temp 37 °C (98.6 °F) (Temporal)   Resp 16   Ht 1.6 m (5' 3\")   Wt 52 kg (114 lb 10.2 oz)   SpO2 93%   BMI 20.31 kg/m²     Pain: Patient has 3/10 pain. Pain managed with prescribed medications as needed.    Neuro: Denies numbness/tingling    Cardiac: Denies chest pain. Regular heart sounds    Vascular pulses +2 BUE, BLE. No edema noted    Respiratory: Patient denies SOB. On RA. Sating 90%<. Reinforced education on IS. Best 1500    GI: +output via ostomy. Thick, brown. Encouraged more hydration. Patient performs ostomy care independently. Family and friends have been coming for teaching. Tolerating regular diet. Denies N/V.    : Patient voiding adequately.    MSK: Patient ambulates with SB assist w/ FWW. Bed alarm NA. Patient calling appropriately    Skin: MLI EFREN with staples CDI. Ostomy LLQ stoma pink in tact. Incision to the rectum CDI EFREN with sutures pink, CDI no drainage    Discharge Barriers/Plan: Patient's son and  will come in for ostomy teaching between 11-12 then plan to DC home afterwards     Fall precautions in place. Treaded socks on. Bed locked in lowest position. Call light and personal belongings at bedside within reach. Reinforced use of call light to patient. Hourly rounding in place.  All needs met at this time and POC discussed.     "

## 2020-10-23 NOTE — PROGRESS NOTES
Received report from day shift RN. Assumed patient care  AOx4  Pain rated at 7/10, medicated per MAR  Room air  Left ostomy with appliance intact, +output +flatus  Midline incision with staples in place EFREN  Perineal incisions with sutures in place EFREN  No complaints of nausea at this time, tolerating regular diet  Ambulates SBA with FWW  +void  Call light within reach  Bed locked and in low position   POC discussed with patient  All needs met at this time.

## 2020-10-24 NOTE — WOUND TEAM
"[facility]  Inpatient Services  New Ostomy Management & Teaching    HPI:  Reviewed  PMH: Reviewed   SH: Reviewed    Subjective: \"I don't like the bag straight down. Can I move it?\"    Objective: appliance intact, stool and flatus present    Colostomy 10/15/20 End/Segovia's Pouch LUQ (Active)   Stomal Appliance Assessment Clean;Dry;Intact;Changed 10/23/20 1100   Stoma Assessment Red;Edema 10/23/20 1100   Stoma Shape Budded Greater Than One Inch;Round 10/23/20 1100   Stoma Size (in) 1.7 10/23/20 1100   Peristomal Assessment Intact 10/23/20 1100   Mucocutaneous Junction Intact 10/23/20 1100   Treatment Appliance Changed;Cleansed with water/washcloth;Site care;Removed appliance with adhesive remover 10/23/20 1100   Peristomal Protectant Paste Ring 10/16/20 1600   Stomal Appliance 2 1/4\" (57mm) CTF 10/23/20 1100   Output (mL) 50 mL 10/23/20 1100   Output Color Brown 10/23/20 1100   WOUND RN ONLY - Stomal Appliance  2 Piece;Transparent Pouch Lock & Roll;2 1/4\" (57mm) CTF 10/23/20 1100   Appliance (Pouch) # [id number] 10/23/20 1100   Appliance Brand Wanda 10/23/20 1100   Appliance Supplier M[employer] 10/23/20 1100   Secure Start completed Yes 10/23/20 1100   Ostomy Care Resources Provided [facility] Tip Sheet 10/23/20 1100        Interventions: Patient's on and  at bedside for ostomy teaching.  performed all of ostomy care while son made a video for later reference.  removed appliance by using push-pull method and universal adhesive remover wipes. The use of wipes was demonstrated to  before he did it.  cleaned stoma and skin with warm moist wash cloth and then dried it. He needed a little bit of help getting some stool that was hidden by stoma from the mc junction. [place] template used to measure stoma, offset to medial side,  cut barrier to 1 5/8\".   Discussed paste ring and why it wasn't used today, that patient can use paste ring if she likes but since stool thickening " up she does not have to.  applied barrier to skin, rubbed to adhere and attached and closed pouch. Pointed end of pouch down patient's left thigh and patient did not feel comfortable with that. With encouragement patient removed pouch from barrier and snapped it back on with the end pointed out to left side.  Catalog marked.  Showed them the phone number to call to place their order and to do so on Monday. Made sure they had enough appliance to go home with and they had 6 sets of pouches and barriers in their supply bag. 5 paste rings in bag, No Sting in bag, ostomy belt in bag.      Pt education: Questions and concerns addressed. Patient and her family engaged in learning, asked excellent and pertinent questions.  nervous about hurting patient but otherwise did and excellent job at ostomy care.     Plan: Patient discharging today. She has a friend who has had an ostomy for 20 years who is going to come and give her advice and pointers. Family very supportive.    Anticipated discharge needs: Supplies provided, supplier information given and products marked in catalog. Family and friend support and help.

## 2020-10-26 ENCOUNTER — APPOINTMENT (OUTPATIENT)
Dept: RADIOLOGY | Facility: MEDICAL CENTER | Age: 80
DRG: 392 | End: 2020-10-26
Attending: EMERGENCY MEDICINE
Payer: MEDICARE

## 2020-10-26 ENCOUNTER — HOSPITAL ENCOUNTER (INPATIENT)
Facility: MEDICAL CENTER | Age: 80
LOS: 1 days | DRG: 392 | End: 2020-10-28
Attending: EMERGENCY MEDICINE | Admitting: INTERNAL MEDICINE
Payer: MEDICARE

## 2020-10-26 DIAGNOSIS — G89.18 POST-OP PAIN: ICD-10-CM

## 2020-10-26 DIAGNOSIS — R10.9 ABDOMINAL PAIN, UNSPECIFIED ABDOMINAL LOCATION: ICD-10-CM

## 2020-10-26 DIAGNOSIS — K59.00 CONSTIPATION, UNSPECIFIED CONSTIPATION TYPE: ICD-10-CM

## 2020-10-26 LAB
ALBUMIN SERPL BCP-MCNC: 3.4 G/DL (ref 3.2–4.9)
ALBUMIN/GLOB SERPL: 0.9 G/DL
ALP SERPL-CCNC: 102 U/L (ref 30–99)
ALT SERPL-CCNC: 24 U/L (ref 2–50)
ANION GAP SERPL CALC-SCNC: 14 MMOL/L (ref 7–16)
APPEARANCE UR: CLEAR
AST SERPL-CCNC: 17 U/L (ref 12–45)
BACTERIA #/AREA URNS HPF: NEGATIVE /HPF
BASOPHILS # BLD AUTO: 0.3 % (ref 0–1.8)
BASOPHILS # BLD: 0.04 K/UL (ref 0–0.12)
BILIRUB SERPL-MCNC: 0.4 MG/DL (ref 0.1–1.5)
BILIRUB UR QL STRIP.AUTO: NEGATIVE
BUN SERPL-MCNC: 15 MG/DL (ref 8–22)
CALCIUM SERPL-MCNC: 9.8 MG/DL (ref 8.5–10.5)
CHLORIDE SERPL-SCNC: 103 MMOL/L (ref 96–112)
CO2 SERPL-SCNC: 22 MMOL/L (ref 20–33)
COLOR UR: YELLOW
CREAT SERPL-MCNC: 0.72 MG/DL (ref 0.5–1.4)
EOSINOPHIL # BLD AUTO: 0.02 K/UL (ref 0–0.51)
EOSINOPHIL NFR BLD: 0.2 % (ref 0–6.9)
EPI CELLS #/AREA URNS HPF: NORMAL /HPF
ERYTHROCYTE [DISTWIDTH] IN BLOOD BY AUTOMATED COUNT: 54 FL (ref 35.9–50)
GLOBULIN SER CALC-MCNC: 3.9 G/DL (ref 1.9–3.5)
GLUCOSE SERPL-MCNC: 131 MG/DL (ref 65–99)
GLUCOSE UR STRIP.AUTO-MCNC: NEGATIVE MG/DL
HCT VFR BLD AUTO: 40.2 % (ref 37–47)
HGB BLD-MCNC: 13.5 G/DL (ref 12–16)
HYALINE CASTS #/AREA URNS LPF: NORMAL /LPF
IMM GRANULOCYTES # BLD AUTO: 0.1 K/UL (ref 0–0.11)
IMM GRANULOCYTES NFR BLD AUTO: 0.9 % (ref 0–0.9)
KETONES UR STRIP.AUTO-MCNC: NEGATIVE MG/DL
LACTATE BLD-SCNC: 1 MMOL/L (ref 0.5–2)
LACTATE BLD-SCNC: 2.2 MMOL/L (ref 0.5–2)
LEUKOCYTE ESTERASE UR QL STRIP.AUTO: NEGATIVE
LYMPHOCYTES # BLD AUTO: 1.39 K/UL (ref 1–4.8)
LYMPHOCYTES NFR BLD: 12 % (ref 22–41)
MCH RBC QN AUTO: 34.1 PG (ref 27–33)
MCHC RBC AUTO-ENTMCNC: 33.3 G/DL (ref 33.6–35)
MCV RBC AUTO: 102.5 FL (ref 81.4–97.8)
MICRO URNS: ABNORMAL
MONOCYTES # BLD AUTO: 0.95 K/UL (ref 0–0.85)
MONOCYTES NFR BLD AUTO: 8.2 % (ref 0–13.4)
NEUTROPHILS # BLD AUTO: 9.09 K/UL (ref 2–7.15)
NEUTROPHILS NFR BLD: 78.4 % (ref 44–72)
NITRITE UR QL STRIP.AUTO: NEGATIVE
NRBC # BLD AUTO: 0 K/UL
NRBC BLD-RTO: 0 /100 WBC
PH UR STRIP.AUTO: 6 [PH] (ref 5–8)
PLATELET # BLD AUTO: 394 K/UL (ref 164–446)
PMV BLD AUTO: 9.3 FL (ref 9–12.9)
POTASSIUM SERPL-SCNC: 3.9 MMOL/L (ref 3.6–5.5)
PROT SERPL-MCNC: 7.3 G/DL (ref 6–8.2)
PROT UR QL STRIP: NEGATIVE MG/DL
RBC # BLD AUTO: 3.93 M/UL (ref 4.2–5.4)
RBC # URNS HPF: NORMAL /HPF
RBC UR QL AUTO: ABNORMAL
SODIUM SERPL-SCNC: 139 MMOL/L (ref 135–145)
SP GR UR STRIP.AUTO: 1.01
TRANS CELLS #/AREA URNS HPF: NORMAL /HPF
UROBILINOGEN UR STRIP.AUTO-MCNC: 0.2 MG/DL
WBC # BLD AUTO: 11.6 K/UL (ref 4.8–10.8)
WBC #/AREA URNS HPF: NORMAL /HPF

## 2020-10-26 PROCEDURE — G0378 HOSPITAL OBSERVATION PER HR: HCPCS

## 2020-10-26 PROCEDURE — 96367 TX/PROPH/DG ADDL SEQ IV INF: CPT

## 2020-10-26 PROCEDURE — 93005 ELECTROCARDIOGRAM TRACING: CPT | Performed by: SURGERY

## 2020-10-26 PROCEDURE — C9803 HOPD COVID-19 SPEC COLLECT: HCPCS | Performed by: EMERGENCY MEDICINE

## 2020-10-26 PROCEDURE — 85025 COMPLETE CBC W/AUTO DIFF WBC: CPT

## 2020-10-26 PROCEDURE — 87186 SC STD MICRODIL/AGAR DIL: CPT

## 2020-10-26 PROCEDURE — 700101 HCHG RX REV CODE 250: Performed by: EMERGENCY MEDICINE

## 2020-10-26 PROCEDURE — 700117 HCHG RX CONTRAST REV CODE 255: Performed by: EMERGENCY MEDICINE

## 2020-10-26 PROCEDURE — 74177 CT ABD & PELVIS W/CONTRAST: CPT

## 2020-10-26 PROCEDURE — 87040 BLOOD CULTURE FOR BACTERIA: CPT | Mod: 91

## 2020-10-26 PROCEDURE — 700111 HCHG RX REV CODE 636 W/ 250 OVERRIDE (IP): Performed by: EMERGENCY MEDICINE

## 2020-10-26 PROCEDURE — A9270 NON-COVERED ITEM OR SERVICE: HCPCS | Performed by: SURGERY

## 2020-10-26 PROCEDURE — 36415 COLL VENOUS BLD VENIPUNCTURE: CPT

## 2020-10-26 PROCEDURE — 87086 URINE CULTURE/COLONY COUNT: CPT

## 2020-10-26 PROCEDURE — 96376 TX/PRO/DX INJ SAME DRUG ADON: CPT

## 2020-10-26 PROCEDURE — 81001 URINALYSIS AUTO W/SCOPE: CPT

## 2020-10-26 PROCEDURE — 87077 CULTURE AEROBIC IDENTIFY: CPT

## 2020-10-26 PROCEDURE — 83605 ASSAY OF LACTIC ACID: CPT

## 2020-10-26 PROCEDURE — 96372 THER/PROPH/DIAG INJ SC/IM: CPT

## 2020-10-26 PROCEDURE — 80053 COMPREHEN METABOLIC PANEL: CPT

## 2020-10-26 PROCEDURE — 96365 THER/PROPH/DIAG IV INF INIT: CPT

## 2020-10-26 PROCEDURE — 96374 THER/PROPH/DIAG INJ IV PUSH: CPT

## 2020-10-26 PROCEDURE — 71045 X-RAY EXAM CHEST 1 VIEW: CPT

## 2020-10-26 PROCEDURE — 96375 TX/PRO/DX INJ NEW DRUG ADDON: CPT

## 2020-10-26 PROCEDURE — 700105 HCHG RX REV CODE 258: Performed by: EMERGENCY MEDICINE

## 2020-10-26 PROCEDURE — 700102 HCHG RX REV CODE 250 W/ 637 OVERRIDE(OP): Performed by: SURGERY

## 2020-10-26 PROCEDURE — 99285 EMERGENCY DEPT VISIT HI MDM: CPT

## 2020-10-26 RX ORDER — ACETAMINOPHEN 325 MG/1
650 TABLET ORAL EVERY 4 HOURS PRN
Status: DISCONTINUED | OUTPATIENT
Start: 2020-10-26 | End: 2020-10-28 | Stop reason: HOSPADM

## 2020-10-26 RX ORDER — OXYCODONE HYDROCHLORIDE 5 MG/1
5 TABLET ORAL EVERY 4 HOURS PRN
Status: DISCONTINUED | OUTPATIENT
Start: 2020-10-26 | End: 2020-10-28 | Stop reason: HOSPADM

## 2020-10-26 RX ORDER — MORPHINE SULFATE 4 MG/ML
4 INJECTION, SOLUTION INTRAMUSCULAR; INTRAVENOUS ONCE
Status: COMPLETED | OUTPATIENT
Start: 2020-10-26 | End: 2020-10-26

## 2020-10-26 RX ORDER — DIGOXIN 125 MCG
125 TABLET ORAL DAILY
Status: DISCONTINUED | OUTPATIENT
Start: 2020-10-26 | End: 2020-10-28 | Stop reason: HOSPADM

## 2020-10-26 RX ORDER — SODIUM CHLORIDE 9 MG/ML
1000 INJECTION, SOLUTION INTRAVENOUS ONCE
Status: COMPLETED | OUTPATIENT
Start: 2020-10-26 | End: 2020-10-26

## 2020-10-26 RX ORDER — DOCUSATE SODIUM 100 MG/1
100 CAPSULE, LIQUID FILLED ORAL 2 TIMES DAILY
Status: DISCONTINUED | OUTPATIENT
Start: 2020-10-26 | End: 2020-10-28 | Stop reason: HOSPADM

## 2020-10-26 RX ORDER — ZOLPIDEM TARTRATE 5 MG/1
5 TABLET ORAL
Status: DISCONTINUED | OUTPATIENT
Start: 2020-10-26 | End: 2020-10-26

## 2020-10-26 RX ORDER — ATORVASTATIN CALCIUM 80 MG/1
80 TABLET, FILM COATED ORAL
Status: DISCONTINUED | OUTPATIENT
Start: 2020-10-27 | End: 2020-10-28 | Stop reason: HOSPADM

## 2020-10-26 RX ORDER — AMOXICILLIN AND CLAVULANATE POTASSIUM 875; 125 MG/1; MG/1
1 TABLET, FILM COATED ORAL EVERY 12 HOURS
Status: DISCONTINUED | OUTPATIENT
Start: 2020-10-26 | End: 2020-10-28 | Stop reason: HOSPADM

## 2020-10-26 RX ORDER — ONDANSETRON 2 MG/ML
4 INJECTION INTRAMUSCULAR; INTRAVENOUS EVERY 4 HOURS PRN
Status: DISCONTINUED | OUTPATIENT
Start: 2020-10-26 | End: 2020-10-28 | Stop reason: HOSPADM

## 2020-10-26 RX ORDER — ONDANSETRON 2 MG/ML
4 INJECTION INTRAMUSCULAR; INTRAVENOUS ONCE
Status: COMPLETED | OUTPATIENT
Start: 2020-10-26 | End: 2020-10-26

## 2020-10-26 RX ORDER — OXYCODONE HYDROCHLORIDE 5 MG/1
5 TABLET ORAL EVERY 4 HOURS PRN
Status: DISCONTINUED | OUTPATIENT
Start: 2020-10-26 | End: 2020-10-26

## 2020-10-26 RX ORDER — BUDESONIDE AND FORMOTEROL FUMARATE DIHYDRATE 160; 4.5 UG/1; UG/1
2 AEROSOL RESPIRATORY (INHALATION) 2 TIMES DAILY
Status: DISCONTINUED | OUTPATIENT
Start: 2020-10-26 | End: 2020-10-28 | Stop reason: HOSPADM

## 2020-10-26 RX ORDER — GABAPENTIN 300 MG/1
300 CAPSULE ORAL 3 TIMES DAILY
Status: DISCONTINUED | OUTPATIENT
Start: 2020-10-26 | End: 2020-10-28 | Stop reason: HOSPADM

## 2020-10-26 RX ORDER — ZOLPIDEM TARTRATE 5 MG/1
5 TABLET ORAL NIGHTLY PRN
Status: DISCONTINUED | OUTPATIENT
Start: 2020-10-26 | End: 2020-10-28 | Stop reason: HOSPADM

## 2020-10-26 RX ORDER — DIPHENHYDRAMINE HYDROCHLORIDE 50 MG/ML
25 INJECTION INTRAMUSCULAR; INTRAVENOUS ONCE
Status: COMPLETED | OUTPATIENT
Start: 2020-10-26 | End: 2020-10-26

## 2020-10-26 RX ORDER — IBUPROFEN 600 MG/1
600 TABLET ORAL EVERY 6 HOURS PRN
Status: DISCONTINUED | OUTPATIENT
Start: 2020-10-26 | End: 2020-10-28 | Stop reason: HOSPADM

## 2020-10-26 RX ORDER — METOCLOPRAMIDE HYDROCHLORIDE 5 MG/ML
5 INJECTION INTRAMUSCULAR; INTRAVENOUS ONCE
Status: COMPLETED | OUTPATIENT
Start: 2020-10-26 | End: 2020-10-26

## 2020-10-26 RX ADMIN — ONDANSETRON 4 MG: 2 INJECTION INTRAMUSCULAR; INTRAVENOUS at 14:31

## 2020-10-26 RX ADMIN — SODIUM CHLORIDE 1000 ML: 9 INJECTION, SOLUTION INTRAVENOUS at 14:27

## 2020-10-26 RX ADMIN — DIPHENHYDRAMINE HYDROCHLORIDE 25 MG: 50 INJECTION INTRAMUSCULAR; INTRAVENOUS at 17:26

## 2020-10-26 RX ADMIN — ZOLPIDEM TARTRATE 5 MG: 5 TABLET ORAL at 20:58

## 2020-10-26 RX ADMIN — BUDESONIDE AND FORMOTEROL FUMARATE DIHYDRATE 2 PUFF: 160; 4.5 AEROSOL RESPIRATORY (INHALATION) at 22:41

## 2020-10-26 RX ADMIN — OXYCODONE 5 MG: 5 TABLET ORAL at 20:55

## 2020-10-26 RX ADMIN — IOHEXOL 100 ML: 350 INJECTION, SOLUTION INTRAVENOUS at 16:30

## 2020-10-26 RX ADMIN — METOCLOPRAMIDE 5 MG: 5 INJECTION, SOLUTION INTRAMUSCULAR; INTRAVENOUS at 17:26

## 2020-10-26 RX ADMIN — AMOXICILLIN AND CLAVULANATE POTASSIUM 1 TABLET: 875; 125 TABLET, FILM COATED ORAL at 22:40

## 2020-10-26 RX ADMIN — POLYETHYLENE GLYCOL 3350, SODIUM SULFATE ANHYDROUS, SODIUM BICARBONATE, SODIUM CHLORIDE, POTASSIUM CHLORIDE 4 L: 236; 22.74; 6.74; 5.86; 2.97 POWDER, FOR SOLUTION ORAL at 20:58

## 2020-10-26 RX ADMIN — GLYCOPYRROLATE 1 CAPSULE: 15.6 CAPSULE RESPIRATORY (INHALATION) at 22:43

## 2020-10-26 RX ADMIN — DIGOXIN 125 MCG: 125 TABLET ORAL at 22:42

## 2020-10-26 RX ADMIN — MORPHINE SULFATE 4 MG: 4 INJECTION INTRAVENOUS at 14:31

## 2020-10-26 RX ADMIN — GABAPENTIN 300 MG: 300 CAPSULE ORAL at 22:41

## 2020-10-26 RX ADMIN — DOCUSATE SODIUM 100 MG: 100 CAPSULE ORAL at 22:41

## 2020-10-26 RX ADMIN — APIXABAN 5 MG: 5 TABLET, FILM COATED ORAL at 22:40

## 2020-10-26 RX ADMIN — SODIUM CHLORIDE 1000 ML: 9 INJECTION, SOLUTION INTRAVENOUS at 17:18

## 2020-10-26 SDOH — HEALTH STABILITY: MENTAL HEALTH: HOW OFTEN DO YOU HAVE A DRINK CONTAINING ALCOHOL?: 2-3 TIMES A WEEK

## 2020-10-26 ASSESSMENT — PAIN DESCRIPTION - PAIN TYPE
TYPE: ACUTE PAIN
TYPE: ACUTE PAIN

## 2020-10-26 ASSESSMENT — FIBROSIS 4 INDEX: FIB4 SCORE: 1.81

## 2020-10-26 NOTE — ED TRIAGE NOTES
"Chief Complaint   Patient presents with   • Abdominal Pain     lower abdominal pain since yesterday; pt had part of colon removed along with all of rectum and anus (d/t anal cancer) and colostomy placed on 10/15/2020   • ALOC     pt's family states that pt was \"talking nonsense all day yesterday and doesn't remember it today\"; she states pt is better today but not quite back to baseline     Pt to triage via wheelchair with family for above complaint. Pt appears very uncomfortable in triage and is fidgeting around in her chair d/t discomfort.      Pt is alert/oriented and follows commands. Pt speaking in full sentences and responds appropriately to questions. No acute distress noted in triage and respirations are even and unlabored.     Pt placed in FAMILY ROOM and educated on triage process. Pt and family encouraged to alert staff for any changes in condition.  "

## 2020-10-26 NOTE — ED PROVIDER NOTES
"ED Provider Note    Scribed for Dr. Da Trevino M.D. by Amisha Mauro. 10/26/2020  1:57 PM    Primary care provider: Pcp Pt States None  Means of arrival: Walk In  History obtained from: Patient  History limited by: None     CHIEF COMPLAINT  Chief Complaint   Patient presents with   • Abdominal Pain     lower abdominal pain since yesterday; pt had part of colon removed along with all of rectum and anus (d/t anal cancer) and colostomy placed on 10/15/2020   • ALOC     pt's family states that pt was \"talking nonsense all day yesterday and doesn't remember it today\"; she states pt is better today but not quite back to baseline       HPI  Josy Grove is a 79 y.o. female with a history of colostomy and anal cancer who presents to the Emergency Department for lower abdominal pain onset 2 days ago. The patient states that she has developed moderate abdominal pain over the past few days and reports that she had part of her colon removed along with all of her rectum and anus due to anal cancer on 10/15. The patient's family says that the patient had an altered level of consciousness 1 day ago and was \"talking nonsense.\" When the patient is asked about this event, she is unable to recall it. Family reports that the patient appears better today, but not completely back to normal. The patient adds that a flap was placed where her anus used to be, but it wasn't working properly, so the surgeons had to go back in and redo the flap. She reports \"it feels like I need to have a bowel movement, but I don't end up having one.\" Patient says after her abdominal pain failed to alleviate, she was prompted to come into the [facility] ED to have her symptoms further evaluated. No alleviating factors were noted, and pan is exacerbated by sitting. Patient has associated altered level of consciousness, constipation, and abdominal discomfort, but denies fever, chills, cough, rhinorrhea, vomiting, or shortness of breath. She also denies " coming into contact with anyone who has tested positive for Covid-19 or taking any recent travels outside of the country. Patient drinks alcohol, but doesn't smoke or use drugs. She is allergic to Codeine and does not take any daily medications. Patient reports she does not have a PCP.     REVIEW OF SYSTEMS  Pertinent positives include lower abdominal pain, constipation, abdominal discomfort, and altered level of consciousness. Pertinent negatives include no fever, chills, cough, rhinorrhea, vomiting, or shortness of breath. As above, all other systems reviewed and are negative.   See HPI for further details.     PAST MEDICAL HISTORY   has a past medical history of Arrhythmia, Arthritis, Asthma, Cancer (HCC) (1983), Cancer (HCC), Cataract, Diverticulosis, Gynecological disorder, Heart burn, Hiatus hernia syndrome, High cholesterol, IBS (irritable bowel syndrome), Indigestion, Pain, Pneumonia (2000), and PONV (postoperative nausea and vomiting).    SURGICAL HISTORY   has a past surgical history that includes tubal ligation (1975); hysterectomy, total abdominal (1983); removal of tonsils,<11 y/o; bunionectomy; cataract extraction with iol (Bilateral, 2012); lumbar laminectomy diskectomy (3/3/2016); abdominal hysterectomy total (1983); surg diagnostic exam, anorectal (9/21/2020); biceps tendon repair (Right, 2013, 2014); shoulder surgery (Right); cholecystectomy (6/2014); appendectomy; colonoscopy (1/13/2020); proctoscopy (9/21/2020); lap, surg colostomy (Left, 10/15/2020); abdominal perineal resection robotic xi (N/A, 10/15/2020); myocutaneous flap (N/A, 10/15/2020); and irrigation & debridement general (N/A, 10/19/2020).    SOCIAL HISTORY  Social History     Tobacco Use   • Smoking status: Never Smoker   • Smokeless tobacco: Never Used   Substance Use Topics   • Alcohol use: Yes     Alcohol/week: 3.0 oz     Types: 5 Glasses of wine per week     Frequency: 2-3 times a week     Drinks per session: 1 or 2   • Drug use:  "No      Social History     Substance and Sexual Activity   Drug Use No       FAMILY HISTORY  Family History   Problem Relation Age of Onset   • Cancer Mother    • Cancer Father    • Cancer Maternal Grandfather        CURRENT MEDICATIONS  Home Medications    **Home medications have not yet been reviewed for this encounter**         ALLERGIES  Allergies   Allergen Reactions   • Codeine Nausea       PHYSICAL EXAM  VITAL SIGNS: /83   Pulse 93   Temp 36.8 °C (98.2 °F) (Temporal)   Resp 20   Ht 1.6 m (5' 3\")   Wt 51.3 kg (113 lb)   SpO2 95%   BMI 20.02 kg/m²     Constitutional: Ill appearing, Well developed, Mild distress, Non-toxic appearance.   HENT: Normocephalic, Atraumatic, Bilateral external ears normal, Oropharynx dry, No oral exudates.   Eyes: PERRLA, EOMI, Conjunctiva normal, No discharge.   Neck: No tenderness, Supple, No stridor.   Lymphatic: No lymphadenopathy noted.   Cardiovascular: Tachycardic l heart rate, Normal rhythm.   Thorax & Lungs: Clear to auscultation bilaterally, No respiratory distress, No wheezing, No crackles.   Abdomen: Midline incision that appears to be healing well, Diffuse abdominal tenderness, Coloscopy on her left side with minimal watery output, Soft, No masses, No pulsatile masses.   GI: Perianal incision after closure of the anus that is healing well  Skin: Warm, Dry, No erythema, No rash.   Extremities:, No edema No cyanosis.   Musculoskeletal: No tenderness to palpation or major deformities noted.  Intact distal pulses  Neurologic: Awake, alert. Moves all extremities spontaneously.  Psychiatric: Affect normal, Judgment normal, Mood normal.     LABS  Results for orders placed or performed during the hospital encounter of 10/26/20   Lactic acid (lactate)   Result Value Ref Range    Lactic Acid 2.2 (H) 0.5 - 2.0 mmol/L   CBC WITH DIFFERENTIAL   Result Value Ref Range    WBC 11.6 (H) 4.8 - 10.8 K/uL    RBC 3.93 (L) 4.20 - 5.40 M/uL    Hemoglobin 13.5 12.0 - 16.0 g/dL    " Hematocrit 40.2 37.0 - 47.0 %    .5 (H) 81.4 - 97.8 fL    MCH 34.1 (H) 27.0 - 33.0 pg    MCHC 33.3 (L) 33.6 - 35.0 g/dL    RDW 54.0 (H) 35.9 - 50.0 fL    Platelet Count 394 164 - 446 K/uL    MPV 9.3 9.0 - 12.9 fL    Neutrophils-Polys 78.40 (H) 44.00 - 72.00 %    Lymphocytes 12.00 (L) 22.00 - 41.00 %    Monocytes 8.20 0.00 - 13.40 %    Eosinophils 0.20 0.00 - 6.90 %    Basophils 0.30 0.00 - 1.80 %    Immature Granulocytes 0.90 0.00 - 0.90 %    Nucleated RBC 0.00 /100 WBC    Neutrophils (Absolute) 9.09 (H) 2.00 - 7.15 K/uL    Lymphs (Absolute) 1.39 1.00 - 4.80 K/uL    Monos (Absolute) 0.95 (H) 0.00 - 0.85 K/uL    Eos (Absolute) 0.02 0.00 - 0.51 K/uL    Baso (Absolute) 0.04 0.00 - 0.12 K/uL    Immature Granulocytes (abs) 0.10 0.00 - 0.11 K/uL    NRBC (Absolute) 0.00 K/uL   COMP METABOLIC PANEL   Result Value Ref Range    Sodium 139 135 - 145 mmol/L    Potassium 3.9 3.6 - 5.5 mmol/L    Chloride 103 96 - 112 mmol/L    Co2 22 20 - 33 mmol/L    Anion Gap 14.0 7.0 - 16.0    Glucose 131 (H) 65 - 99 mg/dL    Bun 15 8 - 22 mg/dL    Creatinine 0.72 0.50 - 1.40 mg/dL    Calcium 9.8 8.5 - 10.5 mg/dL    AST(SGOT) 17 12 - 45 U/L    ALT(SGPT) 24 2 - 50 U/L    Alkaline Phosphatase 102 (H) 30 - 99 U/L    Total Bilirubin 0.4 0.1 - 1.5 mg/dL    Albumin 3.4 3.2 - 4.9 g/dL    Total Protein 7.3 6.0 - 8.2 g/dL    Globulin 3.9 (H) 1.9 - 3.5 g/dL    A-G Ratio 0.9 g/dL   URINALYSIS    Specimen: Urine, Clean Catch   Result Value Ref Range    Color Yellow     Character Clear     Specific Gravity 1.010 <1.035    Ph 6.0 5.0 - 8.0    Glucose Negative Negative mg/dL    Ketones Negative Negative mg/dL    Protein Negative Negative mg/dL    Bilirubin Negative Negative    Urobilinogen, Urine 0.2 Negative    Nitrite Negative Negative    Leukocyte Esterase Negative Negative    Occult Blood Moderate (A) Negative    Micro Urine Req Microscopic    ESTIMATED GFR   Result Value Ref Range    GFR If African American >60 >60 mL/min/1.73 m 2    GFR If Non  African American >60 >60 mL/min/1.73 m 2   URINE MICROSCOPIC (W/UA)   Result Value Ref Range    WBC 2-5 /hpf    RBC 0-2 /hpf    Bacteria Negative None /hpf    Epithelial Cells Few /hpf    Trans Epithelial Cells Rare /hpf    Hyaline Cast 0-2 /lpf      All labs reviewed by me.    RADIOLOGY  CT-ABDOMEN-PELVIS WITH   Final Result      Postsurgical change without evidence of bowel obstruction.      Large amount stool within colon suggesting constipation.      DX-CHEST-PORTABLE (1 VIEW)   Final Result      No acute cardiac or pulmonary abnormalities are identified.        The radiologist's interpretation of all radiological studies have been reviewed by me.    COURSE & MEDICAL DECISION MAKING  Pertinent Labs & Imaging studies reviewed. (See chart for details)    1:57 PM - Patient seen and examined at bedside. Discussed plan of care with patient. I informed them that labs and imaging will be ordered to evaluate symptoms. The patient is understanding and agreeable with plan of care. Patient will be treated with 1,000 mL NS Infusion. Ordered CT-Abdomen-Pelvis w/, DX-Chest-Portable (1 View), Lactic Acid, Estimated GFR, CBC w/ Differential, CMP, UA, Urine Culture, and Blood Culture  to evaluate her symptoms. The differential diagnoses include but are not limited to: Constipation, bowel obstruction,a cute abdomen    2:05 PM Patient is complaining of worsening pain. She was told that pain medication will be ordered to treat her symptoms. Patient is understanding and agreeable to the plan of care.     2:39 PM Patient will be treated with 4 mg Zofran and 4 mg Morphine    3:40 PM Ordered Urine Microscopic to evaluate the patient     4:01 PM Patient will be treated with 350 mg/mL Omnipaque     4:22 PM Paged Surgery    4:27 PM I discussed the patient's case and the above findings with Dr. Jeter (Surgery) who agrees with the plan of care and states to treat the patient's constipation and discharge her if her symptoms are  alleviated.    4:31 PM Patient was reevaluated at bedside. Discussed lab and radiology results with the patient and informed them that there were no acute or abnormal findings. The plan of care was discussed with the patient. She was informed of the discussion with Dr. Jeter and told she will receive medication to try to alleviate her constipation. Patient is understanding and agreeable to the plan of care.     4:44 PM Patient will be treated with 1,000 mL NS Infusion and 4 L Golytely    5:20 PM Patient was reevaluated at bedside. She and daughter state that they are more comfortable with the patient staying in the [facility] ED while she alleviates her constipation. The plan of care was discussed with the patient. She was informed she will need to have a Covid-19 test if she is going to be hospitalized. Patient is understanding and agreeable to the plan of care. The patient had the opportunity to ask any questions. The plan for hospitalization was discussed with the patient. She is understanding and agreeable to the plan for hospitalization.       5:28 PM I discussed the patient's case and the above findings with Dr. Jeter who agrees with the plan of care and I did discuss with the patient possibility of going home if we could relieve her constipation here we have not as of yet been able to do so and the patient feels quite uncomfortable about going home as does her family request admission.  I think it is probably reasonable to clear out the patient's significant constipation noted on CT scan and so therefore admitting admission order after consultation with her surgeon.    HYDRATION: Based on the patient's presentation of Dehydration the patient was given IV fluids. IV Hydration was used because oral hydration was not adequate alone. Upon recheck following hydration, the patient was improved.    PPE Note: I verified that the patient was wearing a mask and I was wearing appropriate PPE every time I entered the  room. The patient's mask was on the patient at all times during my encounter except for a brief view of the oropharynx.     Decision Making:  Patient appears likely just to have constipation as a cause of her abdominal pain and decreased output.  She did exhibit some tachycardia which I suspect may be somewhat secondary to dehydration which hydrated with IV fluids after discussing with her surgeon try to alleviate her constipation here in the ER, patient felt and it seems reasonable to for admission to clear the constipation and observation, therefore observation admission .  DISPOSITION:  Patient will be hospitalized by Dr. Jeter    FINAL IMPRESSION  1. Constipation, unspecified constipation type    2. Abdominal pain, unspecified abdominal location          Amisha SMITH ([place]), am scribing for, and in the presence of, Da Trevino M.D..    Electronically signed by: Amisha Mauro ([place]), 10/26/2020    Da SMITH M.D. personally performed the services described in this documentation, as scribed by Amisha Mauro in my presence, and it is both accurate and complete. C    The note accurately reflects work and decisions made by me.  Da Trevino M.D.  10/26/2020  8:26 PM

## 2020-10-26 NOTE — ED NOTES
Pt to Red 9 from ivan. Pt placed in gown. Attached to cardiac monitor. Awaiting ERP, warm blankets provided

## 2020-10-27 LAB
ANION GAP SERPL CALC-SCNC: 12 MMOL/L (ref 7–16)
BASOPHILS # BLD AUTO: 0.3 % (ref 0–1.8)
BASOPHILS # BLD: 0.03 K/UL (ref 0–0.12)
BUN SERPL-MCNC: 12 MG/DL (ref 8–22)
CALCIUM SERPL-MCNC: 8.3 MG/DL (ref 8.5–10.5)
CHLORIDE SERPL-SCNC: 105 MMOL/L (ref 96–112)
CO2 SERPL-SCNC: 23 MMOL/L (ref 20–33)
COVID ORDER STATUS COVID19: NORMAL
CREAT SERPL-MCNC: 0.68 MG/DL (ref 0.5–1.4)
EKG IMPRESSION: NORMAL
EOSINOPHIL # BLD AUTO: 0.02 K/UL (ref 0–0.51)
EOSINOPHIL NFR BLD: 0.2 % (ref 0–6.9)
ERYTHROCYTE [DISTWIDTH] IN BLOOD BY AUTOMATED COUNT: 54.6 FL (ref 35.9–50)
GLUCOSE SERPL-MCNC: 102 MG/DL (ref 65–99)
HCT VFR BLD AUTO: 33.8 % (ref 37–47)
HGB BLD-MCNC: 11 G/DL (ref 12–16)
IMM GRANULOCYTES # BLD AUTO: 0.09 K/UL (ref 0–0.11)
IMM GRANULOCYTES NFR BLD AUTO: 1 % (ref 0–0.9)
LYMPHOCYTES # BLD AUTO: 0.96 K/UL (ref 1–4.8)
LYMPHOCYTES NFR BLD: 10.2 % (ref 22–41)
MCH RBC QN AUTO: 33.4 PG (ref 27–33)
MCHC RBC AUTO-ENTMCNC: 32.5 G/DL (ref 33.6–35)
MCV RBC AUTO: 102.7 FL (ref 81.4–97.8)
MONOCYTES # BLD AUTO: 0.98 K/UL (ref 0–0.85)
MONOCYTES NFR BLD AUTO: 10.4 % (ref 0–13.4)
NEUTROPHILS # BLD AUTO: 7.37 K/UL (ref 2–7.15)
NEUTROPHILS NFR BLD: 77.9 % (ref 44–72)
NRBC # BLD AUTO: 0 K/UL
NRBC BLD-RTO: 0 /100 WBC
PLATELET # BLD AUTO: 345 K/UL (ref 164–446)
PMV BLD AUTO: 9.1 FL (ref 9–12.9)
POTASSIUM SERPL-SCNC: 3.3 MMOL/L (ref 3.6–5.5)
RBC # BLD AUTO: 3.29 M/UL (ref 4.2–5.4)
SARS-COV-2 RNA RESP QL NAA+PROBE: NOTDETECTED
SODIUM SERPL-SCNC: 140 MMOL/L (ref 135–145)
SPECIMEN SOURCE: NORMAL
WBC # BLD AUTO: 9.5 K/UL (ref 4.8–10.8)

## 2020-10-27 PROCEDURE — 80048 BASIC METABOLIC PNL TOTAL CA: CPT

## 2020-10-27 PROCEDURE — 700105 HCHG RX REV CODE 258: Performed by: SURGERY

## 2020-10-27 PROCEDURE — 770006 HCHG ROOM/CARE - MED/SURG/GYN SEMI*

## 2020-10-27 PROCEDURE — A9270 NON-COVERED ITEM OR SERVICE: HCPCS | Performed by: SURGERY

## 2020-10-27 PROCEDURE — U0003 INFECTIOUS AGENT DETECTION BY NUCLEIC ACID (DNA OR RNA); SEVERE ACUTE RESPIRATORY SYNDROME CORONAVIRUS 2 (SARS-COV-2) (CORONAVIRUS DISEASE [COVID-19]), AMPLIFIED PROBE TECHNIQUE, MAKING USE OF HIGH THROUGHPUT TECHNOLOGIES AS DESCRIBED BY CMS-2020-01-R: HCPCS

## 2020-10-27 PROCEDURE — 85025 COMPLETE CBC W/AUTO DIFF WBC: CPT

## 2020-10-27 PROCEDURE — 700102 HCHG RX REV CODE 250 W/ 637 OVERRIDE(OP): Performed by: SURGERY

## 2020-10-27 PROCEDURE — 93010 ELECTROCARDIOGRAM REPORT: CPT | Performed by: INTERNAL MEDICINE

## 2020-10-27 RX ORDER — SODIUM CHLORIDE 9 MG/ML
500 INJECTION, SOLUTION INTRAVENOUS ONCE
Status: COMPLETED | OUTPATIENT
Start: 2020-10-27 | End: 2020-10-27

## 2020-10-27 RX ORDER — SUMATRIPTAN 25 MG/1
25 TABLET, FILM COATED ORAL
Status: DISCONTINUED | OUTPATIENT
Start: 2020-10-27 | End: 2020-10-28 | Stop reason: HOSPADM

## 2020-10-27 RX ADMIN — DOCUSATE SODIUM 100 MG: 100 CAPSULE ORAL at 05:47

## 2020-10-27 RX ADMIN — AMOXICILLIN AND CLAVULANATE POTASSIUM 1 TABLET: 875; 125 TABLET, FILM COATED ORAL at 16:58

## 2020-10-27 RX ADMIN — APIXABAN 5 MG: 5 TABLET, FILM COATED ORAL at 16:58

## 2020-10-27 RX ADMIN — OXYCODONE 5 MG: 5 TABLET ORAL at 03:00

## 2020-10-27 RX ADMIN — GLYCOPYRROLATE 1 CAPSULE: 15.6 CAPSULE RESPIRATORY (INHALATION) at 21:22

## 2020-10-27 RX ADMIN — BUDESONIDE AND FORMOTEROL FUMARATE DIHYDRATE 2 PUFF: 160; 4.5 AEROSOL RESPIRATORY (INHALATION) at 17:38

## 2020-10-27 RX ADMIN — GABAPENTIN 300 MG: 300 CAPSULE ORAL at 16:58

## 2020-10-27 RX ADMIN — ZOLPIDEM TARTRATE 5 MG: 5 TABLET ORAL at 21:22

## 2020-10-27 RX ADMIN — SODIUM CHLORIDE 500 ML: 9 INJECTION, SOLUTION INTRAVENOUS at 00:21

## 2020-10-27 RX ADMIN — APIXABAN 5 MG: 5 TABLET, FILM COATED ORAL at 05:46

## 2020-10-27 RX ADMIN — DIGOXIN 125 MCG: 125 TABLET ORAL at 16:59

## 2020-10-27 RX ADMIN — DOCUSATE SODIUM 100 MG: 100 CAPSULE ORAL at 16:58

## 2020-10-27 RX ADMIN — GABAPENTIN 300 MG: 300 CAPSULE ORAL at 11:13

## 2020-10-27 RX ADMIN — SUMATRIPTAN SUCCINATE 25 MG: 25 TABLET ORAL at 22:15

## 2020-10-27 RX ADMIN — ATORVASTATIN CALCIUM 80 MG: 20 TABLET, FILM COATED ORAL at 11:13

## 2020-10-27 RX ADMIN — BUDESONIDE AND FORMOTEROL FUMARATE DIHYDRATE 2 PUFF: 160; 4.5 AEROSOL RESPIRATORY (INHALATION) at 05:48

## 2020-10-27 RX ADMIN — GABAPENTIN 300 MG: 300 CAPSULE ORAL at 05:46

## 2020-10-27 RX ADMIN — AMOXICILLIN AND CLAVULANATE POTASSIUM 1 TABLET: 875; 125 TABLET, FILM COATED ORAL at 05:46

## 2020-10-27 RX ADMIN — PSYLLIUM HUSK 1 PACKET: 3.4 POWDER ORAL at 05:46

## 2020-10-27 RX ADMIN — OXYCODONE 5 MG: 5 TABLET ORAL at 11:17

## 2020-10-27 RX ADMIN — OXYCODONE 5 MG: 5 TABLET ORAL at 16:58

## 2020-10-27 ASSESSMENT — LIFESTYLE VARIABLES
ALCOHOL_USE: YES
TOTAL SCORE: 0
EVER HAD A DRINK FIRST THING IN THE MORNING TO STEADY YOUR NERVES TO GET RID OF A HANGOVER: NO
TOTAL SCORE: 0
HAVE PEOPLE ANNOYED YOU BY CRITICIZING YOUR DRINKING: NO
ON A TYPICAL DAY WHEN YOU DRINK ALCOHOL HOW MANY DRINKS DO YOU HAVE: 1
EVER FELT BAD OR GUILTY ABOUT YOUR DRINKING: NO
TOTAL SCORE: 0
AVERAGE NUMBER OF DAYS PER WEEK YOU HAVE A DRINK CONTAINING ALCOHOL: 3
HOW MANY TIMES IN THE PAST YEAR HAVE YOU HAD 5 OR MORE DRINKS IN A DAY: 0
CONSUMPTION TOTAL: NEGATIVE
DOES PATIENT WANT TO STOP DRINKING: NO
HAVE YOU EVER FELT YOU SHOULD CUT DOWN ON YOUR DRINKING: NO

## 2020-10-27 ASSESSMENT — CHA2DS2 SCORE
AGE 75 OR GREATER: YES
SEX: FEMALE
HYPERTENSION: NO
AGE 65 TO 74: NO
PRIOR STROKE OR TIA OR THROMBOEMBOLISM: NO
CHA2DS2 VASC SCORE: 3
DIABETES: NO
CHF OR LEFT VENTRICULAR DYSFUNCTION: NO
VASCULAR DISEASE: NO

## 2020-10-27 ASSESSMENT — COGNITIVE AND FUNCTIONAL STATUS - GENERAL
TOILETING: A LITTLE
MOVING TO AND FROM BED TO CHAIR: A LITTLE
STANDING UP FROM CHAIR USING ARMS: A LITTLE
MOBILITY SCORE: 17
WALKING IN HOSPITAL ROOM: A LITTLE
MOVING FROM LYING ON BACK TO SITTING ON SIDE OF FLAT BED: A LOT
DRESSING REGULAR LOWER BODY CLOTHING: A LOT
SUGGESTED CMS G CODE MODIFIER MOBILITY: CK
DAILY ACTIVITIY SCORE: 19
DRESSING REGULAR UPPER BODY CLOTHING: A LITTLE
HELP NEEDED FOR BATHING: A LITTLE
CLIMB 3 TO 5 STEPS WITH RAILING: A LOT
SUGGESTED CMS G CODE MODIFIER DAILY ACTIVITY: CK

## 2020-10-27 ASSESSMENT — PAIN DESCRIPTION - PAIN TYPE
TYPE: ACUTE PAIN
TYPE: ACUTE PAIN

## 2020-10-27 ASSESSMENT — PATIENT HEALTH QUESTIONNAIRE - PHQ9
SUM OF ALL RESPONSES TO PHQ9 QUESTIONS 1 AND 2: 0
1. LITTLE INTEREST OR PLEASURE IN DOING THINGS: NOT AT ALL
2. FEELING DOWN, DEPRESSED, IRRITABLE, OR HOPELESS: NOT AT ALL

## 2020-10-27 NOTE — PROGRESS NOTES
Received bedside report and assumed care for pt at change of shift. Pt is A&Ox4 and on RA. States having pain around incision sites but declines intervention stating she would like to rest. Assessment was completed and pt is resting in bed now no signs of distress.  placed overnight and will monitor for abnormalities in heart rhythm. Bed is locked and in lowest position with water and call light in reach. Pt ambulates with SBA and FWW.

## 2020-10-27 NOTE — PROGRESS NOTES
Pt arrived to unit at 1828. Pt oriented to room, unit, and plan of care. All questions answered at this time. Call light within reach; fall precautions in place.

## 2020-10-27 NOTE — PROGRESS NOTES
"Surgical Progress Note:    PAD1 - Readmission for constipation, abdominal and perineal post-op pain    S:  - Feeling well  - Tolerating diet, no n/v  - Passing lots of stools since starting laxative  - Pain well managed  - Ambulating  - No chest pain, extremity pain, or difficulty breathing    Vitals:  BP (!) 98/58 Comment: Simultaneous filing. User may not have seen previous data.  Pulse 82   Temp 36 °C (96.8 °F) (Temporal)   Resp 16   Ht 1.6 m (5' 3\")   Wt 51.3 kg (113 lb)   SpO2 94%   BMI 20.02 kg/m²     I/O:     Intake/Output Summary (Last 24 hours) at 10/27/2020 1245  Last data filed at 10/27/2020 0810  Gross per 24 hour   Intake 120 ml   Output 1400 ml   Net -1280 ml       P/E:  Constitutional: Appears well, no distress  Head/Neck: Normocephalic, atraumatic, neck supple  Cardiovascular: Normal rate and rhythm  Pulmonary/Chest: Normal respiratory effort, no wheezes  Abdominal: Soft, mildly tender around stoma, no distension, incision(s) clean/dry/intact  Musculoskeletal: No deficits  Neurological:  Alert, oriented  Skin:  Warm and dry, no erythema  Extremities: No edema, no evidence of DVT  Perineum: less swollen and hot today, minimal drainage    Labs:  Recent Labs     10/26/20  1350 10/27/20  0244   WBC 11.6* 9.5   RBC 3.93* 3.29*   HEMOGLOBIN 13.5 11.0*   HEMATOCRIT 40.2 33.8*   .5* 102.7*   MCH 34.1* 33.4*   RDW 54.0* 54.6*   PLATELETCT 394 345   MPV 9.3 9.1   NEUTSPOLYS 78.40* 77.90*   LYMPHOCYTES 12.00* 10.20*   MONOCYTES 8.20 10.40   EOSINOPHILS 0.20 0.20   BASOPHILS 0.30 0.30     Recent Labs     10/26/20  1350 10/27/20  0244   SODIUM 139 140   POTASSIUM 3.9 3.3*   CHLORIDE 103 105   CO2 22 23   GLUCOSE 131* 102*   BUN 15 12         A/P:   - Advance diet as tolerated to soft/low residue  - Incentive spirometry q1h while awake  - Ambulate at least QID, up in chair for all meals, PT consulted  - Multimodal analgesia, try to avoid narcotics  - Lovenox/SCDs  - Dr. Reyes will exam perineal " wound  - DC midline staples  - Stoma therapist working with patient  - Stop laxative  - Plan for DC home tomorrow        Dulce Jeter M.D.  [facility]  [phone]

## 2020-10-27 NOTE — H&P
Surgery General History & Physical Note    Date  10/26/2020    Primary Care Physician  Pcp Pt States None    CC  Abdominal and perineal pain    HPI  This is a 79 y.o. female, well-known to me, who underwent an abdominal perineal resection with rectus flap repair on October 15.  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  She did well following surgery, however she did have to return to the operating room for necrosis of the skin and subcutaneous tissue of her rectus flap.  This was debrided.  The patient was discharged home 3 days ago.  She reports that since her discharge she has been having slow worsening of abdominal pain and perineal pain.  She denies any fever or chills.  She has been tolerating a good diet.  She reports decreased output from her colostomy.  She denies any significant drainage from the perineum.    Past Medical History:   Diagnosis Date   • Arrhythmia     afib   • Arthritis     low back, right hand   • Asthma     inhalers daily   • Cancer (HCC) 1983    uterine   • Cancer (HCC)     anal (received chemo & radiation; removal surgery done on 10/15/2020)   • Cataract     repaired   • Diverticulosis    • Gynecological disorder    • Heart burn     history of   • Hiatus hernia syndrome    • High cholesterol    • IBS (irritable bowel syndrome)     due to radiation   • Indigestion    • Pain     anus   • Pneumonia 2000   • PONV (postoperative nausea and vomiting)        Past Surgical History:   Procedure Laterality Date   • IRRIGATION & DEBRIDEMENT GENERAL N/A 10/19/2020    Procedure: IRRIGATION AND DEBRIDEMENT, WOUND-PERINEAL WOUND;  Surgeon: Mark Reyes M.D.;  Location: SURGERY [facility];  Service: Plastics   • PB LAP, SURG COLOSTOMY Left 10/15/2020    Procedure: CREATION, COLOSTOMY, LAPAROSCOPIC - FOR END COLOSTOMY;  Surgeon: Dulce Jeter M.D.;  Location: SURGERY [facility];  Service: Gen Robotic   • ABDOMINAL PERINEAL  RESECTION ROBOTIC XI N/A 10/15/2020    Procedure: RESECTION, ABDOMINOPERINEAL, ROBOT-ASSISTED, USING DA AUGIE XI;  Surgeon: Dulce Jeter M.D.;  Location: SURGERY [facility];  Service: Gen Robotic   • MYOCUTANEOUS FLAP N/A 10/15/2020    Procedure: REPAIR, WOUND, USING MYOCUTANEOUS FLAP- MUSCLE FLAP TO PERINEUM;  Surgeon: Mark Reyes M.D.;  Location: SURGERY [facility];  Service: Plastics   • PB SURG DIAGNOSTIC EXAM, ANORECTAL  9/21/2020    Procedure: EXAM UNDER ANESTHESIA, RECTUM;  Surgeon: Dulce Jeter M.D.;  Location: SURGERY [facility];  Service: General   • PROCTOSCOPY  9/21/2020    Procedure: PROCTOSCOPY- RIGID WITH BIOPSY;  Surgeon: Dulce Jeter M.D.;  Location: SURGERY [facility];  Service: General   • COLONOSCOPY  1/13/2020    Procedure: COLONOSCOPY;  Surgeon: Dulce Jeter M.D.;  Location: SURGERY SAME DAY [facility];  Service: Gen Robotic   • LUMBAR LAMINECTOMY DISKECTOMY  3/3/2016    Procedure: LUMBAR LAMINECTOMY DISKECTOMY FOR MINIMALLY INVASIVE LEFT L4-5 LAMINOTOMY;  Surgeon: Stevo Guthrie M.D.;  Location: [facility];  Service:    • CHOLECYSTECTOMY  6/2014   • CATARACT EXTRACTION WITH IOL Bilateral 2012   • HYSTERECTOMY, TOTAL ABDOMINAL  1983   • ABDOMINAL HYSTERECTOMY TOTAL  1983   • TUBAL LIGATION  1975   • APPENDECTOMY     • BICEPS TENDON REPAIR Right 2013, 2014    5 screws placed in second surgery   • BUNIONECTOMY     • PB REMOVAL OF TONSILS,<13 Y/O     • SHOULDER SURGERY Right        Current Facility-Administered Medications   Medication Dose Route Frequency Provider Last Rate Last Dose   • oxyCODONE immediate-release (ROXICODONE) tablet 5 mg  5 mg Oral Q4HRS PRN Dulce Jeter M.D.   5 mg at 10/26/20 2055   • zolpidem (AMBIEN) tablet 5 mg  5 mg Oral QHS Dulce Jeter M.D.   5 mg at 10/26/20 2058       Social History     Socioeconomic History   • Marital status:      Spouse name: Klaus    • Number of children: 6   • Years of education: Not  on file   • Highest education level: Not on file   Occupational History   • Not on file   Social Needs   • Financial resource strain: Not on file   • Food insecurity     Worry: Not on file     Inability: Not on file   • Transportation needs     Medical: Not on file     Non-medical: Not on file   Tobacco Use   • Smoking status: Never Smoker   • Smokeless tobacco: Never Used   Substance and Sexual Activity   • Alcohol use: Yes     Alcohol/week: 3.0 oz     Types: 5 Glasses of wine per week     Frequency: 2-3 times a week     Drinks per session: 1 or 2   • Drug use: No   • Sexual activity: Not on file   Lifestyle   • Physical activity     Days per week: Not on file     Minutes per session: Not on file   • Stress: Not on file   Relationships   • Social connections     Talks on phone: Not on file     Gets together: Not on file     Attends Holiness service: Not on file     Active member of club or organization: Not on file     Attends meetings of clubs or organizations: Not on file     Relationship status: Not on file   • Intimate partner violence     Fear of current or ex partner: Not on file     Emotionally abused: Not on file     Physically abused: Not on file     Forced sexual activity: Not on file   Other Topics Concern   • Not on file   Social History Narrative    Patient lives in [place], , retired, 6 children.       Family History   Problem Relation Age of Onset   • Cancer Mother    • Cancer Father    • Cancer Maternal Grandfather        Allergies  Codeine    Review of Systems  Negative except for HPI    Physical Exam  Constitutional:       General: She is not in acute distress.     Appearance: Normal appearance. She is normal weight. She is not ill-appearing.   HENT:      Head: Normocephalic and atraumatic.      Nose: Nose normal.      Mouth/Throat:      Mouth: Mucous membranes are moist.   Eyes:      Extraocular Movements: Extraocular movements intact.      Pupils: Pupils are equal, round, and reactive to  light.   Neck:      Musculoskeletal: Normal range of motion and neck supple.   Cardiovascular:      Rate and Rhythm: Normal rate and regular rhythm.      Pulses: Normal pulses.      Heart sounds: Normal heart sounds.   Pulmonary:      Effort: Pulmonary effort is normal.      Breath sounds: Normal breath sounds.   Abdominal:      General: Abdomen is flat. There is no distension.      Palpations: Abdomen is soft.      Tenderness: There is abdominal tenderness.      Hernia: No hernia is present.      Comments: Mild tenderness mostly around colostomy, colostomy pink and warm with gas and liquid stool in the bag   Genitourinary:     Comments: Perineum swollen and hot around incision without evidence of fluctuance, skin pink and warm, no drainage, sutures intact  Skin:     General: Skin is warm and dry.      Capillary Refill: Capillary refill takes less than 2 seconds.   Neurological:      General: No focal deficit present.      Mental Status: She is alert and oriented to person, place, and time. Mental status is at baseline.   Psychiatric:         Mood and Affect: Mood normal.         Behavior: Behavior normal.         Thought Content: Thought content normal.         Judgment: Judgment normal.         Vital Signs  Blood Pressure : (!) 94/56   Temperature: 36.2 °C (97.2 °F)   Pulse: (!) 112   Respiration: 18   Pulse Oximetry: 96 %       Labs:  Recent Labs     10/26/20  1350   WBC 11.6*   RBC 3.93*   HEMOGLOBIN 13.5   HEMATOCRIT 40.2   .5*   MCH 34.1*   MCHC 33.3*   RDW 54.0*   PLATELETCT 394   MPV 9.3     Recent Labs     10/26/20  1350   SODIUM 139   POTASSIUM 3.9   CHLORIDE 103   CO2 22   GLUCOSE 131*   BUN 15   CREATININE 0.72   CALCIUM 9.8         Recent Labs     10/26/20  1350   ASTSGOT 17   ALTSGPT 24   TBILIRUBIN 0.4   ALKPHOSPHAT 102*   GLOBULIN 3.9*       Radiology:  CT-ABDOMEN-PELVIS WITH   Final Result      Postsurgical change without evidence of bowel obstruction.      Large amount stool within colon  suggesting constipation.      DX-CHEST-PORTABLE (1 VIEW)   Final Result      No acute cardiac or pulmonary abnormalities are identified.            Assessment/Plan:  79-year-old woman 11 days postop from an abdominal perineal resection with rectus flap repair of the perineum.  She returns today with abdominal pain and CT findings demonstrating constipation.  She has mild elevation of the white blood cell count and some erythema and swelling associated with her perineal incision with a small amount of fluid seen on CT scan without evidence of abscess.    She will be admitted for conservative management with laxatives and stool softeners.    She will be started on antibiotics due to the swelling that is noted at the perineal incision.  If this worsens some of the sutures may have to be removed to allow for drainage of some fluid.  I will speak with Dr. Reyes tomorrow with this regard.

## 2020-10-27 NOTE — PROGRESS NOTES
Pt /60 and  at 2240. Pt given scheduled digoxin. Vitals retaken at 2315 and BP 95/58 and .  applied and HR continued to run in 120-130. Paged Dr. Dulce Jeter who returned call at 2350. Updated Dr. Jeter on vitals. Ordered EKG and stated to page again if result showed afib. Also ordered 500 mL bolus NS. Orders read back.    EKG results showed sinus rhythm. Bolus given. HR after bolus 86, BP 90/48. Will continue to monitor.

## 2020-10-27 NOTE — ED NOTES
Med rec updated and complete. Allergies reviewed. Met with pt at bedside.  No antibiotic use in last 14 days.      Home pharmacy [facility] [place].    Narcotics are filled at [facility]

## 2020-10-27 NOTE — PROGRESS NOTES
2 RN skin check complete with Thu VARGHESE.   Devices in place: PIV, colostomy.  Skin assessed under devices: yes.  Confirmed pressure ulcers found on: n/a.  New potential pressure ulcers noted on sacrum.    The following interventions in place: pt able to turn self side to side; pt able to ambulate; pillows in use for positioning and support    Skin assessment:   Ears: intact; pink and blanching  BUE: intact; elbows pink and blanching  Abdomen: staples from surgery; colostomy in place  Sacrum: red and blanching expect one spot red and non blanching; pt had recent surgery on rectum   BLE: intact  Heels: intact; pink/boggy and blanching

## 2020-10-27 NOTE — PROGRESS NOTES
Pt requested pain medication and Ambien for sleep at change of shift.  Pt had no pain meds on MAR. Paged Dr. Dulce Jeter who returned call at 2025. Gave order for oxycodone 5 mg PO Q4 PRN for severe pain and 5 mg ambien at bedtime for sleep. Orders read back. MD also coming to bedside this evening.    At bedside Dr. Jeter stated oxycodone could be given for moderate pain. Also the ordered Golytely 4L could be given as 2L this evening and other 2 L in morning.    Pt AO x4, on RA, up with SBA and FWW.  Incision with staples on abdomen clean and dry.

## 2020-10-27 NOTE — CARE PLAN
Problem: Safety  Goal: Will remain free from falls  Outcome: PROGRESSING AS EXPECTED  Intervention: Implement fall precautions  Flowsheets (Taken 10/27/2020 2866)  Environmental Precautions:   Treaded Slipper Socks on Patient   Personal Belongings, Wastebasket, Call Bell etc. in Easy Reach   Report Given to Other Health Care Providers Regarding Fall Risk   Bed in Low Position   Communication Sign for Patients & Families   Mobility Assessed & Appropriate Sign Placed  Chair/Bed Strip Alarm: Yes - Alarm On  Note: Education on fall risk provided. Pt stated will call before getting up.     Problem: Pain Management  Goal: Pain level will decrease to patient's comfort goal  Outcome: PROGRESSING AS EXPECTED  Note: Medicate with oxycodone per MAR.

## 2020-10-27 NOTE — WOUND TEAM
"[facility]   Inpatient Services   Established Ostomy Management/ troubleshooting  HPI: Reviewed  PMH: Reviewed   SH: Reviewed   Reason for Ostomy nurse consult:      Subjective:   Colostomy 10/15/20 End/[place]'s Pouch LUQ (Active)   Stomal Appliance Assessment Clean;Dry;Intact;Changed    Stoma Assessment Red;Pink    Stoma Shape Budded Greater Than One Inch;Round    Stoma Size (in) 1.7    Peristomal Assessment Clean;Dry;Intact    Mucocutaneous Junction Intact    Treatment Appliance Changed;Site care;Cleansed with water/washcloth;Bag Change    Peristomal Protectant No Sting Skin Prep    Stomal Appliance 2 1/4\" (57mm) CTF    Output (mL) 50 mL    Output Color Brown    WOUND RN ONLY - Stomal Appliance  2 Piece;2 1/4\" (57mm) CTF    Appliance (Pouch) # [id number]    Appliance Brand Wanda    Appliance Supplier [employer]    WOUND NURSE ONLY - Time Spent with Patient (mins) 45                 Ostomy Appliance (type and size): 2 1/4\" two piece wanda     Interventions and Education: Patient and chart reviewed. Patient happy wound team in to change appliance. Appliance easily removed from patient LUQ, cleansed peristoma and stoma with warm moist wash cloth. [place] template created on 2 1/4\" barrier, traced and cut to outer most [place]. Left template in bag. Cut barrier to fit. Applied no-sting skin prep to peristomal area. Applied barrier to skin and pouch to barrier, assuring snapped into place. Bag going towards left side per patient request, closed end of bag. Patient and  have been managing per patient. Extra supplies left in bag at bedside.      Evaluation: No questions or concerns from patient at this time. Stoma and peristoma are intact, no separation or breakdown noted. Continue to use two piece 2 1/4\". Would benefit from paste ring since patient drinking Go-Lightly and stool may become thinner, patient refusing paste ring at this time. No need for wound team follow up at this time unless " questions or concerns arise.     Plan: Patient and bedside staff to manage established colostomy. Patient and family/friends manage outpatient.    Anticipated discharge needs: Patient previously set up for supplies with [facility] for discharge, is established. Family and friends to continue to help and support patient.

## 2020-10-28 VITALS
HEIGHT: 63 IN | TEMPERATURE: 98 F | BODY MASS INDEX: 20.02 KG/M2 | WEIGHT: 113 LBS | DIASTOLIC BLOOD PRESSURE: 70 MMHG | SYSTOLIC BLOOD PRESSURE: 116 MMHG | RESPIRATION RATE: 18 BRPM | HEART RATE: 71 BPM | OXYGEN SATURATION: 93 %

## 2020-10-28 PROBLEM — K59.00 CONSTIPATION: Status: ACTIVE | Noted: 2020-10-28

## 2020-10-28 PROCEDURE — 700102 HCHG RX REV CODE 250 W/ 637 OVERRIDE(OP): Performed by: SURGERY

## 2020-10-28 PROCEDURE — 700111 HCHG RX REV CODE 636 W/ 250 OVERRIDE (IP): Performed by: SURGERY

## 2020-10-28 PROCEDURE — A9270 NON-COVERED ITEM OR SERVICE: HCPCS | Performed by: SURGERY

## 2020-10-28 RX ORDER — AMOXICILLIN AND CLAVULANATE POTASSIUM 875; 125 MG/1; MG/1
1 TABLET, FILM COATED ORAL EVERY 12 HOURS
Qty: 10 TAB | Refills: 0 | Status: SHIPPED | OUTPATIENT
Start: 2020-10-28 | End: 2020-11-02

## 2020-10-28 RX ORDER — OXYCODONE HYDROCHLORIDE 5 MG/1
5 TABLET ORAL EVERY 4 HOURS PRN
Qty: 10 TAB | Refills: 0 | Status: SHIPPED | OUTPATIENT
Start: 2020-10-28 | End: 2020-11-02

## 2020-10-28 RX ORDER — PSEUDOEPHEDRINE HCL 30 MG
100 TABLET ORAL 2 TIMES DAILY
Qty: 60 CAP | Refills: 3 | Status: SHIPPED | OUTPATIENT
Start: 2020-10-28 | End: 2020-11-27

## 2020-10-28 RX ADMIN — AMOXICILLIN AND CLAVULANATE POTASSIUM 1 TABLET: 875; 125 TABLET, FILM COATED ORAL at 05:26

## 2020-10-28 RX ADMIN — OXYCODONE 5 MG: 5 TABLET ORAL at 12:52

## 2020-10-28 RX ADMIN — APIXABAN 5 MG: 5 TABLET, FILM COATED ORAL at 05:26

## 2020-10-28 RX ADMIN — OXYCODONE 5 MG: 5 TABLET ORAL at 07:59

## 2020-10-28 RX ADMIN — GABAPENTIN 300 MG: 300 CAPSULE ORAL at 05:26

## 2020-10-28 RX ADMIN — PSYLLIUM HUSK 1 PACKET: 3.4 POWDER ORAL at 05:26

## 2020-10-28 RX ADMIN — BUDESONIDE AND FORMOTEROL FUMARATE DIHYDRATE 2 PUFF: 160; 4.5 AEROSOL RESPIRATORY (INHALATION) at 05:27

## 2020-10-28 RX ADMIN — OXYCODONE 5 MG: 5 TABLET ORAL at 00:34

## 2020-10-28 NOTE — PROGRESS NOTES
Received bedside report and assumed care of pt at change of shift. Pt is A&Ox4 and on RA. Pt has ostomy on LLQ which has good output. Assessment completed and pt states 5/10 pain at flap site. Pt also reported she noticed some blood in her underwear. Site was assessed and everything was dry and intact. Pt had no other needs at this time. Bed is locked and in lowest position with water and call light in reach

## 2020-10-28 NOTE — DISCHARGE INSTRUCTIONS
Surgery Discharge Instructions:    1. DIET: Upon discharge from the hospital you may resume your normal preoperative diet. Depending on how you are feeling and whether you have nausea or not, you may wish to stay with a bland diet for the first few days. However, you can advance your diet as quickly as you feel ready.    2. ACTIVITIES: You have a ten pound weight lifting restriction for four to six weeks after surgery.  This means no lifting anything heavier than a gallon of milk.  Routine activities such as walking and using the stairs are safe.  You may sleep in any position that is comfortable. Avoid strenuous activities and exercise that involves twisting, bending, and, running.    3. DRIVING: You may drive whenever you are off pain medications and are able to perform the activities needed to drive, i.e. turning, bending, twisting, wearing a seat belt, etc.    4. BATHING: You may shower two days following your surgery, but do not submerge in a bath or a pool until you after your first postoperative visit.    5. BOWEL FUNCTION: You may develop either frequent or loose stools after meals. This usually corrects itself after a few days, to a few weeks.    You may develop constipation. The combination of pain medication and decreased activity level can cause constipation in otherwise normal patients. If you feel this is occurring, increase your fluid intake and take an over the counter laxative (Milk of Magnesia, Miralax, or Magnesium Citrate).  If this is not successful, take an over the counter Fleet enema and repeat the laxative until effect.    6. PAIN MEDICATION: You may be given a prescription for pain medication at discharge. Please take these as directed. It is important to remember not to take medications on an empty stomach as this may cause nausea.  Wean the use of pain medication as soon as possible.  If narcotics were prescribed, try to avoid their use and try non-narcotic medications, such as tylenol  first.    7. CALL IF YOU HAVE: (1) Fevers of more than 101 F (38.5 C), (2) New chest or leg pain, (3) Worsening abdominal pain, (4) Persistent vomiting, (5) Large quantity bleeding, (6) Drainage from incision that is foul smelling, increased pain at the wound, wound edges that have pulled apart, redness or swelling at the incision site. Please do not hesitate to call with any other questions.     8. FOLLOW-UP APPOINTMENT: Contact my office at [phone] for a follow-up appointment in 1 to 2 weeks following your procedure.    If you have any additional questions, please do not hesitate to call the office and speak to either myself or the physician on call.    Office address:  [address] [place], [place] [address]e [facility], [address] [place] [address]      Dulce Jeter M.D.   [facility]  General Surgery  Colon and Rectal Surgeon      Constipation, Adult  Constipation is when a person has fewer bowel movements in a week than normal, has difficulty having a bowel movement, or has stools that are dry, hard, or larger than normal. Constipation may be caused by an underlying condition. It may become worse with age if a person takes certain medicines and does not take in enough fluids.  Follow these instructions at home:  Eating and drinking    · Eat foods that have a lot of fiber, such as fresh fruits and vegetables, whole grains, and beans.  · Limit foods that are high in fat, low in fiber, or overly processed, such as french fries, hamburgers, cookies, candies, and soda.  · Drink enough fluid to keep your urine clear or pale yellow.  General instructions  · Exercise regularly or as told by your health care provider.  · Go to the restroom when you have the urge to go. Do not hold it in.  · Take over-the-counter and prescription medicines only as told by your health care provider. These include any fiber supplements.  · Practice pelvic floor retraining exercises, such as deep breathing while relaxing the lower abdomen and pelvic floor  relaxation during bowel movements.  · Watch your condition for any changes.  · Keep all follow-up visits as told by your health care provider. This is important.  Contact a health care provider if:  · You have pain that gets worse.  · You have a fever.  · You do not have a bowel movement after 4 days.  · You vomit.  · You are not hungry.  · You lose weight.  · You are bleeding from the anus.  · You have thin, pencil-like stools.  Get help right away if:  · You have a fever and your symptoms suddenly get worse.  · You leak stool or have blood in your stool.  · Your abdomen is bloated.  · You have severe pain in your abdomen.  · You feel dizzy or you faint.  This information is not intended to replace advice given to you by your health care provider. Make sure you discuss any questions you have with your health care provider.  Document Released: 09/15/2005 Document Revised: 11/30/2018 Document Reviewed: 06/07/2017  [employer] Patient Education © 2020 [employer]    Discharge Instructions    Discharged to home by car with relative. Discharged via wheelchair, hospital escort: Yes.  Special equipment needed: Walker    Be sure to schedule a follow-up appointment with your primary care doctor or any specialists as instructed.     Discharge Plan:   Diet Plan: Discussed  Activity Level: Discussed  Confirmed Follow up Appointment: Patient to Call and Schedule Appointment  Confirmed Symptoms Management: Discussed  Medication Reconciliation Updated: Yes  Influenza Vaccine Indication: Not indicated: Previously immunized this influenza season and > 8 years of age    I understand that a diet low in cholesterol, fat, and sodium is recommended for good health. Unless I have been given specific instructions below for another diet, I accept this instruction as my diet prescription.   Other diet: Advance as tolerated to soft diet    Special Instructions: None    · Is patient discharged on Warfarin / Coumadin?   No     Depression /  Suicide Risk    As you are discharged from this [facility] facility, it is important to learn how to keep safe from harming yourself.    Recognize the warning signs:  · Abrupt changes in personality, positive or negative- including increase in energy   · Giving away possessions  · Change in eating patterns- significant weight changes-  positive or negative  · Change in sleeping patterns- unable to sleep or sleeping all the time   · Unwillingness or inability to communicate  · Depression  · Unusual sadness, discouragement and loneliness  · Talk of wanting to die  · Neglect of personal appearance   · Rebelliousness- reckless behavior  · Withdrawal from people/activities they love  · Confusion- inability to concentrate     If you or a loved one observes any of these behaviors or has concerns about self-harm, here's what you can do:  · Talk about it- your feelings and reasons for harming yourself  · Remove any means that you might use to hurt yourself (examples: pills, rope, extension cords, firearm)  · Get professional help from the community (Mental Health, Substance Abuse, psychological counseling)  · Do not be alone:Call your Safe Contact- someone whom you trust who will be there for you.  · Call your local CRISIS HOTLINE 108-4180 or 286-689-2212  · Call your local Children's Mobile Crisis Response Team [place] (040) 535-5981 or www.Sarenza  · Call the toll free National Suicide Prevention Hotlines   · National Suicide Prevention Lifeline 340-078-XPFT (2774)  · National Hope Line Network 800-SUICIDE (473-7725)

## 2020-10-28 NOTE — PROGRESS NOTES
Dr. Jeter informed me of pt's readmission  Pt says discomfort is about the same  AVSS  CT reviewed-just a small amount of fluid in perineum  Perineum on exam is not bulging, no erythema, no area of fluctuance.     A/P-I am satisfied with the operative sites and have no further recommendations.  If the patient is DC's tomorrow I would like to see her in one week.

## 2020-10-28 NOTE — PROGRESS NOTES
Pt sent to discharge lounge via transport in wheelchair. All belongings sent with pt. Charge and UC notified

## 2020-10-28 NOTE — DISCHARGE SUMMARY
"Discharge Summary    CHIEF COMPLAINT ON ADMISSION  Chief Complaint   Patient presents with   • Abdominal Pain     lower abdominal pain since yesterday; pt had part of colon removed along with all of rectum and anus (d/t anal cancer) and colostomy placed on 10/15/2020   • ALOC     pt's family states that pt was \"talking nonsense all day yesterday and doesn't remember it today\"; she states pt is better today but not quite back to baseline       Reason for Admission  Abdominal Pain     Admission Date  10/26/2020    CODE STATUS  Prior    HPI & HOSPITAL COURSE  This is a 79 y.o. female here with constipation and post-op pain.  She did well following admission.  She had good stoma output after initiating a laxative.  Her pain improved with resolution of constipation.  There was some perineal swelling that improved during stay.       Therefore, she is discharged in good and stable condition to home with close outpatient follow-up.    The patient met 2-midnight criteria for an inpatient stay at the time of discharge.    Discharge Date  October 28, 2020    FOLLOW UP ITEMS POST DISCHARGE  Follow-up with me in 1 week    DISCHARGE DIAGNOSES  Principal Problem:    Constipation POA: Unknown  Resolved Problems:    * No resolved hospital problems. *      FOLLOW UP  Future Appointments   Date Time Provider Department Center   12/21/2020 11:00 AM Sha Casper M.D. RADT None     Mark Reyes M.D.  [address]  [place] [address]  [phone]    Schedule an appointment as soon as possible for a visit on 11/3/2020      Dulce Jeter M.D.  [address] [facility] [address]  [place] [address]  [phone]    Schedule an appointment as soon as possible for a visit in 1 week        MEDICATIONS ON DISCHARGE     Medication List      START taking these medications      Instructions   amoxicillin-clavulanate 875-125 MG Tabs  Commonly known as: AUGMENTIN   Take 1 Tab by mouth every 12 hours for 5 days.  Dose: 1 Tab     docusate " sodium 100 MG Caps   Take 100 mg by mouth 2 Times a Day for 30 days.  Dose: 100 mg     Psyllium 28 % packet  Commonly known as: METAMUCIL   Take 1 Packet by mouth every day.  Dose: 1 Packet        CONTINUE taking these medications      Instructions   apixaban 5mg Tabs  Commonly known as: ELIQUIS   Take 5 mg by mouth 2 Times a Day.  Dose: 5 mg     atorvastatin 20 MG Tabs  Commonly known as: LIPITOR   Take 80 mg by mouth every day with lunch.  Dose: 80 mg     digoxin 125 MCG Tabs  Commonly known as: LANOXIN   Take 125 mcg by mouth every day with lunch.  Dose: 125 mcg     oxyCODONE immediate-release 5 MG Tabs  Commonly known as: ROXICODONE   Take 1 Tab by mouth every four hours as needed for up to 5 days.  Dose: 5 mg     tiotropium 18 MCG Caps  Commonly known as: SPIRIVA   Inhale 18 mcg by mouth every day.  Dose: 18 mcg     Wixela Inhub 500-50 MCG/DOSE Aepb  Generic drug: fluticasone-salmeterol   Inhale 1 Puff by mouth every 12 hours.  Dose: 1 Puff     zolpidem 10 MG Tabs  Commonly known as: AMBIEN   Take 5 mg by mouth at bedtime as needed for Sleep.  Dose: 5 mg        ASK your doctor about these medications      Instructions   gabapentin 300 MG Caps  Commonly known as: NEURONTIN  Ask about: Should I take this medication?   Take 1 Capsule by mouth 3 times a day for 4 days.  Dose: 300 mg            Allergies  Allergies   Allergen Reactions   • Codeine Nausea       DIET  Orders Placed This Encounter   Procedures   • Diet Order Regular     Standing Status:   Standing     Number of Occurrences:   1     Order Specific Question:   Diet:     Answer:   Regular [1]       ACTIVITY  Light duty.  10-lb lifting restriction    CONSULTATIONS  Wound care  Plastic surgery - Dr. Reyes    PROCEDURES  None    LABORATORY  Lab Results   Component Value Date    SODIUM 140 10/27/2020    POTASSIUM 3.3 (L) 10/27/2020    CHLORIDE 105 10/27/2020    CO2 23 10/27/2020    GLUCOSE 102 (H) 10/27/2020    BUN 12 10/27/2020    CREATININE 0.68 10/27/2020         Lab Results   Component Value Date    WBC 9.5 10/27/2020    HEMOGLOBIN 11.0 (L) 10/27/2020    HEMATOCRIT 33.8 (L) 10/27/2020    PLATELETCT 345 10/27/2020        Total time of the discharge process exceeds 30 minutes.

## 2020-10-28 NOTE — PROGRESS NOTES
Pt AOx 4, mobile with one person assist and FWW, ostomy on left lower abdomen, and on RA.  VSS. PIV in place on left AC, SL. Incision on abdomen clean, dry, staples in place. Rectum incision with sutures clean and dry. Pt reporting 5/10 pain and requesting imitrex for migraine. Message left for Dr. Dulce Jeter. Order given for 25 mg Imitrex PO Q2. Medication given to pt.  Pt belongings and call light in reach, bed locked/lowest position, bed alarm on, and pt now resting quietly.

## 2020-10-29 LAB
BACTERIA UR CULT: ABNORMAL
BACTERIA UR CULT: ABNORMAL
SIGNIFICANT IND 70042: ABNORMAL
SITE SITE: ABNORMAL
SOURCE SOURCE: ABNORMAL

## 2020-10-31 LAB
BACTERIA BLD CULT: NORMAL
BACTERIA BLD CULT: NORMAL
SIGNIFICANT IND 70042: NORMAL
SIGNIFICANT IND 70042: NORMAL
SITE SITE: NORMAL
SITE SITE: NORMAL
SOURCE SOURCE: NORMAL
SOURCE SOURCE: NORMAL

## 2020-12-15 ENCOUNTER — HOSPITAL ENCOUNTER (OUTPATIENT)
Dept: RADIOLOGY | Facility: MEDICAL CENTER | Age: 80
End: 2020-12-15
Payer: MEDICARE

## 2020-12-18 ENCOUNTER — PRE-ADMISSION TESTING (OUTPATIENT)
Dept: ADMISSIONS | Facility: MEDICAL CENTER | Age: 80
End: 2020-12-18
Attending: INTERNAL MEDICINE
Payer: MEDICARE

## 2020-12-18 RX ORDER — HYDROCODONE BITARTRATE AND ACETAMINOPHEN 7.5; 325 MG/1; MG/1
1 TABLET ORAL PRN
COMMUNITY
End: 2021-01-21

## 2020-12-21 ENCOUNTER — HOSPITAL ENCOUNTER (OUTPATIENT)
Dept: RADIATION ONCOLOGY | Facility: MEDICAL CENTER | Age: 80
End: 2020-12-31
Attending: INTERNAL MEDICINE
Payer: MEDICARE

## 2020-12-21 ENCOUNTER — PRE-ADMISSION TESTING (OUTPATIENT)
Dept: ADMISSIONS | Facility: MEDICAL CENTER | Age: 80
End: 2020-12-21
Attending: INTERNAL MEDICINE
Payer: MEDICARE

## 2020-12-21 VITALS
BODY MASS INDEX: 19.68 KG/M2 | DIASTOLIC BLOOD PRESSURE: 64 MMHG | TEMPERATURE: 98.3 F | HEART RATE: 83 BPM | OXYGEN SATURATION: 97 % | WEIGHT: 111.11 LBS | SYSTOLIC BLOOD PRESSURE: 110 MMHG

## 2020-12-21 DIAGNOSIS — Z01.812 PRE-OPERATIVE LABORATORY EXAMINATION: ICD-10-CM

## 2020-12-21 DIAGNOSIS — Z01.810 PRE-OPERATIVE CARDIOVASCULAR EXAMINATION: ICD-10-CM

## 2020-12-21 LAB
COVID ORDER STATUS COVID19: NORMAL
ERYTHROCYTE [DISTWIDTH] IN BLOOD BY AUTOMATED COUNT: 54 FL (ref 35.9–50)
HCT VFR BLD AUTO: 39.8 % (ref 37–47)
HGB BLD-MCNC: 12.6 G/DL (ref 12–16)
INR PPP: 1.16 (ref 0.87–1.13)
MCH RBC QN AUTO: 32.8 PG (ref 27–33)
MCHC RBC AUTO-ENTMCNC: 31.7 G/DL (ref 33.6–35)
MCV RBC AUTO: 103.6 FL (ref 81.4–97.8)
PLATELET # BLD AUTO: 191 K/UL (ref 164–446)
PMV BLD AUTO: 9.7 FL (ref 9–12.9)
PROTHROMBIN TIME: 15.2 SEC (ref 12–14.6)
RBC # BLD AUTO: 3.84 M/UL (ref 4.2–5.4)
SARS-COV-2 RNA RESP QL NAA+PROBE: DETECTED
SPECIMEN SOURCE: ABNORMAL
WBC # BLD AUTO: 6.9 K/UL (ref 4.8–10.8)

## 2020-12-21 PROCEDURE — 99212 OFFICE O/P EST SF 10 MIN: CPT | Performed by: RADIOLOGY

## 2020-12-21 PROCEDURE — 85610 PROTHROMBIN TIME: CPT

## 2020-12-21 PROCEDURE — U0003 INFECTIOUS AGENT DETECTION BY NUCLEIC ACID (DNA OR RNA); SEVERE ACUTE RESPIRATORY SYNDROME CORONAVIRUS 2 (SARS-COV-2) (CORONAVIRUS DISEASE [COVID-19]), AMPLIFIED PROBE TECHNIQUE, MAKING USE OF HIGH THROUGHPUT TECHNOLOGIES AS DESCRIBED BY CMS-2020-01-R: HCPCS

## 2020-12-21 PROCEDURE — 99214 OFFICE O/P EST MOD 30 MIN: CPT | Performed by: RADIOLOGY

## 2020-12-21 PROCEDURE — 85027 COMPLETE CBC AUTOMATED: CPT

## 2020-12-21 PROCEDURE — 36415 COLL VENOUS BLD VENIPUNCTURE: CPT

## 2020-12-21 ASSESSMENT — PAIN SCALES - GENERAL: PAINLEVEL: 1=MINIMAL PAIN

## 2020-12-21 ASSESSMENT — FIBROSIS 4 INDEX: FIB4 SCORE: 0.79

## 2020-12-21 NOTE — PROGRESS NOTES
RADIATION ONCOLOGY FOLLOW-UP    DATE OF SERVICE: 12/21/2020    IDENTIFICATION:   A 79 y.o. 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 on 1/16/2020        HISTORY OF PRESENT ILLNESS:   Patient originally presented in April 2019 with some rectal bleeding.  She was originally seen by Dr. Edilson Quinn who noted anal fissure and hemorrhoids and referred her to Dr. Dulce Jeter ([facility]) for anoscopy which showed hard mass exophytic palpated from the anal verge to the top of the sphincter in the left lateral position fixed and punch biopsy December 18, 2018 shows invasive squamous cell carcinoma moderately differentiated.  She underwent MRI rectum January 3, 2020 which showed a 3.9 x 2 cm anal canal lesion semicircumferential extending from 1:00 to 8 o'clock position extending from the anal verge into the lower rectum.  Anteriorly the lesion abuts the lower vagina without definitive evidence of vaginal invasion.  CT chest abdomen pelvis January 10, 2020 showed no evidence of metastatic disease.  A hypo-dense 1.4 cm nodule below the left hemidiaphragm medial to the spleen it is unlikely to represent a solitary metastatic lesion in this location.  PET CT scan done January 16, 2020 showed abnormal oval-shaped solid mass in the left side of the anus extending to the level of the lower rectum with marketed activity.  No adenopathy or elevated raghav activity noted.  Patient currently is complaining of severe anal pain and occasional bleeding.    3/20/20  Patient improving post treatment. [facility] PETCT and CONCHITA in 3 months follow up ~June 2020.     6/11/20  Patient still having some loose bowel movements and fecal incontinence as well as some pain in the anal rectal area.  She is using  the lidocaine jelly with some relief.  She also complains of some bladder irritation and just general discomfort in her lower abdomen.  PET/CT shows good response with just mild activity in the anal area at this time.    9/14/20  Patient is doing well and her inflammation is improving.  She does not have any diarrhea at this time she does have a little bit of rectal pain which she uses her lidocaine for.  She has no abdominal pain or cystitis at this time.  She was seen by Dr. Jeter who was following her left lateral lesion which shows some induration and is due for an MRI next week for follow-up.    INTERVAL HISTORY:  Patient underwent APR by Dr. Dulce Jeter (Colorectal Surgery) on October 15, 2020 with flap by Dr. Reyes.  Patient was found to have persistent disease clinical T2 with no nodes involved.  She underwent repeat PET CT scan recently which showed some uptake within the left lower lobe and is undergoing CT-guided biopsy next week.  Overall she still has lower pelvic pain and some urinary incontinence for who she previously saw Dr. Montana Jeter (Uro-Gyn).  She says her weight has been stable and actually she has been increasing her weight recently.      PROBLEM LIST:  Patient Active Problem List   Diagnosis   • Degeneration of lumbar intervertebral disc   • Anal cancer (HCC)   • Hiatal hernia   • Asthma   • High cholesterol   • PONV (postoperative nausea and vomiting)   • Anemia   • Arrhythmia   • Cancer (HCC)   • Constipation       CURRENT MEDICATIONS:  Current Outpatient Medications   Medication Sig Dispense Refill   • Multiple Vitamins-Minerals (CENTRUM SILVER 50+WOMEN PO) Take  by mouth every day.     • HYDROcodone-acetaminophen (NORCO) 7.5-325 MG per tablet Take 1 Tab by mouth as needed.     • Psyllium (METAMUCIL) 28 % packet Take 1 Packet by mouth every day. (Patient taking differently: Take 1 Packet by mouth as needed.) 30 Each 3   • apixaban (ELIQUIS) 5mg Tab Take 5 mg by mouth 2 Times a  Day.     • fluticasone-salmeterol (WIXELA INHUB) 500-50 MCG/DOSE AEROSOL POWDER, BREATH ACTIVATED Inhale 1 Puff by mouth every 12 hours.     • digoxin (LANOXIN) 125 MCG Tab Take 125 mcg by mouth every day with lunch.     • tiotropium (SPIRIVA) 18 MCG Cap Inhale 18 mcg by mouth every day.     • atorvastatin (LIPITOR) 20 MG Tab Take 80 mg by mouth every day with lunch.     • zolpidem (AMBIEN) 10 MG Tab Take 10 mg by mouth at bedtime as needed for Sleep.       No current facility-administered medications for this encounter.        ALLERGIES:  Sagebrush    REVIEW OF SYSTEMS:  A review of systems for today's date of service was reviewed and uploaded into the electronic medical record.    PHYSICAL EXAM:  PERFORMANCE STATUS:  No flowsheet data found.  Karnofsky Score 12/21/2020 9/14/2020   Karnofsky Score 80 90   Some recent data might be hidden     /64 (BP Location: Left arm, Patient Position: Sitting, BP Cuff Size: Adult)   Pulse 83   Temp 36.8 °C (98.3 °F)   Wt 50.4 kg (111 lb 1.8 oz)   SpO2 97%   BMI 19.68 kg/m²   Physical Exam  Vitals signs reviewed.   Constitutional:       Appearance: Normal appearance.   HENT:      Head: Normocephalic and atraumatic.      Mouth/Throat:      Mouth: Mucous membranes are moist.   Eyes:      Pupils: Pupils are equal, round, and reactive to light.   Neck:      Musculoskeletal: Normal range of motion.   Cardiovascular:      Rate and Rhythm: Normal rate.   Pulmonary:      Effort: Pulmonary effort is normal.   Abdominal:      General: Abdomen is flat.   Musculoskeletal: Normal range of motion.   Skin:     General: Skin is warm.   Neurological:      General: No focal deficit present.      Mental Status: She is alert.   Psychiatric:         Mood and Affect: Mood normal.         LABORATORY DATA:   Lab Results   Component Value Date/Time    WBC 9.5 10/27/2020 0244    WBC 11.6 (H) 10/26/2020 1350    WBC 5.5 10/21/2020 0551    HEMOGLOBIN 11.0 (L) 10/27/2020 0244    HEMOGLOBIN 13.5  10/26/2020 1350    HEMOGLOBIN 10.8 (L) 10/21/2020 0551    HEMATOCRIT 33.8 (L) 10/27/2020 0244    HEMATOCRIT 40.2 10/26/2020 1350    HEMATOCRIT 32.6 (L) 10/21/2020 0551    .7 (H) 10/27/2020 0244    .5 (H) 10/26/2020 1350    .5 (H) 10/21/2020 0551    PLATELETCT 345 10/27/2020 0244    PLATELETCT 394 10/26/2020 1350    PLATELETCT 192 10/21/2020 0551    NEUTS 7.37 (H) 10/27/2020 0244    NEUTS 9.09 (H) 10/26/2020 1350    NEUTS 3.68 10/21/2020 0551      Lab Results   Component Value Date/Time    SODIUM 140 10/27/2020 0244    SODIUM 139 10/26/2020 1350    SODIUM 137 10/21/2020 0551    POTASSIUM 3.3 (L) 10/27/2020 0244    POTASSIUM 3.9 10/26/2020 1350    POTASSIUM 4.0 10/21/2020 0551    BUN 12 10/27/2020 0244    BUN 15 10/26/2020 1350    BUN 19 10/21/2020 0551    CREATININE 0.68 10/27/2020 0244    CREATININE 0.72 10/26/2020 1350    CREATININE 0.77 10/21/2020 0551    CALCIUM 8.3 (L) 10/27/2020 0244    CALCIUM 9.8 10/26/2020 1350    CALCIUM 8.4 (L) 10/21/2020 0551    ALBUMIN 3.4 10/26/2020 1350    ALBUMIN 3.9 06/11/2020 1024    ALBUMIN 3.4 03/25/2020 1405    ASTSGOT 17 10/26/2020 1350    ASTSGOT 22 06/11/2020 1024    ASTSGOT 27 03/25/2020 1405    ALKPHOSPHAT 102 (H) 10/26/2020 1350    ALKPHOSPHAT 56 06/11/2020 1024    ALKPHOSPHAT 62 03/25/2020 1405    IFNOTAFR >60 10/27/2020 0244    IFNOTAFR >60 10/26/2020 1350    IFNOTAFR >60 10/21/2020 0551       RADIOLOGY DATA:  No results found.    IMPRESSION:    A 79 y.o.    Anal cancer (HCC)  Staging form: Anus, AJCC 8th Edition  - Clinical: Stage IIA (cT2, cN0, cM0) - Signed by Sha Casper M.D. on 1/16/2020      CANCER STATUS:  Disease Progression Distant    RECOMMENDATIONS:   I reviewed with her that her most recent PET CT scan which shows local uptake within the lower lobe of the lung.  I agree with Dr. Vogel's assessment that she should undergo CT-guided biopsy and we will review the pathology if it is just this isolated lesion I did discuss stereotactic  body radiation therapy which she is hesitant given side effects from previous anal radiation however I did discuss that side effects are much more tolerable for SBRT to the lung and likely she will have no symptoms but symptoms can include acute fatigue, cough, chest pain or rib tenderness but rarely other side effects besides that.  I also discussed the possibility of a metastatectomy which she should see a thoracic surgeon like Dr. Ganser or Dr. Rangel.  I also discussed that there are multiple other new agents include immunotherapy for recurrent anal cancer if this is what that is.  I will call her next Wednesday to discuss results.    Thank you for the opportunity to participate in her care.  If any questions or comments, please do not hesitate in calling.

## 2020-12-21 NOTE — NON-PROVIDER
Patient was seen today in clinic with Dr. Casper for follow up.  Vitals signs and weight were obtained and pain assessment was completed.  Allergies and medications were reviewed with the patient.  Review of systems completed.     Vitals/Pain:  Vitals:    12/21/20 1108   BP: 110/64   BP Location: Left arm   Patient Position: Sitting   BP Cuff Size: Adult   Pulse: 83   Temp: 36.8 °C (98.3 °F)   SpO2: 97%   Weight: 50.4 kg (111 lb 1.8 oz)   Pain Score: 1=Minimal Pain        Allergies:   Sagebrush    Current Medications:  Current Outpatient Medications   Medication Sig Dispense Refill   • Multiple Vitamins-Minerals (CENTRUM SILVER 50+WOMEN PO) Take  by mouth every day.     • HYDROcodone-acetaminophen (NORCO) 7.5-325 MG per tablet Take 1 Tab by mouth as needed.     • Psyllium (METAMUCIL) 28 % packet Take 1 Packet by mouth every day. (Patient taking differently: Take 1 Packet by mouth as needed.) 30 Each 3   • apixaban (ELIQUIS) 5mg Tab Take 5 mg by mouth 2 Times a Day.     • fluticasone-salmeterol (WIXELA INHUB) 500-50 MCG/DOSE AEROSOL POWDER, BREATH ACTIVATED Inhale 1 Puff by mouth every 12 hours.     • digoxin (LANOXIN) 125 MCG Tab Take 125 mcg by mouth every day with lunch.     • tiotropium (SPIRIVA) 18 MCG Cap Inhale 18 mcg by mouth every day.     • atorvastatin (LIPITOR) 20 MG Tab Take 80 mg by mouth every day with lunch.     • zolpidem (AMBIEN) 10 MG Tab Take 10 mg by mouth at bedtime as needed for Sleep.       No current facility-administered medications for this encounter.          PCP:  Lorelei Mariscal, Med Ass't

## 2020-12-22 NOTE — PROGRESS NOTES
Contact made with pt. She denies any URI symptoms and dyspnea. Advised pt that we will r/s her planned biopsy. She will monitor for symptoms and escalate to medical care if needed.     Call placed to pt's PCP Tram GRANGER's office and notified of +COVID test. Results e faxed to office.

## 2020-12-22 NOTE — PROGRESS NOTES
Pre procedure test results show + COVID screening. LM on both phone numbers in EPIC for pt to return call to discuss symptoms and r/s of upcoming biopsy, currently scheduled for 12/28. Will need to change appointment to 3 weeks from positive result, or 14 days after resolution of symptoms per facility COVID policy.     Referring providers and IR medical director notified.

## 2020-12-30 RX ORDER — VITAMIN B COMPLEX
1000 TABLET ORAL DAILY
COMMUNITY

## 2021-01-18 NOTE — OR NURSING
"COVID-19 Pre-Surgery Screenin. Do you have an undiagnosed respiratory illness or symptoms such as coughing or sneezing? N     • Onset of COVID Sx:  per patient.    • Acute vs. chronic respiratory illness: \"Severe head cold lasted around 10 days, symptom free since \" per patient.     2. Do you have an unexplained fever greater than 100.4 degrees Fahrenheit or 38 degrees Celsius? N  ?  3. Have you had direct exposure to a patient who tested positive for Covid-19? Not within the last 21 days.    4. Patient informed of current visitation and mask policies by this RN.  "

## 2021-01-19 ENCOUNTER — HOSPITAL ENCOUNTER (OUTPATIENT)
Facility: MEDICAL CENTER | Age: 81
End: 2021-01-19
Attending: INTERNAL MEDICINE | Admitting: RADIOLOGY
Payer: MEDICARE

## 2021-01-19 ENCOUNTER — APPOINTMENT (OUTPATIENT)
Dept: RADIOLOGY | Facility: MEDICAL CENTER | Age: 81
End: 2021-01-19
Attending: RADIOLOGY
Payer: MEDICARE

## 2021-01-19 ENCOUNTER — APPOINTMENT (OUTPATIENT)
Dept: RADIOLOGY | Facility: MEDICAL CENTER | Age: 81
End: 2021-01-19
Attending: INTERNAL MEDICINE
Payer: MEDICARE

## 2021-01-19 VITALS
WEIGHT: 110.01 LBS | SYSTOLIC BLOOD PRESSURE: 109 MMHG | TEMPERATURE: 97.3 F | BODY MASS INDEX: 19.49 KG/M2 | OXYGEN SATURATION: 94 % | HEIGHT: 63 IN | RESPIRATION RATE: 20 BRPM | HEART RATE: 70 BPM | DIASTOLIC BLOOD PRESSURE: 53 MMHG

## 2021-01-19 DIAGNOSIS — C21.0 ANAL CANCER (HCC): ICD-10-CM

## 2021-01-19 LAB
ERYTHROCYTE [DISTWIDTH] IN BLOOD BY AUTOMATED COUNT: 54.5 FL (ref 35.9–50)
HCT VFR BLD AUTO: 43.7 % (ref 37–47)
HGB BLD-MCNC: 13.8 G/DL (ref 12–16)
INR PPP: 0.93 (ref 0.87–1.13)
MCH RBC QN AUTO: 32.2 PG (ref 27–33)
MCHC RBC AUTO-ENTMCNC: 31.6 G/DL (ref 33.6–35)
MCV RBC AUTO: 102.1 FL (ref 81.4–97.8)
PATHOLOGY CONSULT NOTE: NORMAL
PLATELET # BLD AUTO: 183 K/UL (ref 164–446)
PMV BLD AUTO: 9.5 FL (ref 9–12.9)
PROTHROMBIN TIME: 12.8 SEC (ref 12–14.6)
RBC # BLD AUTO: 4.28 M/UL (ref 4.2–5.4)
WBC # BLD AUTO: 6 K/UL (ref 4.8–10.8)

## 2021-01-19 PROCEDURE — 71045 X-RAY EXAM CHEST 1 VIEW: CPT

## 2021-01-19 PROCEDURE — 700111 HCHG RX REV CODE 636 W/ 250 OVERRIDE (IP): Performed by: RADIOLOGY

## 2021-01-19 PROCEDURE — 99153 MOD SED SAME PHYS/QHP EA: CPT

## 2021-01-19 PROCEDURE — 88360 TUMOR IMMUNOHISTOCHEM/MANUAL: CPT

## 2021-01-19 PROCEDURE — 700111 HCHG RX REV CODE 636 W/ 250 OVERRIDE (IP)

## 2021-01-19 PROCEDURE — 85027 COMPLETE CBC AUTOMATED: CPT

## 2021-01-19 PROCEDURE — 700102 HCHG RX REV CODE 250 W/ 637 OVERRIDE(OP): Performed by: RADIOLOGY

## 2021-01-19 PROCEDURE — 85610 PROTHROMBIN TIME: CPT

## 2021-01-19 PROCEDURE — 160002 HCHG RECOVERY MINUTES (STAT)

## 2021-01-19 PROCEDURE — A9270 NON-COVERED ITEM OR SERVICE: HCPCS | Performed by: RADIOLOGY

## 2021-01-19 PROCEDURE — 88305 TISSUE EXAM BY PATHOLOGIST: CPT

## 2021-01-19 PROCEDURE — 88341 IMHCHEM/IMCYTCHM EA ADD ANTB: CPT | Mod: 91

## 2021-01-19 PROCEDURE — 88342 IMHCHEM/IMCYTCHM 1ST ANTB: CPT | Mod: XU

## 2021-01-19 RX ORDER — ONDANSETRON 2 MG/ML
4 INJECTION INTRAMUSCULAR; INTRAVENOUS PRN
Status: ACTIVE | OUTPATIENT
Start: 2021-01-19 | End: 2021-01-19

## 2021-01-19 RX ORDER — SODIUM CHLORIDE 9 MG/ML
500 INJECTION, SOLUTION INTRAVENOUS
Status: ACTIVE | OUTPATIENT
Start: 2021-01-19 | End: 2021-01-19

## 2021-01-19 RX ORDER — MIDAZOLAM HYDROCHLORIDE 1 MG/ML
INJECTION INTRAMUSCULAR; INTRAVENOUS
Status: COMPLETED
Start: 2021-01-19 | End: 2021-01-19

## 2021-01-19 RX ORDER — HYDROMORPHONE HYDROCHLORIDE 1 MG/ML
0.5 INJECTION, SOLUTION INTRAMUSCULAR; INTRAVENOUS; SUBCUTANEOUS
Status: DISCONTINUED | OUTPATIENT
Start: 2021-01-19 | End: 2021-01-19 | Stop reason: HOSPADM

## 2021-01-19 RX ORDER — OXYCODONE HYDROCHLORIDE 5 MG/1
5 TABLET ORAL
Status: DISCONTINUED | OUTPATIENT
Start: 2021-01-19 | End: 2021-01-19 | Stop reason: HOSPADM

## 2021-01-19 RX ORDER — ONDANSETRON 2 MG/ML
INJECTION INTRAMUSCULAR; INTRAVENOUS
Status: COMPLETED
Start: 2021-01-19 | End: 2021-01-19

## 2021-01-19 RX ORDER — MIDAZOLAM HYDROCHLORIDE 1 MG/ML
.5-2 INJECTION INTRAMUSCULAR; INTRAVENOUS PRN
Status: ACTIVE | OUTPATIENT
Start: 2021-01-19 | End: 2021-01-19

## 2021-01-19 RX ADMIN — ONDANSETRON 4 MG: 2 INJECTION INTRAMUSCULAR; INTRAVENOUS at 11:09

## 2021-01-19 RX ADMIN — MIDAZOLAM HYDROCHLORIDE 1 MG: 1 INJECTION, SOLUTION INTRAMUSCULAR; INTRAVENOUS at 11:10

## 2021-01-19 RX ADMIN — HYDROMORPHONE HYDROCHLORIDE 0.5 MG: 1 INJECTION, SOLUTION INTRAMUSCULAR; INTRAVENOUS; SUBCUTANEOUS at 12:00

## 2021-01-19 RX ADMIN — OXYCODONE HYDROCHLORIDE 5 MG: 5 TABLET ORAL at 12:34

## 2021-01-19 RX ADMIN — FENTANYL CITRATE 25 MCG: 50 INJECTION, SOLUTION INTRAMUSCULAR; INTRAVENOUS at 11:15

## 2021-01-19 RX ADMIN — FENTANYL CITRATE 25 MCG: 50 INJECTION, SOLUTION INTRAMUSCULAR; INTRAVENOUS at 11:24

## 2021-01-19 RX ADMIN — MIDAZOLAM HYDROCHLORIDE 1 MG: 1 INJECTION INTRAMUSCULAR; INTRAVENOUS at 11:10

## 2021-01-19 ASSESSMENT — PAIN DESCRIPTION - PAIN TYPE
TYPE: SURGICAL PAIN

## 2021-01-19 ASSESSMENT — FIBROSIS 4 INDEX: FIB4 SCORE: 1.45

## 2021-01-19 NOTE — DISCHARGE INSTRUCTIONS
ACTIVITY: Rest and take it easy for the first 24 hours.  A responsible adult is recommended to remain with you during that time.  It is normal to feel sleepy.  We encourage you to not do anything that requires balance, judgment or coordination.    MILD FLU-LIKE SYMPTOMS ARE NORMAL. YOU MAY EXPERIENCE GENERALIZED MUSCLE ACHES, THROAT IRRITATION, HEADACHE AND/OR SOME NAUSEA.    FOR 24 HOURS DO NOT:  Drive, operate machinery or run household appliances.  Drink beer or alcoholic beverages.   Make important decisions or sign legal documents.    SPECIAL INSTRUCTIONS: Follow up with Dr. Vogel.  Resume home medications.    Lung Biopsy, Care After  This sheet gives you information about how to care for yourself after your procedure. Your health care provider may also give you more specific instructions depending on the type of biopsy you had. If you have problems or questions, contact your health care provider.  What can I expect after the procedure?  After the procedure, it is common to have:  · A cough.  · A sore throat.  · Pain where a needle, bronchoscope, or incision was used to collect a biopsy sample (biopsy site).  Follow these instructions at home:  Medicines  · Do not drink alcohol if your health care provider tells you not to drink.  · Do not drive for 24 hours if you were given a sedative.  Biopsy site care  · Do not take baths, swim, or use a hot tub until your health care provider approves. Ask your health care provider if you may take showers. You may only be allowed to take sponge baths.  · Check your biopsy site every day for signs of infection. Check for:  ? Redness, swelling, or more pain.  ? Fluid or blood.  ? Warmth.  ? Pus or a bad smell.  General instructions  · Return to your normal activities as told by your health care provider. Ask your health care provider what activities are safe for you.  · It is up to you to get the results of your procedure. Ask your health care provider, or the department  that is doing the procedure, when your results will be ready.  · Keep all follow-up visits as told by your health care provider. This is important.  Contact a health care provider if:  · You have a fever.  · You have redness, swelling, or more pain around your biopsy site.  · You have fluid or blood coming from your biopsy site.  · Your biopsy site feels warm to the touch.  · You have pus or a bad smell coming from your biopsy site.  · You have pain that does not get better with medicine.  Get help right away if:  · You cough up blood.  · You have trouble breathing.  · You have chest pain.  · You lose consciousness.  Summary  · After the procedure, it is common to have a sore throat and a cough.  · Return to your normal activities as told by your health care provider. Ask your health care provider what activities are safe for you.  · Take over-the-counter and prescription medicines only as told by your health care provider.  · Report any unusual symptoms to your health care provider.  This information is not intended to replace advice given to you by your health care provider. Make sure you discuss any questions you have with your health care provider.    DIET: To avoid nausea, slowly advance diet as tolerated, avoiding spicy or greasy foods for the first day.  Add more substantial food to your diet according to your physician's instructions.  Babies can be fed formula or breast milk as soon as they are hungry.  INCREASE FLUIDS AND FIBER TO AVOID CONSTIPATION.    SURGICAL DRESSING/BATHING: Keep dressing clean and dry for 24 hours. May remove dressing and shower after 24 hours, do not need to replace dressing. Do not submerge in water for 5 days.    FOLLOW-UP APPOINTMENT:  A follow-up appointment should be arranged with your doctor; call to schedule.    You should CALL YOUR PHYSICIAN if you develop:  Fever greater than 101 degrees F.  Pain not relieved by medication, or persistent nausea or vomiting.  Excessive  bleeding (blood soaking through dressing) or unexpected drainage from the wound.  Extreme redness or swelling around the incision site, drainage of pus or foul smelling drainage.  Inability to urinate or empty your bladder within 8 hours.  Problems with breathing or chest pain.    You should call 911 if you develop problems with breathing or chest pain.  If you are unable to contact your doctor or surgical center, you should go to the nearest emergency room or urgent care center.  Physician's telephone #: [phone]    If any questions arise, call your doctor.  If your doctor is not available, please feel free to call the Surgical Center at [phone]. The Contact Center is open Monday through Friday 7AM to 5PM and may speak to a nurse at [phone], or toll free at [phone].     A registered nurse may call you a few days after your surgery to see how you are doing after your procedure.    MEDICATIONS: Resume taking daily medication.  Take prescribed pain medication with food.  If no medication is prescribed, you may take non-aspirin pain medication if needed.  PAIN MEDICATION CAN BE VERY CONSTIPATING.  Take a stool softener or laxative such as senokot, pericolace, or milk of magnesia if needed.    If your physician has prescribed pain medication that includes Acetaminophen (Tylenol), do not take additional Acetaminophen (Tylenol) while taking the prescribed medication.    Depression / Suicide Risk    As you are discharged from this [facility] facility, it is important to learn how to keep safe from harming yourself.    Recognize the warning signs:  · Abrupt changes in personality, positive or negative- including increase in energy   · Giving away possessions  · Change in eating patterns- significant weight changes-  positive or negative  · Change in sleeping patterns- unable to sleep or sleeping all the time   · Unwillingness or inability to communicate  · Depression  · Unusual sadness,  discouragement and loneliness  · Talk of wanting to die  · Neglect of personal appearance   · Rebelliousness- reckless behavior  · Withdrawal from people/activities they love  · Confusion- inability to concentrate     If you or a loved one observes any of these behaviors or has concerns about self-harm, here's what you can do:  · Talk about it- your feelings and reasons for harming yourself  · Remove any means that you might use to hurt yourself (examples: pills, rope, extension cords, firearm)  · Get professional help from the community (Mental Health, Substance Abuse, psychological counseling)  · Do not be alone:Call your Safe Contact- someone whom you trust who will be there for you.  · Call your local CRISIS HOTLINE 875-1233 or 804-629-8775  · Call your local Children's Mobile Crisis Response Team [place] (236) 220-6380 or www.Sitrion  · Call the toll free National Suicide Prevention Hotlines   · National Suicide Prevention Lifeline 447-110-DNMH (9054)  · National Hope Line Network 800-SUICIDE (265-1453)

## 2021-01-19 NOTE — OR SURGEON
Immediate Post- Operative Note        PostOp Diagnosis: LEFT lung nodule, anal carcinoma      Procedure(s): CT biopsy LEFT lung      Estimated Blood Loss: Less than 5 ml        Complications: None            1/19/2021     11:24 AM     Brian Alfonso M.D.

## 2021-01-19 NOTE — PROGRESS NOTES
CT guided biopsy of left lung lesion performed by Dr Alfonso via posterior approach. Procedure BARs explained to patient by physician and consent obtained. Patient to CT4 and assisted to prone position on table. Patient was monitored and assessed continuously throughout procedure; ETCO2 29-32 with consistent waveform. Left lung biopsy completed without complication. Posterior left chest puncture site CDI; sterile guaze/tegaderm dressing applied. Patient tolerated procedure quite well, was slightly drowsy but appropriately responsive afterward. Patient transferred to [facility] in good condition.     Core sample x 3 submitted to pathology in formalin.    [employer] tract sealant ref [id number] lot [id number]

## 2021-01-19 NOTE — OR NURSING
1154: Patient arrived from IR s/p left lung biopsy with RN. Left posterior chest access site; clean, dry, and soft. Patient is alert and awake. Patient experiencing sharp chest pain; Dr. Alfonso updated. Stat chest X-ray to be completed.    1213: Chest X-ray completed at bedside. No pneumothorax per Radiologist.    1230: Patient tolerating PO intake.    1438: Second chest X-ray completed at bedside. No pneumothorax per Radiologist. Criteria met to discharge patient.    1515: Discharge paperwork reviewed with patient and son. Discussed activity, site care, worsening symptoms, and follow-up. Verbalized understanding. No further questions. PIV removed, tip intact.    1520: Patient escorted out with RN. Discharge instructions and personal belongings in possession of the patient. Copy of discharge instructions signed and placed in the chart. Patient discharged to home with son.

## 2021-01-19 NOTE — H&P
History and Physical    Date: 1/19/2021    PCP: TIM Amezquita      CC: SS LLL nodule    HPI: This is a 80 y.o. female who is presenting with above, h/o anal carcinoma    Past Medical History:   Diagnosis Date   • Arrhythmia     afib   • Arthritis     low back, right hand   • Asthma     inhalers daily   • Breath shortness     asthma related   • Cancer (HCC) 1983    uterine   • Cancer (HCC) 12/2019    anal (received chemo & radiation; removal surgery done on 10/15/2020)   • Cataract     repaired   • Diverticulosis    • Gynecological disorder    • Hemorrhagic disorder (HCC)     Eliquis   • Hiatus hernia syndrome     pt denies   • High cholesterol    • IBS (irritable bowel syndrome)     due to radiation   • Pain 12/2020    rectal   • Pneumonia 2000   • PONV (postoperative nausea and vomiting)    • Urinary bladder disorder     urgency/fequency   • Urinary incontinence        Past Surgical History:   Procedure Laterality Date   • IRRIGATION & DEBRIDEMENT GENERAL N/A 10/19/2020    Procedure: IRRIGATION AND DEBRIDEMENT, WOUND-PERINEAL WOUND;  Surgeon: Mark Reyes M.D.;  Location: [facility];  Service: Plastics   • PB LAP, SURG COLOSTOMY Left 10/15/2020    Procedure: CREATION, COLOSTOMY, LAPAROSCOPIC - FOR END COLOSTOMY;  Surgeon: Dulce Jeter M.D.;  Location: [facility];  Service: Gen Robotic   • ABDOMINAL PERINEAL RESECTION ROBOTIC XI N/A 10/15/2020    Procedure: RESECTION, ABDOMINOPERINEAL, ROBOT-ASSISTED, USING DA AUGIE XI;  Surgeon: Dulce Jeter M.D.;  Location: [facility];  Service: Gen Robotic   • MYOCUTANEOUS FLAP N/A 10/15/2020    Procedure: REPAIR, WOUND, USING MYOCUTANEOUS FLAP- MUSCLE FLAP TO PERINEUM;  Surgeon: Mark Reyes M.D.;  Location: [facility];  Service: Plastics   • PB SURG DIAGNOSTIC EXAM, ANORECTAL  9/21/2020    Procedure: EXAM UNDER ANESTHESIA, RECTUM;  Surgeon: Dulce Jeter M.D.;  Location: [facility];  Service: General   •  PROCTOSCOPY  9/21/2020    Procedure: PROCTOSCOPY- RIGID WITH BIOPSY;  Surgeon: Dulce Jeter M.D.;  Location: SURGERY [facility];  Service: General   • COLONOSCOPY  1/13/2020    Procedure: COLONOSCOPY;  Surgeon: Dulce Jeter M.D.;  Location: SURGERY SAME DAY [facility];  Service: Gen Robotic   • LUMBAR LAMINECTOMY DISKECTOMY  3/3/2016    Procedure: LUMBAR LAMINECTOMY DISKECTOMY FOR MINIMALLY INVASIVE LEFT L4-5 LAMINOTOMY;  Surgeon: Stevo Guthrie M.D.;  Location: SURGERY [facility];  Service:    • CHOLECYSTECTOMY  6/2014   • CATARACT EXTRACTION WITH IOL Bilateral 2012   • HYSTERECTOMY, TOTAL ABDOMINAL  1983   • ABDOMINAL HYSTERECTOMY TOTAL  1983   • TUBAL LIGATION  1975   • APPENDECTOMY     • BICEPS TENDON REPAIR Right 2013, 2014    5 screws placed in second surgery   • BUNIONECTOMY     • PB REMOVAL OF TONSILS,<13 Y/O     • SHOULDER SURGERY Right        No current facility-administered medications for this encounter.         Social History     Socioeconomic History   • Marital status:      Spouse name: Klaus    • Number of children: 6   • Years of education: Not on file   • Highest education level: Not on file   Occupational History   • Not on file   Social Needs   • Financial resource strain: Not on file   • Food insecurity     Worry: Not on file     Inability: Not on file   • Transportation needs     Medical: Not on file     Non-medical: Not on file   Tobacco Use   • Smoking status: Never Smoker   • Smokeless tobacco: Never Used   Substance and Sexual Activity   • Alcohol use: Yes     Alcohol/week: 3.0 oz     Types: 5 Glasses of wine per week     Frequency: 2-3 times a week     Drinks per session: 1 or 2     Comment: 0-1 per month   • Drug use: No   • Sexual activity: Not on file   Lifestyle   • Physical activity     Days per week: Not on file     Minutes per session: Not on file   • Stress: Not on file   Relationships   • Social connections     Talks on phone: Not on file     Gets  together: Not on file     Attends Church service: Not on file     Active member of club or organization: Not on file     Attends meetings of clubs or organizations: Not on file     Relationship status: Not on file   • Intimate partner violence     Fear of current or ex partner: Not on file     Emotionally abused: Not on file     Physically abused: Not on file     Forced sexual activity: Not on file   Other Topics Concern   • Not on file   Social History Narrative    Patient lives in [place], , retired, 6 children.       Family History   Problem Relation Age of Onset   • Cancer Mother    • Cancer Father    • Cancer Maternal Grandfather        Allergies:  Sagebrush    Review of Systems:  Negative except for urinary frequency, colostomy    Physical Exam   Constitutional: She is oriented to person, place, and time. She appears well-developed and well-nourished. No distress.   HENT:   Head: Normocephalic and atraumatic.   Mouth/Throat: No oropharyngeal exudate.   Eyes: Right eye exhibits no discharge. Left eye exhibits no discharge. No scleral icterus.   Neck: Neck supple.   Cardiovascular: Normal rate.   Pulmonary/Chest: Effort normal.   Abdominal: Soft.   Neurological: She is alert and oriented to person, place, and time.   Skin: Skin is warm and dry. She is not diaphoretic.   Psychiatric: She has a normal mood and affect. Her behavior is normal. Judgment and thought content normal.       Vital Signs  Blood Pressure : 110/74   Temperature: 36.6 °C (97.8 °F)   Pulse: 79   Respiration: 16   Pulse Oximetry: 98 %        Labs:  Recent Labs     01/19/21  0945   WBC 6.0   RBC 4.28   HEMOGLOBIN 13.8   HEMATOCRIT 43.7   .1*   MCH 32.2   MCHC 31.6*   RDW 54.5*   PLATELETCT 183   MPV 9.5         Recent Labs     01/19/21  0945   INR 0.93     Recent Labs     01/19/21  0945   INR 0.93       Radiology:  CT-NEEDLE BX-LUNG-MEDIASTINUM LEFT    (Results Pending)             Assessment and Plan:This is a 80 y.o. here for  LLL lung biopsy

## 2021-01-20 LAB
CHEMOTHERAPY INFUSION START DATE: NORMAL
CHEMOTHERAPY INFUSION STOP DATE: NORMAL
CHEMOTHERAPY RECORDS: 1.8
CHEMOTHERAPY RECORDS: 5040
CHEMOTHERAPY RECORDS: NORMAL
RAD ONC ARIA COURSE TREATMENT ELAPSED DAYS: NORMAL
RAD ONC ARIA REFERENCE POINT DOSAGE GIVEN TO DATE: 0
RAD ONC ARIA REFERENCE POINT DOSAGE GIVEN TO DATE: 0
RAD ONC ARIA REFERENCE POINT ID: NORMAL
RAD ONC ARIA REFERENCE POINT ID: NORMAL

## 2021-01-21 DIAGNOSIS — C21.0 ANAL CANCER (HCC): ICD-10-CM

## 2021-01-21 RX ORDER — HYDROCODONE BITARTRATE AND ACETAMINOPHEN 10; 325 MG/1; MG/1
1 TABLET ORAL EVERY 6 HOURS PRN
Qty: 120 TAB | Refills: 0 | Status: SHIPPED | OUTPATIENT
Start: 2021-01-21 | End: 2021-02-12 | Stop reason: SDUPTHER

## 2021-01-21 NOTE — PROGRESS NOTES
80 y.o. 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 on 1/16/2020    -Reviewed results of lung bx consistent with metastatic disease  -Spoke with Dr. Vogel about immunotherapy options for her and she will see him next Tuesday  -I did discuss if oligometastatic in just LLL lung lesion we could consider SBRT after trial of IO therapy  -Follow up Late April/May  -Rx for Norco 10mg q6hrs for pain relief      CC: Dr. Vogel, Dr. Jeter

## 2021-02-08 DIAGNOSIS — C20 MALIGNANT NEOPLASM OF RECTUM (HCC): ICD-10-CM

## 2021-02-08 DIAGNOSIS — C21.0 ANAL CANCER (HCC): ICD-10-CM

## 2021-02-08 RX ORDER — HYDROCODONE BITARTRATE AND ACETAMINOPHEN 10; 325 MG/1; MG/1
1 TABLET ORAL EVERY 6 HOURS PRN
Qty: 120 TAB | Refills: 0 | Status: CANCELLED | OUTPATIENT
Start: 2021-02-08 | End: 2021-03-10

## 2021-02-08 RX ORDER — LIDOCAINE 50 MG/G
0.5 OINTMENT TOPICAL PRN
Qty: 30 G | Refills: 5 | Status: CANCELLED | OUTPATIENT
Start: 2021-02-08 | End: 2021-02-18

## 2021-02-12 DIAGNOSIS — C21.0 ANAL CANCER (HCC): ICD-10-CM

## 2021-02-12 RX ORDER — HYDROCODONE BITARTRATE AND ACETAMINOPHEN 10; 325 MG/1; MG/1
1 TABLET ORAL EVERY 6 HOURS PRN
Qty: 120 TABLET | Refills: 0 | Status: SHIPPED | OUTPATIENT
Start: 2021-02-12 | End: 2021-03-14

## 2021-02-12 RX ORDER — LIDOCAINE 50 MG/G
0.5 OINTMENT TOPICAL PRN
Qty: 30 G | Refills: 5 | Status: ON HOLD | OUTPATIENT
Start: 2021-02-12 | End: 2021-02-24

## 2021-02-12 NOTE — ADDENDUM NOTE
Encounter addended by: Pam Robles R.N. on: 2/12/2021 2:16 PM   Actions taken: Order Reconciliation Section accessed, Pharmacy for encounter modified

## 2021-02-20 ENCOUNTER — APPOINTMENT (OUTPATIENT)
Dept: RADIOLOGY | Facility: MEDICAL CENTER | Age: 81
DRG: 392 | End: 2021-02-20
Attending: EMERGENCY MEDICINE
Payer: MEDICARE

## 2021-02-20 ENCOUNTER — HOSPITAL ENCOUNTER (INPATIENT)
Facility: MEDICAL CENTER | Age: 81
LOS: 4 days | DRG: 392 | End: 2021-02-24
Attending: EMERGENCY MEDICINE | Admitting: STUDENT IN AN ORGANIZED HEALTH CARE EDUCATION/TRAINING PROGRAM
Payer: MEDICARE

## 2021-02-20 DIAGNOSIS — C21.0 ANAL CANCER (HCC): ICD-10-CM

## 2021-02-20 DIAGNOSIS — K62.89 ANAL OR RECTAL PAIN: ICD-10-CM

## 2021-02-20 DIAGNOSIS — D72.829 LEUKOCYTOSIS, UNSPECIFIED TYPE: ICD-10-CM

## 2021-02-20 LAB
ALBUMIN SERPL BCP-MCNC: 4 G/DL (ref 3.2–4.9)
ALBUMIN/GLOB SERPL: 1.1 G/DL
ALP SERPL-CCNC: 103 U/L (ref 30–99)
ALT SERPL-CCNC: 29 U/L (ref 2–50)
ANION GAP SERPL CALC-SCNC: 10 MMOL/L (ref 7–16)
APPEARANCE UR: CLEAR
AST SERPL-CCNC: 31 U/L (ref 12–45)
BASOPHILS # BLD AUTO: 0.6 % (ref 0–1.8)
BASOPHILS # BLD: 0.19 K/UL (ref 0–0.12)
BILIRUB SERPL-MCNC: 0.6 MG/DL (ref 0.1–1.5)
BILIRUB UR QL STRIP.AUTO: NEGATIVE
BUN SERPL-MCNC: 20 MG/DL (ref 8–22)
CALCIUM SERPL-MCNC: 9.8 MG/DL (ref 8.5–10.5)
CHLORIDE SERPL-SCNC: 98 MMOL/L (ref 96–112)
CO2 SERPL-SCNC: 27 MMOL/L (ref 20–33)
COLOR UR: YELLOW
COMMENT 1642: NORMAL
CREAT SERPL-MCNC: 0.71 MG/DL (ref 0.5–1.4)
CRP SERPL HS-MCNC: 0.82 MG/DL (ref 0–0.75)
EOSINOPHIL # BLD AUTO: 0.12 K/UL (ref 0–0.51)
EOSINOPHIL NFR BLD: 0.4 % (ref 0–6.9)
ERYTHROCYTE [DISTWIDTH] IN BLOOD BY AUTOMATED COUNT: 55.6 FL (ref 35.9–50)
ERYTHROCYTE [SEDIMENTATION RATE] IN BLOOD BY WESTERGREN METHOD: 30 MM/HOUR (ref 0–30)
GLOBULIN SER CALC-MCNC: 3.6 G/DL (ref 1.9–3.5)
GLUCOSE SERPL-MCNC: 129 MG/DL (ref 65–99)
GLUCOSE UR STRIP.AUTO-MCNC: NEGATIVE MG/DL
HCT VFR BLD AUTO: 44.3 % (ref 37–47)
HGB BLD-MCNC: 14.3 G/DL (ref 12–16)
IMM GRANULOCYTES # BLD AUTO: 3.74 K/UL (ref 0–0.11)
IMM GRANULOCYTES NFR BLD AUTO: 12.6 % (ref 0–0.9)
INR PPP: 1.08 (ref 0.87–1.13)
KETONES UR STRIP.AUTO-MCNC: NEGATIVE MG/DL
LACTATE BLD-SCNC: 1.6 MMOL/L (ref 0.5–2)
LEUKOCYTE ESTERASE UR QL STRIP.AUTO: NEGATIVE
LYMPHOCYTES # BLD AUTO: 0.85 K/UL (ref 1–4.8)
LYMPHOCYTES NFR BLD: 2.9 % (ref 22–41)
MAGNESIUM SERPL-MCNC: 2.2 MG/DL (ref 1.5–2.5)
MCH RBC QN AUTO: 32.4 PG (ref 27–33)
MCHC RBC AUTO-ENTMCNC: 32.3 G/DL (ref 33.6–35)
MCV RBC AUTO: 100.2 FL (ref 81.4–97.8)
MICRO URNS: NORMAL
MONOCYTES # BLD AUTO: 0.19 K/UL (ref 0–0.85)
MONOCYTES NFR BLD AUTO: 0.6 % (ref 0–13.4)
MORPHOLOGY BLD-IMP: NORMAL
NEUTROPHILS # BLD AUTO: 24.51 K/UL (ref 2–7.15)
NEUTROPHILS NFR BLD: 82.9 % (ref 44–72)
NITRITE UR QL STRIP.AUTO: NEGATIVE
NRBC # BLD AUTO: 0 K/UL
NRBC BLD-RTO: 0 /100 WBC
PH UR STRIP.AUTO: 7 [PH] (ref 5–8)
PHOSPHATE SERPL-MCNC: 3.2 MG/DL (ref 2.5–4.5)
PLATELET # BLD AUTO: 92 K/UL (ref 164–446)
PMV BLD AUTO: 11.2 FL (ref 9–12.9)
POTASSIUM SERPL-SCNC: 4.4 MMOL/L (ref 3.6–5.5)
PROCALCITONIN SERPL-MCNC: 0.22 NG/ML
PROT SERPL-MCNC: 7.6 G/DL (ref 6–8.2)
PROT UR QL STRIP: NEGATIVE MG/DL
PROTHROMBIN TIME: 14.4 SEC (ref 12–14.6)
RBC # BLD AUTO: 4.42 M/UL (ref 4.2–5.4)
RBC UR QL AUTO: NEGATIVE
SARS-COV+SARS-COV-2 AG RESP QL IA.RAPID: NOTDETECTED
SODIUM SERPL-SCNC: 135 MMOL/L (ref 135–145)
SP GR UR STRIP.AUTO: 1.01
SPECIMEN SOURCE: NORMAL
UROBILINOGEN UR STRIP.AUTO-MCNC: 0.2 MG/DL
WBC # BLD AUTO: 29.6 K/UL (ref 4.8–10.8)

## 2021-02-20 PROCEDURE — 99285 EMERGENCY DEPT VISIT HI MDM: CPT

## 2021-02-20 PROCEDURE — 83735 ASSAY OF MAGNESIUM: CPT

## 2021-02-20 PROCEDURE — 700111 HCHG RX REV CODE 636 W/ 250 OVERRIDE (IP)

## 2021-02-20 PROCEDURE — 80053 COMPREHEN METABOLIC PANEL: CPT

## 2021-02-20 PROCEDURE — 96376 TX/PRO/DX INJ SAME DRUG ADON: CPT

## 2021-02-20 PROCEDURE — 74177 CT ABD & PELVIS W/CONTRAST: CPT | Mod: MG

## 2021-02-20 PROCEDURE — 96365 THER/PROPH/DIAG IV INF INIT: CPT

## 2021-02-20 PROCEDURE — 83605 ASSAY OF LACTIC ACID: CPT

## 2021-02-20 PROCEDURE — 700105 HCHG RX REV CODE 258: Performed by: EMERGENCY MEDICINE

## 2021-02-20 PROCEDURE — 770006 HCHG ROOM/CARE - MED/SURG/GYN SEMI*

## 2021-02-20 PROCEDURE — 87040 BLOOD CULTURE FOR BACTERIA: CPT

## 2021-02-20 PROCEDURE — 86140 C-REACTIVE PROTEIN: CPT

## 2021-02-20 PROCEDURE — 96368 THER/DIAG CONCURRENT INF: CPT

## 2021-02-20 PROCEDURE — U0005 INFEC AGEN DETEC AMPLI PROBE: HCPCS

## 2021-02-20 PROCEDURE — 99223 1ST HOSP IP/OBS HIGH 75: CPT | Mod: AI | Performed by: STUDENT IN AN ORGANIZED HEALTH CARE EDUCATION/TRAINING PROGRAM

## 2021-02-20 PROCEDURE — A9270 NON-COVERED ITEM OR SERVICE: HCPCS | Performed by: STUDENT IN AN ORGANIZED HEALTH CARE EDUCATION/TRAINING PROGRAM

## 2021-02-20 PROCEDURE — 700111 HCHG RX REV CODE 636 W/ 250 OVERRIDE (IP): Performed by: EMERGENCY MEDICINE

## 2021-02-20 PROCEDURE — 85025 COMPLETE CBC W/AUTO DIFF WBC: CPT

## 2021-02-20 PROCEDURE — 700105 HCHG RX REV CODE 258: Performed by: STUDENT IN AN ORGANIZED HEALTH CARE EDUCATION/TRAINING PROGRAM

## 2021-02-20 PROCEDURE — 700117 HCHG RX CONTRAST REV CODE 255: Performed by: EMERGENCY MEDICINE

## 2021-02-20 PROCEDURE — 700101 HCHG RX REV CODE 250: Performed by: EMERGENCY MEDICINE

## 2021-02-20 PROCEDURE — 84100 ASSAY OF PHOSPHORUS: CPT

## 2021-02-20 PROCEDURE — 85652 RBC SED RATE AUTOMATED: CPT

## 2021-02-20 PROCEDURE — 700111 HCHG RX REV CODE 636 W/ 250 OVERRIDE (IP): Performed by: STUDENT IN AN ORGANIZED HEALTH CARE EDUCATION/TRAINING PROGRAM

## 2021-02-20 PROCEDURE — 81003 URINALYSIS AUTO W/O SCOPE: CPT

## 2021-02-20 PROCEDURE — 85610 PROTHROMBIN TIME: CPT

## 2021-02-20 PROCEDURE — C9803 HOPD COVID-19 SPEC COLLECT: HCPCS | Performed by: EMERGENCY MEDICINE

## 2021-02-20 PROCEDURE — 87426 SARSCOV CORONAVIRUS AG IA: CPT

## 2021-02-20 PROCEDURE — 96375 TX/PRO/DX INJ NEW DRUG ADDON: CPT

## 2021-02-20 PROCEDURE — 700102 HCHG RX REV CODE 250 W/ 637 OVERRIDE(OP): Performed by: STUDENT IN AN ORGANIZED HEALTH CARE EDUCATION/TRAINING PROGRAM

## 2021-02-20 PROCEDURE — U0003 INFECTIOUS AGENT DETECTION BY NUCLEIC ACID (DNA OR RNA); SEVERE ACUTE RESPIRATORY SYNDROME CORONAVIRUS 2 (SARS-COV-2) (CORONAVIRUS DISEASE [COVID-19]), AMPLIFIED PROBE TECHNIQUE, MAKING USE OF HIGH THROUGHPUT TECHNOLOGIES AS DESCRIBED BY CMS-2020-01-R: HCPCS

## 2021-02-20 PROCEDURE — 84145 PROCALCITONIN (PCT): CPT

## 2021-02-20 RX ORDER — AMOXICILLIN 250 MG
2 CAPSULE ORAL 2 TIMES DAILY
Status: DISCONTINUED | OUTPATIENT
Start: 2021-02-20 | End: 2021-02-20

## 2021-02-20 RX ORDER — ATORVASTATIN CALCIUM 80 MG/1
80 TABLET, FILM COATED ORAL
Status: DISCONTINUED | OUTPATIENT
Start: 2021-02-21 | End: 2021-02-24 | Stop reason: HOSPADM

## 2021-02-20 RX ORDER — ACETAMINOPHEN 325 MG/1
650 TABLET ORAL EVERY 6 HOURS PRN
Status: DISCONTINUED | OUTPATIENT
Start: 2021-02-20 | End: 2021-02-24 | Stop reason: HOSPADM

## 2021-02-20 RX ORDER — ZOLPIDEM TARTRATE 5 MG/1
10 TABLET ORAL NIGHTLY PRN
Status: DISCONTINUED | OUTPATIENT
Start: 2021-02-20 | End: 2021-02-24 | Stop reason: HOSPADM

## 2021-02-20 RX ORDER — BUDESONIDE AND FORMOTEROL FUMARATE DIHYDRATE 160; 4.5 UG/1; UG/1
2 AEROSOL RESPIRATORY (INHALATION) 2 TIMES DAILY
Status: DISCONTINUED | OUTPATIENT
Start: 2021-02-20 | End: 2021-02-24 | Stop reason: HOSPADM

## 2021-02-20 RX ORDER — ONDANSETRON 2 MG/ML
4 INJECTION INTRAMUSCULAR; INTRAVENOUS EVERY 4 HOURS PRN
Status: DISCONTINUED | OUTPATIENT
Start: 2021-02-20 | End: 2021-02-24 | Stop reason: HOSPADM

## 2021-02-20 RX ORDER — ONDANSETRON 2 MG/ML
4 INJECTION INTRAMUSCULAR; INTRAVENOUS ONCE
Status: COMPLETED | OUTPATIENT
Start: 2021-02-20 | End: 2021-02-20

## 2021-02-20 RX ORDER — HYDROCODONE BITARTRATE AND ACETAMINOPHEN 10; 325 MG/1; MG/1
1 TABLET ORAL EVERY 6 HOURS PRN
Status: DISCONTINUED | OUTPATIENT
Start: 2021-02-20 | End: 2021-02-24 | Stop reason: HOSPADM

## 2021-02-20 RX ORDER — HEPARIN SODIUM 5000 [USP'U]/ML
5000 INJECTION, SOLUTION INTRAVENOUS; SUBCUTANEOUS EVERY 8 HOURS
Status: DISCONTINUED | OUTPATIENT
Start: 2021-02-20 | End: 2021-02-20

## 2021-02-20 RX ORDER — LIDOCAINE 50 MG/G
0.5 OINTMENT TOPICAL PRN
Status: DISCONTINUED | OUTPATIENT
Start: 2021-02-20 | End: 2021-02-20

## 2021-02-20 RX ORDER — DOCUSATE SODIUM 100 MG/1
100 CAPSULE, LIQUID FILLED ORAL 2 TIMES DAILY
COMMUNITY

## 2021-02-20 RX ORDER — SODIUM CHLORIDE, SODIUM LACTATE, POTASSIUM CHLORIDE, AND CALCIUM CHLORIDE .6; .31; .03; .02 G/100ML; G/100ML; G/100ML; G/100ML
30 INJECTION, SOLUTION INTRAVENOUS
Status: DISCONTINUED | OUTPATIENT
Start: 2021-02-20 | End: 2021-02-24 | Stop reason: HOSPADM

## 2021-02-20 RX ORDER — SODIUM CHLORIDE, SODIUM LACTATE, POTASSIUM CHLORIDE, AND CALCIUM CHLORIDE .6; .31; .03; .02 G/100ML; G/100ML; G/100ML; G/100ML
500 INJECTION, SOLUTION INTRAVENOUS
Status: DISCONTINUED | OUTPATIENT
Start: 2021-02-20 | End: 2021-02-24 | Stop reason: HOSPADM

## 2021-02-20 RX ORDER — BISACODYL 10 MG
10 SUPPOSITORY, RECTAL RECTAL
Status: DISCONTINUED | OUTPATIENT
Start: 2021-02-20 | End: 2021-02-20

## 2021-02-20 RX ORDER — VITAMIN B COMPLEX
2000 TABLET ORAL DAILY
Status: DISCONTINUED | OUTPATIENT
Start: 2021-02-21 | End: 2021-02-24 | Stop reason: HOSPADM

## 2021-02-20 RX ORDER — ONDANSETRON 2 MG/ML
INJECTION INTRAMUSCULAR; INTRAVENOUS
Status: COMPLETED
Start: 2021-02-20 | End: 2021-02-20

## 2021-02-20 RX ORDER — TIOTROPIUM BROMIDE INHALATION SPRAY 3.12 UG/1
2.5 SPRAY, METERED RESPIRATORY (INHALATION) DAILY
COMMUNITY

## 2021-02-20 RX ORDER — POLYETHYLENE GLYCOL 3350 17 G/17G
1 POWDER, FOR SOLUTION ORAL
Status: DISCONTINUED | OUTPATIENT
Start: 2021-02-20 | End: 2021-02-20

## 2021-02-20 RX ORDER — POLYETHYLENE GLYCOL 3350 17 G/17G
1 POWDER, FOR SOLUTION ORAL 3 TIMES DAILY
Status: DISCONTINUED | OUTPATIENT
Start: 2021-02-20 | End: 2021-02-20

## 2021-02-20 RX ORDER — DIGOXIN 125 MCG
125 TABLET ORAL
Status: DISCONTINUED | OUTPATIENT
Start: 2021-02-21 | End: 2021-02-24 | Stop reason: HOSPADM

## 2021-02-20 RX ORDER — MORPHINE SULFATE 4 MG/ML
4 INJECTION, SOLUTION INTRAMUSCULAR; INTRAVENOUS ONCE
Status: COMPLETED | OUTPATIENT
Start: 2021-02-20 | End: 2021-02-20

## 2021-02-20 RX ORDER — SODIUM CHLORIDE, SODIUM LACTATE, POTASSIUM CHLORIDE, CALCIUM CHLORIDE 600; 310; 30; 20 MG/100ML; MG/100ML; MG/100ML; MG/100ML
INJECTION, SOLUTION INTRAVENOUS CONTINUOUS
Status: DISCONTINUED | OUTPATIENT
Start: 2021-02-20 | End: 2021-02-24

## 2021-02-20 RX ORDER — GABAPENTIN 100 MG/1
100 CAPSULE ORAL
COMMUNITY
Start: 2020-11-25

## 2021-02-20 RX ORDER — DEXTROSE MONOHYDRATE, SODIUM CHLORIDE, AND POTASSIUM CHLORIDE 50; 1.49; 4.5 G/1000ML; G/1000ML; G/1000ML
INJECTION, SOLUTION INTRAVENOUS CONTINUOUS
Status: DISCONTINUED | OUTPATIENT
Start: 2021-02-20 | End: 2021-02-20

## 2021-02-20 RX ADMIN — ONDANSETRON 4 MG: 2 INJECTION INTRAMUSCULAR; INTRAVENOUS at 19:30

## 2021-02-20 RX ADMIN — IOHEXOL 80 ML: 350 INJECTION, SOLUTION INTRAVENOUS at 19:26

## 2021-02-20 RX ADMIN — ONDANSETRON 4 MG: 2 INJECTION INTRAMUSCULAR; INTRAVENOUS at 17:28

## 2021-02-20 RX ADMIN — ONDANSETRON 4 MG: 2 INJECTION INTRAMUSCULAR; INTRAVENOUS at 22:34

## 2021-02-20 RX ADMIN — MORPHINE SULFATE 4 MG: 4 INJECTION INTRAVENOUS at 17:28

## 2021-02-20 RX ADMIN — CEFTRIAXONE SODIUM 1 G: 1 INJECTION, POWDER, FOR SOLUTION INTRAMUSCULAR; INTRAVENOUS at 18:54

## 2021-02-20 RX ADMIN — SODIUM CHLORIDE, POTASSIUM CHLORIDE, SODIUM LACTATE AND CALCIUM CHLORIDE: 600; 310; 30; 20 INJECTION, SOLUTION INTRAVENOUS at 22:35

## 2021-02-20 RX ADMIN — METRONIDAZOLE 500 MG: 500 INJECTION, SOLUTION INTRAVENOUS at 19:00

## 2021-02-20 ASSESSMENT — LIFESTYLE VARIABLES
TOTAL SCORE: 0
EVER FELT BAD OR GUILTY ABOUT YOUR DRINKING: NO
EVER HAD A DRINK FIRST THING IN THE MORNING TO STEADY YOUR NERVES TO GET RID OF A HANGOVER: NO
DO YOU DRINK ALCOHOL: NO
AVERAGE NUMBER OF DAYS PER WEEK YOU HAVE A DRINK CONTAINING ALCOHOL: 0
HAVE YOU EVER FELT YOU SHOULD CUT DOWN ON YOUR DRINKING: NO
HOW MANY TIMES IN THE PAST YEAR HAVE YOU HAD 5 OR MORE DRINKS IN A DAY: 0
TOTAL SCORE: 0
HAVE PEOPLE ANNOYED YOU BY CRITICIZING YOUR DRINKING: NO
CONSUMPTION TOTAL: NEGATIVE
ON A TYPICAL DAY WHEN YOU DRINK ALCOHOL HOW MANY DRINKS DO YOU HAVE: 0
TOTAL SCORE: 0

## 2021-02-20 ASSESSMENT — FIBROSIS 4 INDEX: FIB4 SCORE: 1.52

## 2021-02-20 ASSESSMENT — PAIN DESCRIPTION - PAIN TYPE: TYPE: ACUTE PAIN

## 2021-02-21 PROBLEM — K45.8: Status: ACTIVE | Noted: 2021-02-21

## 2021-02-21 PROBLEM — I48.0 PAROXYSMAL ATRIAL FIBRILLATION (HCC): Status: ACTIVE | Noted: 2021-02-21

## 2021-02-21 PROBLEM — D75.839 THROMBOCYTHEMIA: Status: ACTIVE | Noted: 2021-02-21

## 2021-02-21 PROBLEM — Z86.16 HISTORY OF 2019 NOVEL CORONAVIRUS DISEASE (COVID-19): Status: ACTIVE | Noted: 2021-02-21

## 2021-02-21 PROBLEM — K59.03 DRUG-INDUCED CONSTIPATION: Status: ACTIVE | Noted: 2020-10-28

## 2021-02-21 PROBLEM — R51.9 HEADACHE: Status: ACTIVE | Noted: 2021-02-21

## 2021-02-21 PROBLEM — A41.9 SEPSIS (HCC): Status: ACTIVE | Noted: 2021-02-21

## 2021-02-21 PROBLEM — D72.829 LEUKOCYTOSIS: Status: ACTIVE | Noted: 2021-02-21

## 2021-02-21 LAB
ALBUMIN SERPL BCP-MCNC: 3.2 G/DL (ref 3.2–4.9)
ALBUMIN/GLOB SERPL: 1.1 G/DL
ALP SERPL-CCNC: 92 U/L (ref 30–99)
ALT SERPL-CCNC: 22 U/L (ref 2–50)
ANION GAP SERPL CALC-SCNC: 8 MMOL/L (ref 7–16)
AST SERPL-CCNC: 22 U/L (ref 12–45)
BILIRUB SERPL-MCNC: 0.5 MG/DL (ref 0.1–1.5)
BUN SERPL-MCNC: 16 MG/DL (ref 8–22)
CALCIUM SERPL-MCNC: 9 MG/DL (ref 8.5–10.5)
CHLORIDE SERPL-SCNC: 101 MMOL/L (ref 96–112)
CO2 SERPL-SCNC: 25 MMOL/L (ref 20–33)
CREAT SERPL-MCNC: 0.71 MG/DL (ref 0.5–1.4)
ERYTHROCYTE [DISTWIDTH] IN BLOOD BY AUTOMATED COUNT: 54.8 FL (ref 35.9–50)
GLOBULIN SER CALC-MCNC: 2.9 G/DL (ref 1.9–3.5)
GLUCOSE SERPL-MCNC: 106 MG/DL (ref 65–99)
HCT VFR BLD AUTO: 39.6 % (ref 37–47)
HGB BLD-MCNC: 13 G/DL (ref 12–16)
LACTATE BLD-SCNC: 1.3 MMOL/L (ref 0.5–2)
LACTATE BLD-SCNC: 1.4 MMOL/L (ref 0.5–2)
MAGNESIUM SERPL-MCNC: 2.2 MG/DL (ref 1.5–2.5)
MCH RBC QN AUTO: 33.2 PG (ref 27–33)
MCHC RBC AUTO-ENTMCNC: 32.8 G/DL (ref 33.6–35)
MCV RBC AUTO: 101.3 FL (ref 81.4–97.8)
PLATELET # BLD AUTO: 63 K/UL (ref 164–446)
PMV BLD AUTO: 11 FL (ref 9–12.9)
POTASSIUM SERPL-SCNC: 4.6 MMOL/L (ref 3.6–5.5)
PROT SERPL-MCNC: 6.1 G/DL (ref 6–8.2)
RBC # BLD AUTO: 3.91 M/UL (ref 4.2–5.4)
SARS-COV-2 RNA RESP QL NAA+PROBE: NOTDETECTED
SODIUM SERPL-SCNC: 134 MMOL/L (ref 135–145)
SPECIMEN SOURCE: NORMAL
WBC # BLD AUTO: 22.2 K/UL (ref 4.8–10.8)

## 2021-02-21 PROCEDURE — 700111 HCHG RX REV CODE 636 W/ 250 OVERRIDE (IP): Performed by: STUDENT IN AN ORGANIZED HEALTH CARE EDUCATION/TRAINING PROGRAM

## 2021-02-21 PROCEDURE — 83735 ASSAY OF MAGNESIUM: CPT

## 2021-02-21 PROCEDURE — 85027 COMPLETE CBC AUTOMATED: CPT

## 2021-02-21 PROCEDURE — 36415 COLL VENOUS BLD VENIPUNCTURE: CPT

## 2021-02-21 PROCEDURE — 770006 HCHG ROOM/CARE - MED/SURG/GYN SEMI*

## 2021-02-21 PROCEDURE — 700101 HCHG RX REV CODE 250: Performed by: SURGERY

## 2021-02-21 PROCEDURE — 700105 HCHG RX REV CODE 258: Performed by: STUDENT IN AN ORGANIZED HEALTH CARE EDUCATION/TRAINING PROGRAM

## 2021-02-21 PROCEDURE — 302111 WAFER OST 2.25IN N IMG RD 2 PC (BARRIER): Performed by: HOSPITALIST

## 2021-02-21 PROCEDURE — 83605 ASSAY OF LACTIC ACID: CPT

## 2021-02-21 PROCEDURE — 302098 PASTE RING (FLAT): Performed by: HOSPITALIST

## 2021-02-21 PROCEDURE — A9270 NON-COVERED ITEM OR SERVICE: HCPCS | Performed by: STUDENT IN AN ORGANIZED HEALTH CARE EDUCATION/TRAINING PROGRAM

## 2021-02-21 PROCEDURE — 700111 HCHG RX REV CODE 636 W/ 250 OVERRIDE (IP): Performed by: HOSPITALIST

## 2021-02-21 PROCEDURE — 302102 BAG OST N IMG 2.25IN 2PC (FECAL): Performed by: HOSPITALIST

## 2021-02-21 PROCEDURE — 99233 SBSQ HOSP IP/OBS HIGH 50: CPT | Performed by: HOSPITALIST

## 2021-02-21 PROCEDURE — 80053 COMPREHEN METABOLIC PANEL: CPT

## 2021-02-21 PROCEDURE — 700102 HCHG RX REV CODE 250 W/ 637 OVERRIDE(OP): Performed by: STUDENT IN AN ORGANIZED HEALTH CARE EDUCATION/TRAINING PROGRAM

## 2021-02-21 RX ORDER — MORPHINE SULFATE 4 MG/ML
1-4 INJECTION, SOLUTION INTRAMUSCULAR; INTRAVENOUS EVERY 4 HOURS PRN
Status: DISCONTINUED | OUTPATIENT
Start: 2021-02-21 | End: 2021-02-24 | Stop reason: HOSPADM

## 2021-02-21 RX ORDER — SUMATRIPTAN 6 MG/.5ML
6 INJECTION, SOLUTION SUBCUTANEOUS ONCE
Status: COMPLETED | OUTPATIENT
Start: 2021-02-21 | End: 2021-02-21

## 2021-02-21 RX ORDER — POLYETHYLENE GLYCOL 3350 17 G/17G
1 POWDER, FOR SOLUTION ORAL DAILY
Status: DISCONTINUED | OUTPATIENT
Start: 2021-02-21 | End: 2021-02-24 | Stop reason: HOSPADM

## 2021-02-21 RX ADMIN — APIXABAN 5 MG: 5 TABLET, FILM COATED ORAL at 00:05

## 2021-02-21 RX ADMIN — GLYCOPYRROLATE 1 CAPSULE: 15.6 CAPSULE RESPIRATORY (INHALATION) at 00:01

## 2021-02-21 RX ADMIN — SODIUM CHLORIDE 3 G: 900 INJECTION INTRAVENOUS at 06:08

## 2021-02-21 RX ADMIN — SODIUM CHLORIDE, POTASSIUM CHLORIDE, SODIUM LACTATE AND CALCIUM CHLORIDE: 600; 310; 30; 20 INJECTION, SOLUTION INTRAVENOUS at 10:25

## 2021-02-21 RX ADMIN — HYDROCODONE BITARTRATE AND ACETAMINOPHEN 1 TABLET: 10; 325 TABLET ORAL at 06:15

## 2021-02-21 RX ADMIN — SUMATRIPTAN SUCCINATE 6 MG: 6 INJECTION SUBCUTANEOUS at 13:53

## 2021-02-21 RX ADMIN — BUDESONIDE AND FORMOTEROL FUMARATE DIHYDRATE 2 PUFF: 160; 4.5 AEROSOL RESPIRATORY (INHALATION) at 06:07

## 2021-02-21 RX ADMIN — Medication 2000 UNITS: at 06:08

## 2021-02-21 RX ADMIN — ATORVASTATIN CALCIUM 80 MG: 40 TABLET, FILM COATED ORAL at 11:31

## 2021-02-21 RX ADMIN — ACETAMINOPHEN 650 MG: 325 TABLET ORAL at 09:14

## 2021-02-21 RX ADMIN — HYDROCODONE BITARTRATE AND ACETAMINOPHEN 1 TABLET: 10; 325 TABLET ORAL at 00:00

## 2021-02-21 RX ADMIN — ACETAMINOPHEN 650 MG: 325 TABLET ORAL at 10:26

## 2021-02-21 RX ADMIN — APIXABAN 5 MG: 5 TABLET, FILM COATED ORAL at 17:31

## 2021-02-21 RX ADMIN — ZOLPIDEM TARTRATE 10 MG: 5 TABLET ORAL at 00:50

## 2021-02-21 RX ADMIN — HYDROCODONE BITARTRATE AND ACETAMINOPHEN 1 TABLET: 10; 325 TABLET ORAL at 22:26

## 2021-02-21 RX ADMIN — SODIUM CHLORIDE, POTASSIUM CHLORIDE, SODIUM LACTATE AND CALCIUM CHLORIDE: 600; 310; 30; 20 INJECTION, SOLUTION INTRAVENOUS at 20:51

## 2021-02-21 RX ADMIN — BUDESONIDE AND FORMOTEROL FUMARATE DIHYDRATE 2 PUFF: 160; 4.5 AEROSOL RESPIRATORY (INHALATION) at 00:00

## 2021-02-21 RX ADMIN — POLYETHYLENE GLYCOL 3350 1 PACKET: 17 POWDER, FOR SOLUTION ORAL at 11:30

## 2021-02-21 RX ADMIN — GLYCOPYRROLATE 1 CAPSULE: 15.6 CAPSULE RESPIRATORY (INHALATION) at 17:31

## 2021-02-21 RX ADMIN — ONDANSETRON 4 MG: 2 INJECTION INTRAMUSCULAR; INTRAVENOUS at 10:24

## 2021-02-21 RX ADMIN — DIGOXIN 125 MCG: 250 TABLET ORAL at 11:31

## 2021-02-21 RX ADMIN — SODIUM CHLORIDE 3 G: 900 INJECTION INTRAVENOUS at 00:06

## 2021-02-21 RX ADMIN — BUDESONIDE AND FORMOTEROL FUMARATE DIHYDRATE 2 PUFF: 160; 4.5 AEROSOL RESPIRATORY (INHALATION) at 17:31

## 2021-02-21 RX ADMIN — ZOLPIDEM TARTRATE 10 MG: 5 TABLET ORAL at 22:09

## 2021-02-21 RX ADMIN — APIXABAN 5 MG: 5 TABLET, FILM COATED ORAL at 06:08

## 2021-02-21 RX ADMIN — GLYCOPYRROLATE 1 CAPSULE: 15.6 CAPSULE RESPIRATORY (INHALATION) at 06:08

## 2021-02-21 ASSESSMENT — PAIN DESCRIPTION - PAIN TYPE
TYPE: ACUTE PAIN

## 2021-02-21 ASSESSMENT — COGNITIVE AND FUNCTIONAL STATUS - GENERAL
MOBILITY SCORE: 21
SUGGESTED CMS G CODE MODIFIER MOBILITY: CJ
MOVING FROM LYING ON BACK TO SITTING ON SIDE OF FLAT BED: A LITTLE
STANDING UP FROM CHAIR USING ARMS: A LITTLE
DAILY ACTIVITIY SCORE: 23
HELP NEEDED FOR BATHING: A LITTLE
MOVING TO AND FROM BED TO CHAIR: A LITTLE
SUGGESTED CMS G CODE MODIFIER DAILY ACTIVITY: CI

## 2021-02-21 ASSESSMENT — ENCOUNTER SYMPTOMS
FOCAL WEAKNESS: 0
BLURRED VISION: 0
VOMITING: 1
FEVER: 0
DOUBLE VISION: 0
COUGH: 0
VOMITING: 0
NAUSEA: 1
CONSTIPATION: 0
HEADACHES: 1
CHILLS: 0
WEAKNESS: 0
MYALGIAS: 0
NERVOUS/ANXIOUS: 0
BLOOD IN STOOL: 0
MEMORY LOSS: 0
ABDOMINAL PAIN: 0
NAUSEA: 0
DIARRHEA: 0
DEPRESSION: 1
ABDOMINAL PAIN: 1
DIZZINESS: 0
PALPITATIONS: 1
WHEEZING: 0
SHORTNESS OF BREATH: 0
SORE THROAT: 0

## 2021-02-21 ASSESSMENT — PATIENT HEALTH QUESTIONNAIRE - PHQ9
2. FEELING DOWN, DEPRESSED, IRRITABLE, OR HOPELESS: NOT AT ALL
1. LITTLE INTEREST OR PLEASURE IN DOING THINGS: NOT AT ALL
SUM OF ALL RESPONSES TO PHQ9 QUESTIONS 1 AND 2: 0

## 2021-02-21 ASSESSMENT — LIFESTYLE VARIABLES
TOTAL SCORE: 0
AVERAGE NUMBER OF DAYS PER WEEK YOU HAVE A DRINK CONTAINING ALCOHOL: 3
CONSUMPTION TOTAL: NEGATIVE
TOTAL SCORE: 0
HAVE YOU EVER FELT YOU SHOULD CUT DOWN ON YOUR DRINKING: NO
EVER FELT BAD OR GUILTY ABOUT YOUR DRINKING: NO
TOTAL SCORE: 0
ALCOHOL_USE: NO
HAVE PEOPLE ANNOYED YOU BY CRITICIZING YOUR DRINKING: NO
HOW MANY TIMES IN THE PAST YEAR HAVE YOU HAD 5 OR MORE DRINKS IN A DAY: 0
EVER HAD A DRINK FIRST THING IN THE MORNING TO STEADY YOUR NERVES TO GET RID OF A HANGOVER: NO
ON A TYPICAL DAY WHEN YOU DRINK ALCOHOL HOW MANY DRINKS DO YOU HAVE: 1

## 2021-02-21 ASSESSMENT — FIBROSIS 4 INDEX: FIB4 SCORE: 5.01

## 2021-02-21 NOTE — CONSULTS
General Surgery Consult    Date of Service: 2/20/2021    Consulting Physician: Romario Paulson M.D. [facility]    -------------------------------------------------------------------------------------------------    Chief complaint: Perineal hernia    HPI: This is a 80 y.o. female with history of APR and known perineal hernia.  She reports having decreased ostomy output for the past several days.  In conjunction with this her perineal hernia has become more apparent particularly when standing up.  She spoke with her colorectal surgeon today, Dr. Jeter, and the patient reported that the hernia was staying out and difficult to reduce, which was concerning for incarceration.  Patient was sent to the ER for evaluation.  CT scan shows that there is constipation and the cecum is sitting in the perineal hernia but there is no evidence of incarceration, no evidence of inflammation, no evidence of ischemia or perforation.  The patient is alert and conversant, she is reporting mild abdominal pain and mild tenderness on exam but no peritoneal signs.  No nausea or vomiting    There is a small amount of stool in the ostomy bag and has a thick consistency      Past Medical History:   Diagnosis Date   • Arrhythmia     afib   • Arthritis     low back, right hand   • Asthma     inhalers daily   • Breath shortness     asthma related   • Cancer (HCC) 1983    uterine   • Cancer ([facility]) 12/2019    anal (received chemo & radiation; removal surgery done on 10/15/2020)   • Cataract     repaired   • Diverticulosis    • Gynecological disorder    • Hemorrhagic disorder (HCC)     Eliquis   • Hiatus hernia syndrome     pt denies   • High cholesterol    • IBS (irritable bowel syndrome)     due to radiation   • Pain 12/2020    rectal   • Pneumonia 2000   • PONV (postoperative nausea and vomiting)    • Urinary bladder disorder     urgency/fequency   • Urinary incontinence        Past Surgical History:   Procedure Laterality Date   •  IRRIGATION & DEBRIDEMENT GENERAL N/A 10/19/2020    Procedure: IRRIGATION AND DEBRIDEMENT, WOUND-PERINEAL WOUND;  Surgeon: Mark Reyes M.D.;  Location: SURGERY [facility];  Service: Plastics   • PB LAP, SURG COLOSTOMY Left 10/15/2020    Procedure: CREATION, COLOSTOMY, LAPAROSCOPIC - FOR END COLOSTOMY;  Surgeon: Dulce Jeter M.D.;  Location: SURGERY [facility];  Service: Gen Robotic   • ABDOMINAL PERINEAL RESECTION ROBOTIC XI N/A 10/15/2020    Procedure: RESECTION, ABDOMINOPERINEAL, ROBOT-ASSISTED, USING DA AUGIE XI;  Surgeon: Dulce Jeter M.D.;  Location: SURGERY [facility];  Service: Gen Robotic   • MYOCUTANEOUS FLAP N/A 10/15/2020    Procedure: REPAIR, WOUND, USING MYOCUTANEOUS FLAP- MUSCLE FLAP TO PERINEUM;  Surgeon: Mark Reyes M.D.;  Location: [facility];  Service: Plastics   • PB SURG DIAGNOSTIC EXAM, ANORECTAL  9/21/2020    Procedure: EXAM UNDER ANESTHESIA, RECTUM;  Surgeon: Dulce Jeter M.D.;  Location: [facility];  Service: General   • PROCTOSCOPY  9/21/2020    Procedure: PROCTOSCOPY- RIGID WITH BIOPSY;  Surgeon: Dulce Jeter M.D.;  Location: [facility];  Service: General   • COLONOSCOPY  1/13/2020    Procedure: COLONOSCOPY;  Surgeon: Dulce Jeter M.D.;  Location: SURGERY SAME DAY [facility];  Service: Gen Robotic   • LUMBAR LAMINECTOMY DISKECTOMY  3/3/2016    Procedure: LUMBAR LAMINECTOMY DISKECTOMY FOR MINIMALLY INVASIVE LEFT L4-5 LAMINOTOMY;  Surgeon: Stevo Guthrie M.D.;  Location: [facility];  Service:    • CHOLECYSTECTOMY  6/2014   • CATARACT EXTRACTION WITH IOL Bilateral 2012   • HYSTERECTOMY, TOTAL ABDOMINAL  1983   • ABDOMINAL HYSTERECTOMY TOTAL  1983   • TUBAL LIGATION  1975   • APPENDECTOMY     • BICEPS TENDON REPAIR Right 2013, 2014    5 screws placed in second surgery   • BUNIONECTOMY     • PB REMOVAL OF TONSILS,<11 Y/O     • SHOULDER SURGERY Right        Current Facility-Administered Medications   Medication Dose  Route Frequency Provider Last Rate Last Admin   • acetaminophen (Tylenol) tablet 650 mg  650 mg Oral Q6HRS PRN Kasi Juarez M.D.       • senna-docusate (PERICOLACE or SENOKOT S) 8.6-50 MG per tablet 2 tablet  2 tablet Oral BID Kasi Juarez M.D.        And   • polyethylene glycol/lytes (MIRALAX) PACKET 1 Packet  1 Packet Oral QDAY PRN Kasi Juarez M.D.        And   • magnesium hydroxide (MILK OF MAGNESIA) suspension 30 mL  30 mL Oral QDAY PRN Kasi Juarez M.D.        And   • bisacodyl (DULCOLAX) suppository 10 mg  10 mg Rectal QDAY PRN Kasi Juarez M.D.       • lactated ringers infusion (BOLUS)  500 mL Intravenous Once PRN Kasi Juarez M.D.       • lactated ringers infusion (BOLUS): BMI less than or equal to 30  30 mL/kg Intravenous Once PRN Kasi Juarez M.D.       • apixaban (ELIQUIS) tablet 5 mg  5 mg Oral BID Kasi Juarez M.D.       • [START ON 2/21/2021] atorvastatin (LIPITOR) tablet 80 mg  80 mg Oral DAILY WITH LUNCH Kasi Juarez M.D.       • [START ON 2/21/2021] digoxin (LANOXIN) tablet 125 mcg  125 mcg Oral DAILY WITH LUNCH Kasi Juarez M.D.       • fluticasone-salmeterol (ADVAIR) 500-50 MCG/DOSE inhaler 1 Puff  1 Puff Inhalation Q12HRS Kasi Juarez M.D.       • HYDROcodone/acetaminophen (NORCO)  MG per tablet 1 tablet  1 tablet Oral Q6HRS PRN Kasi Juarez M.D.       • lidocaine (XYLOCAINE) 5 % ointment 0.5 g  0.5 g Topical PRN Kasi Juarez M.D.       • glycopyrrolate (Seebri) 15.6 mcg inhalation capsule 1 capsule  1 capsule Inhalation BID (RT) Kasi Juarez M.D.       • [START ON 2/21/2021] vitamin D (cholecalciferol) tablet 2,000 Units  2,000 Units Oral DAILY Kasi Juarez M.D.       • zolpidem (AMBIEN) tablet 10 mg  10 mg Oral HS PRN Kasi Juarez M.D.       • metroNIDAZOLE (FLAGYL) IVPB 500 mg  500 mg Intravenous Q8HRS Kasi Juarez M.D.       • [START ON 2/21/2021] cefTRIAXone (ROCEPHIN) 1 g in  mL IVPB  1 g Intravenous Q24HRS Kasi Juarez M.D.       • Pharmacy Consult Request  1 Each Other  PHARMACY TO DOSE Romario Paulson M.D.       • polyethylene glycol/lytes (MIRALAX) PACKET 1 Packet  1 Packet Oral TID Romario Paulson M.D.         Current Outpatient Medications   Medication Sig Dispense Refill   • docusate sodium (COLACE) 100 MG Cap Take 100 mg by mouth 2 times a day.     • gabapentin (NEURONTIN) 100 MG Cap Take 200 mg by mouth 2 (two) times a day.     • SPIRIVA RESPIMAT 2.5 MCG/ACT Aero Soln Inhale 2.5 mcg every day.     • lidocaine (XYLOCAINE) 5 % Ointment Apply 0.5 g topically as needed for up to 10 days. (Patient not taking: Reported on 2/20/2021) 30 g 5   • HYDROcodone/acetaminophen (NORCO)  MG Tab Take 1 tablet by mouth every 6 hours as needed for up to 30 days. 120 tablet 0   • vitamin D (CHOLECALCIFEROL) 1000 Unit (25 mcg) Tab Take 2,000 Units by mouth every day.     • Multiple Vitamins-Minerals (CENTRUM SILVER 50+WOMEN PO) Take 1 tablet by mouth every day.     • Psyllium (METAMUCIL) 28 % packet Take 1 Packet by mouth every day. 30 Each 3   • apixaban (ELIQUIS) 5mg Tab Take 5 mg by mouth 2 Times a Day.     • fluticasone-salmeterol (WIXELA INHUB) 500-50 MCG/DOSE AEROSOL POWDER, BREATH ACTIVATED Inhale 1 Puff by mouth every 12 hours.     • digoxin (LANOXIN) 125 MCG Tab Take 125 mcg by mouth every day with lunch.     • atorvastatin (LIPITOR) 20 MG Tab Take 80 mg by mouth every day.     • zolpidem (AMBIEN) 10 MG Tab Take 10 mg by mouth at bedtime as needed for Sleep.         Social History     Socioeconomic History   • Marital status:      Spouse name: Klaus    • Number of children: 6   • Years of education: Not on file   • Highest education level: Not on file   Occupational History   • Not on file   Tobacco Use   • Smoking status: Never Smoker   • Smokeless tobacco: Never Used   Substance and Sexual Activity   • Alcohol use: Yes     Alcohol/week: 3.0 oz     Types: 5 Glasses of wine per week     Comment: 0-1 per month   • Drug use: No   • Sexual activity: Not on file   Other  Topics Concern   • Not on file   Social History Narrative    Patient lives in [place], , retired, 6 children.     Social Determinants of Health     Financial Resource Strain:    • Difficulty of Paying Living Expenses:    Food Insecurity:    • Worried About Running Out of Food in the Last Year:    • Ran Out of Food in the Last Year:    Transportation Needs:    • Lack of Transportation (Medical):    • Lack of Transportation (Non-Medical):    Physical Activity:    • Days of Exercise per Week:    • Minutes of Exercise per Session:    Stress:    • Feeling of Stress :    Social Connections:    • Frequency of Communication with Friends and Family:    • Frequency of Social Gatherings with Friends and Family:    • Attends Orthodoxy Services:    • Active Member of Clubs or Organizations:    • Attends Club or Organization Meetings:    • Marital Status:    Intimate Partner Violence:    • Fear of Current or Ex-Partner:    • Emotionally Abused:    • Physically Abused:    • Sexually Abused:        Family History   Problem Relation Age of Onset   • Cancer Mother    • Cancer Father    • Cancer Maternal Grandfather        Allergies:  Sagebrush    Review of Systems:  Noncontributory except as per HPI    Physical Exam:  /65   Pulse 82   Temp 36.3 °C (97.4 °F) (Temporal)   Resp 19   Wt 51.3 kg (113 lb 1.5 oz)   SpO2 94%     Constitutional: Alert, oriented, no acute distress  HEENT:  Normocephalic and atraumatic, EOMI  Neck:   Supple, no JVD,   Cardiovascular: Regular rate and rhythm,   Pulmonary:  Good air entry bilaterally,   Abdominal:  Soft,  Non-distended     Mildly tender to palpation diffusely     No rebound/guarding     Ostomy was digitized and index finger passes below the fascia, no gross blood  Musculoskeletal: No edema, no tenderness  Neurological:  CN II-XII grossly intact, no focal deficits  Skin:   Skin is warm and dry. No rash noted.  Psychiatric:  Normal mood and affect.    Labs:  Recent Labs      02/20/21  1710   WBC 29.6*   RBC 4.42   HEMOGLOBIN 14.3   HEMATOCRIT 44.3   .2*   MCH 32.4   MCHC 32.3*   RDW 55.6*   PLATELETCT 92*   MPV 11.2     Recent Labs     02/20/21  1710   SODIUM 135   POTASSIUM 4.4   CHLORIDE 98   CO2 27   GLUCOSE 129*   BUN 20   CREATININE 0.71   CALCIUM 9.8     Recent Labs     02/20/21  1710   INR 1.08     Recent Labs     02/20/21  1710   ASTSGOT 31   ALTSGPT 29   TBILIRUBIN 0.6   ALKPHOSPHAT 103*   GLOBULIN 3.6*   INR 1.08       Radiology:  CT scan shows that there is constipation and the cecum is sitting in the perineal hernia but there is no evidence of incarceration, no evidence of inflammation, no evidence of ischemia or perforation.     Assessment:  -Constipation  -Perineal hernia with no incarceration or obstruction  -Leukocytosis    Patient does have at least a moderate amount of constipation.  It appears to me that with the colon being distended from the constipation is more apparent coming out of her perineal hernia but it easily reduces back into the abdomen with no resistance.  There is no evidence of any complications related to the hernia, no bowel compromise.  I have prescribed MiraLAX for the constipation and will monitor her clinical response.    Okay for clear liquid diet.  BUN and creatinine ratio is above 20 so there may be a mild component of dehydration and would recommend keeping her IV fluids running at 100 cc an hour.    Patient does have an unexpected leukocytosis of 30.  This is out of proportion to any of her symptoms or exam findings.  There is no evidence of any infection and no source of inflammation adequate to achieve a leukocytosis quite this high.  At this point the patient will be on empiric antibiotics and we will check a C. difficile test as she was reporting some liquid ostomy output prior to arrival.  We will monitor her clinical progress and trend her white blood cell count.    Following along. Any questions or concerns please contact our  surgical team at [phone]    Appreciate hospitalist services support managing Mrs. Perfecto Paulson M.D.  [facility]  [phone]  Cell: [phone]

## 2021-02-21 NOTE — ASSESSMENT & PLAN NOTE
Secondary to constipation/hernia initially   Continue Zofran as needed.  Bowel regimen with MiraLAX and docusate daily.    Now resolved

## 2021-02-21 NOTE — H&P
Hospital Medicine History & Physical Note    Date of Service  2/20/2021    Primary Care Physician  ALISSA Amezquita.    Consultants  General Surgery-Dr. Paulson for Dr. Jeter ([facility])    Code Status  DNAR/DNI    Chief Complaint  Chief Complaint   Patient presents with   • Post-Op Complications     s/p colostomy in october, decreased output past 3 days.  also reports anal hernia increasing in size and intolerable pain.       History of Presenting Illness  80 y.o. female who presented 2/20/2021 for evaluation of increasing size of her perineal hernia as well as decreased ostomy output.  This is a pleasant woman with a history of anal cancer diagnosed in December 2019, at which time she underwent radiation and chemotherapy.  Unfortunately, the cancer progressed and the patient underwent an APR with colostomy placement by Dr. Jeter in October 2020.  Patient is under the care of oncologist Dr. Vogel and radiation oncologist Dr. Casper.  Due to progression of the cancer with metastases to the lungs, patient started chemotherapy 2 days ago.  Patient called Dr. Jeter's office due to concern for increasing size of her perineal hernia as well as decreased ostomy output after she started the chemotherapy.  She notes she has been taking Norco intermittently for chronic pain.  Patient was referred to the ER for further evaluation.      In the ER, CT scan of the abdomen and pelvis was performed and reviewed by Dr. Paulson, who was on-call for Dr. Jeter surgical practice.  Per review and discussion, he felt her symptoms and complaints were related to copious amounts of stool within the bowel now extending through the perineal hernia.  Again notably, this has been occurring in setting of intermittent Norco intake.  Recommendation was to start her on MiraLAX and aggressive bowel regimen.  Also IV fluids while keeping her on clear liquid diet.  Patient reports new complaints of a headache but denies any  dizziness.  She also reports nausea and vomiting associated with her decreased ostomy output.  She reports urinary frequency due to urinary incontinence from her previous radiation.  She also reports palpitations but denies any chest pains.  Denies any fevers or chills.    In the ER, patient was noted to have a leukocytosis of 29.6 with a significant left shift as well as tachypnea and tachycardia meeting criteria for sepsis.  She was started on fluids per sepsis protocol as well as ceftriaxone and metronidazole, which was changed to Unasyn upon admission.  Lactic acid was 1.6 with a procalcitonin of 0.22, ESR 30, CRP 0.82.      Review of Systems  Review of Systems   Constitutional: Negative for chills and fever.   HENT: Negative for ear pain and sore throat.    Eyes: Negative for blurred vision and double vision.   Respiratory: Negative for cough and shortness of breath.    Cardiovascular: Positive for palpitations. Negative for chest pain.   Gastrointestinal: Positive for nausea and vomiting. Negative for abdominal pain, blood in stool, constipation and diarrhea.   Genitourinary: Positive for frequency (Secondary to radiation). Negative for dysuria and urgency.   Musculoskeletal: Negative for joint pain and myalgias.   Skin: Negative for itching and rash.   Neurological: Positive for headaches. Negative for dizziness, focal weakness and weakness.   Psychiatric/Behavioral: Positive for depression. Negative for memory loss and suicidal ideas. The patient is not nervous/anxious.        Past Medical History   has a past medical history of Arrhythmia, Arthritis, Asthma, Breath shortness, Cancer ([facility]) (1983), Cancer ([facility]) (12/2019), Cataract, Diverticulosis, Gynecological disorder, Hemorrhagic disorder (HCC), Hiatus hernia syndrome, High cholesterol, IBS (irritable bowel syndrome), Pain (12/2020), Pneumonia (2000), PONV (postoperative nausea and vomiting), Urinary bladder disorder, and Urinary incontinence.    Surgical  History   has a past surgical history that includes tubal ligation (1975); hysterectomy, total abdominal (1983); pr removal of tonsils,<13 y/o; bunionectomy; cataract extraction with iol (Bilateral, 2012); lumbar laminectomy diskectomy (3/3/2016); abdominal hysterectomy total (1983); pr surg diagnostic exam, anorectal (9/21/2020); biceps tendon repair (Right, 2013, 2014); shoulder surgery (Right); cholecystectomy (6/2014); appendectomy; colonoscopy (1/13/2020); proctoscopy (9/21/2020); pr lap, surg colostomy (Left, 10/15/2020); abdominal perineal resection robotic xi (N/A, 10/15/2020); myocutaneous flap (N/A, 10/15/2020); and irrigation & debridement general (N/A, 10/19/2020).     Family History  family history includes Cancer in her father, maternal grandfather, and mother.     Social History   reports that she has never smoked. She has never used smokeless tobacco. She reports current alcohol use of about 3.0 oz of alcohol per week. She reports that she does not use drugs.    Allergies  Allergies   Allergen Reactions   • [place]        Medications  Prior to Admission Medications   Prescriptions Last Dose Informant Patient Reported? Taking?   HYDROcodone/acetaminophen (NORCO)  MG Tab   No No   Sig: Take 1 tablet by mouth every 6 hours as needed for up to 30 days.   Multiple Vitamins-Minerals (CENTRUM SILVER 50+WOMEN PO)  Patient Yes No   Sig: Take  by mouth every day.   Psyllium (METAMUCIL) 28 % packet  Patient No No   Sig: Take 1 Packet by mouth every day.   Patient taking differently: Take 1 Packet by mouth as needed.   apixaban (ELIQUIS) 5mg Tab  Patient Yes No   Sig: Take 5 mg by mouth 2 Times a Day.   atorvastatin (LIPITOR) 20 MG Tab  Patient Yes No   Sig: Take 80 mg by mouth every day with lunch.   digoxin (LANOXIN) 125 MCG Tab  Patient Yes No   Sig: Take 125 mcg by mouth every day with lunch.   fluticasone-salmeterol (WIXELA INHUB) 500-50 MCG/DOSE AEROSOL POWDER, BREATH ACTIVATED  Patient Yes No    Sig: Inhale 1 Puff by mouth every 12 hours.   lidocaine (XYLOCAINE) 5 % Ointment   No No   Sig: Apply 0.5 g topically as needed for up to 10 days.   tiotropium (SPIRIVA) 18 MCG Cap  Patient Yes No   Sig: Inhale 18 mcg by mouth every day.   vitamin D (CHOLECALCIFEROL) 1000 Unit (25 mcg) Tab   Yes No   Sig: Take 2,000 Units by mouth every day.   zolpidem (AMBIEN) 10 MG Tab  Patient Yes No   Sig: Take 10 mg by mouth at bedtime as needed for Sleep.      Facility-Administered Medications: None       Physical Exam  Temp:  [36.3 °C (97.4 °F)] 36.3 °C (97.4 °F)  Pulse:  [82-92] 82  Resp:  [15-35] 19  BP: (103-147)/(51-74) 147/65  SpO2:  [92 %-96 %] 94 %    Physical Exam  Vitals and nursing note reviewed. Exam conducted with a chaperone present.   Constitutional:       General: She is not in acute distress.     Appearance: She is well-developed. She is ill-appearing. She is not toxic-appearing or diaphoretic.   HENT:      Head: Normocephalic and atraumatic.      Right Ear: External ear normal.      Left Ear: External ear normal.      Nose: Nose normal.      Mouth/Throat:      Mouth: Mucous membranes are moist.      Pharynx: Oropharynx is clear. No oropharyngeal exudate or posterior oropharyngeal erythema.   Eyes:      General:         Right eye: No discharge.         Left eye: No discharge.      Conjunctiva/sclera: Conjunctivae normal.      Pupils: Pupils are equal, round, and reactive to light.   Neck:      Thyroid: No thyromegaly.      Vascular: No JVD.      Trachea: No tracheal deviation.   Cardiovascular:      Rate and Rhythm: Normal rate and regular rhythm.      Pulses: Normal pulses.      Heart sounds: Normal heart sounds. No murmur. No friction rub. No gallop.    Pulmonary:      Effort: Pulmonary effort is normal. No respiratory distress.      Breath sounds: Normal breath sounds. No stridor. No wheezing or rales.   Abdominal:      General: There is no distension.      Palpations: Abdomen is soft. There is no mass.       Tenderness: There is no abdominal tenderness. There is no guarding or rebound.      Comments: Decreased bowel sounds, ostomy output small amounts of firm stool   Genitourinary:     Comments: Firm bulge from the midline area just above the perineum.  No tenderness to palpation.  No erythema present.  Musculoskeletal:         General: No tenderness or deformity.      Cervical back: Neck supple.      Right lower leg: No edema.      Left lower leg: No edema.   Skin:     General: Skin is warm and dry.      Capillary Refill: Capillary refill takes less than 2 seconds.      Coloration: Skin is not pale.      Findings: No erythema or rash.   Neurological:      Mental Status: She is alert and oriented to person, place, and time.      Motor: No weakness or abnormal muscle tone.   Psychiatric:         Behavior: Behavior normal.         Thought Content: Thought content normal.         Judgment: Judgment normal.      Comments: Reports depression without suicidal ideation.         Laboratory:  Recent Labs     02/20/21  1710   WBC 29.6*   RBC 4.42   HEMOGLOBIN 14.3   HEMATOCRIT 44.3   .2*   MCH 32.4   MCHC 32.3*   RDW 55.6*   PLATELETCT 92*   MPV 11.2     Recent Labs     02/20/21  1710   SODIUM 135   POTASSIUM 4.4   CHLORIDE 98   CO2 27   GLUCOSE 129*   BUN 20   CREATININE 0.71   CALCIUM 9.8     Recent Labs     02/20/21  1710   ALTSGPT 29   ASTSGOT 31   ALKPHOSPHAT 103*   TBILIRUBIN 0.6   GLUCOSE 129*         No results for input(s): NTPROBNP in the last 72 hours.      No results for input(s): TROPONINT in the last 72 hours.    Imaging:  CT-ABDOMEN-PELVIS WITH   Final Result      1.  No acute intra-abdominal findings.      2.  Status post bowel resection with left lower quadrant colostomy. Large amount of colonic stool.      3.  Atherosclerosis.        To CT scan of the abdomen and pelvis with contrast per my read shows copious amounts of stool extending through the transverse colon, descending colon, and down into the  rectum just posterior to the bladder, where the the site of the hernia is present.    Assessment/Plan:  I anticipate this patient will require at least two midnights for appropriate medical management, necessitating inpatient admission.    * Sepsis (HCC)- (present on admission)  Assessment & Plan  This is Sepsis Present on admission  SIRS criteria identified on my evaluation include: Tachycardia, with heart rate greater than 90 BPM, Tachypnea, with respirations greater than 20 per minute, Leukocytosis, with WBC greater than 12,000 and Bandemia, greater than 10% bands  Source is suspected GI stool impaction with translation of floral bacteria   Sepsis protocol initiated  Fluid resuscitation ordered per protocol: LR  IV antibiotics as appropriate for source of sepsis: Ceftriaxone and metronidazole started in the ER per ER physician.  Change to Unasyn on admission.  While organ dysfunction may be noted elsewhere in this problem list or in the chart, degree of organ dysfunction does not meet CMS criteria for severe sepsis    Procalcitonin borderline elevated 0.22, ESR 30, CRP 0.82.    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  Hernia worsening likely secondary to drug-induced constipation.  No surgery indicated as per general surgery.    Aggressive bowel regimen as per recommendations.  Continue to monitor closely.    Leukocytosis- (present on admission)  Assessment & Plan  Other possibilities for leukocytosis including leukemoid reaction.  Patient may have gotten steroids prior to her chemotherapy on 2/18/2021.    Continue to monitor closely for response antibiotics.  To monitor inflammatory markers as indicated.    Drug-induced constipation- (present on admission)  Assessment & Plan  Suggested by copious amounts of stool within the colon.  In setting of Norco use.  General surgery feels decreased ostomy output secondary to constipation.    Appreciate general surgery  consult.  Continue daily MiraLAX and docusate sodium.  Consider trial of magnesium citrate.    Nausea and vomiting- (present on admission)  Assessment & Plan  Secondary to severe constipation/impaction of stool within the bowel.    Continue Zofran as needed.  Bowel regimen with MiraLAX and docusate daily.  Continue to monitor.    Anal cancer (HCC)- (present on admission)  Assessment & Plan  As per history now has progressed to stage IV with metastases to the lungs.  Status post chemoradiation as well as recent chemotherapy for the metastases.  Under care of Dr. Vogel (cancer care specialists) and Dr. Casper (radiation oncology).    Continue outpatient follow-up.    Paroxysmal atrial fibrillation (HCC)- (present on admission)  Assessment & Plan  Per patient, she has a history of atrial fibrillation.  Review of all previous EKG from this institution shows normal sinus rhythm.  Currently in normal sinus rhythm on telemetry monitoring.  [id number] score of 3, 3.2% risk of stroke per year.    Continue home digoxin and apixaban.    Thrombocythemia (HCC)- (present on admission)  Assessment & Plan  Possibly secondary to recent chemotherapy.  No need for transfusion unless less than 10 or active bleeding.    Continue to monitor.

## 2021-02-21 NOTE — CARE PLAN
Problem: Safety  Goal: Will remain free from injury  Outcome: PROGRESSING AS EXPECTED  Patient educated to call before mobilizing, call light and belongings within reach, bed locked and in lowest position.       Problem: Pain Management  Goal: Pain level will decrease to patient's comfort goal  Outcome: PROGRESSING AS EXPECTED    Patient educated on pain medications, availability, and alternatives.

## 2021-02-21 NOTE — ASSESSMENT & PLAN NOTE
Other possibilities for leukocytosis including leukemoid reaction.  Patient may have gotten steroids prior to her chemotherapy on 2/18/2021.  No overt infection  Initially given antibiotics  ProCalcitonin negative  Now DC'd  Leukocytosis now resolved

## 2021-02-21 NOTE — CARE PLAN
Problem: Communication  Goal: The ability to communicate needs accurately and effectively will improve  Outcome: PROGRESSING AS EXPECTED  Patient able to articulate needs. Utilize call lights appropriately.     Problem: Safety  Goal: Will remain free from injury  Outcome: PROGRESSING AS EXPECTED  Patient educated not to get out of bed unassisted. No history of falls.     Problem: Venous Thromboembolism (VTW)/Deep Vein Thrombosis (DVT) Prevention:  Goal: Patient will participate in Venous Thrombosis (VTE)/Deep Vein Thrombosis (DVT)Prevention Measures  Outcome: PROGRESSING AS EXPECTED  SCDs on/engaged. Patient on blood thinners.      Problem: Bowel/Gastric:  Goal: Normal bowel function is maintained or improved  Outcome: PROGRESSING SLOWER THAN EXPECTED  Hypoactive BS x 4 noted. Patient tolerating tepid water. Medicated for nausea; see MAR.     Problem: Pain Management  Goal: Pain level will decrease to patient's comfort goal  Outcome: PROGRESSING AS EXPECTED  Medicated for pain; see MAR.

## 2021-02-21 NOTE — PROGRESS NOTES
2 RN skin check complete.   Devices in place: LLQ ostomy.     Skin assessed under devices: NA.     Scattered bruising to BUE/BLE. Abdomen round, distended (hernia). Multiple surgical scars from previous abdominal surgeries. LLQ ostomy. Red heels, blanching. Rectal cancer, protruding redness/perineal bulge.     Confirmed pressure ulcers found on: NA.     New potential pressure ulcers noted on: Rectal area. Wound consult placed: for ostomy.   The following interventions in place: Pillows for limb support. Patient refused waffle overlay for rectal area.

## 2021-02-21 NOTE — ASSESSMENT & PLAN NOTE
Suggested by copious amounts of stool within the colon.  In setting of Norco use.  General surgery feels decreased ostomy output secondary to constipation.    Appreciate general surgery consult.  Continue daily MiraLAX and docusate sodium.  Consider trial of magnesium citrate.

## 2021-02-21 NOTE — DIETARY
"Nutrition services: Day 1 of admit.  Josy Grove is an 80 y.o. female with admitting DX of sepsis.    Consult received for wt loss (24-33 lbs in 1 month), poor PO per admit screen (MST score of 4). Spoke with pt and son at bedside. Pt appeared elderly, though adequately nourished.  Pt reports a low appetite. Pt stated she received chemotherapy last Thursday and she hasn't felt well since. She reports she was not able to tolerate broth and juice at breakfast this morning. Pt denied any taste changes at this time. Pt declined to discuss food preferences as she received a phone call and interview was ended.    Assessment:  Height: 160 cm (5' 3\")  Weight: 49.4 kg (109 lb)  Body mass index is 19.31 kg/m²., BMI classification: normal  Diet/Intake: Regular; PO 0-<25% for two meals thus far    Evaluation:   1. Hx of Stage II anal cancer s/p chemoRT.  2. Recent findings of mets to lung. Left lung biopsy 1/19/21.  3. Wt hx per chart review: 114 lbs 10/20/20. Wt loss of 4% in four months is not significant, but worth noting.  4. Zofran given today per MAR.    Malnutrition Risk: Does not meet criteria per ASPEN guidelines at this time.    Recommendations/Plan:  1. Add oral nutrition supplements per protocol to optimize intake.  2. Encourage intake of meals and supplements.  3. Document intake of all meals and supplements as % taken in ADLs to provide interdisciplinary communication across all shifts.   4. Monitor weight.  5. Nutrition rep will continue to see patient for ongoing meal and snack preferences.     RD will continue to follow.        "

## 2021-02-21 NOTE — PROGRESS NOTES
Received report from ED RN; assumed care. Patient arrived via WC. Patient escorted to/from bathroom; SBA with steady gait at time of arrival. Patient reported nausea, extreme HA. Admitting MD contacted, new orders received/administered. Patient oriented to room, floor, routine. A&O x 4. VSS. 90% on RA. Intermittent use of NC with nausea/HA reports. 2 RN skin check performed; see previous PN. Abdomen rounded, mildly tender. LLQ ostomy appliance intact, hard stool noted in appliance. Hypoactive BS x 4 noted. Perineal hernia protruding, mildly red. Tender only when sitting. Patient preferring to lay flat. Refusing waffle overlay/mattress. + void, yellow urine noted. + eructation. + flatus. LBM 02/20. Admission profile completed. Patient low fall risk. Bed alarm placed d/t mild weakness/dizziness/ambient administration mid shift. Patient tolerating MIVF/IV antibiotics. POC discussed. Questions answered. Bed locked/lowest position. Call light/personal belongings within reach. Needs met throughout shift.

## 2021-02-21 NOTE — CONSULTS
CONSULT NOTE    PATIENT ID  Name:             Josy Grove   YOB: 1940  Age:                 80 y.o.  female   MRN:               [id number]    CHIEF COMPLAINT:        Perineal pain, decreased stoma output    HISTORY OF PRESENT ILLNESS:  80-year-old woman known to me with previous anal squamous cell carcinoma who underwent chemoradiotherapy with progression of anal canal nodule approximately 4 months following treatment.  She underwent a robotic abdominoperineal resection with rectus flap repair by myself and Dr. Reyes [plastic surgery].She has recently been diagnosed with a distant recurrence in the lung and started chemotherapy on February 18, 2021.  The patient called yesterday with concern of worsening perineal discomfort as well as a bruised appearance of the skin overlying her perineal hernia.  She also reported a 3 day history of minimal output from her colostomy.  For those reasons she was brought to the emergency department.  Her investigations in the emergency department demonstrated a grossly elevated white blood cell count for a cause that remains unknown.  Her CT scan did not demonstrate any evidence of obstruction or bowel compromise related to her perineal hernia.  Her physical exam last night was reported by Dr. Lang and Dr. Paulson as completely benign.  She was admitted to the hospitalist service for further investigations of her grossly elevated white blood cell count.    The patient is feeling nauseated this morning.  She reports that her symptoms related to her hernia have greatly improved since being in bed since last night.  She is having a small amount of stool output from her colostomy.  She is tolerating a small amount of clear fluids.  A diagnosis remains unclear for her elevated white blood cell count, however has improved slightly today.    REVIEW OF SYSTEMS:   Constitutional: Denies unintended weight change, night sweats, fatigue  Eyes:   Denies eye pain,  redness, discharge, vision changes  Ears/Nose/Throat/Mouth: Denies hearing changes, ear pain, nasal congestion, sore throat, dysphagia  Cardiovascular:  Denies Chest pain, shortness of breath, orthopnea, palpitations, claudication  Respiratory:  Denies cough, sputum, wheezing, dyspnea  Gastrointestinal/Hepatic:  Denies dysphagia, melena, jaundice, hematochezia, changing heartburn  Genitourinary:  Denies dysuria, increased frequency, hematuria, urgency  Musculoskeletal/Rheum: Denies changing arthralgias, myalgias, joint swelling, joint stiffness  Skin/Breast: Denies changing skin lesions, pruritis, nipple discharge, hair changes  Neurological: Denies weakness, numbness, paresthesia, syncope, dizziness  Pyschiatric: Denies acute depression, anxiety, insomnia, personality changes, delusions  Endocrine: Denies temperature intolerance, polydipsia, polyuria  Heme/Oncology/Lymph Nodes: Denies easy bruising, bleeding, lymphadenopathy  All other systems were reviewed and are negative                 Past Medical History:   Past Medical History:   Diagnosis Date   • Arrhythmia     afib   • Arthritis     low back, right hand   • Asthma     inhalers daily   • Breath shortness     asthma related   • Cancer (HCC) 1983    uterine   • Cancer (HCC) 12/2019    anal (received chemo & radiation; removal surgery done on 10/15/2020)   • Cataract     repaired   • Diverticulosis    • Gynecological disorder     uterine CA   • Hemorrhagic disorder (HCC)     Eliquis   • Hiatus hernia syndrome     pt denies   • High cholesterol    • IBS (irritable bowel syndrome)     due to radiation   • Indigestion    • Pain 12/2020    rectal   • Pneumonia 2000   • PONV (postoperative nausea and vomiting)    • Urinary bladder disorder     urgency/fequency   • Urinary incontinence     after radiation       Past Surgical History:  Past Surgical History:   Procedure Laterality Date   • IRRIGATION & DEBRIDEMENT GENERAL N/A 10/19/2020    Procedure: IRRIGATION AND  DEBRIDEMENT, WOUND-PERINEAL WOUND;  Surgeon: Mark Reyes M.D.;  Location: SURGERY [facility];  Service: Plastics   • PB LAP, SURG COLOSTOMY Left 10/15/2020    Procedure: CREATION, COLOSTOMY, LAPAROSCOPIC - FOR END COLOSTOMY;  Surgeon: Dulce Jeter M.D.;  Location: SURGERY [facility];  Service: Gen Robotic   • ABDOMINAL PERINEAL RESECTION ROBOTIC XI N/A 10/15/2020    Procedure: RESECTION, ABDOMINOPERINEAL, ROBOT-ASSISTED, USING DA AUGIE XI;  Surgeon: Dulce Jeter M.D.;  Location: SURGERY [facility];  Service: Gen Robotic   • MYOCUTANEOUS FLAP N/A 10/15/2020    Procedure: REPAIR, WOUND, USING MYOCUTANEOUS FLAP- MUSCLE FLAP TO PERINEUM;  Surgeon: Mark Reyes M.D.;  Location: [facility];  Service: Plastics   • PB SURG DIAGNOSTIC EXAM, ANORECTAL  9/21/2020    Procedure: EXAM UNDER ANESTHESIA, RECTUM;  Surgeon: Dulce Jeter M.D.;  Location: [facility];  Service: General   • PROCTOSCOPY  9/21/2020    Procedure: PROCTOSCOPY- RIGID WITH BIOPSY;  Surgeon: Dulce Jeter M.D.;  Location: [facility];  Service: General   • COLONOSCOPY  1/13/2020    Procedure: COLONOSCOPY;  Surgeon: Dulce Jeter M.D.;  Location: SURGERY SAME DAY [facility];  Service: Gen Robotic   • LUMBAR LAMINECTOMY DISKECTOMY  3/3/2016    Procedure: LUMBAR LAMINECTOMY DISKECTOMY FOR MINIMALLY INVASIVE LEFT L4-5 LAMINOTOMY;  Surgeon: Stevo Guthrie M.D.;  Location: SURGERY [facility];  Service:    • CHOLECYSTECTOMY  6/2014   • CATARACT EXTRACTION WITH IOL Bilateral 2012   • HYSTERECTOMY, TOTAL ABDOMINAL  1983   • ABDOMINAL HYSTERECTOMY TOTAL  1983   • TUBAL LIGATION  1975   • APPENDECTOMY     • BICEPS TENDON REPAIR Right 2013, 2014    5 screws placed in second surgery   • BUNIONECTOMY     • PB REMOVAL OF TONSILS,<11 Y/O     • SHOULDER SURGERY Right        Current Outpatient Medications:  No current facility-administered medications on file prior to encounter.     Current Outpatient Medications  on File Prior to Encounter   Medication Sig Dispense Refill   • docusate sodium (COLACE) 100 MG Cap Take 100 mg by mouth 2 times a day.     • gabapentin (NEURONTIN) 100 MG Cap Take 200 mg by mouth 2 (two) times a day.     • SPIRIVA RESPIMAT 2.5 MCG/ACT Aero Soln Inhale 2.5 mcg every day.     • lidocaine (XYLOCAINE) 5 % Ointment Apply 0.5 g topically as needed for up to 10 days. (Patient not taking: Reported on 2/20/2021) 30 g 5   • HYDROcodone/acetaminophen (NORCO)  MG Tab Take 1 tablet by mouth every 6 hours as needed for up to 30 days. 120 tablet 0   • vitamin D (CHOLECALCIFEROL) 1000 Unit (25 mcg) Tab Take 2,000 Units by mouth every day.     • Multiple Vitamins-Minerals (CENTRUM SILVER 50+WOMEN PO) Take 1 tablet by mouth every day.     • Psyllium (METAMUCIL) 28 % packet Take 1 Packet by mouth every day. 30 Each 3   • apixaban (ELIQUIS) 5mg Tab Take 5 mg by mouth 2 Times a Day.     • fluticasone-salmeterol (WIXELA INHUB) 500-50 MCG/DOSE AEROSOL POWDER, BREATH ACTIVATED Inhale 1 Puff by mouth every 12 hours.     • digoxin (LANOXIN) 125 MCG Tab Take 125 mcg by mouth every day with lunch.     • atorvastatin (LIPITOR) 20 MG Tab Take 80 mg by mouth every day.     • zolpidem (AMBIEN) 10 MG Tab Take 10 mg by mouth at bedtime as needed for Sleep.         Current Inpatient Medications:   Current Facility-Administered Medications   Medication Last Admin   • morphine (pf) 4 mg/mL injection 1-4 mg     • acetaminophen (Tylenol) tablet 650 mg 650 mg at 02/21/21 1026   • lactated ringers infusion (BOLUS)     • lactated ringers infusion (BOLUS): BMI less than or equal to 30     • apixaban (ELIQUIS) tablet 5 mg 5 mg at 02/21/21 0608   • atorvastatin (LIPITOR) tablet 80 mg     • digoxin (LANOXIN) tablet 125 mcg     • HYDROcodone/acetaminophen (NORCO)  MG per tablet 1 tablet 1 tablet at 02/21/21 0615   • glycopyrrolate (Seebri) 15.6 mcg inhalation capsule 1 capsule 1 capsule at 02/21/21 0608   • vitamin D  "(cholecalciferol) tablet 2,000 Units 2,000 Units at 02/21/21 0608   • zolpidem (AMBIEN) tablet 10 mg 10 mg at 02/21/21 0050   • Pharmacy Consult Request     • budesonide-formoterol (SYMBICORT) 160-4.5 MCG/ACT inhaler 2 Puff 2 Puff at 02/21/21 0607   • lactated ringers infusion New Bag at 02/21/21 1025   • ondansetron (ZOFRAN) syringe/vial injection 4 mg 4 mg at 02/21/21 1024       Medication Allergy/Sensitivities:  Allergies   Allergen Reactions   • [place]        Family History:  Family History   Problem Relation Age of Onset   • Cancer Mother    • Stroke Mother    • Cancer Father    • Arthritis Father    • Migraines Father    • Cancer Maternal Grandfather    • Diabetes Sister    • Glaucoma Sister    • Cancer Brother    • Alcohol abuse Brother    • Migraines Other         3 daughters       Social History:  PCP: TIM Amezquita  Social History     Tobacco Use   Smoking Status Never Smoker   Smokeless Tobacco Never Used     Social History     Substance and Sexual Activity   Alcohol Use Yes   • Alcohol/week: 1.8 oz   • Types: 3 Glasses of wine per week    Comment: 0-1 per month     Social History     Substance and Sexual Activity   Drug Use No       PHYSICAL EXAM:  Weight/BMI: Body mass index is 19.31 kg/m².  Vitals:    02/21/21 0013 02/21/21 0027 02/21/21 0410 02/21/21 0700   BP: 121/71  (!) 98/57 (!) 90/50   Pulse: 79  84 84   Resp: 16  16 17   Temp: 36.1 °C (97 °F)  36.4 °C (97.5 °F) 36.3 °C (97.4 °F)   TempSrc: Temporal  Temporal Temporal   SpO2: 99% 96% 90% 92%   Weight:  49.4 kg (109 lb)     Height:  1.6 m (5' 3\")       Oxygen Therapy:  Pulse Oximetry: 92 %, O2 (LPM): 0, O2 Delivery Device: None - Room Air    Constitutional: Well developed, Well nourished, No acute distress, Non-toxic appearance.    HENMT: Normocephalic, Atraumatic, Bilateral external ears normal, Oropharynx moist mucous membranes, No oral exudates, Nose normal.  No thyromegaly.   Eyes: PERRLA, EOMI, Conjunctiva normal, No discharge.   Neck: " Normal range of motion, No cervical tenderness, Supple, No stridor, no JVD.  Cardiovascular: Normal heart rate, Normal rhythm, No murmurs, No rubs, No gallops.   Extremites with intact distal pulses, no cyanosis, clubbing or edema.   Lungs:  Respiratory effort is normal. Normal breath sounds, breath sounds clear to auscultation bilaterally,  no rales, no rhonchi, no wheezing.   Abdomen: Bowel sounds normal, Soft, No tenderness, No distention, No guarding, No rebound, No masses, No hepatosplenomegaly, Colostomy pink and productive with gas and soft formed stool within the bag.  Perineum: Very easily and completely reducible perineal hernia with no skin changes and no discomfort during exam  Skin: Warm, Dry, No erythema, No rash, no induration or crepitus.        Neurologic: Alert & oriented x 3, Normal motor function, Normal sensory function, No focal deficits noted, cranial nerves II through XII are normal.  Psychiatric: Affect normal, Judgment normal, Mood normal.     LAB DATA REVIEWED:  Recent Results (from the past 24 hour(s))   CBC WITH DIFFERENTIAL    Collection Time: 02/20/21  5:10 PM   Result Value Ref Range    WBC 29.6 (H) 4.8 - 10.8 K/uL    RBC 4.42 4.20 - 5.40 M/uL    Hemoglobin 14.3 12.0 - 16.0 g/dL    Hematocrit 44.3 37.0 - 47.0 %    .2 (H) 81.4 - 97.8 fL    MCH 32.4 27.0 - 33.0 pg    MCHC 32.3 (L) 33.6 - 35.0 g/dL    RDW 55.6 (H) 35.9 - 50.0 fL    Platelet Count 92 (L) 164 - 446 K/uL    MPV 11.2 9.0 - 12.9 fL    Neutrophils-Polys 82.90 (H) 44.00 - 72.00 %    Lymphocytes 2.90 (L) 22.00 - 41.00 %    Monocytes 0.60 0.00 - 13.40 %    Eosinophils 0.40 0.00 - 6.90 %    Basophils 0.60 0.00 - 1.80 %    Immature Granulocytes 12.60 (H) 0.00 - 0.90 %    Nucleated RBC 0.00 /100 WBC    Neutrophils (Absolute) 24.51 (H) 2.00 - 7.15 K/uL    Lymphs (Absolute) 0.85 (L) 1.00 - 4.80 K/uL    Monos (Absolute) 0.19 0.00 - 0.85 K/uL    Eos (Absolute) 0.12 0.00 - 0.51 K/uL    Baso (Absolute) 0.19 (H) 0.00 - 0.12 K/uL     Immature Granulocytes (abs) 3.74 (H) 0.00 - 0.11 K/uL    NRBC (Absolute) 0.00 K/uL   COMP METABOLIC PANEL    Collection Time: 02/20/21  5:10 PM   Result Value Ref Range    Sodium 135 135 - 145 mmol/L    Potassium 4.4 3.6 - 5.5 mmol/L    Chloride 98 96 - 112 mmol/L    Co2 27 20 - 33 mmol/L    Anion Gap 10.0 7.0 - 16.0    Glucose 129 (H) 65 - 99 mg/dL    Bun 20 8 - 22 mg/dL    Creatinine 0.71 0.50 - 1.40 mg/dL    Calcium 9.8 8.5 - 10.5 mg/dL    AST(SGOT) 31 12 - 45 U/L    ALT(SGPT) 29 2 - 50 U/L    Alkaline Phosphatase 103 (H) 30 - 99 U/L    Total Bilirubin 0.6 0.1 - 1.5 mg/dL    Albumin 4.0 3.2 - 4.9 g/dL    Total Protein 7.6 6.0 - 8.2 g/dL    Globulin 3.6 (H) 1.9 - 3.5 g/dL    A-G Ratio 1.1 g/dL   PERIPHERAL SMEAR REVIEW    Collection Time: 02/20/21  5:10 PM   Result Value Ref Range    Peripheral Smear Review see below    DIFFERENTIAL COMMENT    Collection Time: 02/20/21  5:10 PM   Result Value Ref Range    Comments-Diff see below    ESTIMATED GFR    Collection Time: 02/20/21  5:10 PM   Result Value Ref Range    GFR If African American >60 >60 mL/min/1.73 m 2    GFR If Non African American >60 >60 mL/min/1.73 m 2   Prothrombin time (INR)    Collection Time: 02/20/21  5:10 PM   Result Value Ref Range    PT 14.4 12.0 - 14.6 sec    INR 1.08 0.87 - 1.13   CRP QUANTITIVE (NON-CARDIAC)    Collection Time: 02/20/21  5:10 PM   Result Value Ref Range    Stat C-Reactive Protein 0.82 (H) 0.00 - 0.75 mg/dL   Sed Rate    Collection Time: 02/20/21  5:10 PM   Result Value Ref Range    Sed Rate Westergren 30 0 - 30 mm/hour   PROCALCITONIN    Collection Time: 02/20/21  5:10 PM   Result Value Ref Range    Procalcitonin 0.22 <0.25 ng/mL   MAGNESIUM    Collection Time: 02/20/21  5:10 PM   Result Value Ref Range    Magnesium 2.2 1.5 - 2.5 mg/dL   PHOSPHORUS    Collection Time: 02/20/21  5:10 PM   Result Value Ref Range    Phosphorus 3.2 2.5 - 4.5 mg/dL   URINALYSIS CULTURE, IF INDICATED    Collection Time: 02/20/21  5:33 PM    Specimen:  Urine   Result Value Ref Range    Color Yellow     Character Clear     Specific Gravity 1.010 <1.035    Ph 7.0 5.0 - 8.0    Glucose Negative Negative mg/dL    Ketones Negative Negative mg/dL    Protein Negative Negative mg/dL    Bilirubin Negative Negative    Urobilinogen, Urine 0.2 Negative    Nitrite Negative Negative    Leukocyte Esterase Negative Negative    Occult Blood Negative Negative    Micro Urine Req see below    BLOOD CULTURE x2    Collection Time: 02/20/21  6:44 PM    Specimen: Peripheral; Blood   Result Value Ref Range    Significant Indicator NEG     Source BLD     Site PERIPHERAL     Culture Result       No Growth  Note: Blood cultures are incubated for 5 days and  are monitored continuously.Positive blood cultures  are called to the RN and reported as soon as  they are identified.     SARS-COV Antigen [place]: Collect dry nasal swab AND NP swab in VTM    Collection Time: 02/20/21  6:51 PM   Result Value Ref Range    SARS-CoV-2 Source Nasal Swab     SARS-COV ANTIGEN [place] NotDetected Not-Detected   SARS-CoV-2 PCR (24 hour In-House): Collect NP swab in VTM    Collection Time: 02/20/21  6:52 PM    Specimen: Respirate   Result Value Ref Range    SARS-CoV-2 Source NP Swab    LACTIC ACID    Collection Time: 02/20/21  7:30 PM   Result Value Ref Range    Lactic Acid 1.6 0.5 - 2.0 mmol/L   Blood Culture    Collection Time: 02/20/21  8:23 PM    Specimen: Peripheral; Blood   Result Value Ref Range    Significant Indicator NEG     Source BLD     Site PERIPHERAL     Culture Result       No Growth  Note: Blood cultures are incubated for 5 days and  are monitored continuously.Positive blood cultures  are called to the RN and reported as soon as  they are identified.     CBC without Differential    Collection Time: 02/21/21  4:51 AM   Result Value Ref Range    WBC 22.2 (H) 4.8 - 10.8 K/uL    RBC 3.91 (L) 4.20 - 5.40 M/uL    Hemoglobin 13.0 12.0 - 16.0 g/dL    Hematocrit 39.6 37.0 - 47.0 %    .3 (H) 81.4 - 97.8 fL     MCH 33.2 (H) 27.0 - 33.0 pg    MCHC 32.8 (L) 33.6 - 35.0 g/dL    RDW 54.8 (H) 35.9 - 50.0 fL    Platelet Count 63 (L) 164 - 446 K/uL    MPV 11.0 9.0 - 12.9 fL   Comp Metabolic Panel (CMP)    Collection Time: 02/21/21  4:51 AM   Result Value Ref Range    Sodium 134 (L) 135 - 145 mmol/L    Potassium 4.6 3.6 - 5.5 mmol/L    Chloride 101 96 - 112 mmol/L    Co2 25 20 - 33 mmol/L    Anion Gap 8.0 7.0 - 16.0    Glucose 106 (H) 65 - 99 mg/dL    Bun 16 8 - 22 mg/dL    Creatinine 0.71 0.50 - 1.40 mg/dL    Calcium 9.0 8.5 - 10.5 mg/dL    AST(SGOT) 22 12 - 45 U/L    ALT(SGPT) 22 2 - 50 U/L    Alkaline Phosphatase 92 30 - 99 U/L    Total Bilirubin 0.5 0.1 - 1.5 mg/dL    Albumin 3.2 3.2 - 4.9 g/dL    Total Protein 6.1 6.0 - 8.2 g/dL    Globulin 2.9 1.9 - 3.5 g/dL    A-G Ratio 1.1 g/dL   Magnesium    Collection Time: 02/21/21  4:51 AM   Result Value Ref Range    Magnesium 2.2 1.5 - 2.5 mg/dL   Lactic Acid Every four hours after STAT order    Collection Time: 02/21/21  4:51 AM   Result Value Ref Range    Lactic Acid 1.4 0.5 - 2.0 mmol/L   ESTIMATED GFR    Collection Time: 02/21/21  4:51 AM   Result Value Ref Range    GFR If African American >60 >60 mL/min/1.73 m 2    GFR If Non African American >60 >60 mL/min/1.73 m 2   Lactic Acid Every four hours after STAT order    Collection Time: 02/21/21  9:07 AM   Result Value Ref Range    Lactic Acid 1.3 0.5 - 2.0 mmol/L       IMAGING:   Images Independently Reviewed   CT-ABDOMEN-PELVIS WITH   Final Result      1.  No acute intra-abdominal findings.      2.  Status post bowel resection with left lower quadrant colostomy. Large amount of colonic stool.      3.  Atherosclerosis.          ASSESSMENT/PLAN     80-year-old female presenting with worsening symptoms related to her perineal hernia with incidental finding of grossly elevated white blood cell count.  It is unclear the cause of the white blood cell count, which could possibly be related to her recent chemotherapy.    As there is no  evidence of obstruction, incarceration or strangulation related to her perineal hernia, I think it would be best to be repaired electively as she has just received her chemotherapy.  The patient would like to take a break from chemotherapy despite the risks of cancer progression to undergo hernia repair.  I have reached out to Dr. Vogel, her oncologist, to discuss timing of surgery with relation to her chemotherapy.  I will await his repair, however assume that we will plan for surgery in approximately 2-3 weeks from now.    She does appear to have some constipation and she has been started on MiraLAX.  A C. Difficile stool sample is pending, despite not having any symptoms of C. Difficile colitis, but has been ordered to rule out as patient has significant elevation of blood cell count.    I will continue to follow the patient while in house, but will arrange for surgery electively as an outpatient through my office.    Dulce Jeter MD  General Surgery  Colon and Rectal Surgery  [facility]

## 2021-02-21 NOTE — WOUND TEAM
"  [facility]     Inpatient Services     Established Ostomy Management/ troubleshooting      HPI: Reviewed  PMH: Reviewed   SH: Reviewed   Reason for Ostomy nurse consult:  Established LLQ Colostomy    Objective: Pt lying in bed with son at bedside complaining of headache and nausea.     Colostomy 10/15/20 End/Segovia's Pouch LUQ (Active)   Stomal Appliance Assessment Clean;Dry;Intact    Stoma Assessment Clean;Dry;Intact    Stoma Shape Round    Stoma Size (in) 1.7    Peristomal Assessment NANCY    Mucocutaneous Junction Intact    Treatment Bag Change    Peristomal Protectant No Sting Skin Prep    Stomal Appliance 2 1/4\" (57mm) CTF    Output (mL) 500 mL    Output Color Brown    WOUND RN ONLY - Stomal Appliance  2 Piece;2 1/4\" (57mm) CTF    Appliance (Pouch) # [id number]    Appliance Brand Wanda    Appliance Supplier [employer]    Secure Start completed Yes    Ostomy Care Resources Provided [facility] Tip Sheet    WOUND NURSE ONLY - Time Spent with Patient (mins) 45      Ostomy Appliance (type and size): 2.25\" 2 Piece     Interventions and Education: This RN in to assess needs.  Pt reports her appliance was changed yesterday 2/20/21 prior to admission. Pt reports she changes her appliance every 5 days. Pt does all her own changes and does not use a paste ring. Pt was concerned that pouch was too full. Stool semi firm formed at this time. Pouch was changed as unable to get stool out of pouch. Perineal bottle left     Evaluation: Pt is an older woman admitted with headache, decreased colostomy output and increased size of her anal hernia. Pt has a history of anal cancer which was diagnosed in December of 2019 at which time the patient underwent radiation and chemotherapy. Unfortunately her cancer continued to progress and a colostomy was created in October of 2020 by Dr. Jeter. Cancer has progressed to her lungs and pt is currently undergoing chemotherapy with first dose received on Thursday 2/18/21. Dr. Jeter " is currently involved in care and at this time hernia will be repaired electively in the next 2-3 weeks. Pt is mildly constipated and therefore was started on a bowel regimen. Today pt did have semi soft stool in appliance. Pouch was changed and supplies left at bedside for appliance change was well as emptying of pouch and cleaning of the area.     Plan: Bedside RNs to assist pt with appliance changes. Ostomy RN to remain available PRN for any needs.    Anticipated discharge needs: NA

## 2021-02-21 NOTE — PROGRESS NOTES
CT reviewed.  Evidence of enlarging perineal hernia containing cecum without obstruction or concern for bowel compromise.  Reviewed physical exam findings with Dr. Lang and she reports a benign abdomen and perineum with no concern regarding the perineal hernia (confirmed soft, nontender, completely reducible, without skin changes).  Unsure cause of elevated WBC. Possibly related to first dose of chemotherapy for lung metastasis two days ago.    I am not on call tonight and will see the patient tomorrow.  Please page [facility] on-call if any concerns arise tonight.

## 2021-02-21 NOTE — ASSESSMENT & PLAN NOTE
Status post APR.  Hernia worsening likely secondary to drug-induced constipation.  No surgery indicated as per general surgery.  Patient will get surgery outpatient by surgeon Dr. Jeter

## 2021-02-21 NOTE — ED TRIAGE NOTES
To triage via w/c with report of   Chief Complaint   Patient presents with   • Post-Op Complications     s/p colostomy in october, decreased output past 3 days.  also reports anal hernia increasing in size and intolerable pain.   also reports started chemotherapy on 2/18 for anal cancer with mets to lungs.  Pt placed in family consult room to wait (instead of main lobby)

## 2021-02-21 NOTE — ASSESSMENT & PLAN NOTE
Patient does have history of headache but she says this feels different, bandlike sensation  Does have a history of metastatic cancer -MRI brain does not show any brain mets, b/l mastoid effusion L >R  Did receive accidentally 2 doses of Tylenol and close proximity on 2/21/2021 2/23 Responded to Imitrex.  Trial of oral imitrex, for dc home  Encourage activity

## 2021-02-21 NOTE — ASSESSMENT & PLAN NOTE
Possibly secondary to recent chemotherapy.  No need for transfusion unless less than 10 or active bleeding.    Continue to monitor.

## 2021-02-21 NOTE — ED NOTES
Pt resting in bed, VSS on RA, GCS 15, NAD, c/o pain in rectum, will medicate per MAR, son at bedside, updated POC to CT/labs and wait for results.

## 2021-02-21 NOTE — ASSESSMENT & PLAN NOTE
Per patient, she has a history of atrial fibrillation.  Review of all previous EKG from this institution shows normal sinus rhythm.  Currently in normal sinus rhythm on telemetry monitoring.  [id number] score of 3, 3.2% risk of stroke per year.    Continue home digoxin and apixaban.

## 2021-02-21 NOTE — ED PROVIDER NOTES
ED Provider Note    Scribed for Belinda Lang M.D. by Keagan Hong. 2/20/2021  5:06 PM    Primary care provider: TIM Amezquita  Means of arrival: Wheel chair  History obtained from: Patient  History limited by: None    CHIEF COMPLAINT  Chief Complaint   Patient presents with    Post-Op Complications     s/p colostomy in october, decreased output past 3 days.  also reports anal hernia increasing in size and intolerable pain.       AVI Grove is a 80 y.o. 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 Patient reports that she had a colostomy last October for her cancer and was started on chemotherapy on 2/18 with Dr. Vogel and Dr. Casper. In the last couple days she reports that her anal hernia has grown in size and become painful. Additionally she notes that her colostomy output has decreased. There are no known alleviating or exacerbating factors. She sage any fevers, chest pain, shortness of breath, or vomiting. Patient states that she previously was diagnosed with COVID, but is now fully vaccinated.     REVIEW OF SYSTEMS  CARDIAC: no chest pain  PULMONARY: no dyspnea  GI: Decreased colostomy output, anal hernia, anal cancer. no vomiting  Endocrine: no fevers  See history of present illness. All other systems are negative. C.    PAST MEDICAL HISTORY   has a past medical history of Arrhythmia, Arthritis, Asthma, Breath shortness, Cancer (HCC) (1983), Cancer (HCC) (12/2019), Cataract, Diverticulosis, Gynecological disorder, Hemorrhagic disorder (HCC), Hiatus hernia syndrome, High cholesterol, IBS (irritable bowel syndrome), Pain (12/2020), Pneumonia (2000), PONV (postoperative nausea and vomiting), Urinary bladder disorder, and Urinary incontinence.    SURGICAL HISTORY   has a past surgical history that includes tubal ligation (1975);  hysterectomy, total abdominal (1983); removal of tonsils,<11 y/o; bunionectomy; cataract extraction with iol (Bilateral, 2012); lumbar laminectomy diskectomy (3/3/2016); abdominal hysterectomy total (1983); surg diagnostic exam, anorectal (9/21/2020); biceps tendon repair (Right, 2013, 2014); shoulder surgery (Right); cholecystectomy (6/2014); appendectomy; colonoscopy (1/13/2020); proctoscopy (9/21/2020); lap, surg colostomy (Left, 10/15/2020); abdominal perineal resection robotic xi (N/A, 10/15/2020); myocutaneous flap (N/A, 10/15/2020); and irrigation & debridement general (N/A, 10/19/2020).    SOCIAL HISTORY  Social History     Tobacco Use    Smoking status: Never Smoker    Smokeless tobacco: Never Used   Substance Use Topics    Alcohol use: Yes     Alcohol/week: 3.0 oz     Types: 5 Glasses of wine per week     Comment: 0-1 per month    Drug use: No      Social History     Substance and Sexual Activity   Drug Use No       FAMILY HISTORY  Family History   Problem Relation Age of Onset    Cancer Mother     Cancer Father     Cancer Maternal Grandfather        CURRENT MEDICATIONS  Home Medications       Reviewed by Donn Madrigal (Pharmacy Tech) on 02/20/21 at 2048  Med List Status: Complete     Medication Last Dose Status   apixaban (ELIQUIS) 5mg Tab 2/20/2021 Active   atorvastatin (LIPITOR) 20 MG Tab 2/20/2021 Active   digoxin (LANOXIN) 125 MCG Tab 2/20/2021 Active   docusate sodium (COLACE) 100 MG Cap 2/20/2021 Active   fluticasone-salmeterol (WIXELA INHUB) 500-50 MCG/DOSE AEROSOL POWDER, BREATH ACTIVATED 2/20/2021 Active   gabapentin (NEURONTIN) 100 MG Cap 2/20/2021 Active   HYDROcodone/acetaminophen (NORCO)  MG Tab 2/19/2021 Active   lidocaine (XYLOCAINE) 5 % Ointment Not Taking Active   Multiple Vitamins-Minerals (CENTRUM SILVER 50+WOMEN PO) 2/20/2021 Active   Psyllium (METAMUCIL) 28 % packet 2/20/2021 Active   SPIRIVA RESPIMAT 2.5 MCG/ACT Aero Soln 2/20/2021 Active   vitamin D (CHOLECALCIFEROL) 1000  Unit (25 mcg) Tab 2/20/2021 Active   zolpidem (AMBIEN) 10 MG Tab  Active                    ALLERGIES  Allergies   Allergen Reactions    [place]        PHYSICAL EXAM  VITAL SIGNS: /74   Pulse 86   Temp 36.3 °C (97.4 °F) (Temporal)   Resp 16   Wt 51.3 kg (113 lb 1.5 oz)   SpO2 96%   BMI 20.03 kg/m²     Constitutional: No acute distress, Non-toxic appearance.   HEENT: Normocephalic, Atraumatic,  external ears normal, pharynx pink,  Mucous  Membranes moist, No rhinorrhea or mucosal edema  Eyes: PERRL, EOMI, Conjunctiva normal, No discharge.   Neck: Normal range of motion, No tenderness, Supple, No stridor.   Lymphatic: No lymphadenopathy    Cardiovascular: Regular Rate and Rhythm, No murmurs,  rubs, or gallops.   Thorax & Lungs: Lungs clear to auscultation bilaterally, No respiratory distress, No wheezes, rhales or rhonchi, No chest wall tenderness.   Abdomen: Colostomy bag in place with no surrounding erythema. Brown stool noted. Bowel sounds normal, Soft, non tender, non distended,  No pulsatile masses., no rebound guarding or peritoneal signs.   Rectal: Post-operative changes to the anus, no masses felt.    Skin: Warm, Dry, No erythema, No rash,   Back:  No CVA tenderness,  No spinal tenderness, bony crepitance, step offs, or instability.   Neurologic: Alert & oriented x 3, Normal motor function, Normal sensory function, No focal deficits noted. Normal reflexes. Normal Cranial Nerves.  Extremities: Equal, intact distal pulses, No cyanosis, clubbing or edema,  No tenderness.   Musculoskeletal: Good range of motion in all major joints. No tenderness to palpation or major deformities noted.     DIAGNOSTIC STUDIES / PROCEDURES    LABS  Results for orders placed or performed during the hospital encounter of 02/20/21   CBC WITH DIFFERENTIAL   Result Value Ref Range    WBC 29.6 (H) 4.8 - 10.8 K/uL    RBC 4.42 4.20 - 5.40 M/uL    Hemoglobin 14.3 12.0 - 16.0 g/dL    Hematocrit 44.3 37.0 - 47.0 %    .2  (H) 81.4 - 97.8 fL    MCH 32.4 27.0 - 33.0 pg    MCHC 32.3 (L) 33.6 - 35.0 g/dL    RDW 55.6 (H) 35.9 - 50.0 fL    Platelet Count 92 (L) 164 - 446 K/uL    MPV 11.2 9.0 - 12.9 fL    Neutrophils-Polys 82.90 (H) 44.00 - 72.00 %    Lymphocytes 2.90 (L) 22.00 - 41.00 %    Monocytes 0.60 0.00 - 13.40 %    Eosinophils 0.40 0.00 - 6.90 %    Basophils 0.60 0.00 - 1.80 %    Immature Granulocytes 12.60 (H) 0.00 - 0.90 %    Nucleated RBC 0.00 /100 WBC    Neutrophils (Absolute) 24.51 (H) 2.00 - 7.15 K/uL    Lymphs (Absolute) 0.85 (L) 1.00 - 4.80 K/uL    Monos (Absolute) 0.19 0.00 - 0.85 K/uL    Eos (Absolute) 0.12 0.00 - 0.51 K/uL    Baso (Absolute) 0.19 (H) 0.00 - 0.12 K/uL    Immature Granulocytes (abs) 3.74 (H) 0.00 - 0.11 K/uL    NRBC (Absolute) 0.00 K/uL   COMP METABOLIC PANEL   Result Value Ref Range    Sodium 135 135 - 145 mmol/L    Potassium 4.4 3.6 - 5.5 mmol/L    Chloride 98 96 - 112 mmol/L    Co2 27 20 - 33 mmol/L    Anion Gap 10.0 7.0 - 16.0    Glucose 129 (H) 65 - 99 mg/dL    Bun 20 8 - 22 mg/dL    Creatinine 0.71 0.50 - 1.40 mg/dL    Calcium 9.8 8.5 - 10.5 mg/dL    AST(SGOT) 31 12 - 45 U/L    ALT(SGPT) 29 2 - 50 U/L    Alkaline Phosphatase 103 (H) 30 - 99 U/L    Total Bilirubin 0.6 0.1 - 1.5 mg/dL    Albumin 4.0 3.2 - 4.9 g/dL    Total Protein 7.6 6.0 - 8.2 g/dL    Globulin 3.6 (H) 1.9 - 3.5 g/dL    A-G Ratio 1.1 g/dL   URINALYSIS CULTURE, IF INDICATED    Specimen: Urine   Result Value Ref Range    Color Yellow     Character Clear     Specific Gravity 1.010 <1.035    Ph 7.0 5.0 - 8.0    Glucose Negative Negative mg/dL    Ketones Negative Negative mg/dL    Protein Negative Negative mg/dL    Bilirubin Negative Negative    Urobilinogen, Urine 0.2 Negative    Nitrite Negative Negative    Leukocyte Esterase Negative Negative    Occult Blood Negative Negative    Micro Urine Req see below    PERIPHERAL SMEAR REVIEW   Result Value Ref Range    Peripheral Smear Review see below    DIFFERENTIAL COMMENT   Result Value Ref  Range    Comments-Diff see below    ESTIMATED GFR   Result Value Ref Range    GFR If African American >60 >60 mL/min/1.73 m 2    GFR If Non African American >60 >60 mL/min/1.73 m 2   SARS-COV Antigen [place]: Collect dry nasal swab AND NP swab in VTM   Result Value Ref Range    SARS-CoV-2 Source Nasal Swab     SARS-COV ANTIGEN [place] NotDetected Not-Detected   SARS-CoV-2 PCR (24 hour In-House): Collect NP swab in VTM    Specimen: Respirate   Result Value Ref Range    SARS-CoV-2 Source NP Swab    LACTIC ACID   Result Value Ref Range    Lactic Acid 1.6 0.5 - 2.0 mmol/L   Prothrombin time (INR)   Result Value Ref Range    PT 14.4 12.0 - 14.6 sec    INR 1.08 0.87 - 1.13   CRP QUANTITIVE (NON-CARDIAC)   Result Value Ref Range    Stat C-Reactive Protein 0.82 (H) 0.00 - 0.75 mg/dL   Sed Rate   Result Value Ref Range    Sed Rate Westergren 30 0 - 30 mm/hour   PROCALCITONIN   Result Value Ref Range    Procalcitonin 0.22 <0.25 ng/mL   MAGNESIUM   Result Value Ref Range    Magnesium 2.2 1.5 - 2.5 mg/dL   PHOSPHORUS   Result Value Ref Range    Phosphorus 3.2 2.5 - 4.5 mg/dL     All labs reviewed by me.      RADIOLOGY  CT-ABDOMEN-PELVIS WITH   Final Result      1.  No acute intra-abdominal findings.      2.  Status post bowel resection with left lower quadrant colostomy. Large amount of colonic stool.      3.  Atherosclerosis.        The radiologist's interpretation of all radiological studies have been reviewed by me.    COURSE & MEDICAL DECISION MAKING  Nursing notes, VS, PMSFHx reviewed in chart.    5:06 PM Patient seen and examined at bedside. Patient will be treated with morphine 4 mg and Zofran 4 mg. Ordered CT-abdomen-pelvis w/, CBC w/ diff, CMP, amd UA to evaluate her symptoms. The differential diagnoses include but are not limited to: herniation/prolapse to post-surgical area, metastasis.    6:49 PM Patient with WBC of 29.6; COVID swab sent. Treated with Flagyl 500 mg and Rocephin 1 g. Plan to have her hospitalized.     7:38  PM Patient treated with additional 4 mg Zofran.     7:55 PM Dr. Jeter (Surgery) and Hospitalist paged.     8:12 PM I discussed the patient's case and the above findings with Dr. Juarez (Hospitalist) who agrees to evaluate the patient for hospitalization.     8:23 PM I discussed the patient's case and the above findings with Dr. Paulson (Surgery for [place]).    8:28 PM I discussed the patient's case and the above findings with Dr. Jeter (Surgery).    DISPOSITION:  Patient will be hospitalized by Dr. Juarez in guarded condition.    FINAL IMPRESSION  1. Leukocytosis, unspecified type    2. Anal or rectal pain    3. Anal cancer (HCC)          I, Keagan Hong (Scribe), am scribing for, and in the presence of, Belinda Lang M.D..    Electronically signed by: Keagan Hong (Scribe), 2/20/2021    IBelinda M.D. personally performed the services described in this documentation, as scribed by Keagan Hong in my presence, and it is both accurate and complete.    The note accurately reflects work and decisions made by me.  Belinda Lang M.D.  2/20/2021  10:41 PM

## 2021-02-21 NOTE — PROGRESS NOTES
Hospital Medicine Daily Progress Note    Date of Service  2/21/2021    Chief Complaint  80 y.o. female admitted 2/20/2021 with leukocytosis, headache, low ostomy output    Hospital Course  No notes on file    Interval Problem Update  2/21: WBCs decreased to 22.2.  DC antibiotics.  Treating headache.  Was accidentally given 2 doses of Tylenol, discussed with pharmacy and will recheck LFTs in a.m.    Disposition  Pending improvement of leukocytosis    Review of Systems  Review of Systems   Constitutional: Positive for malaise/fatigue. Negative for chills and fever.   Respiratory: Negative for cough, shortness of breath and wheezing.    Cardiovascular: Negative for chest pain.   Gastrointestinal: Positive for abdominal pain. Negative for constipation, diarrhea, nausea and vomiting.   Genitourinary: Negative for dysuria.   Neurological: Positive for headaches.        Physical Exam  Temp:  [36.1 °C (97 °F)-36.4 °C (97.5 °F)] 36.3 °C (97.4 °F)  Pulse:  [79-92] 84  Resp:  [15-35] 17  BP: ()/(50-89) 90/50  SpO2:  [90 %-99 %] 92 %    Physical Exam  Constitutional:       Appearance: She is well-developed.   HENT:      Head: Normocephalic.   Cardiovascular:      Rate and Rhythm: Normal rate.      Heart sounds: No gallop.    Pulmonary:      Effort: No respiratory distress.      Breath sounds: No wheezing.   Abdominal:      General: There is no distension.      Tenderness: There is abdominal tenderness.      Comments: Ostomy in place   Skin:     General: Skin is warm.   Neurological:      Mental Status: She is alert. Mental status is at baseline.         Fluids    Intake/Output Summary (Last 24 hours) at 2/21/2021 1200  Last data filed at 2/21/2021 0600  Gross per 24 hour   Intake 900 ml   Output 1300 ml   Net -400 ml       Laboratory  Recent Labs     02/20/21  1710 02/21/21  0451   WBC 29.6* 22.2*   RBC 4.42 3.91*   HEMOGLOBIN 14.3 13.0   HEMATOCRIT 44.3 39.6   .2* 101.3*   MCH 32.4 33.2*   MCHC 32.3* 32.8*   RDW  55.6* 54.8*   PLATELETCT 92* 63*   MPV 11.2 11.0     Recent Labs     02/20/21  1710 02/21/21  0451   SODIUM 135 134*   POTASSIUM 4.4 4.6   CHLORIDE 98 101   CO2 27 25   GLUCOSE 129* 106*   BUN 20 16   CREATININE 0.71 0.71   CALCIUM 9.8 9.0     Recent Labs     02/20/21  1710   INR 1.08               Imaging  CT-ABDOMEN-PELVIS WITH   Final Result      1.  No acute intra-abdominal findings.      2.  Status post bowel resection with left lower quadrant colostomy. Large amount of colonic stool.      3.  Atherosclerosis.           Assessment/Plan  * Sepsis (HCC)- (present on admission)  Assessment & Plan  This is Sepsis Present on admission  SIRS criteria identified on my evaluation include: Tachycardia, with heart rate greater than 90 BPM, Tachypnea, with respirations greater than 20 per minute, Leukocytosis, with WBC greater than 12,000 and Bandemia, greater than 10% bands  Source is suspected GI stool impaction with translation of floral bacteria   Sepsis protocol initiated  Fluid resuscitation ordered per protocol: LR  IV antibiotics as appropriate for source of sepsis: Ceftriaxone and metronidazole started in the ER per ER physician.  Changed to Unasyn on admission.  Now DC'd.  While organ dysfunction may be noted elsewhere in this problem list or in the chart, degree of organ dysfunction does not meet CMS criteria for severe sepsis    Procalcitonin borderline elevated 0.22, ESR 30, CRP 0.82.    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  Hernia worsening likely secondary to drug-induced constipation.  No surgery indicated as per general surgery.    Aggressive bowel regimen as per recommendations.  Continue to monitor closely.    Leukocytosis- (present on admission)  Assessment & Plan  Other possibilities for leukocytosis including leukemoid reaction.  Patient may have gotten steroids prior to her chemotherapy on 2/18/2021.  No overt infection  Initially given  antibiotics  ProCalcitonin negative  Now DC'd    Drug-induced constipation- (present on admission)  Assessment & Plan  Suggested by copious amounts of stool within the colon.  In setting of Norco use.  General surgery feels decreased ostomy output secondary to constipation.    Appreciate general surgery consult.  Continue daily MiraLAX and docusate sodium.  Consider trial of magnesium citrate.    Nausea and vomiting- (present on admission)  Assessment & Plan  Secondary to severe constipation/impaction of stool within the bowel.    Continue Zofran as needed.  Bowel regimen with MiraLAX and docusate daily.  Continue to monitor.    Anal cancer (HCC)- (present on admission)  Assessment & Plan  As per history now has progressed to stage IV with metastases to the lungs.  Status post chemoradiation as well as recent chemotherapy for the metastases.  Under care of Dr. Vogel (cancer care specialists) and Dr. Casper (radiation oncology).    Continue outpatient follow-up.    Headache- (present on admission)  Assessment & Plan  Patient does have history of headache but she says this feels different, bandlike sensation  Does have a history of metastatic cancer, will check with oncology to see if further imaging is recommended  In the meantime, will treat with medications  Did receive accidentally 2 doses of Tylenol and close proximity on 2/21/2021, discussed with pharmacy and will recheck LFTs, no need to check Tylenol level at this time.    History of 2019 novel coronavirus disease (COVID-19)- (present on admission)  Assessment & Plan  In December 2020  No longer infected, does not need special isolation anymore    Paroxysmal atrial fibrillation (HCC)- (present on admission)  Assessment & Plan  Per patient, she has a history of atrial fibrillation.  Review of all previous EKG from this institution shows normal sinus rhythm.  Currently in normal sinus rhythm on telemetry monitoring.  [id number] score of 3, 3.2% risk of stroke  per year.    Continue home digoxin and apixaban.    Thrombocythemia (HCC)- (present on admission)  Assessment & Plan  Possibly secondary to recent chemotherapy.  No need for transfusion unless less than 10 or active bleeding.    Continue to monitor.       VTE prophylaxis: apixiban

## 2021-02-21 NOTE — ED NOTES
Med rec updated and complete. Allergies reviewed.  Pt denies antibiotic use  In last 14 days.    Home pharmacy [facility]

## 2021-02-21 NOTE — PROGRESS NOTES
Bedside report received.  Assessment complete.  A&O x 4. Patient calls appropriately.  Patient mobilizes with stand by assist. Bed alarm n/a.   Patient has 5/10 pain. Declines interventions at this time.  Denies N&V. Tolerating clear liquid diet.  + void, + flatus, + BM.  Patient denies SOB.  SCD's off.  Review plan with of care with patient. Call light and personal belongings with in reach. Hourly rounding in place. All needs met at this time.

## 2021-02-21 NOTE — PROGRESS NOTES
MD paged regarding double dose of Tylenol. No more Tylenol for the next 24 hours and continue to monitor.

## 2021-02-21 NOTE — CARE PLAN
Problem: Nutritional:  Goal: Achieve adequate nutritional intake  Description: Patient will consume 50% of meals  Outcome: PROGRESSING SLOWER THAN EXPECTED   PO 0-<25% of meals.

## 2021-02-21 NOTE — ASSESSMENT & PLAN NOTE
This is Sepsis Present on admission  SIRS criteria identified on my evaluation include: Tachycardia, with heart rate greater than 90 BPM, Tachypnea, with respirations greater than 20 per minute, Leukocytosis, with WBC greater than 12,000 and Bandemia, greater than 10% bands  Sepsis protocol initiated  Fluid resuscitation ordered per protocol: LR  IV antibiotics as appropriate for source of sepsis: Ceftriaxone and metronidazole started in the ER per ER physician.  Changed to Unasyn on admission.  Now DC'd.  While organ dysfunction may be noted elsewhere in this problem list or in the chart, degree of organ dysfunction does not meet CMS criteria for severe sepsis    Procalcitonin borderline elevated 0.22, ESR 30, CRP 0.82.    Now resolved

## 2021-02-21 NOTE — ASSESSMENT & PLAN NOTE
As per history now has progressed to stage IV with metastases to the lungs.  Status post chemoradiation as well as recent chemotherapy for the metastases.  Under care of Dr. Vogel (cancer care specialists) and Dr. Casper (radiation oncology).    Continue outpatient follow-up.

## 2021-02-22 ENCOUNTER — APPOINTMENT (OUTPATIENT)
Dept: RADIOLOGY | Facility: MEDICAL CENTER | Age: 81
DRG: 392 | End: 2021-02-22
Attending: HOSPITALIST
Payer: MEDICARE

## 2021-02-22 LAB
ALBUMIN SERPL BCP-MCNC: 3 G/DL (ref 3.2–4.9)
ALP SERPL-CCNC: 97 U/L (ref 30–99)
ALT SERPL-CCNC: 20 U/L (ref 2–50)
ANION GAP SERPL CALC-SCNC: 12 MMOL/L (ref 7–16)
AST SERPL-CCNC: 23 U/L (ref 12–45)
BASOPHILS # BLD AUTO: 0 % (ref 0–1.8)
BASOPHILS # BLD: 0 K/UL (ref 0–0.12)
BILIRUB CONJ SERPL-MCNC: <0.2 MG/DL (ref 0.1–0.5)
BILIRUB INDIRECT SERPL-MCNC: ABNORMAL MG/DL (ref 0–1)
BILIRUB SERPL-MCNC: 0.6 MG/DL (ref 0.1–1.5)
BUN SERPL-MCNC: 16 MG/DL (ref 8–22)
CALCIUM SERPL-MCNC: 8.7 MG/DL (ref 8.5–10.5)
CHLORIDE SERPL-SCNC: 104 MMOL/L (ref 96–112)
CO2 SERPL-SCNC: 20 MMOL/L (ref 20–33)
CREAT SERPL-MCNC: 0.59 MG/DL (ref 0.5–1.4)
EOSINOPHIL # BLD AUTO: 0.09 K/UL (ref 0–0.51)
EOSINOPHIL NFR BLD: 0.9 % (ref 0–6.9)
ERYTHROCYTE [DISTWIDTH] IN BLOOD BY AUTOMATED COUNT: 54.9 FL (ref 35.9–50)
GLUCOSE SERPL-MCNC: 104 MG/DL (ref 65–99)
HCT VFR BLD AUTO: 38.9 % (ref 37–47)
HGB BLD-MCNC: 12.7 G/DL (ref 12–16)
LYMPHOCYTES # BLD AUTO: 0.17 K/UL (ref 1–4.8)
LYMPHOCYTES NFR BLD: 1.7 % (ref 22–41)
MANUAL DIFF BLD: NORMAL
MCH RBC QN AUTO: 32.9 PG (ref 27–33)
MCHC RBC AUTO-ENTMCNC: 32.6 G/DL (ref 33.6–35)
MCV RBC AUTO: 100.8 FL (ref 81.4–97.8)
MONOCYTES # BLD AUTO: 0 K/UL (ref 0–0.85)
MONOCYTES NFR BLD AUTO: 0 % (ref 0–13.4)
MORPHOLOGY BLD-IMP: NORMAL
NEUTROPHILS # BLD AUTO: 9.64 K/UL (ref 2–7.15)
NEUTROPHILS NFR BLD: 89.6 % (ref 44–72)
NEUTS BAND NFR BLD MANUAL: 7.8 % (ref 0–10)
NRBC # BLD AUTO: 0 K/UL
NRBC BLD-RTO: 0 /100 WBC
PLATELET # BLD AUTO: 67 K/UL (ref 164–446)
PLATELET BLD QL SMEAR: NORMAL
PMV BLD AUTO: 10.9 FL (ref 9–12.9)
POTASSIUM SERPL-SCNC: 4.1 MMOL/L (ref 3.6–5.5)
PROT SERPL-MCNC: 6 G/DL (ref 6–8.2)
RBC # BLD AUTO: 3.86 M/UL (ref 4.2–5.4)
RBC BLD AUTO: NORMAL
SODIUM SERPL-SCNC: 136 MMOL/L (ref 135–145)
WBC # BLD AUTO: 9.9 K/UL (ref 4.8–10.8)

## 2021-02-22 PROCEDURE — 700102 HCHG RX REV CODE 250 W/ 637 OVERRIDE(OP): Performed by: HOSPITALIST

## 2021-02-22 PROCEDURE — 700111 HCHG RX REV CODE 636 W/ 250 OVERRIDE (IP): Performed by: HOSPITALIST

## 2021-02-22 PROCEDURE — 85007 BL SMEAR W/DIFF WBC COUNT: CPT

## 2021-02-22 PROCEDURE — A9576 INJ PROHANCE MULTIPACK: HCPCS | Performed by: HOSPITALIST

## 2021-02-22 PROCEDURE — A9270 NON-COVERED ITEM OR SERVICE: HCPCS | Performed by: STUDENT IN AN ORGANIZED HEALTH CARE EDUCATION/TRAINING PROGRAM

## 2021-02-22 PROCEDURE — 700105 HCHG RX REV CODE 258: Performed by: STUDENT IN AN ORGANIZED HEALTH CARE EDUCATION/TRAINING PROGRAM

## 2021-02-22 PROCEDURE — 770006 HCHG ROOM/CARE - MED/SURG/GYN SEMI*

## 2021-02-22 PROCEDURE — 36415 COLL VENOUS BLD VENIPUNCTURE: CPT

## 2021-02-22 PROCEDURE — 70553 MRI BRAIN STEM W/O & W/DYE: CPT | Mod: MG

## 2021-02-22 PROCEDURE — 85027 COMPLETE CBC AUTOMATED: CPT

## 2021-02-22 PROCEDURE — A9270 NON-COVERED ITEM OR SERVICE: HCPCS | Performed by: HOSPITALIST

## 2021-02-22 PROCEDURE — 99232 SBSQ HOSP IP/OBS MODERATE 35: CPT | Performed by: HOSPITALIST

## 2021-02-22 PROCEDURE — 80048 BASIC METABOLIC PNL TOTAL CA: CPT

## 2021-02-22 PROCEDURE — 700117 HCHG RX CONTRAST REV CODE 255: Performed by: HOSPITALIST

## 2021-02-22 PROCEDURE — 700102 HCHG RX REV CODE 250 W/ 637 OVERRIDE(OP): Performed by: STUDENT IN AN ORGANIZED HEALTH CARE EDUCATION/TRAINING PROGRAM

## 2021-02-22 PROCEDURE — 80076 HEPATIC FUNCTION PANEL: CPT

## 2021-02-22 PROCEDURE — 700101 HCHG RX REV CODE 250: Performed by: SURGERY

## 2021-02-22 RX ORDER — LORAZEPAM 2 MG/ML
1 INJECTION INTRAMUSCULAR
Status: COMPLETED | OUTPATIENT
Start: 2021-02-22 | End: 2021-02-22

## 2021-02-22 RX ORDER — SUMATRIPTAN 6 MG/.5ML
6 INJECTION, SOLUTION SUBCUTANEOUS ONCE
Status: DISCONTINUED | OUTPATIENT
Start: 2021-02-22 | End: 2021-02-22

## 2021-02-22 RX ORDER — SUMATRIPTAN 6 MG/.5ML
6 INJECTION, SOLUTION SUBCUTANEOUS ONCE
Status: COMPLETED | OUTPATIENT
Start: 2021-02-22 | End: 2021-02-22

## 2021-02-22 RX ORDER — SUMATRIPTAN 6 MG/.5ML
6 INJECTION, SOLUTION SUBCUTANEOUS
Status: COMPLETED | OUTPATIENT
Start: 2021-02-22 | End: 2021-02-22

## 2021-02-22 RX ADMIN — SODIUM CHLORIDE, POTASSIUM CHLORIDE, SODIUM LACTATE AND CALCIUM CHLORIDE: 600; 310; 30; 20 INJECTION, SOLUTION INTRAVENOUS at 05:52

## 2021-02-22 RX ADMIN — BUDESONIDE AND FORMOTEROL FUMARATE DIHYDRATE 2 PUFF: 160; 4.5 AEROSOL RESPIRATORY (INHALATION) at 17:06

## 2021-02-22 RX ADMIN — GLYCOPYRROLATE 1 CAPSULE: 15.6 CAPSULE RESPIRATORY (INHALATION) at 17:06

## 2021-02-22 RX ADMIN — GLYCOPYRROLATE 1 CAPSULE: 15.6 CAPSULE RESPIRATORY (INHALATION) at 05:43

## 2021-02-22 RX ADMIN — DIGOXIN 125 MCG: 250 TABLET ORAL at 12:15

## 2021-02-22 RX ADMIN — APIXABAN 2.5 MG: 5 TABLET, FILM COATED ORAL at 18:34

## 2021-02-22 RX ADMIN — SUMATRIPTAN SUCCINATE 6 MG: 6 INJECTION SUBCUTANEOUS at 18:36

## 2021-02-22 RX ADMIN — APIXABAN 5 MG: 5 TABLET, FILM COATED ORAL at 05:43

## 2021-02-22 RX ADMIN — BUDESONIDE AND FORMOTEROL FUMARATE DIHYDRATE 2 PUFF: 160; 4.5 AEROSOL RESPIRATORY (INHALATION) at 05:43

## 2021-02-22 RX ADMIN — LORAZEPAM 1 MG: 2 INJECTION INTRAMUSCULAR; INTRAVENOUS at 10:05

## 2021-02-22 RX ADMIN — GADOTERIDOL 10 ML: 279.3 INJECTION, SOLUTION INTRAVENOUS at 10:34

## 2021-02-22 RX ADMIN — ZOLPIDEM TARTRATE 10 MG: 5 TABLET ORAL at 21:46

## 2021-02-22 RX ADMIN — Medication 2000 UNITS: at 05:43

## 2021-02-22 RX ADMIN — HYDROCODONE BITARTRATE AND ACETAMINOPHEN 1 TABLET: 10; 325 TABLET ORAL at 05:43

## 2021-02-22 RX ADMIN — SUMATRIPTAN SUCCINATE 6 MG: 6 INJECTION SUBCUTANEOUS at 09:39

## 2021-02-22 RX ADMIN — POLYETHYLENE GLYCOL 3350 1 PACKET: 17 POWDER, FOR SOLUTION ORAL at 05:44

## 2021-02-22 RX ADMIN — SODIUM CHLORIDE, POTASSIUM CHLORIDE, SODIUM LACTATE AND CALCIUM CHLORIDE: 600; 310; 30; 20 INJECTION, SOLUTION INTRAVENOUS at 17:12

## 2021-02-22 RX ADMIN — ATORVASTATIN CALCIUM 80 MG: 40 TABLET, FILM COATED ORAL at 12:15

## 2021-02-22 ASSESSMENT — PAIN DESCRIPTION - PAIN TYPE
TYPE: ACUTE PAIN

## 2021-02-22 ASSESSMENT — ENCOUNTER SYMPTOMS
SHORTNESS OF BREATH: 0
HEADACHES: 1
DIARRHEA: 0
WHEEZING: 0
CONSTIPATION: 0
ABDOMINAL PAIN: 0
NAUSEA: 0
CHILLS: 0
VOMITING: 0
FEVER: 0
COUGH: 0

## 2021-02-22 NOTE — HOSPITAL COURSE
80 y.o. female who presented 2/20/2021 for evaluation of increasing size of her perineal hernia as well as decreased ostomy output.  This is a pleasant woman with a history of anal cancer diagnosed in December 2019, at which time she underwent radiation and chemotherapy.  Unfortunately, the cancer progressed and the patient underwent an APR with colostomy placement by Dr. Jeter in October 2020.  Patient is under the care of oncologist Dr. Vogel and radiation oncologist Dr. Casper.  Due to progression of the cancer with metastases to the lungs, patient started chemotherapy 2 days ago.  Patient called Dr. Jeter's office due to concern for increasing size of her perineal hernia as well as decreased ostomy output after she started the chemotherapy.  She notes she has been taking Norco intermittently for chronic pain.  Patient was referred to the ER for further evaluation.       In the ER, CT scan of the abdomen and pelvis was performed and reviewed by Dr. Paulson, who was on-call for Dr. Jeter surgical practice.  Per review and discussion, he felt her symptoms and complaints were related to copious amounts of stool within the bowel now extending through the perineal hernia.  Again notably, this has been occurring in setting of intermittent Norco intake.  Recommendation was to start her on MiraLAX and aggressive bowel regimen.    In the ER, patient was noted to have a leukocytosis of 29.6 with a significant left shift.  She was started on ceftriaxone and metronidazole which was changed to Unasyn upon admission.  However, her procalcitonin was negative.  Surgery reevaluated her and did not feel that she had any significant infection or GI etiology.  Antibiotics were discontinued and her leukocytosis resolved.  Given her new complaint of headache, a MRI brain was done and did not show any metastases.  Patient did respond well to Imitrex.  Thus, she would likely benefit from Imitrex outpatient.

## 2021-02-22 NOTE — PROGRESS NOTES
[facility] Medicine Daily Progress Note    Date of Service  2/22/2021    Chief Complaint  80 y.o. female admitted 2/20/2021 with leukocytosis, headache, low ostomy output    Hospital Course  80 y.o. female who presented 2/20/2021 for evaluation of increasing size of her perineal hernia as well as decreased ostomy output.  This is a pleasant woman with a history of anal cancer diagnosed in December 2019, at which time she underwent radiation and chemotherapy.  Unfortunately, the cancer progressed and the patient underwent an APR with colostomy placement by Dr. Jeter in October 2020.  Patient is under the care of oncologist Dr. Vogel and radiation oncologist Dr. Casper.  Due to progression of the cancer with metastases to the lungs, patient started chemotherapy 2 days ago.  Patient called Dr. Jeter's office due to concern for increasing size of her perineal hernia as well as decreased ostomy output after she started the chemotherapy.  She notes she has been taking Norco intermittently for chronic pain.  Patient was referred to the ER for further evaluation.       In the ER, CT scan of the abdomen and pelvis was performed and reviewed by Dr. Paulson, who was on-call for Dr. Jeter surgical practice.  Per review and discussion, he felt her symptoms and complaints were related to copious amounts of stool within the bowel now extending through the perineal hernia.  Again notably, this has been occurring in setting of intermittent Norco intake.  Recommendation was to start her on MiraLAX and aggressive bowel regimen.    In the ER, patient was noted to have a leukocytosis of 29.6 with a significant left shift.  She was started on ceftriaxone and metronidazole which was changed to Unasyn upon admission.  However, her procalcitonin was negative.  Surgery reevaluated her and did not feel that she had any significant infection or GI etiology.  Antibiotics were discontinued and her leukocytosis resolved.  Given her new  complaint of headache, a MRI brain was done and did not show any metastases.  Patient did respond well to Imitrex.  Thus, she would likely benefit from Imitrex outpatient.        Interval Problem Update  2/21: WBCs decreased to 22.2.  DC antibiotics.  Treating headache.  Was accidentally given 2 doses of Tylenol, discussed with pharmacy and will recheck LFTs in a.m.  2/22: [place] ptosis resolved.  Headache recurred but then improved with Imitrex.  Ostomy output improved.  Likely DC home tomorrow.    Disposition  Likely DC home tomorrow with prescription of oral Imitrex    Review of Systems  Review of Systems   Constitutional: Positive for malaise/fatigue. Negative for chills and fever.   Respiratory: Negative for cough, shortness of breath and wheezing.    Cardiovascular: Negative for chest pain.   Gastrointestinal: Negative for abdominal pain, constipation, diarrhea, nausea and vomiting.   Genitourinary: Negative for dysuria.   Neurological: Positive for headaches.        Physical Exam  Temp:  [36.7 °C (98.1 °F)-37.1 °C (98.8 °F)] 37.1 °C (98.8 °F)  Pulse:  [63-93] 63  Resp:  [16] 16  BP: ()/(53-69) 113/59  SpO2:  [93 %-97 %] 93 %    Physical Exam  Constitutional:       General: She is not in acute distress.     Appearance: She is well-developed.      Comments: thin/frail   HENT:      Head: Normocephalic.   Cardiovascular:      Rate and Rhythm: Normal rate.      Heart sounds: No gallop.    Pulmonary:      Effort: No respiratory distress.      Breath sounds: No wheezing.   Abdominal:      General: There is no distension.      Tenderness: There is abdominal tenderness.      Comments: Ostomy in place   Skin:     General: Skin is warm.   Neurological:      Mental Status: She is alert. Mental status is at baseline.         Fluids    Intake/Output Summary (Last 24 hours) at 2/22/2021 1223  Last data filed at 2/22/2021 0800  Gross per 24 hour   Intake 1840 ml   Output 1300 ml   Net 540 ml       Laboratory  Recent  Labs     02/20/21  1710 02/21/21  0451 02/22/21  0719   WBC 29.6* 22.2* 9.9   RBC 4.42 3.91* 3.86*   HEMOGLOBIN 14.3 13.0 12.7   HEMATOCRIT 44.3 39.6 38.9   .2* 101.3* 100.8*   MCH 32.4 33.2* 32.9   MCHC 32.3* 32.8* 32.6*   RDW 55.6* 54.8* 54.9*   PLATELETCT 92* 63* 67*   MPV 11.2 11.0 10.9     Recent Labs     02/20/21  1710 02/21/21  0451 02/22/21  0719   SODIUM 135 134* 136   POTASSIUM 4.4 4.6 4.1   CHLORIDE 98 101 104   CO2 27 25 20   GLUCOSE 129* 106* 104*   BUN 20 16 16   CREATININE 0.71 0.71 0.59   CALCIUM 9.8 9.0 8.7     Recent Labs     02/20/21  1710   INR 1.08               Imaging  MR-BRAIN-WITH & W/O   Final Result      1.  No evidence of acute territorial infarct, intracranial hemorrhage or mass lesion.   2.  Mild diffuse cerebral substance loss.   3.  Mild microangiopathic ischemic change versus demyelination or gliosis.   4.  Bilateral mastoid effusions, left greater than right.      CT-ABDOMEN-PELVIS WITH   Final Result      1.  No acute intra-abdominal findings.      2.  Status post bowel resection with left lower quadrant colostomy. Large amount of colonic stool.      3.  Atherosclerosis.           Assessment/Plan  * Sepsis (HCC)- (present on admission)  Assessment & Plan  This is Sepsis Present on admission  SIRS criteria identified on my evaluation include: Tachycardia, with heart rate greater than 90 BPM, Tachypnea, with respirations greater than 20 per minute, Leukocytosis, with WBC greater than 12,000 and Bandemia, greater than 10% bands  Sepsis protocol initiated  Fluid resuscitation ordered per protocol: LR  IV antibiotics as appropriate for source of sepsis: Ceftriaxone and metronidazole started in the ER per ER physician.  Changed to Unasyn on admission.  Now DC'd.  While organ dysfunction may be noted elsewhere in this problem list or in the chart, degree of organ dysfunction does not meet CMS criteria for severe sepsis    Procalcitonin borderline elevated 0.22, ESR 30, CRP  0.82.    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  Hernia worsening likely secondary to drug-induced constipation.  No surgery indicated as per general surgery.  Patient will get surgery outpatient by surgeon Dr. Jeter    Leukocytosis- (present on admission)  Assessment & Plan  Other possibilities for leukocytosis including leukemoid reaction.  Patient may have gotten steroids prior to her chemotherapy on 2/18/2021.  No overt infection  Initially given antibiotics  ProCalcitonin negative  Now DC'd  Leukocytosis now resolved    Drug-induced constipation- (present on admission)  Assessment & Plan  Suggested by copious amounts of stool within the colon.  In setting of Norco use.  General surgery feels decreased ostomy output secondary to constipation.    Appreciate general surgery consult.  Continue daily MiraLAX and docusate sodium.  Consider trial of magnesium citrate.    Nausea and vomiting- (present on admission)  Assessment & Plan  Secondary to constipation/hernia initially   Continue Zofran as needed.  Bowel regimen with MiraLAX and docusate daily.    Now resolved    Anal cancer (HCC)- (present on admission)  Assessment & Plan  As per history now has progressed to stage IV with metastases to the lungs.  Status post chemoradiation as well as recent chemotherapy for the metastases.  Under care of Dr. Vogel (cancer care specialists) and Dr. Casper (radiation oncology).    Continue outpatient follow-up.    Headache- (present on admission)  Assessment & Plan  Patient does have history of headache but she says this feels different, bandlike sensation  Does have a history of metastatic cancer -MRI brain does not show any brain mets  Did receive accidentally 2 doses of Tylenol and close proximity on 2/21/2021  Responded to Imitrex.    History of 2019 novel coronavirus disease (COVID-19)- (present on admission)  Assessment & Plan  In December 2020  No longer  infected, does not need special isolation anymore    Paroxysmal atrial fibrillation (HCC)- (present on admission)  Assessment & Plan  Per patient, she has a history of atrial fibrillation.  Review of all previous EKG from this institution shows normal sinus rhythm.  Currently in normal sinus rhythm on telemetry monitoring.  [id number] score of 3, 3.2% risk of stroke per year.    Continue home digoxin and apixaban.    Thrombocythemia (HCC)- (present on admission)  Assessment & Plan  Possibly secondary to recent chemotherapy.  No need for transfusion unless less than 10 or active bleeding.    Continue to monitor.       VTE prophylaxis: apixiban

## 2021-02-22 NOTE — PROGRESS NOTES
Received report from off going shift. Pt lying in bed resting quietly. Respirations even and unlabored on room air. No acute distress noted. Pt currently receiving LR @ 100 cc/hr. Pt complaining of a headache at this time, spoke with Dr. Garcia regarding pt requesting Immutrex for headache and pre-med prior to MRI today. Orders placed on chart. No other needs noted, will continue to monitor.

## 2021-02-22 NOTE — PROGRESS NOTES
Received report from AM RN; assumed care. A&O x 4. VSS. 96% on RA. Patient c/o HA that's not relenting. On call hospitalist contacted for Imitrex orders. No orders received. Dr. Vallecillo on floor authorized administration of Norco after checking patients history/labs; patient has not reached max Tylenol dosage. Administered; see MAR. Patient denied SOB, numbness, tingling, nausea, vomiting. Pain reported to head, mediastinum, hernia. Abdomen rounded, soft, non-tender. Improved output on LLQ ostomy; upper bag changed twice. Soft, brown stool noted. Education provided to patient about importance of hydration. Verbalized understanding. Noted pain with sitting on perineal hernia. Redness noted. Perineal washcloths being utilized. + void, yellow urine noted. + flatus. LBM 02/21/21. Patient up SBA with steady/unsteady gait dependent upon fatigue/HA. Patient tolerating MIVF. Patient tolerating clears, encouraged. 25% of diet ingested. POC/impending MRI discussed. Questions answered. Bed locked/lowest position. Call light/personal belongings within reach. All needs met/patient resting at present time.

## 2021-02-22 NOTE — PROGRESS NOTES
Pt here for brain MRI.  Attempted to administer contrast through pt's existing IV in the left forearm; IV did not flush properly and tissue felt hard above IV site.  Small butterfly stick in the left hand to administer MRI contrast.  RN informed pt's IV doesn't seem to be working.

## 2021-02-22 NOTE — INFECTION CONTROL
This patient is considered COVID RECOVERED.    Patient initially tested positive for COVID on 12/21/2020.  Patient is greater than or equal to 21 days from symptom onset and/or positive test, with symptom improvement.  Per the CDC guidance, this patient no longer requires transmission based precautions.  Patient may be placed on any unit per the bed assignment policy (except CNU), including placement in a semi-private room with a roommate.

## 2021-02-22 NOTE — CARE PLAN
Problem: Communication  Goal: The ability to communicate needs accurately and effectively will improve  Outcome: PROGRESSING AS EXPECTED  Patient able to articulate needs. Utilizes call light appropriately.     Problem: Safety  Goal: Will remain free from injury  Outcome: PROGRESSING AS EXPECTED  Patient educated not to get OOB unassisted. Verbalized/demonstrated understanding.     Problem: Bowel/Gastric:  Goal: Normal bowel function is maintained or improved  Outcome: PROGRESSING AS EXPECTED  Improved output on colostomy; soft, brown stool noted.       Problem: Pain Management  Goal: Pain level will decrease to patient's comfort goal  Outcome: PROGRESSING AS EXPECTED  Dr. Vallecillo authorized Norco administration.

## 2021-02-22 NOTE — PROGRESS NOTES
"Surgical Progress Note:    S:  - Had headache, has improved with medication  - Tolerating diet, no n/v today  - Passing gas/BMs via colostomy  - Reports minimal discomfort at perineum associated with perineal hernia as has been in bed  - Ambulating  - No chest pain, extremity pain, or difficulty breathing    Vitals:  /59   Pulse 63   Temp 37.1 °C (98.8 °F) (Temporal)   Resp 16   Ht 1.6 m (5' 3\")   Wt 49.4 kg (109 lb)   LMP 01/01/1983   SpO2 93%   Breastfeeding No   BMI 19.31 kg/m²     I/O:     Intake/Output Summary (Last 24 hours) at 2/22/2021 1145  Last data filed at 2/22/2021 0800  Gross per 24 hour   Intake 2240 ml   Output 1300 ml   Net 940 ml       P/E:  Constitutional: Appears well, no distress  Head/Neck: Normocephalic, atraumatic, neck supple  Cardiovascular: Normal rate and rhythm  Pulmonary/Chest: Normal respiratory effort, no wheezes  Abdominal: Soft, non-tender, no distension, stoma pink and productive  Perineum: Soft, nontender, perineal hernia easily and completely reducible  Musculoskeletal: No deficits  Neurological:  Alert, oriented  Skin:  Warm and dry, no erythema  Extremities: No edema, no evidence of DVT    Labs:  Recent Labs     02/20/21 1710 02/21/21  0451   WBC 29.6* 22.2*   RBC 4.42 3.91*   HEMOGLOBIN 14.3 13.0   HEMATOCRIT 44.3 39.6   .2* 101.3*   MCH 32.4 33.2*   RDW 55.6* 54.8*   PLATELETCT 92* 63*   MPV 11.2 11.0   NEUTSPOLYS 82.90*  --    LYMPHOCYTES 2.90*  --    MONOCYTES 0.60  --    EOSINOPHILS 0.40  --    BASOPHILS 0.60  --      Recent Labs     02/20/21 1710 02/21/21  0451 02/22/21  0719   SODIUM 135 134* 136   POTASSIUM 4.4 4.6 4.1   CHLORIDE 98 101 104   CO2 27 25 20   GLUCOSE 129* 106* 104*   BUN 20 16 16         A/P:   -Blood work pending  -Colostomy functioning well  -Minimal symptoms related to easily incompletely reducible perineal hernia  -Diet as tolerated  -We will plan for elective perineal hernia repair in approximately 3 to 4 weeks since most " recent dose of chemotherapy  -We will follow up with patient as an outpatient to schedule        Dulce Jeter M.D.  [facility]  [phone]

## 2021-02-23 PROCEDURE — 700111 HCHG RX REV CODE 636 W/ 250 OVERRIDE (IP): Performed by: INTERNAL MEDICINE

## 2021-02-23 PROCEDURE — 700102 HCHG RX REV CODE 250 W/ 637 OVERRIDE(OP): Performed by: INTERNAL MEDICINE

## 2021-02-23 PROCEDURE — A9270 NON-COVERED ITEM OR SERVICE: HCPCS | Performed by: HOSPITALIST

## 2021-02-23 PROCEDURE — 99233 SBSQ HOSP IP/OBS HIGH 50: CPT | Performed by: INTERNAL MEDICINE

## 2021-02-23 PROCEDURE — 700105 HCHG RX REV CODE 258: Performed by: STUDENT IN AN ORGANIZED HEALTH CARE EDUCATION/TRAINING PROGRAM

## 2021-02-23 PROCEDURE — A9270 NON-COVERED ITEM OR SERVICE: HCPCS | Performed by: STUDENT IN AN ORGANIZED HEALTH CARE EDUCATION/TRAINING PROGRAM

## 2021-02-23 PROCEDURE — 700102 HCHG RX REV CODE 250 W/ 637 OVERRIDE(OP): Performed by: HOSPITALIST

## 2021-02-23 PROCEDURE — 700101 HCHG RX REV CODE 250: Performed by: SURGERY

## 2021-02-23 PROCEDURE — A9270 NON-COVERED ITEM OR SERVICE: HCPCS | Performed by: INTERNAL MEDICINE

## 2021-02-23 PROCEDURE — 700102 HCHG RX REV CODE 250 W/ 637 OVERRIDE(OP): Performed by: STUDENT IN AN ORGANIZED HEALTH CARE EDUCATION/TRAINING PROGRAM

## 2021-02-23 PROCEDURE — 770006 HCHG ROOM/CARE - MED/SURG/GYN SEMI*

## 2021-02-23 RX ORDER — SUMATRIPTAN 25 MG/1
25 TABLET, FILM COATED ORAL
Status: DISCONTINUED | OUTPATIENT
Start: 2021-02-24 | End: 2021-02-23

## 2021-02-23 RX ORDER — KETOROLAC TROMETHAMINE 10 MG/1
10 TABLET, FILM COATED ORAL EVERY 6 HOURS PRN
Status: DISCONTINUED | OUTPATIENT
Start: 2021-02-23 | End: 2021-02-24 | Stop reason: HOSPADM

## 2021-02-23 RX ORDER — SUMATRIPTAN 6 MG/.5ML
6 INJECTION, SOLUTION SUBCUTANEOUS ONCE
Status: COMPLETED | OUTPATIENT
Start: 2021-02-23 | End: 2021-02-23

## 2021-02-23 RX ORDER — SUMATRIPTAN 25 MG/1
25 TABLET, FILM COATED ORAL
Status: DISCONTINUED | OUTPATIENT
Start: 2021-02-23 | End: 2021-02-24 | Stop reason: HOSPADM

## 2021-02-23 RX ADMIN — HYDROCODONE BITARTRATE AND ACETAMINOPHEN 1 TABLET: 10; 325 TABLET ORAL at 05:03

## 2021-02-23 RX ADMIN — SODIUM CHLORIDE, POTASSIUM CHLORIDE, SODIUM LACTATE AND CALCIUM CHLORIDE: 600; 310; 30; 20 INJECTION, SOLUTION INTRAVENOUS at 03:30

## 2021-02-23 RX ADMIN — GLYCOPYRROLATE 1 CAPSULE: 15.6 CAPSULE RESPIRATORY (INHALATION) at 18:39

## 2021-02-23 RX ADMIN — SODIUM CHLORIDE, POTASSIUM CHLORIDE, SODIUM LACTATE AND CALCIUM CHLORIDE: 600; 310; 30; 20 INJECTION, SOLUTION INTRAVENOUS at 14:33

## 2021-02-23 RX ADMIN — DIGOXIN 125 MCG: 250 TABLET ORAL at 12:34

## 2021-02-23 RX ADMIN — SUMATRIPTAN SUCCINATE 25 MG: 25 TABLET ORAL at 19:10

## 2021-02-23 RX ADMIN — APIXABAN 2.5 MG: 5 TABLET, FILM COATED ORAL at 04:57

## 2021-02-23 RX ADMIN — BUDESONIDE AND FORMOTEROL FUMARATE DIHYDRATE 2 PUFF: 160; 4.5 AEROSOL RESPIRATORY (INHALATION) at 04:58

## 2021-02-23 RX ADMIN — BUDESONIDE AND FORMOTEROL FUMARATE DIHYDRATE 2 PUFF: 160; 4.5 AEROSOL RESPIRATORY (INHALATION) at 18:40

## 2021-02-23 RX ADMIN — APIXABAN 2.5 MG: 5 TABLET, FILM COATED ORAL at 18:40

## 2021-02-23 RX ADMIN — Medication 2000 UNITS: at 04:58

## 2021-02-23 RX ADMIN — KETOROLAC TROMETHAMINE 10 MG: 10 TABLET, FILM COATED ORAL at 18:40

## 2021-02-23 RX ADMIN — ZOLPIDEM TARTRATE 10 MG: 5 TABLET ORAL at 21:32

## 2021-02-23 RX ADMIN — KETOROLAC TROMETHAMINE 10 MG: 10 TABLET, FILM COATED ORAL at 12:36

## 2021-02-23 RX ADMIN — SUMATRIPTAN SUCCINATE 25 MG: 25 TABLET ORAL at 21:33

## 2021-02-23 RX ADMIN — GLYCOPYRROLATE 1 CAPSULE: 15.6 CAPSULE RESPIRATORY (INHALATION) at 04:58

## 2021-02-23 RX ADMIN — SUMATRIPTAN SUCCINATE 6 MG: 6 INJECTION SUBCUTANEOUS at 08:44

## 2021-02-23 RX ADMIN — POLYETHYLENE GLYCOL 3350 1 PACKET: 17 POWDER, FOR SOLUTION ORAL at 04:58

## 2021-02-23 RX ADMIN — ATORVASTATIN CALCIUM 80 MG: 40 TABLET, FILM COATED ORAL at 12:33

## 2021-02-23 ASSESSMENT — ENCOUNTER SYMPTOMS
ABDOMINAL PAIN: 0
SENSORY CHANGE: 0
NAUSEA: 0
DIZZINESS: 0
CONSTIPATION: 0
HEADACHES: 1
COUGH: 0
SHORTNESS OF BREATH: 0
WHEEZING: 0
BLURRED VISION: 0
DIARRHEA: 0
FEVER: 0
MYALGIAS: 0
DOUBLE VISION: 0
SPEECH CHANGE: 0
NERVOUS/ANXIOUS: 0
DEPRESSION: 0
CHILLS: 0
VOMITING: 0

## 2021-02-23 ASSESSMENT — PAIN DESCRIPTION - PAIN TYPE
TYPE: ACUTE PAIN

## 2021-02-23 NOTE — PROGRESS NOTES
[facility] Medicine Daily Progress Note    Date of Service  2/23/2021    Chief Complaint  80 y.o. female admitted 2/20/2021 with leukocytosis, headache, low ostomy output    Hospital Course  80 y.o. female who presented 2/20/2021 for evaluation of increasing size of her perineal hernia as well as decreased ostomy output.  This is a pleasant woman with a history of anal cancer diagnosed in December 2019, at which time she underwent radiation and chemotherapy.  Unfortunately, the cancer progressed and the patient underwent an APR with colostomy placement by Dr. Jeter in October 2020.  Patient is under the care of oncologist Dr. Vogel and radiation oncologist Dr. Casper.  Due to progression of the cancer with metastases to the lungs, patient started chemotherapy 2 days ago.  Patient called Dr. Jeter's office due to concern for increasing size of her perineal hernia as well as decreased ostomy output after she started the chemotherapy.  She notes she has been taking Norco intermittently for chronic pain.  Patient was referred to the ER for further evaluation.       In the ER, CT scan of the abdomen and pelvis was performed and reviewed by Dr. Paulson, who was on-call for Dr. Jeter surgical practice.  Per review and discussion, he felt her symptoms and complaints were related to copious amounts of stool within the bowel now extending through the perineal hernia.  Again notably, this has been occurring in setting of intermittent Norco intake.  Recommendation was to start her on MiraLAX and aggressive bowel regimen.    In the ER, patient was noted to have a leukocytosis of 29.6 with a significant left shift.  She was started on ceftriaxone and metronidazole which was changed to Unasyn upon admission.  However, her procalcitonin was negative.  Surgery reevaluated her and did not feel that she had any significant infection or GI etiology.  Antibiotics were discontinued and her leukocytosis resolved.  Given her new  complaint of headache, a MRI brain was done and did not show any metastases.  Patient did respond well to Imitrex.  Thus, she would likely benefit from Imitrex outpatient.        Interval Problem Update  2/21: WBCs decreased to 22.2.  DC antibiotics.  Treating headache.  Was accidentally given 2 doses of Tylenol, discussed with pharmacy and will recheck LFTs in a.m.  2/22: [place] ptosis resolved.  Headache recurred but then improved with Imitrex.  Ostomy output improved.  Likely DC home tomorrow.  2/23: Ongoing bilateral frontal headache, described as tension  No associated blurry vision  Improved with Imitrex    Disposition  Likely DC home tomorrow with prescription of oral Imitrex    Encourage activity    Review of Systems  Review of Systems   Constitutional: Negative for chills, fever and malaise/fatigue.   Eyes: Negative for blurred vision and double vision.   Respiratory: Negative for cough, shortness of breath and wheezing.    Cardiovascular: Negative for chest pain.   Gastrointestinal: Negative for abdominal pain, constipation, diarrhea, nausea and vomiting.   Genitourinary: Negative for dysuria.   Musculoskeletal: Negative for myalgias.   Neurological: Positive for headaches. Negative for dizziness, sensory change and speech change.   Psychiatric/Behavioral: Negative for depression and suicidal ideas. The patient is not nervous/anxious.         Physical Exam  Temp:  [36.7 °C (98.1 °F)-37.2 °C (99 °F)] 36.7 °C (98.1 °F)  Pulse:  [60-92] 86  Resp:  [16] 16  BP: ()/(53-63) 101/63  SpO2:  [93 %-96 %] 94 %    Physical Exam  Constitutional:       General: She is in acute distress.      Appearance: She is well-developed. She is not ill-appearing.      Comments: thin/frail   HENT:      Head: Normocephalic.      Nose: Nose normal. No congestion.   Cardiovascular:      Rate and Rhythm: Normal rate.      Heart sounds: No gallop.    Pulmonary:      Effort: No respiratory distress.      Breath sounds: No stridor. No  wheezing or rhonchi.   Abdominal:      General: There is no distension.      Tenderness: There is no abdominal tenderness.      Comments: Ostomy in place   Skin:     General: Skin is warm.   Neurological:      Mental Status: She is alert. Mental status is at baseline.      Cranial Nerves: No cranial nerve deficit.      Sensory: No sensory deficit.      Motor: No weakness.         Fluids    Intake/Output Summary (Last 24 hours) at 2/23/2021 1345  Last data filed at 2/23/2021 0800  Gross per 24 hour   Intake 360 ml   Output 500 ml   Net -140 ml       Laboratory  Recent Labs     02/20/21  1710 02/21/21  0451 02/22/21  0719   WBC 29.6* 22.2* 9.9   RBC 4.42 3.91* 3.86*   HEMOGLOBIN 14.3 13.0 12.7   HEMATOCRIT 44.3 39.6 38.9   .2* 101.3* 100.8*   MCH 32.4 33.2* 32.9   MCHC 32.3* 32.8* 32.6*   RDW 55.6* 54.8* 54.9*   PLATELETCT 92* 63* 67*   MPV 11.2 11.0 10.9     Recent Labs     02/20/21  1710 02/21/21  0451 02/22/21  0719   SODIUM 135 134* 136   POTASSIUM 4.4 4.6 4.1   CHLORIDE 98 101 104   CO2 27 25 20   GLUCOSE 129* 106* 104*   BUN 20 16 16   CREATININE 0.71 0.71 0.59   CALCIUM 9.8 9.0 8.7     Recent Labs     02/20/21  1710   INR 1.08               Imaging  MR-BRAIN-WITH & W/O   Final Result      1.  No evidence of acute territorial infarct, intracranial hemorrhage or mass lesion.   2.  Mild diffuse cerebral substance loss.   3.  Mild microangiopathic ischemic change versus demyelination or gliosis.   4.  Bilateral mastoid effusions, left greater than right.      CT-ABDOMEN-PELVIS WITH   Final Result      1.  No acute intra-abdominal findings.      2.  Status post bowel resection with left lower quadrant colostomy. Large amount of colonic stool.      3.  Atherosclerosis.           Assessment/Plan  * Sepsis (HCC)- (present on admission)  Assessment & Plan  This is Sepsis Present on admission  SIRS criteria identified on my evaluation include: Tachycardia, with heart rate greater than 90 BPM, Tachypnea, with  respirations greater than 20 per minute, Leukocytosis, with WBC greater than 12,000 and Bandemia, greater than 10% bands  Sepsis protocol initiated  Fluid resuscitation ordered per protocol: LR  IV antibiotics as appropriate for source of sepsis: Ceftriaxone and metronidazole started in the ER per ER physician.  Changed to Unasyn on admission.  Now DC'd.  While organ dysfunction may be noted elsewhere in this problem list or in the chart, degree of organ dysfunction does not meet CMS criteria for severe sepsis    Procalcitonin borderline elevated 0.22, ESR 30, CRP 0.82.    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  Hernia worsening likely secondary to drug-induced constipation.  No surgery indicated as per general surgery.  Patient will get surgery outpatient by surgeon Dr. Jeter    Leukocytosis- (present on admission)  Assessment & Plan  Other possibilities for leukocytosis including leukemoid reaction.  Patient may have gotten steroids prior to her chemotherapy on 2/18/2021.  No overt infection  Initially given antibiotics  ProCalcitonin negative  Now DC'd  Leukocytosis now resolved    Drug-induced constipation- (present on admission)  Assessment & Plan  Suggested by copious amounts of stool within the colon.  In setting of Norco use.  General surgery feels decreased ostomy output secondary to constipation.    Appreciate general surgery consult.  Continue daily MiraLAX and docusate sodium.  Consider trial of magnesium citrate.    Nausea and vomiting- (present on admission)  Assessment & Plan  Secondary to constipation/hernia initially   Continue Zofran as needed.  Bowel regimen with MiraLAX and docusate daily.    Now resolved    Anal cancer (HCC)- (present on admission)  Assessment & Plan  As per history now has progressed to stage IV with metastases to the lungs.  Status post chemoradiation as well as recent chemotherapy for the metastases.  Under  care of Dr. Vogel (cancer care specialists) and Dr. Casper (radiation oncology).    Continue outpatient follow-up.    Headache- (present on admission)  Assessment & Plan  Patient does have history of headache but she says this feels different, bandlike sensation  Does have a history of metastatic cancer -MRI brain does not show any brain mets, b/l mastoid effusion L >R  Did receive accidentally 2 doses of Tylenol and close proximity on 2/21/2021 2/23 Responded to Imitrex.  Trial of oral imitrex, for dc home  Encourage activity    History of 2019 novel coronavirus disease (COVID-19)- (present on admission)  Assessment & Plan  In December 2020  No longer infected, does not need special isolation anymore    Paroxysmal atrial fibrillation (HCC)- (present on admission)  Assessment & Plan  Per patient, she has a history of atrial fibrillation.  Review of all previous EKG from this institution shows normal sinus rhythm.  Currently in normal sinus rhythm on telemetry monitoring.  [id number] score of 3, 3.2% risk of stroke per year.    Continue home digoxin and apixaban.    Thrombocythemia (HCC)- (present on admission)  Assessment & Plan  Possibly secondary to recent chemotherapy.  No need for transfusion unless less than 10 or active bleeding.    Continue to monitor.       VTE prophylaxis: apixiban

## 2021-02-23 NOTE — PROGRESS NOTES
"Bedside report completed with day shift RN and patient. Fall precautions in place and hourly rounding continued. Patient vital signs /62   Pulse 87   Temp 37.1 °C (98.8 °F) (Temporal)   Resp 16   Ht 1.6 m (5' 3\")   Wt 49.4 kg (109 lb)   LMP 01/01/1983   SpO2 96%   Breastfeeding No   BMI 19.31 kg/m² . Full assessment completed in flowsheet. Assumed patient care at 1900.   "

## 2021-02-23 NOTE — PROGRESS NOTES
Received message from lab stating pt does not have an order for C. Diff specimen. Informed lab that there was an order. Lab stated the order was not released in Epic and could not be seen in system. Writer spoke with Dr. Dulce Jeter and requested order be re entered into the system since it could not be seen. Instructed to place a verbal order. Verbal order placed for C. Diff toxin. No other needs noted, will continue to monitor.

## 2021-02-23 NOTE — CARE PLAN
Problem: Knowledge Deficit  Goal: Knowledge of disease process/condition, treatment plan, diagnostic tests, and medications will improve  Outcome: PROGRESSING AS EXPECTED  Intervention: Assess knowledge level of disease process/condition, treatment plan, diagnostic tests, and medications  Note: Patient educated on plan of care.      Problem: Discharge Barriers/Planning  Goal: Patient's continuum of care needs will be met  Outcome: PROGRESSING AS EXPECTED  Intervention: Assess potential discharge barriers on admission and throughout hospital stay  Note: [facility] addressing discharge barriers with interdisciplinary team.

## 2021-02-24 VITALS
TEMPERATURE: 96.7 F | HEART RATE: 79 BPM | WEIGHT: 109 LBS | BODY MASS INDEX: 19.31 KG/M2 | OXYGEN SATURATION: 93 % | SYSTOLIC BLOOD PRESSURE: 99 MMHG | HEIGHT: 63 IN | DIASTOLIC BLOOD PRESSURE: 58 MMHG | RESPIRATION RATE: 20 BRPM

## 2021-02-24 PROCEDURE — 700102 HCHG RX REV CODE 250 W/ 637 OVERRIDE(OP): Performed by: HOSPITALIST

## 2021-02-24 PROCEDURE — A9270 NON-COVERED ITEM OR SERVICE: HCPCS | Performed by: STUDENT IN AN ORGANIZED HEALTH CARE EDUCATION/TRAINING PROGRAM

## 2021-02-24 PROCEDURE — 700101 HCHG RX REV CODE 250: Performed by: SURGERY

## 2021-02-24 PROCEDURE — 700102 HCHG RX REV CODE 250 W/ 637 OVERRIDE(OP): Performed by: INTERNAL MEDICINE

## 2021-02-24 PROCEDURE — 700102 HCHG RX REV CODE 250 W/ 637 OVERRIDE(OP): Performed by: STUDENT IN AN ORGANIZED HEALTH CARE EDUCATION/TRAINING PROGRAM

## 2021-02-24 PROCEDURE — A9270 NON-COVERED ITEM OR SERVICE: HCPCS | Performed by: INTERNAL MEDICINE

## 2021-02-24 PROCEDURE — 99239 HOSP IP/OBS DSCHRG MGMT >30: CPT | Performed by: INTERNAL MEDICINE

## 2021-02-24 PROCEDURE — A9270 NON-COVERED ITEM OR SERVICE: HCPCS | Performed by: HOSPITALIST

## 2021-02-24 RX ORDER — SUMATRIPTAN 25 MG/1
25 TABLET, FILM COATED ORAL
Qty: 15 TABLET | Refills: 3 | Status: SHIPPED | OUTPATIENT
Start: 2021-02-24

## 2021-02-24 RX ORDER — KETOROLAC TROMETHAMINE 10 MG/1
10 TABLET, FILM COATED ORAL EVERY 6 HOURS PRN
Qty: 15 TABLET | Refills: 0 | Status: SHIPPED | OUTPATIENT
Start: 2021-02-24

## 2021-02-24 RX ADMIN — APIXABAN 2.5 MG: 5 TABLET, FILM COATED ORAL at 04:50

## 2021-02-24 RX ADMIN — Medication 2000 UNITS: at 04:50

## 2021-02-24 RX ADMIN — SUMATRIPTAN SUCCINATE 25 MG: 25 TABLET ORAL at 04:51

## 2021-02-24 RX ADMIN — BUDESONIDE AND FORMOTEROL FUMARATE DIHYDRATE 2 PUFF: 160; 4.5 AEROSOL RESPIRATORY (INHALATION) at 04:50

## 2021-02-24 RX ADMIN — GLYCOPYRROLATE 1 CAPSULE: 15.6 CAPSULE RESPIRATORY (INHALATION) at 04:50

## 2021-02-24 RX ADMIN — POLYETHYLENE GLYCOL 3350 1 PACKET: 17 POWDER, FOR SOLUTION ORAL at 04:50

## 2021-02-24 RX ADMIN — KETOROLAC TROMETHAMINE 10 MG: 10 TABLET, FILM COATED ORAL at 08:03

## 2021-02-24 ASSESSMENT — PAIN DESCRIPTION - PAIN TYPE
TYPE: ACUTE PAIN

## 2021-02-24 ASSESSMENT — CHA2DS2 SCORE
SEX: FEMALE
CHF OR LEFT VENTRICULAR DYSFUNCTION: NO
AGE 65 TO 74: NO
HYPERTENSION: NO
AGE 75 OR GREATER: YES
CHA2DS2 VASC SCORE: 2
VASCULAR DISEASE: NO
DIABETES: NO
CHA2DS2 VASC SCORE: 3
AGE 75 OR GREATER: YES
PRIOR STROKE OR TIA OR THROMBOEMBOLISM: NO

## 2021-02-24 NOTE — PROGRESS NOTES
Report received from Coco VARGHESE  Assumed care of patient at 1900.    Pt is A&O x 4.  Pain reported as a sharp headache. Day RN provided pain medication per MAR.   Nausea denied   Tolerating Diet   Hernia swelling noted to pt's lower sacrum and butt crack.   + Urine output  + BM via ostomy.    + Flatus  Up self with steady gait   Bed in lowest position and locked.  Pt resting comfortably now.  Review plan of care with patient  Call light within reach  Hourly rounds in place  All needs met at this time

## 2021-02-24 NOTE — CARE PLAN
Problem: Pain Management  Goal: Pain level will decrease to patient's comfort goal  Outcome: PROGRESSING SLOWER THAN EXPECTED  Intervention: Follow pain managment plan developed in collaboration with patient and Interdisciplinary Team  Note: Pt was educated on 0-10 pain scale, non-pharm methods of pain relief and MAR. Pt dis complaining of a migraine. Medication and heat packs were provided. Lights were dimmed.      Problem: Mobility  Goal: Risk for activity intolerance will decrease  Outcome: PROGRESSING AS EXPECTED  Intervention: Assess and monitor signs of activity intolerance  Note: Pt is up self with steady gait.

## 2021-02-24 NOTE — DISCHARGE PLANNING
Anticipated Disposition:  Home    Action:   -> Chart review completed. This CM spoke with Pt at bedside, obtained assessment information.       ~ Pt lives with Spouse whom is able to provide care and support. Pt has a friend and Son they are able to provide a ride upon DC. Pt is fully independent with all ADLs/IDLs, Pt has a walker at home. Pt uses [facility] in [facility].  Pt has a AD on file.           Barriers to Discharge:   Medical clearance      Plan:  Please follow up with attending physician for medical clearance.             Care Transition Team Assessment    Information Source  Orientation : Oriented x 4  Information Given By: Patient  Informant's Name: Bran  Who is responsible for making decisions for patient? : Patient         Elopement Risk  Legal Hold: No  Ambulatory or Self Mobile in Wheelchair: Yes  Disoriented: No  Psychiatric Symptoms: None  History of Wandering: No  Elopement this Admit: No  Vocalizing Wanting to Leave: No  Displays Behaviors, Body Language Wanting to Leave: No-Not at Risk for Elopement  Elopement Risk: Not at Risk for Elopement    Interdisciplinary Discharge Planning  Does Admitting Nurse Feel This Could be a Complex Discharge?: No  Primary Care Physician: RASHEED Moseley  Lives with - Patient's Self Care Capacity: Spouse  Patient or legal guardian wants to designate a caregiver: No  Support Systems: Children, Friends / Neighbors, Family Member(s), Spouse / Significant Other  Housing / Facility: [address]  Do You Take your Prescribed Medications Regularly: Yes  Able to Return to Previous ADL's: Yes  Mobility Issues: No  Prior Services: Home-Independent  Patient Prefers to be Discharged to:: Home  Assistance Needed: No  Durable Medical Equipment: Walker(Pt has a walker at home)    Discharge Preparedness  What is your plan after discharge?: Home with help  What are your discharge supports?: Child, Spouse  Prior Functional Level: Ambulatory, Independent with Activities  of Daily Living    Functional Assesment  Prior Functional Level: Ambulatory, Independent with Activities of Daily Living    Finances  Financial Barriers to Discharge: No  Prescription Coverage: Yes    Vision / Hearing Impairment  Vision Impairment : Yes  Right Eye Vision: Impaired, Wears Glasses  Left Eye Vision: Impaired, Wears Glasses  Hearing Impairment : Yes  Hearing Impairment: Both Ears, Hearing Device(s) Available  Does Pt Need Special Equipment for the Hearing Impaired?: No              Domestic Abuse  Have you ever been the victim of abuse or violence?: No  Physical Abuse or Sexual Abuse: No  Verbal Abuse or Emotional Abuse: No  Possible Abuse/Neglect Reported to:: Not Applicable         Discharge Risks or Barriers  Discharge risks or barriers?: No    Anticipated Discharge Information  Discharge Disposition: Discharged to home/self care (01)

## 2021-02-24 NOTE — DISCHARGE INSTRUCTIONS
Discharge Instructions    Discharged to home by car with relative. Discharged via wheelchair, hospital escort: Yes.  Special equipment needed: Not Applicable    Be sure to schedule a follow-up appointment with your primary care doctor or any specialists as instructed.     Discharge Plan:   Influenza Vaccine Indication: Not indicated: Previously immunized this influenza season and > 8 years of age    I understand that a diet low in cholesterol, fat, and sodium is recommended for good health. Unless I have been given specific instructions below for another diet, I accept this instruction as my diet prescription.   Other diet: cardiac    Special Instructions: None    · Is patient discharged on Warfarin / Coumadin?   No     Depression / Suicide Risk    As you are discharged from this [facility] facility, it is important to learn how to keep safe from harming yourself.    Recognize the warning signs:  · Abrupt changes in personality, positive or negative- including increase in energy   · Giving away possessions  · Change in eating patterns- significant weight changes-  positive or negative  · Change in sleeping patterns- unable to sleep or sleeping all the time   · Unwillingness or inability to communicate  · Depression  · Unusual sadness, discouragement and loneliness  · Talk of wanting to die  · Neglect of personal appearance   · Rebelliousness- reckless behavior  · Withdrawal from people/activities they love  · Confusion- inability to concentrate     If you or a loved one observes any of these behaviors or has concerns about self-harm, here's what you can do:  · Talk about it- your feelings and reasons for harming yourself  · Remove any means that you might use to hurt yourself (examples: pills, rope, extension cords, firearm)  · Get professional help from the community (Mental Health, Substance Abuse, psychological counseling)  · Do not be alone:Call your Safe Contact- someone whom you trust who will be there for  you.  · Call your local CRISIS HOTLINE 434-5153 or 894-407-9671  · Call your local Children's Mobile Crisis Response Team [place] (231) 967-8823 or www.Blippar  · Call the toll free National Suicide Prevention Hotlines   · National Suicide Prevention Lifeline 313-885-HIYP (1240)  · National [employer] Line Network 800-SUICIDE (052-3010)

## 2021-02-24 NOTE — DISCHARGE SUMMARY
Discharge Summary    CHIEF COMPLAINT ON ADMISSION  Chief Complaint   Patient presents with   • Post-Op Complications     s/p colostomy in october, decreased output past 3 days.  also reports anal hernia increasing in size and intolerable pain.       Reason for Admission  Sent by MD     Admission Date  2/20/2021    CODE STATUS  DNAR/DNI    HPI & HOSPITAL COURSE     80 y.o. female who presented 2/20/2021 for evaluation of increasing size of her perineal hernia as well as decreased ostomy output.  This is a pleasant woman with a history of anal cancer diagnosed in December 2019, at which time she underwent radiation and chemotherapy.  Unfortunately, the cancer progressed and the patient underwent an APR with colostomy placement by Dr. Jeter in October 2020.  Patient is under the care of oncologist Dr. Vogel and radiation oncologist Dr. Casper.  Due to progression of the cancer with metastases to the lungs, patient started chemotherapy 2 days ago.  Patient called Dr. Jeter's office due to concern for increasing size of her perineal hernia as well as decreased ostomy output after she started the chemotherapy.  She notes she has been taking Norco intermittently for chronic pain.  Patient was referred to the ER for further evaluation.       In the ER, CT scan of the abdomen and pelvis was performed and reviewed by Dr. Paulson, who was on-call for Dr. Jeter surgical practice.  Per review and discussion, he felt her symptoms and complaints were related to copious amounts of stool within the bowel now extending through the perineal hernia.  Again notably, this has been occurring in setting of intermittent Norco intake.  Recommendation was to start her on MiraLAX and aggressive bowel regimen.    In the ER, patient was noted to have a leukocytosis of 29.6 with a significant left shift.  She was started on ceftriaxone and metronidazole which was changed to Unasyn upon admission.  However, her procalcitonin was negative.   Surgery reevaluated her and did not feel that she had any significant infection or GI etiology.  Antibiotics were discontinued and her leukocytosis resolved.  Given her new complaint of headache, a MRI brain was done and did not show any metastases.  Patient did respond well to Imitrex.  Thus, she would likely benefit from Imitrex outpatient.      Patient's headache has improved with Imitrex as well as Toradol.  Headaches described as tension headaches.  At this point patient is ambulatory without assistance and plans to discharge to home with her .  Dr. Jeter has been notified of patient's discharge and will follow up as an outpatient.      Therefore, she is discharged in good and stable condition to home with close outpatient follow-up.    The patient met 2-midnight criteria for an inpatient stay at the time of discharge.    Discharge Date  2/24    FOLLOW UP ITEMS POST DISCHARGE  Surgery as scheduled  Oncology as scheduled  Primary care provider in 1 week    DISCHARGE DIAGNOSES  Principal Problem:    Sepsis (HCC) POA: Yes  Active Problems:    Nausea and vomiting POA: Yes      Overview: History of...    Drug-induced constipation POA: Yes    Leukocytosis POA: Yes    Posterior perineal hernia POA: Yes    Anal cancer (HCC) (Chronic) POA: Yes    Paroxysmal atrial fibrillation (HCC) POA: Yes    History of 2019 novel coronavirus disease (COVID-19) POA: Yes    Headache POA: Yes    Thrombocythemia (HCC) POA: Yes  Resolved Problems:    * No resolved hospital problems. *      FOLLOW UP  Future Appointments   Date Time Provider Department Center   5/13/2021 11:30 AM Sha Casper M.D. [facility] None     No follow-up provider specified.    MEDICATIONS ON DISCHARGE     Medication List      START taking these medications      Instructions   ketorolac 10 MG Tabs  Commonly known as: TORADOL   Take 1 tablet by mouth every 6 hours as needed for Moderate Pain (Prior to hydrocodone/acetaminophen for moderate pain).  Dose: 10  mg     SUMAtriptan 25 MG Tabs tablet  Commonly known as: IMITREX   Take 1 tablet by mouth every 2 hours as needed for Migraine.  Dose: 25 mg        CONTINUE taking these medications      Instructions   apixaban 5mg Tabs  Commonly known as: ELIQUIS   Take 5 mg by mouth 2 Times a Day.  Dose: 5 mg     atorvastatin 20 MG Tabs  Commonly known as: LIPITOR   Take 80 mg by mouth every day.  Dose: 80 mg     CENTRUM SILVER 50+WOMEN PO   Take 1 tablet by mouth every day.  Dose: 1 tablet     digoxin 125 MCG Tabs  Commonly known as: LANOXIN   Take 125 mcg by mouth every day with lunch.  Dose: 125 mcg     docusate sodium 100 MG Caps  Commonly known as: COLACE   Take 100 mg by mouth 2 times a day.  Dose: 100 mg     gabapentin 100 MG Caps  Commonly known as: NEURONTIN   Take 200 mg by mouth 2 (two) times a day.  Dose: 200 mg     HYDROcodone/acetaminophen  MG Tabs  Commonly known as: Norco   Take 1 tablet by mouth every 6 hours as needed for up to 30 days.  Dose: 1 tablet     Psyllium 28 % packet  Commonly known as: METAMUCIL   Take 1 Packet by mouth every day.  Dose: 1 Packet     Spiriva Respimat 2.5 MCG/ACT Aers  Generic drug: Tiotropium Bromide Monohydrate   Inhale 2.5 mcg every day.  Dose: 2.5 mcg     vitamin D 1000 Unit (25 mcg) Tabs  Commonly known as: cholecalciferol   Take 2,000 Units by mouth every day.  Dose: 2,000 Units     Wixela Inhub 500-50 MCG/DOSE Aepb  Generic drug: fluticasone-salmeterol   Inhale 1 Puff by mouth every 12 hours.  Dose: 1 Puff     zolpidem 10 MG Tabs  Commonly known as: AMBIEN   Take 10 mg by mouth at bedtime as needed for Sleep.  Dose: 10 mg        STOP taking these medications    lidocaine 5 % Oint  Commonly known as: XYLOCAINE            Allergies  Allergies   Allergen Reactions   • [place]        DIET  Orders Placed This Encounter   Procedures   • Diet Order Diet: Regular     Standing Status:   Standing     Number of Occurrences:   1     Order Specific Question:   Diet:     Answer:    Regular [1]       ACTIVITY  As tolerated.  Weight bearing as tolerated    CONSULTATIONS  Surgery    PROCEDURES  None    LABORATORY  Lab Results   Component Value Date    SODIUM 136 02/22/2021    POTASSIUM 4.1 02/22/2021    CHLORIDE 104 02/22/2021    CO2 20 02/22/2021    GLUCOSE 104 (H) 02/22/2021    BUN 16 02/22/2021    CREATININE 0.59 02/22/2021        Lab Results   Component Value Date    WBC 9.9 02/22/2021    HEMOGLOBIN 12.7 02/22/2021    HEMATOCRIT 38.9 02/22/2021    PLATELETCT 67 (L) 02/22/2021        Total time of the discharge process exceeds 42 minutes.

## 2021-02-25 NOTE — DOCUMENTATION QUERY
[facility]                                                                       Query Response Note      PATIENT:               LONNY WEIR  ACCT #:                  [id number]  MRN:                     [id number]  :                      1940  ADMIT DATE:       2021 4:41 PM  DISCH DATE:        2021 11:43 AM  RESPONDING  PROVIDER #:        [id number]           QUERY TEXT:    The diagnosis of Sepsis/SIRS: Source: Suspected stool impaction with translation of floral bacteria is documented in the H&P.  Considering the additional documentation and  clinical indicators, please clarify/confirm the validity of sepsis infection for this admission.      Note: If an appropriate response is not listed below, please respond with a new note.        The patient's Clinical Indicators include:    NOTED:   H&P: Sepsis - Source is suspected GI stool impaction with translation of floral bacteria  H&P & PN -: Leukocytosis: Other possibilities for leukocytosis including leukemoid reaction. Patient may have gotten steroids prior to her chemotherapy on 21.   DC summary : In the ER, patient was noted to have a leukocytosis of 29.6 with significant left shift...started on antibiotics,  procalcitonin negative. Surgery did not feel she had any significant infection or GI etiology. Antibiotic were discontinued and leukocytosis resolved.  Blood cultures: negative   Lactic acid - 1.6-1.3  Surgery consultation - Miralax for constipation  Drug induced constipation  Miralax Daily  Anal cancer/colostomy   Hx chemotherapy/steroids  Norco  Options provided:   -- Sepsis infection is a valid diagnosis for this admission   -- Sepsis ruled out: SIRS ONLY, Constipation, unrelated to any infection   -- Unable to provide additional clarity regarding the diagnosis   -- Unable to determine      Query created by: Maira Munoz on 2021 10:08 AM    RESPONSE  TEXT:    Sepsis ruled out: SIRS ONLY, Constipation, unrelated to any infection          Electronically signed by:  VINOD MARC DO 2/25/2021 10:39 AM

## 2021-03-17 ENCOUNTER — PRE-ADMISSION TESTING (OUTPATIENT)
Dept: ADMISSIONS | Facility: MEDICAL CENTER | Age: 81
End: 2021-03-17
Attending: SURGERY
Payer: MEDICARE

## 2021-03-17 DIAGNOSIS — Z01.812 PRE-OPERATIVE LABORATORY EXAMINATION: ICD-10-CM

## 2021-03-17 LAB
ALBUMIN SERPL BCP-MCNC: 4 G/DL (ref 3.2–4.9)
ALBUMIN/GLOB SERPL: 1.1 G/DL
ALP SERPL-CCNC: 97 U/L (ref 30–99)
ALT SERPL-CCNC: 21 U/L (ref 2–50)
ANION GAP SERPL CALC-SCNC: 9 MMOL/L (ref 7–16)
AST SERPL-CCNC: 27 U/L (ref 12–45)
BASOPHILS # BLD AUTO: 0.7 % (ref 0–1.8)
BASOPHILS # BLD: 0.04 K/UL (ref 0–0.12)
BILIRUB SERPL-MCNC: 0.5 MG/DL (ref 0.1–1.5)
BUN SERPL-MCNC: 15 MG/DL (ref 8–22)
CALCIUM SERPL-MCNC: 10.2 MG/DL (ref 8.5–10.5)
CHLORIDE SERPL-SCNC: 106 MMOL/L (ref 96–112)
CO2 SERPL-SCNC: 26 MMOL/L (ref 20–33)
CREAT SERPL-MCNC: 0.69 MG/DL (ref 0.5–1.4)
EOSINOPHIL # BLD AUTO: 0.13 K/UL (ref 0–0.51)
EOSINOPHIL NFR BLD: 2.3 % (ref 0–6.9)
ERYTHROCYTE [DISTWIDTH] IN BLOOD BY AUTOMATED COUNT: 60.2 FL (ref 35.9–50)
GLOBULIN SER CALC-MCNC: 3.5 G/DL (ref 1.9–3.5)
GLUCOSE SERPL-MCNC: 98 MG/DL (ref 65–99)
HCT VFR BLD AUTO: 42.4 % (ref 37–47)
HGB BLD-MCNC: 13.7 G/DL (ref 12–16)
IMM GRANULOCYTES # BLD AUTO: 0.04 K/UL (ref 0–0.11)
IMM GRANULOCYTES NFR BLD AUTO: 0.7 % (ref 0–0.9)
LYMPHOCYTES # BLD AUTO: 1.06 K/UL (ref 1–4.8)
LYMPHOCYTES NFR BLD: 18.5 % (ref 22–41)
MCH RBC QN AUTO: 33.2 PG (ref 27–33)
MCHC RBC AUTO-ENTMCNC: 32.3 G/DL (ref 33.6–35)
MCV RBC AUTO: 102.7 FL (ref 81.4–97.8)
MONOCYTES # BLD AUTO: 0.76 K/UL (ref 0–0.85)
MONOCYTES NFR BLD AUTO: 13.3 % (ref 0–13.4)
NEUTROPHILS # BLD AUTO: 3.7 K/UL (ref 2–7.15)
NEUTROPHILS NFR BLD: 64.5 % (ref 44–72)
NRBC # BLD AUTO: 0 K/UL
NRBC BLD-RTO: 0 /100 WBC
PLATELET # BLD AUTO: 244 K/UL (ref 164–446)
PMV BLD AUTO: 9.2 FL (ref 9–12.9)
POTASSIUM SERPL-SCNC: 4.2 MMOL/L (ref 3.6–5.5)
PROT SERPL-MCNC: 7.5 G/DL (ref 6–8.2)
RBC # BLD AUTO: 4.13 M/UL (ref 4.2–5.4)
SARS-COV-2 RNA RESP QL NAA+PROBE: NOTDETECTED
SODIUM SERPL-SCNC: 141 MMOL/L (ref 135–145)
SPECIMEN SOURCE: NORMAL
WBC # BLD AUTO: 5.7 K/UL (ref 4.8–10.8)

## 2021-03-17 PROCEDURE — U0003 INFECTIOUS AGENT DETECTION BY NUCLEIC ACID (DNA OR RNA); SEVERE ACUTE RESPIRATORY SYNDROME CORONAVIRUS 2 (SARS-COV-2) (CORONAVIRUS DISEASE [COVID-19]), AMPLIFIED PROBE TECHNIQUE, MAKING USE OF HIGH THROUGHPUT TECHNOLOGIES AS DESCRIBED BY CMS-2020-01-R: HCPCS

## 2021-03-17 PROCEDURE — U0005 INFEC AGEN DETEC AMPLI PROBE: HCPCS

## 2021-03-17 PROCEDURE — C9803 HOPD COVID-19 SPEC COLLECT: HCPCS

## 2021-03-17 PROCEDURE — 85025 COMPLETE CBC W/AUTO DIFF WBC: CPT

## 2021-03-17 PROCEDURE — 80053 COMPREHEN METABOLIC PANEL: CPT

## 2021-03-17 PROCEDURE — 36415 COLL VENOUS BLD VENIPUNCTURE: CPT

## 2021-03-17 RX ORDER — POLYETHYLENE GLYCOL 3350 17 G/17G
17 POWDER, FOR SOLUTION ORAL DAILY
COMMUNITY

## 2021-03-17 RX ORDER — HYDROCODONE BITARTRATE AND ACETAMINOPHEN 5; 325 MG/1; MG/1
.5-1 TABLET ORAL 2 TIMES DAILY PRN
COMMUNITY

## 2021-03-17 ASSESSMENT — FIBROSIS 4 INDEX: FIB4 SCORE: 6.14

## 2021-03-23 ENCOUNTER — ANESTHESIA (OUTPATIENT)
Dept: SURGERY | Facility: MEDICAL CENTER | Age: 81
End: 2021-03-23
Payer: MEDICARE

## 2021-03-23 ENCOUNTER — HOSPITAL ENCOUNTER (OUTPATIENT)
Facility: MEDICAL CENTER | Age: 81
End: 2021-03-25
Attending: SURGERY | Admitting: SURGERY
Payer: MEDICARE

## 2021-03-23 ENCOUNTER — ANESTHESIA EVENT (OUTPATIENT)
Dept: SURGERY | Facility: MEDICAL CENTER | Age: 81
End: 2021-03-23
Payer: MEDICARE

## 2021-03-23 DIAGNOSIS — G89.18 POST-OP PAIN: ICD-10-CM

## 2021-03-23 PROCEDURE — 501838 HCHG SUTURE GENERAL: Performed by: SURGERY

## 2021-03-23 PROCEDURE — 160002 HCHG RECOVERY MINUTES (STAT): Performed by: SURGERY

## 2021-03-23 PROCEDURE — 700111 HCHG RX REV CODE 636 W/ 250 OVERRIDE (IP): Performed by: ANESTHESIOLOGY

## 2021-03-23 PROCEDURE — 160036 HCHG PACU - EA ADDL 30 MINS PHASE I: Performed by: SURGERY

## 2021-03-23 PROCEDURE — 700102 HCHG RX REV CODE 250 W/ 637 OVERRIDE(OP): Performed by: ANESTHESIOLOGY

## 2021-03-23 PROCEDURE — 160039 HCHG SURGERY MINUTES - EA ADDL 1 MIN LEVEL 3: Performed by: SURGERY

## 2021-03-23 PROCEDURE — 160048 HCHG OR STATISTICAL LEVEL 1-5: Performed by: SURGERY

## 2021-03-23 PROCEDURE — 700105 HCHG RX REV CODE 258: Performed by: SURGERY

## 2021-03-23 PROCEDURE — 160009 HCHG ANES TIME/MIN: Performed by: SURGERY

## 2021-03-23 PROCEDURE — 700101 HCHG RX REV CODE 250: Performed by: SURGERY

## 2021-03-23 PROCEDURE — 160035 HCHG PACU - 1ST 60 MINS PHASE I: Performed by: SURGERY

## 2021-03-23 PROCEDURE — 700101 HCHG RX REV CODE 250: Performed by: ANESTHESIOLOGY

## 2021-03-23 PROCEDURE — A9270 NON-COVERED ITEM OR SERVICE: HCPCS | Performed by: SURGERY

## 2021-03-23 PROCEDURE — 160028 HCHG SURGERY MINUTES - 1ST 30 MINS LEVEL 3: Performed by: SURGERY

## 2021-03-23 PROCEDURE — G0378 HOSPITAL OBSERVATION PER HR: HCPCS

## 2021-03-23 PROCEDURE — 700102 HCHG RX REV CODE 250 W/ 637 OVERRIDE(OP): Performed by: SURGERY

## 2021-03-23 PROCEDURE — A9270 NON-COVERED ITEM OR SERVICE: HCPCS | Performed by: ANESTHESIOLOGY

## 2021-03-23 DEVICE — IMPLANTABLE DEVICE: Type: IMPLANTABLE DEVICE | Status: FUNCTIONAL

## 2021-03-23 RX ORDER — MEPERIDINE HYDROCHLORIDE 25 MG/ML
12.5 INJECTION INTRAMUSCULAR; INTRAVENOUS; SUBCUTANEOUS
Status: DISCONTINUED | OUTPATIENT
Start: 2021-03-23 | End: 2021-03-23 | Stop reason: HOSPADM

## 2021-03-23 RX ORDER — SUMATRIPTAN 25 MG/1
25 TABLET, FILM COATED ORAL
Status: DISCONTINUED | OUTPATIENT
Start: 2021-03-23 | End: 2021-03-25 | Stop reason: HOSPADM

## 2021-03-23 RX ORDER — DIPHENHYDRAMINE HYDROCHLORIDE 50 MG/ML
12.5 INJECTION INTRAMUSCULAR; INTRAVENOUS
Status: DISCONTINUED | OUTPATIENT
Start: 2021-03-23 | End: 2021-03-23 | Stop reason: HOSPADM

## 2021-03-23 RX ORDER — VITAMIN B COMPLEX
1000 TABLET ORAL DAILY
Status: DISCONTINUED | OUTPATIENT
Start: 2021-03-24 | End: 2021-03-25 | Stop reason: HOSPADM

## 2021-03-23 RX ORDER — ZOLPIDEM TARTRATE 5 MG/1
10 TABLET ORAL NIGHTLY PRN
Status: DISCONTINUED | OUTPATIENT
Start: 2021-03-23 | End: 2021-03-25 | Stop reason: HOSPADM

## 2021-03-23 RX ORDER — HYDROMORPHONE HYDROCHLORIDE 1 MG/ML
0.4 INJECTION, SOLUTION INTRAMUSCULAR; INTRAVENOUS; SUBCUTANEOUS
Status: DISCONTINUED | OUTPATIENT
Start: 2021-03-23 | End: 2021-03-23 | Stop reason: HOSPADM

## 2021-03-23 RX ORDER — OXYCODONE HYDROCHLORIDE 5 MG/1
5 TABLET ORAL EVERY 4 HOURS PRN
Status: DISCONTINUED | OUTPATIENT
Start: 2021-03-23 | End: 2021-03-25 | Stop reason: HOSPADM

## 2021-03-23 RX ORDER — HALOPERIDOL 5 MG/ML
1 INJECTION INTRAMUSCULAR
Status: DISCONTINUED | OUTPATIENT
Start: 2021-03-23 | End: 2021-03-23 | Stop reason: HOSPADM

## 2021-03-23 RX ORDER — DEXTROSE MONOHYDRATE, SODIUM CHLORIDE, AND POTASSIUM CHLORIDE 50; 1.49; 4.5 G/1000ML; G/1000ML; G/1000ML
INJECTION, SOLUTION INTRAVENOUS EVERY 6 HOURS
Status: COMPLETED | OUTPATIENT
Start: 2021-03-23 | End: 2021-03-24

## 2021-03-23 RX ORDER — CELECOXIB 100 MG/1
100 CAPSULE ORAL 2 TIMES DAILY
Status: DISCONTINUED | OUTPATIENT
Start: 2021-03-23 | End: 2021-03-25 | Stop reason: HOSPADM

## 2021-03-23 RX ORDER — SODIUM CHLORIDE, SODIUM LACTATE, POTASSIUM CHLORIDE, CALCIUM CHLORIDE 600; 310; 30; 20 MG/100ML; MG/100ML; MG/100ML; MG/100ML
INJECTION, SOLUTION INTRAVENOUS CONTINUOUS
Status: DISCONTINUED | OUTPATIENT
Start: 2021-03-23 | End: 2021-03-23

## 2021-03-23 RX ORDER — ROCURONIUM BROMIDE 10 MG/ML
INJECTION, SOLUTION INTRAVENOUS PRN
Status: DISCONTINUED | OUTPATIENT
Start: 2021-03-23 | End: 2021-03-23 | Stop reason: SURG

## 2021-03-23 RX ORDER — BUDESONIDE AND FORMOTEROL FUMARATE DIHYDRATE 160; 4.5 UG/1; UG/1
2 AEROSOL RESPIRATORY (INHALATION) 2 TIMES DAILY
Status: DISCONTINUED | OUTPATIENT
Start: 2021-03-23 | End: 2021-03-25 | Stop reason: HOSPADM

## 2021-03-23 RX ORDER — POLYETHYLENE GLYCOL 3350 17 G/17G
1 POWDER, FOR SOLUTION ORAL DAILY
Status: DISCONTINUED | OUTPATIENT
Start: 2021-03-24 | End: 2021-03-25 | Stop reason: HOSPADM

## 2021-03-23 RX ORDER — SODIUM CHLORIDE, SODIUM LACTATE, POTASSIUM CHLORIDE, CALCIUM CHLORIDE 600; 310; 30; 20 MG/100ML; MG/100ML; MG/100ML; MG/100ML
INJECTION, SOLUTION INTRAVENOUS CONTINUOUS
Status: DISCONTINUED | OUTPATIENT
Start: 2021-03-23 | End: 2021-03-23 | Stop reason: HOSPADM

## 2021-03-23 RX ORDER — DOCUSATE SODIUM 100 MG/1
100 CAPSULE, LIQUID FILLED ORAL 2 TIMES DAILY
Status: DISCONTINUED | OUTPATIENT
Start: 2021-03-23 | End: 2021-03-25 | Stop reason: HOSPADM

## 2021-03-23 RX ORDER — OXYCODONE HCL 5 MG/5 ML
10 SOLUTION, ORAL ORAL
Status: COMPLETED | OUTPATIENT
Start: 2021-03-23 | End: 2021-03-23

## 2021-03-23 RX ORDER — DIGOXIN 250 MCG
125 TABLET ORAL
Status: DISCONTINUED | OUTPATIENT
Start: 2021-03-23 | End: 2021-03-25 | Stop reason: HOSPADM

## 2021-03-23 RX ORDER — ONDANSETRON 2 MG/ML
INJECTION INTRAMUSCULAR; INTRAVENOUS PRN
Status: DISCONTINUED | OUTPATIENT
Start: 2021-03-23 | End: 2021-03-23 | Stop reason: SURG

## 2021-03-23 RX ORDER — ACETAMINOPHEN 500 MG
1000 TABLET ORAL EVERY 6 HOURS
Status: DISCONTINUED | OUTPATIENT
Start: 2021-03-23 | End: 2021-03-25 | Stop reason: HOSPADM

## 2021-03-23 RX ORDER — ATORVASTATIN CALCIUM 20 MG/1
20 TABLET, FILM COATED ORAL DAILY
Status: DISCONTINUED | OUTPATIENT
Start: 2021-03-23 | End: 2021-03-25 | Stop reason: HOSPADM

## 2021-03-23 RX ORDER — HYDROMORPHONE HYDROCHLORIDE 1 MG/ML
0.2 INJECTION, SOLUTION INTRAMUSCULAR; INTRAVENOUS; SUBCUTANEOUS
Status: DISCONTINUED | OUTPATIENT
Start: 2021-03-23 | End: 2021-03-23 | Stop reason: HOSPADM

## 2021-03-23 RX ORDER — ONDANSETRON 2 MG/ML
4 INJECTION INTRAMUSCULAR; INTRAVENOUS
Status: DISCONTINUED | OUTPATIENT
Start: 2021-03-23 | End: 2021-03-23 | Stop reason: HOSPADM

## 2021-03-23 RX ORDER — DEXAMETHASONE SODIUM PHOSPHATE 4 MG/ML
INJECTION, SOLUTION INTRA-ARTICULAR; INTRALESIONAL; INTRAMUSCULAR; INTRAVENOUS; SOFT TISSUE PRN
Status: DISCONTINUED | OUTPATIENT
Start: 2021-03-23 | End: 2021-03-23 | Stop reason: SURG

## 2021-03-23 RX ORDER — OXYCODONE HCL 5 MG/5 ML
5 SOLUTION, ORAL ORAL
Status: COMPLETED | OUTPATIENT
Start: 2021-03-23 | End: 2021-03-23

## 2021-03-23 RX ORDER — CEFAZOLIN SODIUM 1 G/3ML
INJECTION, POWDER, FOR SOLUTION INTRAMUSCULAR; INTRAVENOUS PRN
Status: DISCONTINUED | OUTPATIENT
Start: 2021-03-23 | End: 2021-03-23 | Stop reason: SURG

## 2021-03-23 RX ORDER — PHENYLEPHRINE HCL IN 0.9% NACL 0.5 MG/5ML
SYRINGE (ML) INTRAVENOUS PRN
Status: DISCONTINUED | OUTPATIENT
Start: 2021-03-23 | End: 2021-03-23 | Stop reason: SURG

## 2021-03-23 RX ORDER — BUPIVACAINE HYDROCHLORIDE AND EPINEPHRINE 5; 5 MG/ML; UG/ML
INJECTION, SOLUTION EPIDURAL; INTRACAUDAL; PERINEURAL
Status: DISCONTINUED | OUTPATIENT
Start: 2021-03-23 | End: 2021-03-23 | Stop reason: HOSPADM

## 2021-03-23 RX ORDER — ONDANSETRON 2 MG/ML
4 INJECTION INTRAMUSCULAR; INTRAVENOUS EVERY 4 HOURS PRN
Status: DISCONTINUED | OUTPATIENT
Start: 2021-03-23 | End: 2021-03-25 | Stop reason: HOSPADM

## 2021-03-23 RX ORDER — HYDROMORPHONE HYDROCHLORIDE 1 MG/ML
0.6 INJECTION, SOLUTION INTRAMUSCULAR; INTRAVENOUS; SUBCUTANEOUS
Status: DISCONTINUED | OUTPATIENT
Start: 2021-03-23 | End: 2021-03-23 | Stop reason: HOSPADM

## 2021-03-23 RX ORDER — GABAPENTIN 100 MG/1
100 CAPSULE ORAL 3 TIMES DAILY
Status: DISCONTINUED | OUTPATIENT
Start: 2021-03-23 | End: 2021-03-25 | Stop reason: HOSPADM

## 2021-03-23 RX ADMIN — EPHEDRINE SULFATE 10 MG: 50 INJECTION, SOLUTION INTRAVENOUS at 16:21

## 2021-03-23 RX ADMIN — PROPOFOL 150 MG: 10 INJECTION, EMULSION INTRAVENOUS at 15:15

## 2021-03-23 RX ADMIN — SODIUM CHLORIDE, POTASSIUM CHLORIDE, SODIUM LACTATE AND CALCIUM CHLORIDE: 600; 310; 30; 20 INJECTION, SOLUTION INTRAVENOUS at 14:55

## 2021-03-23 RX ADMIN — LIDOCAINE HYDROCHLORIDE 0.5 ML: 10 INJECTION, SOLUTION EPIDURAL; INFILTRATION; INTRACAUDAL at 14:55

## 2021-03-23 RX ADMIN — OXYCODONE HYDROCHLORIDE 10 MG: 5 SOLUTION ORAL at 18:04

## 2021-03-23 RX ADMIN — EPHEDRINE SULFATE 10 MG: 50 INJECTION, SOLUTION INTRAVENOUS at 15:50

## 2021-03-23 RX ADMIN — ONDANSETRON 4 MG: 2 INJECTION INTRAMUSCULAR; INTRAVENOUS at 17:44

## 2021-03-23 RX ADMIN — FENTANYL CITRATE 50 MCG: 50 INJECTION, SOLUTION INTRAMUSCULAR; INTRAVENOUS at 18:42

## 2021-03-23 RX ADMIN — HYDROMORPHONE HYDROCHLORIDE 0.6 MG: 1 INJECTION, SOLUTION INTRAMUSCULAR; INTRAVENOUS; SUBCUTANEOUS at 18:32

## 2021-03-23 RX ADMIN — FENTANYL CITRATE 100 MCG: 50 INJECTION, SOLUTION INTRAMUSCULAR; INTRAVENOUS at 15:14

## 2021-03-23 RX ADMIN — ACETAMINOPHEN 1000 MG: 500 TABLET, FILM COATED ORAL at 21:35

## 2021-03-23 RX ADMIN — EPHEDRINE SULFATE 5 MG: 50 INJECTION, SOLUTION INTRAVENOUS at 17:00

## 2021-03-23 RX ADMIN — HYDROMORPHONE HYDROCHLORIDE 0.4 MG: 1 INJECTION, SOLUTION INTRAMUSCULAR; INTRAVENOUS; SUBCUTANEOUS at 18:47

## 2021-03-23 RX ADMIN — FENTANYL CITRATE 50 MCG: 50 INJECTION, SOLUTION INTRAMUSCULAR; INTRAVENOUS at 18:25

## 2021-03-23 RX ADMIN — DEXAMETHASONE SODIUM PHOSPHATE 4 MG: 4 INJECTION, SOLUTION INTRA-ARTICULAR; INTRALESIONAL; INTRAMUSCULAR; INTRAVENOUS; SOFT TISSUE at 17:44

## 2021-03-23 RX ADMIN — Medication 100 MCG: at 16:21

## 2021-03-23 RX ADMIN — BUDESONIDE AND FORMOTEROL FUMARATE DIHYDRATE 2 PUFF: 160; 4.5 AEROSOL RESPIRATORY (INHALATION) at 21:37

## 2021-03-23 RX ADMIN — CELECOXIB 100 MG: 100 CAPSULE ORAL at 21:36

## 2021-03-23 RX ADMIN — DOCUSATE SODIUM 100 MG: 100 CAPSULE, LIQUID FILLED ORAL at 21:36

## 2021-03-23 RX ADMIN — ATORVASTATIN CALCIUM 20 MG: 20 TABLET, FILM COATED ORAL at 21:36

## 2021-03-23 RX ADMIN — POTASSIUM CHLORIDE, DEXTROSE MONOHYDRATE AND SODIUM CHLORIDE: 150; 5; 450 INJECTION, SOLUTION INTRAVENOUS at 21:39

## 2021-03-23 RX ADMIN — HYDROMORPHONE HYDROCHLORIDE 0.6 MG: 1 INJECTION, SOLUTION INTRAMUSCULAR; INTRAVENOUS; SUBCUTANEOUS at 18:13

## 2021-03-23 RX ADMIN — FENTANYL CITRATE 50 MCG: 50 INJECTION, SOLUTION INTRAMUSCULAR; INTRAVENOUS at 18:09

## 2021-03-23 RX ADMIN — Medication 100 MCG: at 15:48

## 2021-03-23 RX ADMIN — ROCURONIUM BROMIDE 50 MG: 10 INJECTION, SOLUTION INTRAVENOUS at 15:15

## 2021-03-23 RX ADMIN — HYDROMORPHONE HYDROCHLORIDE 0.4 MG: 1 INJECTION, SOLUTION INTRAMUSCULAR; INTRAVENOUS; SUBCUTANEOUS at 18:22

## 2021-03-23 RX ADMIN — GABAPENTIN 100 MG: 100 CAPSULE ORAL at 21:36

## 2021-03-23 RX ADMIN — POVIDONE IODINE 15 ML: 100 SOLUTION TOPICAL at 14:33

## 2021-03-23 RX ADMIN — CEFAZOLIN 2 G: 330 INJECTION, POWDER, FOR SOLUTION INTRAMUSCULAR; INTRAVENOUS at 15:37

## 2021-03-23 RX ADMIN — SODIUM CHLORIDE, POTASSIUM CHLORIDE, SODIUM LACTATE AND CALCIUM CHLORIDE: 600; 310; 30; 20 INJECTION, SOLUTION INTRAVENOUS at 16:59

## 2021-03-23 RX ADMIN — HALOPERIDOL LACTATE 1 MG: 5 INJECTION, SOLUTION INTRAMUSCULAR at 19:01

## 2021-03-23 RX ADMIN — FENTANYL CITRATE 50 MCG: 50 INJECTION, SOLUTION INTRAMUSCULAR; INTRAVENOUS at 15:38

## 2021-03-23 RX ADMIN — FENTANYL CITRATE 50 MCG: 50 INJECTION, SOLUTION INTRAMUSCULAR; INTRAVENOUS at 17:53

## 2021-03-23 RX ADMIN — FENTANYL CITRATE 50 MCG: 50 INJECTION, SOLUTION INTRAMUSCULAR; INTRAVENOUS at 16:43

## 2021-03-23 ASSESSMENT — PAIN DESCRIPTION - PAIN TYPE
TYPE: SURGICAL PAIN

## 2021-03-23 ASSESSMENT — PAIN SCALES - GENERAL: PAIN_LEVEL: 3

## 2021-03-23 ASSESSMENT — FIBROSIS 4 INDEX: FIB4 SCORE: 1.93

## 2021-03-23 NOTE — ANESTHESIA PROCEDURE NOTES
Airway    Date/Time: 3/23/2021 3:14 PM  Performed by: Haider Gonzalez M.D.  Authorized by: Haider Gonzalez M.D.     Location:  OR  Urgency:  Elective  Indications for Airway Management:  Anesthesia      Spontaneous Ventilation: absent    Sedation Level:  Deep  Preoxygenated: Yes    Patient Position:  Sniffing  Final Airway Type:  Endotracheal airway  Final Endotracheal Airway:  ETT  Cuffed: Yes    Technique Used for Successful ETT Placement:  Direct laryngoscopy    Insertion Site:  Oral  Blade Type:  Kenzie  Laryngoscope Blade/Videolaryngoscope Blade Size:  3  ETT Size (mm):  7.0  Measured from:  Teeth  ETT to Teeth (cm):  22  Placement Verified by: auscultation and capnometry    Cormack-Lehane Classification:  Grade I - full view of glottis  Number of Attempts at Approach:  1

## 2021-03-23 NOTE — OR NURSING
Patient arrived to the OR with bilateral hearing aids and eye glasses. Removed hearing aids, placed in denture cup with patient label. Removed eye glasses, placed in hospital provided case with patient label. Hearing aids and eye glasses are in patient chart and will follow patient to PACU.

## 2021-03-23 NOTE — PROGRESS NOTES
Patient in pre-op, assessment completed, patient and Son Yair updated on plan of care, all questions answered, no further needs at this time, call light within reach.

## 2021-03-23 NOTE — ANESTHESIA PREPROCEDURE EVALUATION
Relevant Problems   PULMONARY   (+) Asthma   (+) History of 2019 novel coronavirus disease (COVID-19)      NEURO   (+) Headache   (+) History of 2019 novel coronavirus disease (COVID-19)      CARDIAC   (+) Arrhythmia   (+) Paroxysmal atrial fibrillation (HCC)      GI   (+) Hiatal hernia       Physical Exam    Airway   Mallampati: II  TM distance: >3 FB  Neck ROM: full       Cardiovascular - normal exam  Rhythm: regular  Rate: normal  (-) murmur     Dental - normal exam           Pulmonary - normal exam  Breath sounds clear to auscultation     Abdominal    Neurological - normal exam                 Anesthesia Plan    ASA 3       Plan - general       Airway plan will be ETT          Induction: intravenous    Postoperative Plan: Postoperative administration of opioids is intended.    Pertinent diagnostic labs and testing reviewed    Informed Consent:    Anesthetic plan and risks discussed with patient.    Use of blood products discussed with: patient whom consented to blood products.

## 2021-03-24 LAB
ANION GAP SERPL CALC-SCNC: 7 MMOL/L (ref 7–16)
BASOPHILS # BLD AUTO: 0.2 % (ref 0–1.8)
BASOPHILS # BLD: 0.02 K/UL (ref 0–0.12)
BUN SERPL-MCNC: 13 MG/DL (ref 8–22)
CALCIUM SERPL-MCNC: 8.9 MG/DL (ref 8.5–10.5)
CHLORIDE SERPL-SCNC: 107 MMOL/L (ref 96–112)
CO2 SERPL-SCNC: 26 MMOL/L (ref 20–33)
CREAT SERPL-MCNC: 0.72 MG/DL (ref 0.5–1.4)
EOSINOPHIL # BLD AUTO: 0 K/UL (ref 0–0.51)
EOSINOPHIL NFR BLD: 0 % (ref 0–6.9)
ERYTHROCYTE [DISTWIDTH] IN BLOOD BY AUTOMATED COUNT: 60.6 FL (ref 35.9–50)
GLUCOSE SERPL-MCNC: 202 MG/DL (ref 65–99)
HCT VFR BLD AUTO: 37.2 % (ref 37–47)
HGB BLD-MCNC: 12.1 G/DL (ref 12–16)
IMM GRANULOCYTES # BLD AUTO: 0.04 K/UL (ref 0–0.11)
IMM GRANULOCYTES NFR BLD AUTO: 0.5 % (ref 0–0.9)
LYMPHOCYTES # BLD AUTO: 0.3 K/UL (ref 1–4.8)
LYMPHOCYTES NFR BLD: 3.4 % (ref 22–41)
MCH RBC QN AUTO: 33.9 PG (ref 27–33)
MCHC RBC AUTO-ENTMCNC: 32.5 G/DL (ref 33.6–35)
MCV RBC AUTO: 104.2 FL (ref 81.4–97.8)
MONOCYTES # BLD AUTO: 0.21 K/UL (ref 0–0.85)
MONOCYTES NFR BLD AUTO: 2.4 % (ref 0–13.4)
NEUTROPHILS # BLD AUTO: 8.23 K/UL (ref 2–7.15)
NEUTROPHILS NFR BLD: 93.5 % (ref 44–72)
NRBC # BLD AUTO: 0 K/UL
NRBC BLD-RTO: 0 /100 WBC
PLATELET # BLD AUTO: 138 K/UL (ref 164–446)
PMV BLD AUTO: 9.5 FL (ref 9–12.9)
POTASSIUM SERPL-SCNC: 4.9 MMOL/L (ref 3.6–5.5)
RBC # BLD AUTO: 3.57 M/UL (ref 4.2–5.4)
SODIUM SERPL-SCNC: 140 MMOL/L (ref 135–145)
WBC # BLD AUTO: 8.8 K/UL (ref 4.8–10.8)

## 2021-03-24 PROCEDURE — G0378 HOSPITAL OBSERVATION PER HR: HCPCS

## 2021-03-24 PROCEDURE — 700111 HCHG RX REV CODE 636 W/ 250 OVERRIDE (IP): Performed by: SURGERY

## 2021-03-24 PROCEDURE — 80048 BASIC METABOLIC PNL TOTAL CA: CPT

## 2021-03-24 PROCEDURE — 96372 THER/PROPH/DIAG INJ SC/IM: CPT

## 2021-03-24 PROCEDURE — A9270 NON-COVERED ITEM OR SERVICE: HCPCS | Performed by: SURGERY

## 2021-03-24 PROCEDURE — 36415 COLL VENOUS BLD VENIPUNCTURE: CPT

## 2021-03-24 PROCEDURE — 700101 HCHG RX REV CODE 250: Performed by: SURGERY

## 2021-03-24 PROCEDURE — 85025 COMPLETE CBC W/AUTO DIFF WBC: CPT

## 2021-03-24 PROCEDURE — 700102 HCHG RX REV CODE 250 W/ 637 OVERRIDE(OP): Performed by: SURGERY

## 2021-03-24 RX ADMIN — OXYCODONE 5 MG: 5 TABLET ORAL at 12:29

## 2021-03-24 RX ADMIN — DIGOXIN 125 MCG: 250 TABLET ORAL at 12:34

## 2021-03-24 RX ADMIN — CELECOXIB 100 MG: 100 CAPSULE ORAL at 17:52

## 2021-03-24 RX ADMIN — TIOTROPIUM BROMIDE INHALATION SPRAY 2.5 MCG: 3.12 SPRAY, METERED RESPIRATORY (INHALATION) at 07:35

## 2021-03-24 RX ADMIN — ZOLPIDEM TARTRATE 10 MG: 5 TABLET ORAL at 22:07

## 2021-03-24 RX ADMIN — POLYETHYLENE GLYCOL 3350 1 PACKET: 17 POWDER, FOR SOLUTION ORAL at 06:41

## 2021-03-24 RX ADMIN — GABAPENTIN 100 MG: 100 CAPSULE ORAL at 12:29

## 2021-03-24 RX ADMIN — ACETAMINOPHEN 1000 MG: 500 TABLET, FILM COATED ORAL at 12:30

## 2021-03-24 RX ADMIN — GABAPENTIN 100 MG: 100 CAPSULE ORAL at 17:52

## 2021-03-24 RX ADMIN — OXYCODONE 5 MG: 5 TABLET ORAL at 22:07

## 2021-03-24 RX ADMIN — CELECOXIB 100 MG: 100 CAPSULE ORAL at 06:41

## 2021-03-24 RX ADMIN — GABAPENTIN 100 MG: 100 CAPSULE ORAL at 06:41

## 2021-03-24 RX ADMIN — DOCUSATE SODIUM 100 MG: 100 CAPSULE, LIQUID FILLED ORAL at 17:52

## 2021-03-24 RX ADMIN — BUDESONIDE AND FORMOTEROL FUMARATE DIHYDRATE 2 PUFF: 160; 4.5 AEROSOL RESPIRATORY (INHALATION) at 06:41

## 2021-03-24 RX ADMIN — DOCUSATE SODIUM 100 MG: 100 CAPSULE, LIQUID FILLED ORAL at 06:41

## 2021-03-24 RX ADMIN — ATORVASTATIN CALCIUM 20 MG: 20 TABLET, FILM COATED ORAL at 17:52

## 2021-03-24 RX ADMIN — ENOXAPARIN SODIUM 40 MG: 40 INJECTION SUBCUTANEOUS at 08:07

## 2021-03-24 RX ADMIN — OXYCODONE 5 MG: 5 TABLET ORAL at 17:52

## 2021-03-24 RX ADMIN — ACETAMINOPHEN 1000 MG: 500 TABLET, FILM COATED ORAL at 06:41

## 2021-03-24 RX ADMIN — ACETAMINOPHEN 1000 MG: 500 TABLET, FILM COATED ORAL at 17:52

## 2021-03-24 RX ADMIN — Medication 1000 UNITS: at 06:41

## 2021-03-24 RX ADMIN — BUDESONIDE AND FORMOTEROL FUMARATE DIHYDRATE 2 PUFF: 160; 4.5 AEROSOL RESPIRATORY (INHALATION) at 18:46

## 2021-03-24 RX ADMIN — OXYCODONE 5 MG: 5 TABLET ORAL at 08:07

## 2021-03-24 RX ADMIN — OXYCODONE 5 MG: 5 TABLET ORAL at 03:43

## 2021-03-24 ASSESSMENT — LIFESTYLE VARIABLES
TOTAL SCORE: 0
AVERAGE NUMBER OF DAYS PER WEEK YOU HAVE A DRINK CONTAINING ALCOHOL: 1
ALCOHOL_USE: YES
ON A TYPICAL DAY WHEN YOU DRINK ALCOHOL HOW MANY DRINKS DO YOU HAVE: 1
EVER HAD A DRINK FIRST THING IN THE MORNING TO STEADY YOUR NERVES TO GET RID OF A HANGOVER: NO
HAVE YOU EVER FELT YOU SHOULD CUT DOWN ON YOUR DRINKING: NO
CONSUMPTION TOTAL: NEGATIVE
EVER FELT BAD OR GUILTY ABOUT YOUR DRINKING: NO
HOW MANY TIMES IN THE PAST YEAR HAVE YOU HAD 5 OR MORE DRINKS IN A DAY: 0
HAVE PEOPLE ANNOYED YOU BY CRITICIZING YOUR DRINKING: NO

## 2021-03-24 ASSESSMENT — PATIENT HEALTH QUESTIONNAIRE - PHQ9
9. THOUGHTS THAT YOU WOULD BE BETTER OFF DEAD, OR OF HURTING YOURSELF: NOT AT ALL
8. MOVING OR SPEAKING SO SLOWLY THAT OTHER PEOPLE COULD HAVE NOTICED. OR THE OPPOSITE, BEING SO FIGETY OR RESTLESS THAT YOU HAVE BEEN MOVING AROUND A LOT MORE THAN USUAL: NOT AT ALL
5. POOR APPETITE OR OVEREATING: SEVERAL DAYS
6. FEELING BAD ABOUT YOURSELF - OR THAT YOU ARE A FAILURE OR HAVE LET YOURSELF OR YOUR FAMILY DOWN: NOT AL ALL
1. LITTLE INTEREST OR PLEASURE IN DOING THINGS: SEVERAL DAYS
SUM OF ALL RESPONSES TO PHQ QUESTIONS 1-9: 4
2. FEELING DOWN, DEPRESSED, IRRITABLE, OR HOPELESS: SEVERAL DAYS
3. TROUBLE FALLING OR STAYING ASLEEP OR SLEEPING TOO MUCH: NOT AT ALL
7. TROUBLE CONCENTRATING ON THINGS, SUCH AS READING THE NEWSPAPER OR WATCHING TELEVISION: NOT AT ALL
4. FEELING TIRED OR HAVING LITTLE ENERGY: SEVERAL DAYS
SUM OF ALL RESPONSES TO PHQ9 QUESTIONS 1 AND 2: 2

## 2021-03-24 ASSESSMENT — COGNITIVE AND FUNCTIONAL STATUS - GENERAL
STANDING UP FROM CHAIR USING ARMS: A LITTLE
DAILY ACTIVITIY SCORE: 23
SUGGESTED CMS G CODE MODIFIER DAILY ACTIVITY: CI
MOBILITY SCORE: 20
DRESSING REGULAR LOWER BODY CLOTHING: A LITTLE
WALKING IN HOSPITAL ROOM: A LITTLE
MOVING TO AND FROM BED TO CHAIR: A LITTLE
SUGGESTED CMS G CODE MODIFIER MOBILITY: CJ
CLIMB 3 TO 5 STEPS WITH RAILING: A LITTLE

## 2021-03-24 ASSESSMENT — PAIN DESCRIPTION - PAIN TYPE
TYPE: SURGICAL PAIN

## 2021-03-24 NOTE — PROGRESS NOTES
Assessment complete.  A&O x 4. Patient calls appropriately.  Patient ambulates with standby assist. Bed alarm off.   Patient has 3/10 pain. Pain managed with prescribed medications.  Denies N&V. Tolerating low fiber GI soft diet.  Gluteal cleft incision dry and intact, scant old drainage present.  + void into francois, - flatus, - BM.  Patient denies SOB.  SCD's on.    Review plan with of care with patient. Call light and personal belongings with in reach. Hourly rounding in place. All needs met at this time.

## 2021-03-24 NOTE — OP REPORT
Operative Report     Date of Procedure:  3/23/2021     PreOp Diagnosis:   Perineal hernia     PostOp Diagnosis:   Same     Procedure(s):  Perineal approach for perineal hernia repair with biologic mesh    Surgeon:  Dulce Jeter M.D.     Assistant:  MD Dr. Da Willoughby was essential as an assistant throughout the entirety of the case.  He assisted with dissection of the hernia sac, reduction of the hernia contents, placement of the mesh and closure of the wound.    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  The rectus flap failed postoperatively and the patient developed a perineal hernia.  She was brought to the operating room for repair.     Operative Procedure:  The patient was brought to the operating suite and remained in the supine position on her stretcher.  She was placed on cardiopulmonary monitors and general endotracheal anesthesia was induced.  A Loving catheter was placed under sterile technique.  She was then rolled into the prone position on pillows.  The positioning of the arms was ensured and padded and were tucked at the sides.  The legs were padded and placed with the knees and hip slightly flexed.  Bilateral SCDs were applied.  Both breasts were in good position on the pillow as well as her colostomy.  The bed was flexed and placed in Trendelenburg.  The perineum was prepped and draped in the usual sterile fashion, including a vaginal prep.  The preoperative safety checklist was performed.  Ancef was administered.    The midline  was marked out with a marking pen.  The location of the hernia was identified and palpated and there was no evidence of contents within the hernia sac.  This was combined with a vaginal exam to again ensure that there was no bowel adherent to the hernia sac at the planned point of incision.  A scalpel was used to incise the skin overlying the perineal hernia in the midline.  It was deepened down to the hernia sac.  This was opened sharply with Metzenbaum scissors.  The cecum was identified.  The hernia sac was dissected away from the surrounding subcutaneous tissue.  The hernia was noted to extend over the entire length of the vagina right down to the skin.  The sac was taken off all of this area taking care to avoid injury to the vagina.  The sac was noted to be adherent to some of the cecum and this was taken down sharply.  The cecum was noted to be free at this time and could be passed further into the pelvis.  The sac was cleared circumferentially.  The edges of the pelvic floor were palpated.  The rectus flap was noted on the patient's left side and was approximately 1 cm in diameter and a few centimeters long and was still viable for that part.  There was a defect just lateral to that.    A Strattice Firm ([id number]) biologic mesh was fashioned to allow for at least 4-5 cm of overlap.  The rough side of the mesh was placed away from the underlying bowel.  #1 Prolene sutures were placed in an interrupted fashion beginning posteriorly on either side of the coccyx.  These were continued around circumferentially taking bites of the pelvic floor musculature.  At the ischium the bites were taken slightly deeper for a strong adherent bite.  When the rectus flap was encountered.  It was used as part of the structural integrity of the pelvic floor.  The defect it was noted to be lateral to it was made sure to be covered and stitches were placed below it as well to exclude it.  Anteriorly the apex of the vagina appeared  to be adherent to the bladder.  A few superficial sutures were placed at the apex of the vagina to close the defect.  An extra long part of the mesh of approximately 7 cm was used for overlap in that area.  The vagina was palpated and there was no evidence of stitches within the vaginal lumen.    The remaining border of the hernia sac was then reapproximated using 2-0 Vicryl sutures.  The overlying subcutaneous tissue was further closed in layers using the same Vicryl.  The skin was closed with 3-0 Vicryl in an interrupted subcuticular fashion.  30 cc of 0.5% Marcaine with epinephrine was injected around the incision site.    Dry dressing was applied.  The patient was stable throughout the case.  She was stable and extubated and brought to recovery room.

## 2021-03-24 NOTE — PROGRESS NOTES
"Surgical Progress Note:    POD1 - Perineal approach for perineal hernia repair with biologic mesh    S:  - Feeling well  - Tolerating diet, no n/v  - Stoma functioning  - Pain well managed  - Ambulating  - No chest pain, extremity pain, or difficulty breathing    Vitals:  /55   Pulse 84   Temp 36.3 °C (97.3 °F) (Temporal)   Resp 16   Ht 1.6 m (5' 3\")   Wt 50.8 kg (111 lb 15.9 oz)   LMP 01/01/1983   SpO2 94%   BMI 19.84 kg/m²     I/O:     Intake/Output Summary (Last 24 hours) at 3/24/2021 0927  Last data filed at 3/24/2021 0800  Gross per 24 hour   Intake 2470 ml   Output 2775 ml   Net -305 ml       P/E:  Constitutional: Appears well, no distress  Head/Neck: Normocephalic, atraumatic, neck supple  Cardiovascular: Normal rate and rhythm  Pulmonary/Chest: Normal respiratory effort, no wheezes  Abdominal: Soft, nontender, no distension  Musculoskeletal: No deficits  Neurological:  Alert, oriented  Skin:  Warm and dry, no erythema  Extremities: No edema, no evidence of DVT  Perineum: mildly tender, incision clean/dry/intact    Labs:  Recent Labs     03/24/21  0141   WBC 8.8   RBC 3.57*   HEMOGLOBIN 12.1   HEMATOCRIT 37.2   .2*   MCH 33.9*   RDW 60.6*   PLATELETCT 138*   MPV 9.5   NEUTSPOLYS 93.50*   LYMPHOCYTES 3.40*   MONOCYTES 2.40   EOSINOPHILS 0.00   BASOPHILS 0.20     Recent Labs     03/24/21  0141   SODIUM 140   POTASSIUM 4.9   CHLORIDE 107   CO2 26   GLUCOSE 202*   BUN 13         A/P:   - Remove francois tomorrow at 6am  - Advance diet as tolerated to soft/low residue  - Incentive spirometry q1h while awake  - Ambulate at least QID, up in chair for all meals  - Multimodal analgesia, try to avoid narcotics  - Lovenox (home Apixaban on hold - restart when going home)/SCDs  - Possible DC home tomorrow      Dulce Jeter M.D.  [facility]  [phone]  "

## 2021-03-24 NOTE — PROGRESS NOTES
2 RN skin check with ABIMAEL Tapia.    Rashy erythema to inferior occiput, blanching & EFREN.  Sacrum pink and blanching.  Island dressing to gluteal cleft, scant drainage present.  BLE varicose veins.    All other skin intact. No signs of skin breakdown over bony prominences.    Devices in place: oxymask, PIV, SCDs.

## 2021-03-24 NOTE — OR NURSING
1800: Pt arrived from OR, handoff received from anesthesiologist and RN. Dressing to posterior buttocks with island dressing C/D/I. Loving in place to gravity draining C/D/I.    1804: Pt medicated with oral pain medications per anesthesia orders.     1836: Call made to patient's son to update patient status.     1924: Handoff to Amelia Anderson on T4. Transport requested, O2 tank level above half full, pt transported on 10 L 02. Tubing connected to device and to patient and stored safely in kaur prior to transport.

## 2021-03-24 NOTE — ANESTHESIA POSTPROCEDURE EVALUATION
Patient: Josy Grove    Procedure Summary     Date: 03/23/21 Room / Location: [facility] 08 / SURGERY [facility]    Anesthesia Start: 1511 Anesthesia Stop: 1803    Procedure: PROCEDURE, PERINEUM- FOR PERINEAL APPROACH OF REPAIR OF PERINEAL HERNIA WITH MESH. (N/A Perineum) Diagnosis: (ANAL CANCER)    Surgeons: Dulce Jeter M.D. Responsible Provider: Haider Gonzalez M.D.    Anesthesia Type: general ASA Status: 3          Final Anesthesia Type: general  Last vitals  BP   Blood Pressure : 135/77    Temp   35.8 °C (96.5 °F)    Pulse   85   Resp   16    SpO2   95 %      Anesthesia Post Evaluation    Patient location during evaluation: PACU  Patient participation: complete - patient participated  Level of consciousness: awake and alert  Pain score: 3    Airway patency: patent  Anesthetic complications: no  Cardiovascular status: hemodynamically stable  Respiratory status: acceptable  Hydration status: euvolemic    PONV: none          No complications documented.     Nurse Pain Score: 4 (NPRS)

## 2021-03-24 NOTE — ANESTHESIA TIME REPORT
Anesthesia Start and Stop Event Times     Date Time Event    3/23/2021 1500 Ready for Procedure     1511 Anesthesia Start     1803 Anesthesia Stop        Responsible Staff  03/23/21    Name Role Begin End    Haider Gonzalez M.D. Anesth 1511 1803        Preop Diagnosis (Free Text):  Pre-op Diagnosis     ANAL CANCER        Preop Diagnosis (Codes):    Post op Diagnosis  Hernia of pelvic floor  Perianal hernia repair with mesh    Premium Reason  A. 3PM - 7AM    Comments: PREMIUM JARED

## 2021-03-24 NOTE — CARE PLAN
Problem: Pain Management  Goal: Pain level will decrease to patient's comfort goal  Outcome: PROGRESSING AS EXPECTED  Note: Pt ambulating, using distraction and multimodal pain medications.     Problem: Urinary Elimination:  Goal: Ability to reestablish a normal urinary elimination pattern will improve  Outcome: PROGRESSING AS EXPECTED  Note: Loving removed this morning. Pt is urinating in the toilet.

## 2021-03-25 VITALS
SYSTOLIC BLOOD PRESSURE: 100 MMHG | RESPIRATION RATE: 16 BRPM | DIASTOLIC BLOOD PRESSURE: 55 MMHG | OXYGEN SATURATION: 96 % | HEIGHT: 63 IN | HEART RATE: 88 BPM | TEMPERATURE: 97.7 F | BODY MASS INDEX: 19.84 KG/M2 | WEIGHT: 111.99 LBS

## 2021-03-25 LAB
ANION GAP SERPL CALC-SCNC: 6 MMOL/L (ref 7–16)
BASOPHILS # BLD AUTO: 0.3 % (ref 0–1.8)
BASOPHILS # BLD: 0.02 K/UL (ref 0–0.12)
BUN SERPL-MCNC: 14 MG/DL (ref 8–22)
CALCIUM SERPL-MCNC: 8.8 MG/DL (ref 8.5–10.5)
CHLORIDE SERPL-SCNC: 110 MMOL/L (ref 96–112)
CO2 SERPL-SCNC: 26 MMOL/L (ref 20–33)
CREAT SERPL-MCNC: 0.71 MG/DL (ref 0.5–1.4)
EOSINOPHIL # BLD AUTO: 0.02 K/UL (ref 0–0.51)
EOSINOPHIL NFR BLD: 0.3 % (ref 0–6.9)
ERYTHROCYTE [DISTWIDTH] IN BLOOD BY AUTOMATED COUNT: 61.8 FL (ref 35.9–50)
GLUCOSE SERPL-MCNC: 127 MG/DL (ref 65–99)
HCT VFR BLD AUTO: 33.8 % (ref 37–47)
HGB BLD-MCNC: 11.3 G/DL (ref 12–16)
IMM GRANULOCYTES # BLD AUTO: 0.02 K/UL (ref 0–0.11)
IMM GRANULOCYTES NFR BLD AUTO: 0.3 % (ref 0–0.9)
LYMPHOCYTES # BLD AUTO: 0.89 K/UL (ref 1–4.8)
LYMPHOCYTES NFR BLD: 12.2 % (ref 22–41)
MCH RBC QN AUTO: 34.8 PG (ref 27–33)
MCHC RBC AUTO-ENTMCNC: 33.4 G/DL (ref 33.6–35)
MCV RBC AUTO: 104 FL (ref 81.4–97.8)
MONOCYTES # BLD AUTO: 1.31 K/UL (ref 0–0.85)
MONOCYTES NFR BLD AUTO: 17.9 % (ref 0–13.4)
NEUTROPHILS # BLD AUTO: 5.05 K/UL (ref 2–7.15)
NEUTROPHILS NFR BLD: 69 % (ref 44–72)
NRBC # BLD AUTO: 0 K/UL
NRBC BLD-RTO: 0 /100 WBC
PLATELET # BLD AUTO: 128 K/UL (ref 164–446)
PMV BLD AUTO: 9.8 FL (ref 9–12.9)
POTASSIUM SERPL-SCNC: 3.8 MMOL/L (ref 3.6–5.5)
RBC # BLD AUTO: 3.25 M/UL (ref 4.2–5.4)
SODIUM SERPL-SCNC: 142 MMOL/L (ref 135–145)
WBC # BLD AUTO: 7.3 K/UL (ref 4.8–10.8)

## 2021-03-25 PROCEDURE — 96372 THER/PROPH/DIAG INJ SC/IM: CPT

## 2021-03-25 PROCEDURE — A9270 NON-COVERED ITEM OR SERVICE: HCPCS | Performed by: SURGERY

## 2021-03-25 PROCEDURE — 85025 COMPLETE CBC W/AUTO DIFF WBC: CPT

## 2021-03-25 PROCEDURE — 80048 BASIC METABOLIC PNL TOTAL CA: CPT

## 2021-03-25 PROCEDURE — 36415 COLL VENOUS BLD VENIPUNCTURE: CPT

## 2021-03-25 PROCEDURE — 700102 HCHG RX REV CODE 250 W/ 637 OVERRIDE(OP): Performed by: SURGERY

## 2021-03-25 PROCEDURE — 700111 HCHG RX REV CODE 636 W/ 250 OVERRIDE (IP): Performed by: SURGERY

## 2021-03-25 PROCEDURE — G0378 HOSPITAL OBSERVATION PER HR: HCPCS

## 2021-03-25 RX ORDER — OXYCODONE HYDROCHLORIDE 5 MG/1
5 TABLET ORAL EVERY 6 HOURS PRN
Qty: 10 TABLET | Refills: 0 | Status: SHIPPED | OUTPATIENT
Start: 2021-03-25 | End: 2021-03-30

## 2021-03-25 RX ADMIN — DOCUSATE SODIUM 100 MG: 100 CAPSULE, LIQUID FILLED ORAL at 05:55

## 2021-03-25 RX ADMIN — CELECOXIB 100 MG: 100 CAPSULE ORAL at 05:55

## 2021-03-25 RX ADMIN — ACETAMINOPHEN 1000 MG: 500 TABLET, FILM COATED ORAL at 05:55

## 2021-03-25 RX ADMIN — OXYCODONE 5 MG: 5 TABLET ORAL at 10:10

## 2021-03-25 RX ADMIN — BUDESONIDE AND FORMOTEROL FUMARATE DIHYDRATE 2 PUFF: 160; 4.5 AEROSOL RESPIRATORY (INHALATION) at 05:48

## 2021-03-25 RX ADMIN — DIGOXIN 125 MCG: 250 TABLET ORAL at 11:39

## 2021-03-25 RX ADMIN — POLYETHYLENE GLYCOL 3350 1 PACKET: 17 POWDER, FOR SOLUTION ORAL at 05:55

## 2021-03-25 RX ADMIN — Medication 1000 UNITS: at 05:56

## 2021-03-25 RX ADMIN — TIOTROPIUM BROMIDE INHALATION SPRAY 2.5 MCG: 3.12 SPRAY, METERED RESPIRATORY (INHALATION) at 05:49

## 2021-03-25 RX ADMIN — ENOXAPARIN SODIUM 40 MG: 40 INJECTION SUBCUTANEOUS at 10:11

## 2021-03-25 RX ADMIN — GABAPENTIN 100 MG: 100 CAPSULE ORAL at 05:56

## 2021-03-25 ASSESSMENT — PAIN DESCRIPTION - PAIN TYPE: TYPE: SURGICAL PAIN

## 2021-03-25 NOTE — DISCHARGE INSTRUCTIONS
Surgery Discharge Instructions:    1. DIET: Upon discharge from the hospital you may resume your normal preoperative diet. Depending on how you are feeling and whether you have nausea or not, you may wish to stay with a bland diet for the first few days. However, you can advance your diet as quickly as you feel ready.    2. ACTIVITIES: You have a ten pound weight lifting restriction for four to six weeks after surgery.  This means no lifting anything heavier than a gallon of milk.  Routine activities such as walking and using the stairs are safe.  You may sleep in any position that is comfortable. Avoid strenuous activities and exercise that involves twisting, bending, and, running.    3. DRIVING: You may drive whenever you are off pain medications and are able to perform the activities needed to drive, i.e. turning, bending, twisting, wearing a seat belt, etc.    4. BATHING: You may shower two days following your surgery, but do not submerge in a bath or a pool until you after your first postoperative visit.    5. BOWEL FUNCTION: You may develop either frequent or loose stools after meals. This usually corrects itself after a few days, to a few weeks.    You may develop constipation. The combination of pain medication and decreased activity level can cause constipation in otherwise normal patients. If you feel this is occurring, increase your fluid intake and take an over the counter laxative (Milk of Magnesia, Miralax, or Magnesium Citrate).  If this is not successful, take an over the counter Fleet enema and repeat the laxative until effect.    6. PAIN MEDICATION: You may be given a prescription for pain medication at discharge. Please take these as directed. It is important to remember not to take medications on an empty stomach as this may cause nausea.  Wean the use of pain medication as soon as possible.  If narcotics were prescribed, try to avoid their use and try non-narcotic medications, such as tylenol  first.    7. CALL IF YOU HAVE: (1) Fevers of more than 101 F (38.5 C), (2) New chest or leg pain, (3) Worsening abdominal pain, (4) Persistent vomiting, (5) Large quantity bleeding, (6) Drainage from incision that is foul smelling, increased pain at the wound, wound edges that have pulled apart, redness or swelling at the incision site. Please do not hesitate to call with any other questions.     8. FOLLOW-UP APPOINTMENT: Contact my office at [phone] for a follow-up appointment in 1 to 2 weeks following your procedure.    If you have any additional questions, please do not hesitate to call the office and speak to either myself or the physician on call.    Office address:  [address] [place], [place] [address]e [facility], [address] [place] [address]      Dulce Jeter M.D.   [facility]  General Surgery  Colon and Rectal Surgeon    Discharge Instructions    Discharged to home by car with relative. Discharged via wheelchair, hospital escort: Yes.  Special equipment needed: Not Applicable    Be sure to schedule a follow-up appointment with your primary care doctor or any specialists as instructed.     Discharge Plan:   Diet Plan: Discussed  Activity Level: Discussed  Confirmed Follow up Appointment: Patient to Call and Schedule Appointment  Confirmed Symptoms Management: Discussed  Medication Reconciliation Updated: Yes  Influenza Vaccine Indication: Not indicated: Previously immunized this influenza season and > 8 years of age    I understand that a diet low in cholesterol, fat, and sodium is recommended for good health. Unless I have been given specific instructions below for another diet, I accept this instruction as my diet prescription.   Other diet: Resume normal diet  Special Instructions: None    · Is patient discharged on Warfarin / Coumadin?   No     Depression / Suicide Risk    As you are discharged from this [facility] facility, it is important to learn how to keep safe from harming yourself.    Recognize the warning  signs:  · Abrupt changes in personality, positive or negative- including increase in energy   · Giving away possessions  · Change in eating patterns- significant weight changes-  positive or negative  · Change in sleeping patterns- unable to sleep or sleeping all the time   · Unwillingness or inability to communicate  · Depression  · Unusual sadness, discouragement and loneliness  · Talk of wanting to die  · Neglect of personal appearance   · Rebelliousness- reckless behavior  · Withdrawal from people/activities they love  · Confusion- inability to concentrate     If you or a loved one observes any of these behaviors or has concerns about self-harm, here's what you can do:  · Talk about it- your feelings and reasons for harming yourself  · Remove any means that you might use to hurt yourself (examples: pills, rope, extension cords, firearm)  · Get professional help from the community (Mental Health, Substance Abuse, psychological counseling)  · Do not be alone:Call your Safe Contact- someone whom you trust who will be there for you.  · Call your local CRISIS HOTLINE 661-8752 or 702-554-7642  · Call your local Children's Mobile Crisis Response Team [place] (793) 068-5356 or www.[employer]  · Call the toll free National Suicide Prevention Hotlines   · National Suicide Prevention Lifeline 145-543-AKPA (6024)  · National Hope Line Network 800-SUICIDE (954-1473)

## 2021-03-25 NOTE — DISCHARGE SUMMARY
Discharge Summary    CHIEF COMPLAINT ON ADMISSION  No chief complaint on file.      Reason for Admission  ANAL CANCER     Admission Date  3/23/2021    CODE STATUS  Prior    HPI & HOSPITAL COURSE  This is a 80 y.o. female here with history of anal squamous cell cancer who underwent chemotherapy and radiation therapy but had progression of disease post treatment and was brought for APR with muscle flap repair.  The muscle flap failed and the patient developed a perineal hernia that was significantly symptomatic and the patient wanted repaired.  She had started on one course of adjuvant chemotherapy for a recurrence noted in the lung.  The patient did well in hospital.  She was ambulating well.  Her pain was controlled with analgesia.  She was tolerating a regular diet.  Her stoma was functioning well.  There was no concerns for the wound.  She was voiding well after Loving was removed.      Therefore, she is discharged in good and stable condition to home with close outpatient follow-up.    The patient met 2-midnight criteria for an inpatient stay at the time of discharge.    Discharge Date  March 25, 2021    FOLLOW UP ITEMS POST DISCHARGE  Follow-up with me in 1 to 2 weeks  Patient can restart apixaban tomorrow    DISCHARGE DIAGNOSES  Active Problems:    * No active hospital problems. *  Resolved Problems:    * No resolved hospital problems. *      FOLLOW UP  Future Appointments   Date Time Provider Department Center   5/13/2021 11:30 AM Sha Casper M.D. [facility] None     Dulce Jeter M.D.  [address] [address]  [phone]    Schedule an appointment as soon as possible for a visit in 1 week        MEDICATIONS ON DISCHARGE     Medication List      START taking these medications      Instructions   oxyCODONE immediate-release 5 MG Tabs  Commonly known as: ROXICODONE   Take 1 tablet by mouth every 6 hours as needed for up to 5 days.  Dose: 5 mg        CONTINUE taking these medications       Instructions   apixaban 5mg Tabs  Commonly known as: ELIQUIS   Take 5 mg by mouth 2 Times a Day.  Dose: 5 mg     atorvastatin 20 MG Tabs  Commonly known as: LIPITOR   Take 20 mg by mouth every day.  Dose: 20 mg     CENTRUM SILVER 50+WOMEN PO   Take 1 tablet by mouth every day.  Dose: 1 tablet     digoxin 125 MCG Tabs  Commonly known as: LANOXIN   Take 125 mcg by mouth every day with lunch.  Dose: 125 mcg     docusate sodium 100 MG Caps  Commonly known as: COLACE   Take 100 mg by mouth 2 times a day.  Dose: 100 mg     gabapentin 100 MG Caps  Commonly known as: NEURONTIN   Take 100 mg by mouth 2 (two) times a day.  Dose: 100 mg     HYDROcodone-acetaminophen 5-325 MG Tabs per tablet  Commonly known as: NORCO   Take 0.5-1 Tablets by mouth 2 times a day as needed (pain).  Dose: 0.5-1 tablet     ketorolac 10 MG Tabs  Commonly known as: TORADOL   Take 1 tablet by mouth every 6 hours as needed for Moderate Pain (Prior to hydrocodone/acetaminophen for moderate pain).  Dose: 10 mg     polyethylene glycol/lytes 17 g Pack  Commonly known as: MIRALAX   Take 17 g by mouth every day.  Dose: 17 g     Spiriva Respimat 2.5 mcg/Act Aers  Generic drug: tiotropium   Inhale 2.5 mcg every day.  Dose: 2.5 mcg     SUMAtriptan 25 MG Tabs tablet  Commonly known as: IMITREX   Take 1 tablet by mouth every 2 hours as needed for Migraine.  Dose: 25 mg     vitamin D 1000 Unit (25 mcg) Tabs  Commonly known as: cholecalciferol   Take 1,000 Units by mouth every day.  Dose: 1,000 Units     Wixela Inhub 500-50 MCG/DOSE Aepb  Generic drug: fluticasone-salmeterol   Inhale 1 Puff every 12 hours. Indications: Asthma  Dose: 1 Puff     zolpidem 10 MG Tabs  Commonly known as: AMBIEN   Take 10 mg by mouth at bedtime as needed for Sleep.  Dose: 10 mg            Allergies  No Known Allergies    DIET  Orders Placed This Encounter   Procedures   • Diet Order Diet: Low Fiber(GI Soft)     Begin Low Fiber (GI Soft) diet as soon as alert on floor     Standing Status:    Standing     Number of Occurrences:   1     Order Specific Question:   Diet:     Answer:   Low Fiber(GI Soft) [2]       ACTIVITY  Light duty.  10-lb lifting restriction    CONSULTATIONS  None    PROCEDURES  Perineal approach for perineal hernia repair with biologic mesh    LABORATORY  Lab Results   Component Value Date    SODIUM 142 03/25/2021    POTASSIUM 3.8 03/25/2021    CHLORIDE 110 03/25/2021    CO2 26 03/25/2021    GLUCOSE 127 (H) 03/25/2021    BUN 14 03/25/2021    CREATININE 0.71 03/25/2021        Lab Results   Component Value Date    WBC 7.3 03/25/2021    HEMOGLOBIN 11.3 (L) 03/25/2021    HEMATOCRIT 33.8 (L) 03/25/2021    PLATELETCT 128 (L) 03/25/2021        Total time of the discharge process exceeds 30 minutes.

## 2021-03-25 NOTE — DISCHARGE PLANNING
Anticipated Discharge Disposition:   Home    Action:   This RN CM is following the case and the plan is to discharge Pt to home without psychosocial needs.     Barriers to Discharge:   None    Plan:   Will assist as needed

## 2021-03-26 NOTE — CARE PLAN
Problem: Knowledge Deficit  Goal: Knowledge of disease process/condition, treatment plan, diagnostic tests, and medications will improve  Outcome: PROGRESSING AS EXPECTED  Note: Discharge education provided to patient. All questions addressed.     Problem: Discharge Barriers/Planning  Goal: Patient's continuum of care needs will be met  Outcome: PROGRESSING AS EXPECTED  Note: Patient discharged home today per physician order.

## 2021-04-26 ENCOUNTER — HOSPITAL ENCOUNTER (OUTPATIENT)
Dept: RADIOLOGY | Facility: MEDICAL CENTER | Age: 81
End: 2021-04-26
Payer: MEDICARE

## 2021-05-06 ENCOUNTER — HOSPITAL ENCOUNTER (OUTPATIENT)
Dept: LAB | Facility: MEDICAL CENTER | Age: 81
End: 2021-05-06
Attending: INTERNAL MEDICINE
Payer: MEDICARE

## 2021-05-06 LAB
ALBUMIN SERPL BCP-MCNC: 3.9 G/DL (ref 3.2–4.9)
ALBUMIN/GLOB SERPL: 1.1 G/DL
ALP SERPL-CCNC: 122 U/L (ref 30–99)
ALT SERPL-CCNC: 23 U/L (ref 2–50)
ANION GAP SERPL CALC-SCNC: 8 MMOL/L (ref 7–16)
AST SERPL-CCNC: 22 U/L (ref 12–45)
BILIRUB SERPL-MCNC: 0.2 MG/DL (ref 0.1–1.5)
BUN SERPL-MCNC: 15 MG/DL (ref 8–22)
CALCIUM SERPL-MCNC: 9.7 MG/DL (ref 8.5–10.5)
CHLORIDE SERPL-SCNC: 105 MMOL/L (ref 96–112)
CO2 SERPL-SCNC: 27 MMOL/L (ref 20–33)
CREAT SERPL-MCNC: 0.72 MG/DL (ref 0.5–1.4)
GLOBULIN SER CALC-MCNC: 3.4 G/DL (ref 1.9–3.5)
GLUCOSE SERPL-MCNC: 94 MG/DL (ref 65–99)
POTASSIUM SERPL-SCNC: 4.1 MMOL/L (ref 3.6–5.5)
PROT SERPL-MCNC: 7.3 G/DL (ref 6–8.2)
SODIUM SERPL-SCNC: 140 MMOL/L (ref 135–145)

## 2021-05-06 PROCEDURE — 80053 COMPREHEN METABOLIC PANEL: CPT

## 2021-05-06 PROCEDURE — 36415 COLL VENOUS BLD VENIPUNCTURE: CPT

## 2021-05-20 ENCOUNTER — HOSPITAL ENCOUNTER (OUTPATIENT)
Dept: RADIATION ONCOLOGY | Facility: MEDICAL CENTER | Age: 81
End: 2021-05-31
Attending: INTERNAL MEDICINE
Payer: MEDICARE

## 2021-05-20 VITALS
BODY MASS INDEX: 20 KG/M2 | HEART RATE: 82 BPM | DIASTOLIC BLOOD PRESSURE: 60 MMHG | TEMPERATURE: 97.4 F | OXYGEN SATURATION: 98 % | SYSTOLIC BLOOD PRESSURE: 117 MMHG | WEIGHT: 112.88 LBS

## 2021-05-20 DIAGNOSIS — K62.7 RADIATION PROCTITIS: ICD-10-CM

## 2021-05-20 DIAGNOSIS — C21.0 ANAL CANCER (HCC): ICD-10-CM

## 2021-05-20 PROCEDURE — 99214 OFFICE O/P EST MOD 30 MIN: CPT | Performed by: RADIOLOGY

## 2021-05-20 PROCEDURE — 99212 OFFICE O/P EST SF 10 MIN: CPT | Performed by: RADIOLOGY

## 2021-05-20 RX ORDER — ONDANSETRON 8 MG/1
TABLET, ORALLY DISINTEGRATING ORAL
COMMUNITY
Start: 2021-04-21

## 2021-05-20 RX ORDER — PROCHLORPERAZINE MALEATE 10 MG
TABLET ORAL EVERY 6 HOURS PRN
COMMUNITY
Start: 2021-04-21

## 2021-05-20 ASSESSMENT — PAIN SCALES - GENERAL: PAINLEVEL: NO PAIN

## 2021-05-20 ASSESSMENT — FIBROSIS 4 INDEX: FIB4 SCORE: 2.87

## 2021-05-20 NOTE — PROGRESS NOTES
RADIATION ONCOLOGY FOLLOW-UP    DATE OF SERVICE: 5/20/2021    IDENTIFICATION:   A 80 y.o. 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    Anal cancer (HCC)  Staging form: Anus, AJCC 8th Edition  - Clinical: Stage IIA (cT2, cN0, cM0) - Signed by Sha Casper M.D. on 1/16/2020      RADIATION SUMMARY:  Aria Treatment Information        Some values may be hidden. Unless noted otherwise, only the newest values recorded on each date are displayed.         Aria Treatment Summary 1/20/21   Anus1 Plan from Course Eval   Fraction 0 of 28   Elapsed Course Days  @    Prescribed Fraction Dose 180 cGy   Prescribed Total Dose 5,040 cGy   Anus Reference Point from Course Eval   Elapsed Course Days  @    Session Dose -   Total Dose 0 cGy   Anus CP Reference Point from Course Eval   Elapsed Course Days  @    Session Dose -   Total Dose 0 cGy   Anus Plan from Course C1_anus   Anus Reference Point from Course C1_anus   Anus CP Reference Point from Course C1_anus             HISTORY OF PRESENT ILLNESS:   Patient originally presented in April 2019 with some rectal bleeding.  She was originally seen by Dr. Edilson Quinn who noted anal fissure and hemorrhoids and referred her to Dr. Dulce Jeter ([facility]) for anoscopy which showed hard mass exophytic palpated from the anal verge to the top of the sphincter in the left lateral position fixed and punch biopsy December 18, 2018 shows invasive squamous cell carcinoma moderately differentiated.  She underwent MRI rectum January 3, 2020 which showed a 3.9 x 2 cm anal canal lesion semicircumferential extending from 1:00 to 8 o'clock position extending from the anal verge into the lower rectum.  Anteriorly the lesion abuts the lower vagina without definitive evidence of vaginal invasion.  CT chest abdomen pelvis January 10, 2020 showed no evidence of metastatic disease.  A hypo-dense 1.4 cm nodule below the left hemidiaphragm  medial to the spleen it is unlikely to represent a solitary metastatic lesion in this location.  PET CT scan done January 16, 2020 showed abnormal oval-shaped solid mass in the left side of the anus extending to the level of the lower rectum with marketed activity.  No adenopathy or elevated raghav activity noted.  Patient currently is complaining of severe anal pain and occasional bleeding.     3/20/20  Patient improving post treatment. Rx for anusol for proctitis eRx to [place] [facility]. PETCT and CONCHITA in 3 months follow up ~June 2020.     6/11/20  Patient still having some loose bowel movements and fecal incontinence as well as some pain in the anal rectal area.  She is using the lidocaine jelly with some relief.  She also complains of some bladder irritation and just general discomfort in her lower abdomen.  PET/CT shows good response with just mild activity in the anal area at this time.     9/14/20  Patient is doing well and her inflammation is improving.  She does not have any diarrhea at this time she does have a little bit of rectal pain which she uses her lidocaine for.  She has no abdominal pain or cystitis at this time.  She was seen by Dr. Jeter who was following her left lateral lesion which shows some induration and is due for an MRI next week for follow-up.     12/21/20  Patient underwent APR by Dr. Dulce Jeter (Colorectal Surgery) on October 15, 2020 with flap by Dr. Reyes.  Patient was found to have persistent disease clinical T2 with no nodes involved.  She underwent repeat PET CT scan recently which showed some uptake within the left lower lobe and is undergoing CT-guided biopsy next week.  Overall she still has lower pelvic pain and some urinary incontinence for who she previously saw Dr. [place] Jeter (Uro-Gyn).  She says her weight has been stable and actually she has been increasing her weight recently.    INTERVAL HISTORY:  Patient has had issues with peritoneal hernia through her  APR flap and underwent mesh revision.  She has resumed her chemotherapy with Dr. Vogel and is tolerating well and her weight is overall stable.  She is having a CT scan with Dr. Vogel on Friday which we will review.  She does have continued issues with urinary incontinence but denies any bowel diarrhea through ostomy bag.  She still does have some wound healing issues from her recent hernia repair.      PROBLEM LIST:  Patient Active Problem List   Diagnosis   • Degeneration of lumbar intervertebral disc   • Anal cancer (HCC)   • Hiatal hernia   • Asthma   • High cholesterol   • Nausea and vomiting   • Anemia   • Arrhythmia   • Cancer (HCC)   • Drug-induced constipation   • Sepsis (HCC)   • Leukocytosis   • Posterior perineal hernia   • Thrombocythemia (HCC)   • Paroxysmal atrial fibrillation (HCC)   • History of 2019 novel coronavirus disease (COVID-19)   • Headache       CURRENT MEDICATIONS:  Current Outpatient Medications   Medication Sig Dispense Refill   • polyethylene glycol/lytes (MIRALAX) 17 g Pack Take 17 g by mouth every day.     • HYDROcodone-acetaminophen (NORCO) 5-325 MG Tab per tablet Take 0.5-1 Tablets by mouth 2 times a day as needed (pain).     • ketorolac (TORADOL) 10 MG Tab Take 1 tablet by mouth every 6 hours as needed for Moderate Pain (Prior to hydrocodone/acetaminophen for moderate pain). (Patient not taking: Reported on 3/23/2021) 15 tablet 0   • SUMAtriptan (IMITREX) 25 MG Tab tablet Take 1 tablet by mouth every 2 hours as needed for Migraine. 15 tablet 3   • gabapentin (NEURONTIN) 100 MG Cap Take 100 mg by mouth 2 (two) times a day.     • SPIRIVA RESPIMAT 2.5 MCG/ACT Aero Soln Inhale 2.5 mcg every day.     • docusate sodium (COLACE) 100 MG Cap Take 100 mg by mouth 2 times a day.     • vitamin D (CHOLECALCIFEROL) 1000 Unit (25 mcg) Tab Take 1,000 Units by mouth every day.     • Multiple Vitamins-Minerals (CENTRUM SILVER 50+WOMEN PO) Take 1 tablet by mouth every day.     • apixaban (ELIQUIS)  5mg Tab Take 5 mg by mouth 2 Times a Day.     • fluticasone-salmeterol (WIXELA INHUB) 500-50 MCG/DOSE AEROSOL POWDER, BREATH ACTIVATED Inhale 1 Puff every 12 hours. Indications: Asthma     • digoxin (LANOXIN) 125 MCG Tab Take 125 mcg by mouth every day with lunch.     • atorvastatin (LIPITOR) 20 MG Tab Take 20 mg by mouth every day.     • zolpidem (AMBIEN) 10 MG Tab Take 10 mg by mouth at bedtime as needed for Sleep.       No current facility-administered medications for this encounter.       ALLERGIES:  Patient has no known allergies.    REVIEW OF SYSTEMS:  A review of systems for today's date of service was reviewed and uploaded into the electronic medical record.    PHYSICAL EXAM:  PERFORMANCE STATUS:  ECOG Performance Review 5/20/2021   ECOG Performance Status Restricted in physically strenuous activity but ambulatory and able to carry out work of a light or sedentary nature, e.g., light house work, office work   Some recent data might be hidden     Karnofsky Score 5/20/2021 12/21/2020   Karnofsky Score 80 80   Some recent data might be hidden     /60 (BP Location: Left arm, Patient Position: Sitting, BP Cuff Size: Adult)   Pulse 82   Temp 36.3 °C (97.4 °F)   Wt 51.2 kg (112 lb 14 oz)   LMP  (LMP Unknown)   SpO2 98%   BMI 20.00 kg/m²   Physical Exam  Vitals reviewed.   HENT:      Head: Normocephalic.      Nose: Nose normal.      Mouth/Throat:      Mouth: Mucous membranes are moist.   Eyes:      Pupils: Pupils are equal, round, and reactive to light.   Cardiovascular:      Rate and Rhythm: Normal rate.   Abdominal:      General: Abdomen is flat.   Musculoskeletal:         General: Normal range of motion.   Neurological:      General: No focal deficit present.      Mental Status: She is alert.   Psychiatric:         Mood and Affect: Mood normal.         LABORATORY DATA:   Lab Results   Component Value Date/Time    WBC 7.3 03/25/2021 0211    WBC 8.8 03/24/2021 0141    WBC 5.7 03/17/2021 1146     HEMOGLOBIN 11.3 (L) 03/25/2021 0211    HEMOGLOBIN 12.1 03/24/2021 0141    HEMOGLOBIN 13.7 03/17/2021 1146    HEMATOCRIT 33.8 (L) 03/25/2021 0211    HEMATOCRIT 37.2 03/24/2021 0141    HEMATOCRIT 42.4 03/17/2021 1146    .0 (H) 03/25/2021 0211    .2 (H) 03/24/2021 0141    .7 (H) 03/17/2021 1146    PLATELETCT 128 (L) 03/25/2021 0211    PLATELETCT 138 (L) 03/24/2021 0141    PLATELETCT 244 03/17/2021 1146    NEUTS 5.05 03/25/2021 0211    NEUTS 8.23 (H) 03/24/2021 0141    NEUTS 3.70 03/17/2021 1146      Lab Results   Component Value Date/Time    SODIUM 140 05/06/2021 1151    SODIUM 142 03/25/2021 0211    SODIUM 140 03/24/2021 0141    POTASSIUM 4.1 05/06/2021 1151    POTASSIUM 3.8 03/25/2021 0211    POTASSIUM 4.9 03/24/2021 0141    BUN 15 05/06/2021 1151    BUN 14 03/25/2021 0211    BUN 13 03/24/2021 0141    CREATININE 0.72 05/06/2021 1151    CREATININE 0.71 03/25/2021 0211    CREATININE 0.72 03/24/2021 0141    CALCIUM 9.7 05/06/2021 1151    CALCIUM 8.8 03/25/2021 0211    CALCIUM 8.9 03/24/2021 0141    ALBUMIN 3.9 05/06/2021 1151    ALBUMIN 4.0 03/17/2021 1146    ALBUMIN 3.0 (L) 02/22/2021 0719    ASTSGOT 22 05/06/2021 1151    ASTSGOT 27 03/17/2021 1146    ASTSGOT 23 02/22/2021 0719    ALKPHOSPHAT 122 (H) 05/06/2021 1151    ALKPHOSPHAT 97 03/17/2021 1146    ALKPHOSPHAT 97 02/22/2021 0719    IFNOTAFR >60 05/06/2021 1151    IFNOTAFR >60 03/25/2021 0211    IFNOTAFR >60 03/24/2021 0141       RADIOLOGY DATA:  No results found.    IMPRESSION:    A 80 y.o. with Stage II anal cancer s/p chemoRT with local persistent disease s/p APR.  Anal cancer (HCC)  Staging form: Anus, AJCC 8th Edition  - Clinical: Stage IIA (cT2, cN0, cM0) - Signed by Sha Casper M.D. on 1/16/2020      CANCER STATUS:  No Evidence of Disease    RECOMMENDATIONS:   Patient has been doing well on chemotherapy.  She has had stable weight and is having restaging scan on Friday which I will request from [facility].  She does have urinary  incontinence issues for which she sees a urogynecologist for and is can resume [facility].  In terms of her wound healing issues I have discussed that she should speak with Dr. Jeter however it is possible that hyperbaric oxygen could be helpful and I will place referral for consultation.    Thank you for the opportunity to participate in her care.  If any questions or comments, please do not hesitate in calling.    CC: Dr. Jeter, Dr. Vogel

## 2021-05-20 NOTE — NON-PROVIDER
Patient was seen today in clinic with Dr. Casper for follow up.  Vitals signs and weight were obtained and pain assessment was completed.  Allergies and medications were reviewed with the patient.  Toxicities of treatment assessed.     Vitals/Pain:  Vitals:    05/20/21 1134   BP: 117/60   BP Location: Left arm   Patient Position: Sitting   BP Cuff Size: Adult   Pulse: 82   Temp: 36.3 °C (97.4 °F)   SpO2: 98%   Weight: 51.2 kg (112 lb 14 oz)   Pain Score: No pain        Allergies:   Patient has no known allergies.    Current Medications:  Current Outpatient Medications   Medication Sig Dispense Refill   • polyethylene glycol/lytes (MIRALAX) 17 g Pack Take 17 g by mouth every day.     • HYDROcodone-acetaminophen (NORCO) 5-325 MG Tab per tablet Take 0.5-1 Tablets by mouth 2 times a day as needed (pain).     • ketorolac (TORADOL) 10 MG Tab Take 1 tablet by mouth every 6 hours as needed for Moderate Pain (Prior to hydrocodone/acetaminophen for moderate pain). (Patient not taking: Reported on 3/23/2021) 15 tablet 0   • SUMAtriptan (IMITREX) 25 MG Tab tablet Take 1 tablet by mouth every 2 hours as needed for Migraine. 15 tablet 3   • gabapentin (NEURONTIN) 100 MG Cap Take 100 mg by mouth 2 (two) times a day.     • SPIRIVA RESPIMAT 2.5 MCG/ACT Aero Soln Inhale 2.5 mcg every day.     • docusate sodium (COLACE) 100 MG Cap Take 100 mg by mouth 2 times a day.     • vitamin D (CHOLECALCIFEROL) 1000 Unit (25 mcg) Tab Take 1,000 Units by mouth every day.     • Multiple Vitamins-Minerals (CENTRUM SILVER 50+WOMEN PO) Take 1 tablet by mouth every day.     • apixaban (ELIQUIS) 5mg Tab Take 5 mg by mouth 2 Times a Day.     • fluticasone-salmeterol (WIXELA INHUB) 500-50 MCG/DOSE AEROSOL POWDER, BREATH ACTIVATED Inhale 1 Puff every 12 hours. Indications: Asthma     • digoxin (LANOXIN) 125 MCG Tab Take 125 mcg by mouth every day with lunch.     • atorvastatin (LIPITOR) 20 MG Tab Take 20 mg by mouth every day.     • zolpidem (AMBIEN) 10 MG  Tab Take 10 mg by mouth at bedtime as needed for Sleep.       No current facility-administered medications for this encounter.           Ashley Mariscal, Med Ass't

## 2021-06-01 ENCOUNTER — HOSPITAL ENCOUNTER (OUTPATIENT)
Dept: RADIOLOGY | Facility: MEDICAL CENTER | Age: 81
End: 2021-06-01
Payer: MEDICARE

## 2021-08-09 ENCOUNTER — HOSPITAL ENCOUNTER (OUTPATIENT)
Dept: RADIOLOGY | Facility: MEDICAL CENTER | Age: 81
End: 2021-08-09
Payer: MEDICARE

## 2021-09-03 ENCOUNTER — HOSPITAL ENCOUNTER (OUTPATIENT)
Dept: RADIATION ONCOLOGY | Facility: MEDICAL CENTER | Age: 81
End: 2021-09-30
Attending: INTERNAL MEDICINE
Payer: MEDICARE

## 2021-09-03 VITALS
OXYGEN SATURATION: 95 % | TEMPERATURE: 97.5 F | WEIGHT: 116 LBS | BODY MASS INDEX: 20.55 KG/M2 | SYSTOLIC BLOOD PRESSURE: 118 MMHG | HEART RATE: 84 BPM | DIASTOLIC BLOOD PRESSURE: 68 MMHG

## 2021-09-03 DIAGNOSIS — C21.0 ANAL CANCER (HCC): Chronic | ICD-10-CM

## 2021-09-03 PROCEDURE — 99214 OFFICE O/P EST MOD 30 MIN: CPT | Performed by: RADIOLOGY

## 2021-09-03 RX ORDER — GABAPENTIN 300 MG/1
CAPSULE ORAL
COMMUNITY
Start: 2021-09-02 | End: 2021-11-04

## 2021-09-03 ASSESSMENT — PAIN SCALES - GENERAL: PAINLEVEL: NO PAIN

## 2021-09-03 ASSESSMENT — FIBROSIS 4 INDEX: FIB4 SCORE: 2.87

## 2021-09-03 NOTE — NON-PROVIDER
Patient was seen today in clinic with Dr. Casper for follow-up.  Vitals signs and weight were obtained and pain assessment was completed.  Allergies and medications were reviewed with the patient.  Toxicities of treatment assessed.     Vitals/Pain:  Vitals:    09/03/21 1418   BP: 118/68   BP Location: Left arm   Patient Position: Sitting   BP Cuff Size: Adult   Pulse: 84   Temp: 36.4 °C (97.5 °F)   TempSrc: Temporal   SpO2: 95%   Weight: 52.6 kg (116 lb)   Pain Score: No pain    Allergies:   Patient has no known allergies.    Current Medications:  Current Outpatient Medications   Medication Sig Dispense Refill   • gabapentin (NEURONTIN) 300 MG Cap      • ondansetron (ZOFRAN ODT) 8 MG TABLET DISPERSIBLE dissolve 1 tablet ON TONGUE every 12 hours if needed for nausea and vomiting     • prochlorperazine (COMPAZINE) 10 MG Tab Take  by mouth every 6 hours as needed.     • polyethylene glycol/lytes (MIRALAX) 17 g Pack Take 17 g by mouth every day.     • HYDROcodone-acetaminophen (NORCO) 5-325 MG Tab per tablet Take 0.5-1 Tablets by mouth 2 times a day as needed (pain).     • ketorolac (TORADOL) 10 MG Tab Take 1 tablet by mouth every 6 hours as needed for Moderate Pain (Prior to hydrocodone/acetaminophen for moderate pain). (Patient not taking: Reported on 3/23/2021) 15 tablet 0   • SUMAtriptan (IMITREX) 25 MG Tab tablet Take 1 tablet by mouth every 2 hours as needed for Migraine. 15 tablet 3   • gabapentin (NEURONTIN) 100 MG Cap Take 100 mg by mouth 2 (two) times a day. (Patient not taking: Reported on 9/3/2021)     • SPIRIVA RESPIMAT 2.5 MCG/ACT Aero Soln Inhale 2.5 mcg every day.     • docusate sodium (COLACE) 100 MG Cap Take 100 mg by mouth 2 times a day.     • vitamin D (CHOLECALCIFEROL) 1000 Unit (25 mcg) Tab Take 1,000 Units by mouth every day.     • Multiple Vitamins-Minerals (CENTRUM SILVER 50+WOMEN PO) Take 1 tablet by mouth every day.     • apixaban (ELIQUIS) 5mg Tab Take 5 mg by mouth 2 Times a Day.     •  fluticasone-salmeterol (WIXELA INHUB) 500-50 MCG/DOSE AEROSOL POWDER, BREATH ACTIVATED Inhale 1 Puff every 12 hours. Indications: Asthma     • digoxin (LANOXIN) 125 MCG Tab Take 125 mcg by mouth every day with lunch.     • atorvastatin (LIPITOR) 20 MG Tab Take 20 mg by mouth every day.     • zolpidem (AMBIEN) 10 MG Tab Take 10 mg by mouth at bedtime as needed for Sleep.       No current facility-administered medications for this encounter.         PCP:  Lorelei Morales R.N.

## 2021-09-03 NOTE — PROGRESS NOTES
RADIATION ONCOLOGY FOLLOW-UP    DATE OF SERVICE: 9/3/2021    IDENTIFICATION:   A 80 y.o. female with 2 left lower lobe metastasis    Visit Diagnoses     ICD-10-CM   1. Anal cancer (HCC)  C21.0     Anal cancer (HCC)  Staging form: Anus, AJCC 8th Edition  - Clinical stage from 9/3/2021: Stage IV (cT2, cN0, pM1) - Signed by Sha Casper M.D. on 9/3/2021      RADIATION SUMMARY:  [facility] Treatment Information        Some values may be hidden. Unless noted otherwise, only the newest values recorded on each date are displayed.         [facility] Treatment Summary 1/20/21   Anus1 Plan from Course Eval   Fraction 0 of 28   Elapsed Course Days  @    Prescribed Fraction Dose 180 cGy   Prescribed Total Dose 5,040 cGy   Anus Reference Point from Course Eval   Elapsed Course Days  @    Session Dose --   Total Dose 0 cGy   Anus CP Reference Point from Course Eval   Elapsed Course Days  @    Session Dose --   Total Dose 0 cGy   Anus Plan from Course C1_anus   Anus Reference Point from Course C1_anus   Anus CP Reference Point from Course C1_anus             HISTORY OF PRESENT ILLNESS:   Patient originally presented in April 2019 with some rectal bleeding.  She was originally seen by Dr. Edilson Quinn who noted anal fissure and hemorrhoids and referred her to Dr. Dulce Jeter ([facility]) for anoscopy which showed hard mass exophytic palpated from the anal verge to the top of the sphincter in the left lateral position fixed and punch biopsy December 18, 2018 shows invasive squamous cell carcinoma moderately differentiated.  She underwent MRI rectum January 3, 2020 which showed a 3.9 x 2 cm anal canal lesion semicircumferential extending from 1:00 to 8 o'clock position extending from the anal verge into the lower rectum.  Anteriorly the lesion abuts the lower vagina without definitive evidence of vaginal invasion.  CT chest abdomen pelvis January 10, 2020 showed no evidence of metastatic disease.  A hypo-dense 1.4  cm nodule below the left hemidiaphragm medial to the spleen it is unlikely to represent a solitary metastatic lesion in this location.  PET CT scan done January 16, 2020 showed abnormal oval-shaped solid mass in the left side of the anus extending to the level of the lower rectum with marketed activity.  No adenopathy or elevated raghav activity noted.  Patient currently is complaining of severe anal pain and occasional bleeding.       3/20/20  Patient improving post treatment. Rx for anusol for proctitis eRx to [place] [facility]. PETCT and CONCHITA in 3 months follow up ~June 2020.     6/11/20  Patient still having some loose bowel movements and fecal incontinence as well as some pain in the anal rectal area.  She is using the lidocaine jelly with some relief.  She also complains of some bladder irritation and just general discomfort in her lower abdomen.  PET/CT shows good response with just mild activity in the anal area at this time.     9/14/20  Patient is doing well and her inflammation is improving.  She does not have any diarrhea at this time she does have a little bit of rectal pain which she uses her lidocaine for.  She has no abdominal pain or cystitis at this time.  She was seen by Dr. Jeter who was following her left lateral lesion which shows some induration and is due for an MRI next week for follow-up.     12/21/20  Patient underwent APR by Dr. Dulce Jeter (Colorectal Surgery) on October 15, 2020 with flap by Dr. Reyes.  Patient was found to have persistent disease clinical T2 with no nodes involved.  She underwent repeat PET CT scan recently which showed some uptake within the left lower lobe and is undergoing CT-guided biopsy next week.  Overall she still has lower pelvic pain and some urinary incontinence for who she previously saw Dr. [place] Jeter (Uro-Gyn).  She says her weight has been stable and actually she has been increasing her weight recently.    5/20/21  Patient has had issues with  peritoneal hernia through her APR flap and underwent mesh revision.  She has resumed her chemotherapy with Dr. Vogel and is tolerating well and her weight is overall stable.  She is having a CT scan with Dr. Vogel on Friday which we will review.  She does have continued issues with urinary incontinence but denies any bowel diarrhea through ostomy bag.  She still does have some wound healing issues from her recent hernia repair.    Interval History:  She had CT-guided biopsy of left lower lobe lung lesion January 1921 which showed metastatic anal squamous cell carcinoma.  Patient has been doing well she completed 6 cycles of carbotaxol July 13, 2021.  She has been feeling well and had repeat CT chest abdomen pelvis which showed left lower lobe metastasis measuring 1.1 cm previously 1 cm May 28, 2021 and 1.4 cm December 7, 2020.  Patient also had mild enlargement of subpleural pulmonary nodule in the inferior left lower lobe 27 mm previously 4 mm on May 28, 2021 and 4 mm on February 20, 2021.  She has been feeling well denies any cough shortness of breath or hemoptysis.  Her weight has been stable.  She does have some general fatigue but is doing overall pretty well.    PROBLEM LIST:  Patient Active Problem List   Diagnosis   • Degeneration of lumbar intervertebral disc   • Anal cancer (HCC)   • Hiatal hernia   • Asthma   • High cholesterol   • Nausea and vomiting   • Anemia   • Arrhythmia   • Cancer (HCC)   • Drug-induced constipation   • Sepsis (HCC)   • Leukocytosis   • Posterior perineal hernia   • Thrombocythemia (HCC)   • Paroxysmal atrial fibrillation (HCC)   • History of 2019 novel coronavirus disease (COVID-19)   • Headache       CURRENT MEDICATIONS:  Current Outpatient Medications   Medication Sig Dispense Refill   • gabapentin (NEURONTIN) 300 MG Cap      • ondansetron (ZOFRAN ODT) 8 MG TABLET DISPERSIBLE dissolve 1 tablet ON TONGUE every 12 hours if needed for nausea and vomiting     • prochlorperazine  (COMPAZINE) 10 MG Tab Take  by mouth every 6 hours as needed.     • polyethylene glycol/lytes (MIRALAX) 17 g Pack Take 17 g by mouth every day.     • HYDROcodone-acetaminophen (NORCO) 5-325 MG Tab per tablet Take 0.5-1 Tablets by mouth 2 times a day as needed (pain).     • ketorolac (TORADOL) 10 MG Tab Take 1 tablet by mouth every 6 hours as needed for Moderate Pain (Prior to hydrocodone/acetaminophen for moderate pain). (Patient not taking: Reported on 3/23/2021) 15 tablet 0   • SUMAtriptan (IMITREX) 25 MG Tab tablet Take 1 tablet by mouth every 2 hours as needed for Migraine. 15 tablet 3   • gabapentin (NEURONTIN) 100 MG Cap Take 100 mg by mouth 2 (two) times a day. (Patient not taking: Reported on 9/3/2021)     • SPIRIVA RESPIMAT 2.5 MCG/ACT Aero Soln Inhale 2.5 mcg every day.     • docusate sodium (COLACE) 100 MG Cap Take 100 mg by mouth 2 times a day.     • vitamin D (CHOLECALCIFEROL) 1000 Unit (25 mcg) Tab Take 1,000 Units by mouth every day.     • Multiple Vitamins-Minerals (CENTRUM SILVER 50+WOMEN PO) Take 1 tablet by mouth every day.     • apixaban (ELIQUIS) 5mg Tab Take 5 mg by mouth 2 Times a Day.     • fluticasone-salmeterol (WIXELA INHUB) 500-50 MCG/DOSE AEROSOL POWDER, BREATH ACTIVATED Inhale 1 Puff every 12 hours. Indications: Asthma     • digoxin (LANOXIN) 125 MCG Tab Take 125 mcg by mouth every day with lunch.     • atorvastatin (LIPITOR) 20 MG Tab Take 20 mg by mouth every day.     • zolpidem (AMBIEN) 10 MG Tab Take 10 mg by mouth at bedtime as needed for Sleep.       No current facility-administered medications for this encounter.       ALLERGIES:  Patient has no known allergies.    REVIEW OF SYSTEMS:  A review of systems for today's date of service was reviewed and uploaded into the electronic medical record.    PHYSICAL EXAM:  PERFORMANCE STATUS:  ECOG Performance Review 5/20/2021   ECOG Performance Status Restricted in physically strenuous activity but ambulatory and able to carry out work of a  light or sedentary nature, e.g., light house work, office work   Some recent data might be hidden     Karnofsky Score 9/3/2021 5/20/2021   Karnofsky Score 80 80   Some recent data might be hidden     /68 (BP Location: Left arm, Patient Position: Sitting, BP Cuff Size: Adult)   Pulse 84   Temp 36.4 °C (97.5 °F) (Temporal)   Wt 52.6 kg (116 lb)   LMP  (LMP Unknown)   SpO2 95%   BMI 20.55 kg/m²   Physical Exam  Constitutional:       General: She is not in acute distress.  Pulmonary:      Breath sounds: Normal air entry.   Skin:     Findings: No erythema.   Neurological:      Mental Status: She is alert.         LABORATORY DATA:   Lab Results   Component Value Date/Time    WBC 7.3 03/25/2021 0211    WBC 8.8 03/24/2021 0141    WBC 5.7 03/17/2021 1146    HEMOGLOBIN 11.3 (L) 03/25/2021 0211    HEMOGLOBIN 12.1 03/24/2021 0141    HEMOGLOBIN 13.7 03/17/2021 1146    HEMATOCRIT 33.8 (L) 03/25/2021 0211    HEMATOCRIT 37.2 03/24/2021 0141    HEMATOCRIT 42.4 03/17/2021 1146    .0 (H) 03/25/2021 0211    .2 (H) 03/24/2021 0141    .7 (H) 03/17/2021 1146    PLATELETCT 128 (L) 03/25/2021 0211    PLATELETCT 138 (L) 03/24/2021 0141    PLATELETCT 244 03/17/2021 1146    NEUTS 5.05 03/25/2021 0211    NEUTS 8.23 (H) 03/24/2021 0141    NEUTS 3.70 03/17/2021 1146      Lab Results   Component Value Date/Time    SODIUM 140 05/06/2021 1151    SODIUM 142 03/25/2021 0211    SODIUM 140 03/24/2021 0141    POTASSIUM 4.1 05/06/2021 1151    POTASSIUM 3.8 03/25/2021 0211    POTASSIUM 4.9 03/24/2021 0141    BUN 15 05/06/2021 1151    BUN 14 03/25/2021 0211    BUN 13 03/24/2021 0141    CREATININE 0.72 05/06/2021 1151    CREATININE 0.71 03/25/2021 0211    CREATININE 0.72 03/24/2021 0141    CALCIUM 9.7 05/06/2021 1151    CALCIUM 8.8 03/25/2021 0211    CALCIUM 8.9 03/24/2021 0141    ALBUMIN 3.9 05/06/2021 1151    ALBUMIN 4.0 03/17/2021 1146    ALBUMIN 3.0 (L) 02/22/2021 0719    ASTSGOT 22 05/06/2021 1151    ASTSGOT 27  03/17/2021 1146    ASTSGOT 23 02/22/2021 0719    ALKPHOSPHAT 122 (H) 05/06/2021 1151    ALKPHOSPHAT 97 03/17/2021 1146    ALKPHOSPHAT 97 02/22/2021 0719    IFNOTAFR >60 05/06/2021 1151    IFNOTAFR >60 03/25/2021 0211    IFNOTAFR >60 03/24/2021 0141       RADIOLOGY DATA:  No results found.    IMPRESSION:    A 80 y.o. with   Visit Diagnoses     ICD-10-CM   1. Anal cancer (HCC)  C21.0     Anal cancer (HCC)  Staging form: Anus, AJCC 8th Edition  - Clinical stage from 9/3/2021: Stage IV (cT2, cN0, pM1) - Signed by Sha Casper M.D. on 9/3/2021      CANCER STATUS:  Disease Progression Distant    RECOMMENDATIONS:   I reviewed with her the most recent CT scan from July 30, 2021 which shows interval progression of 2 areas in the left lower lobe measuring 1.1 cm and 7 mm and explained that this looks like a local progression however she is due for her next CT scan at the end of October and would like to wait to see how areas look at this point before proceeding forward with stereotactic body radiation therapy or further chemotherapy.  I have ordered repeat CT chest abdomen pelvis for October 30, 2021 to [facility] and will follow up with her in early November to discuss results and decide on next steps.    Thank you for the opportunity to participate in her care.  If any questions or comments, please do not hesitate in calling.    Orders Placed This Encounter   • CT-CHEST,ABDOMEN,PELVIS WITH   • gabapentin (NEURONTIN) 300 MG Cap     CC: Dr. Vogel, Dr. Jeter

## 2021-10-28 ENCOUNTER — HOSPITAL ENCOUNTER (OUTPATIENT)
Dept: RADIOLOGY | Facility: MEDICAL CENTER | Age: 81
End: 2021-10-28
Attending: INTERNAL MEDICINE
Payer: COMMERCIAL

## 2021-11-04 ENCOUNTER — HOSPITAL ENCOUNTER (OUTPATIENT)
Dept: RADIATION ONCOLOGY | Facility: MEDICAL CENTER | Age: 81
End: 2021-11-30
Attending: INTERNAL MEDICINE
Payer: MEDICARE

## 2021-11-04 ENCOUNTER — HOSPITAL ENCOUNTER (OUTPATIENT)
Dept: RADIATION ONCOLOGY | Facility: MEDICAL CENTER | Age: 81
End: 2021-11-04

## 2021-11-04 VITALS
SYSTOLIC BLOOD PRESSURE: 123 MMHG | WEIGHT: 115.3 LBS | DIASTOLIC BLOOD PRESSURE: 75 MMHG | OXYGEN SATURATION: 96 % | BODY MASS INDEX: 20.42 KG/M2 | HEART RATE: 77 BPM

## 2021-11-04 DIAGNOSIS — C78.02 MALIGNANT NEOPLASM METASTATIC TO LEFT LUNG (HCC): ICD-10-CM

## 2021-11-04 PROBLEM — M12.9 ARTHROPATHY: Status: ACTIVE | Noted: 2021-11-04

## 2021-11-04 PROBLEM — K60.2 ANAL FISSURE AND FISTULA: Status: ACTIVE | Noted: 2021-11-04

## 2021-11-04 PROBLEM — K60.3 ANAL FISSURE AND FISTULA: Status: ACTIVE | Noted: 2021-11-04

## 2021-11-04 PROBLEM — J30.9 ALLERGIC RHINITIS: Status: ACTIVE | Noted: 2021-11-04

## 2021-11-04 PROBLEM — K57.32 DIVERTICULITIS OF COLON: Status: ACTIVE | Noted: 2021-11-04

## 2021-11-04 PROCEDURE — 77470 SPECIAL RADIATION TREATMENT: CPT | Mod: 26 | Performed by: RADIOLOGY

## 2021-11-04 PROCEDURE — 77290 THER RAD SIMULAJ FIELD CPLX: CPT | Performed by: RADIOLOGY

## 2021-11-04 PROCEDURE — 77470 SPECIAL RADIATION TREATMENT: CPT | Performed by: RADIOLOGY

## 2021-11-04 PROCEDURE — 99212 OFFICE O/P EST SF 10 MIN: CPT | Mod: 25 | Performed by: RADIOLOGY

## 2021-11-04 PROCEDURE — 77334 RADIATION TREATMENT AID(S): CPT | Performed by: RADIOLOGY

## 2021-11-04 PROCEDURE — 77263 THER RADIOLOGY TX PLNG CPLX: CPT | Performed by: RADIOLOGY

## 2021-11-04 PROCEDURE — 99214 OFFICE O/P EST MOD 30 MIN: CPT | Mod: 25 | Performed by: RADIOLOGY

## 2021-11-04 PROCEDURE — 77334 RADIATION TREATMENT AID(S): CPT | Mod: 26 | Performed by: RADIOLOGY

## 2021-11-04 PROCEDURE — 77290 THER RAD SIMULAJ FIELD CPLX: CPT | Mod: 26 | Performed by: RADIOLOGY

## 2021-11-04 RX ORDER — ZONISAMIDE 50 MG/1
50 CAPSULE ORAL
COMMUNITY
Start: 2021-10-07

## 2021-11-04 ASSESSMENT — PAIN SCALES - GENERAL: PAINLEVEL: NO PAIN

## 2021-11-04 ASSESSMENT — FIBROSIS 4 INDEX: FIB4 SCORE: 2.87

## 2021-11-04 NOTE — CT SIMULATION
PATIENT NAME Josy Grove   PRIMARY PHYSICIAN Tram Moseley [id number]   REFERRING PHYSICIAN Dulce Jeter M.D. 1940     Anal cancer (HCC)  Staging form: Anus, AJCC 8th Edition  - Clinical stage from 9/3/2021: Stage IV (cT2, cN0, pM1) - Signed by Sha Casper M.D. on 9/3/2021    Left lower lobe lung metastasis 3 lesions     Treatment Planning CT Simulation      Order Questions     Question Answer Comment    Is this for a new course of treatment? Yes     Is this an Addendum? No     Implanted Device/Pacemaker No     Simulation Status Initial around 1pm    Planned Start Date 11/15/2021     Treatment Site Lung     Laterality Left     Treatment Technique SBRT     Other Technique(s)  LLL    Treatment Pattern/Frequency Q OD 5 tx    Concurrent Chemotherapy No     CT Technique 3D      4D     Slice Thickness 2mm     Scan Extent Chest     Treatment Device(s) Other omniboard W comp    Patient Attire Gown     Patient Position Supine     Patient Orientation Head First     Arm Position Up     Treatment Machine TB1 - STx     Image Guidance Match PTV - Soft Tissue      Bone     Fiducial Tracking Fluoro     Treatment Planning Image Fusion CT/CT     Special Physics Consult Stereotactic     Other Orders Special Tx Procedure      Weekly Physics Check

## 2021-11-05 PROCEDURE — 77370 RADIATION PHYSICS CONSULT: CPT | Performed by: RADIOLOGY

## 2021-11-09 ENCOUNTER — TELEPHONE (OUTPATIENT)
Dept: RADIATION ONCOLOGY | Facility: MEDICAL CENTER | Age: 81
End: 2021-11-09

## 2021-11-09 NOTE — TELEPHONE ENCOUNTER
Nutrition Services: Telephone Encounter     RD received notification from radiation therapist that pt's daughter was hoping to speak with RD. RD did not have availability in that moment, but provided contact card to radiation therapist to provide pt and discussed would follow-up with pt next week.     RD called today, though reached voicemailbox. RD left voicemessage with contact information for callback.     Pt scheduled for SBRT every other day for lung nodules. RD to attempt to see at that time pending availability.   Please contact [phone]

## 2021-11-10 PROCEDURE — 77300 RADIATION THERAPY DOSE PLAN: CPT | Performed by: RADIOLOGY

## 2021-11-10 PROCEDURE — 77295 3-D RADIOTHERAPY PLAN: CPT | Performed by: RADIOLOGY

## 2021-11-10 PROCEDURE — 77334 RADIATION TREATMENT AID(S): CPT | Mod: 26 | Performed by: RADIOLOGY

## 2021-11-10 PROCEDURE — 77334 RADIATION TREATMENT AID(S): CPT | Performed by: RADIOLOGY

## 2021-11-10 PROCEDURE — 77293 RESPIRATOR MOTION MGMT SIMUL: CPT | Mod: 26 | Performed by: RADIOLOGY

## 2021-11-10 PROCEDURE — 77300 RADIATION THERAPY DOSE PLAN: CPT | Mod: 26 | Performed by: RADIOLOGY

## 2021-11-10 PROCEDURE — 77293 RESPIRATOR MOTION MGMT SIMUL: CPT | Performed by: RADIOLOGY

## 2021-11-10 PROCEDURE — 77295 3-D RADIOTHERAPY PLAN: CPT | Mod: 26 | Performed by: RADIOLOGY

## 2021-11-15 ENCOUNTER — HOSPITAL ENCOUNTER (OUTPATIENT)
Dept: RADIATION ONCOLOGY | Facility: MEDICAL CENTER | Age: 81
End: 2021-11-15
Payer: COMMERCIAL

## 2021-11-15 LAB
CHEMOTHERAPY INFUSION START DATE: NORMAL
CHEMOTHERAPY RECORDS: 12
CHEMOTHERAPY RECORDS: 12
CHEMOTHERAPY RECORDS: 6000
CHEMOTHERAPY RECORDS: 6000
CHEMOTHERAPY RECORDS: NORMAL
CHEMOTHERAPY RX CANCER: NORMAL
DATE 1ST CHEMO CANCER: NORMAL
RAD ONC ARIA COURSE LAST TREATMENT DATE: NORMAL
RAD ONC ARIA COURSE TREATMENT ELAPSED DAYS: NORMAL
RAD ONC ARIA REFERENCE POINT DOSAGE GIVEN TO DATE: 12
RAD ONC ARIA REFERENCE POINT DOSAGE GIVEN TO DATE: 12
RAD ONC ARIA REFERENCE POINT DOSAGE GIVEN TO DATE: 12.19
RAD ONC ARIA REFERENCE POINT DOSAGE GIVEN TO DATE: 12.36
RAD ONC ARIA REFERENCE POINT ID: NORMAL
RAD ONC ARIA REFERENCE POINT SESSION DOSAGE GIVEN: 12
RAD ONC ARIA REFERENCE POINT SESSION DOSAGE GIVEN: 12
RAD ONC ARIA REFERENCE POINT SESSION DOSAGE GIVEN: 12.19
RAD ONC ARIA REFERENCE POINT SESSION DOSAGE GIVEN: 12.36

## 2021-11-15 PROCEDURE — 77435 SBRT MANAGEMENT: CPT | Performed by: RADIOLOGY

## 2021-11-15 PROCEDURE — 77373 STRTCTC BDY RAD THER TX DLVR: CPT | Performed by: RADIOLOGY

## 2021-11-15 PROCEDURE — 77280 THER RAD SIMULAJ FIELD SMPL: CPT | Performed by: RADIOLOGY

## 2021-11-15 PROCEDURE — 77280 THER RAD SIMULAJ FIELD SMPL: CPT | Mod: 26 | Performed by: RADIOLOGY

## 2021-11-17 ENCOUNTER — HOSPITAL ENCOUNTER (OUTPATIENT)
Dept: RADIATION ONCOLOGY | Facility: MEDICAL CENTER | Age: 81
End: 2021-11-17
Payer: COMMERCIAL

## 2021-11-17 LAB
CHEMOTHERAPY INFUSION START DATE: NORMAL
CHEMOTHERAPY RECORDS: 12
CHEMOTHERAPY RECORDS: 12
CHEMOTHERAPY RECORDS: 6000
CHEMOTHERAPY RECORDS: 6000
CHEMOTHERAPY RECORDS: NORMAL
CHEMOTHERAPY RX CANCER: NORMAL
DATE 1ST CHEMO CANCER: NORMAL
RAD ONC ARIA COURSE LAST TREATMENT DATE: NORMAL
RAD ONC ARIA COURSE TREATMENT ELAPSED DAYS: NORMAL
RAD ONC ARIA REFERENCE POINT DOSAGE GIVEN TO DATE: 24
RAD ONC ARIA REFERENCE POINT DOSAGE GIVEN TO DATE: 24
RAD ONC ARIA REFERENCE POINT DOSAGE GIVEN TO DATE: 24.37
RAD ONC ARIA REFERENCE POINT DOSAGE GIVEN TO DATE: 24.71
RAD ONC ARIA REFERENCE POINT ID: NORMAL
RAD ONC ARIA REFERENCE POINT SESSION DOSAGE GIVEN: 12
RAD ONC ARIA REFERENCE POINT SESSION DOSAGE GIVEN: 12
RAD ONC ARIA REFERENCE POINT SESSION DOSAGE GIVEN: 12.19
RAD ONC ARIA REFERENCE POINT SESSION DOSAGE GIVEN: 12.36

## 2021-11-17 PROCEDURE — 77280 THER RAD SIMULAJ FIELD SMPL: CPT | Performed by: RADIOLOGY

## 2021-11-17 PROCEDURE — 77280 THER RAD SIMULAJ FIELD SMPL: CPT | Mod: 26 | Performed by: RADIOLOGY

## 2021-11-17 PROCEDURE — 77373 STRTCTC BDY RAD THER TX DLVR: CPT | Performed by: RADIOLOGY

## 2021-11-19 ENCOUNTER — HOSPITAL ENCOUNTER (OUTPATIENT)
Dept: RADIATION ONCOLOGY | Facility: MEDICAL CENTER | Age: 81
End: 2021-11-19

## 2021-11-19 ENCOUNTER — TELEPHONE (OUTPATIENT)
Dept: NUTRITION | Facility: MEDICAL CENTER | Age: 81
End: 2021-11-19

## 2021-11-19 LAB
CHEMOTHERAPY INFUSION START DATE: NORMAL
CHEMOTHERAPY RECORDS: 12
CHEMOTHERAPY RECORDS: 12
CHEMOTHERAPY RECORDS: 6000
CHEMOTHERAPY RECORDS: 6000
CHEMOTHERAPY RECORDS: NORMAL
CHEMOTHERAPY RX CANCER: NORMAL
DATE 1ST CHEMO CANCER: NORMAL
RAD ONC ARIA COURSE LAST TREATMENT DATE: NORMAL
RAD ONC ARIA COURSE TREATMENT ELAPSED DAYS: NORMAL
RAD ONC ARIA REFERENCE POINT DOSAGE GIVEN TO DATE: 36
RAD ONC ARIA REFERENCE POINT DOSAGE GIVEN TO DATE: 36
RAD ONC ARIA REFERENCE POINT DOSAGE GIVEN TO DATE: 36.56
RAD ONC ARIA REFERENCE POINT DOSAGE GIVEN TO DATE: 37.07
RAD ONC ARIA REFERENCE POINT ID: NORMAL
RAD ONC ARIA REFERENCE POINT SESSION DOSAGE GIVEN: 12
RAD ONC ARIA REFERENCE POINT SESSION DOSAGE GIVEN: 12
RAD ONC ARIA REFERENCE POINT SESSION DOSAGE GIVEN: 12.19
RAD ONC ARIA REFERENCE POINT SESSION DOSAGE GIVEN: 12.36

## 2021-11-19 PROCEDURE — 77280 THER RAD SIMULAJ FIELD SMPL: CPT | Mod: 26 | Performed by: RADIOLOGY

## 2021-11-19 PROCEDURE — 77280 THER RAD SIMULAJ FIELD SMPL: CPT | Performed by: RADIOLOGY

## 2021-11-19 PROCEDURE — 77336 RADIATION PHYSICS CONSULT: CPT | Mod: XU | Performed by: RADIOLOGY

## 2021-11-19 PROCEDURE — 77373 STRTCTC BDY RAD THER TX DLVR: CPT | Performed by: RADIOLOGY

## 2021-11-19 NOTE — TELEPHONE ENCOUNTER
Nutrition Services: Brief Update  Wt Readings from Last 7 Encounters:   11/04/21 52.3 kg (115 lb 4.8 oz)   09/03/21 52.6 kg (116 lb)   05/20/21 51.2 kg (112 lb 14 oz)   03/23/21 50.8 kg (111 lb 15.9 oz)   02/21/21 49.4 kg (109 lb)   01/19/21 49.9 kg (110 lb 0.2 oz)   12/21/20 50.4 kg (111 lb 1.8 oz)     Weight Change: Weight remains overall stable, pt is up 4 lbs overall.    Called and spoke with pt on phone, missed at SBRT today. Pt did not have any questions at this time, discussed a note that her daughter had some questions and that we tried to reach out. Only question pt had was when she would be restarting infusion treatment, discussed that staff would reach out to her. Pt reports eating well and has gained 1 lb recently.      Plan/Recommend:  • Encouraged adequate PO intake and weight maintenance. Pt agreeable and all questions answered.   • RD to sign off at this time, will see when starts treatment. No nutrition intervention needed at this time.    Please contact [phone].

## 2021-11-20 NOTE — PROCEDURES
DATE OF SERVICE: 11/19/2021    DIAGNOSIS:  Anal cancer (HCC)  Staging form: Anus, AJCC 8th Edition  - Clinical stage from 9/3/2021: Stage IV (cT2, cN0, pM1) - Signed by Sha Casper M.D. on 9/3/2021       TREATMENT:  Radiation Therapy Episodes     Active Episodes     Radiation Therapy: SBRT (11/15/2021)               Radiation Treatments       Plan Last Treated On Elapsed Days Fractions Treated Prescribed Fraction Dose (cGy) Prescribed Total Dose (cGy)    SBRT_LUL 11/19/2021 4 @ [id number] 3 of 5 1,200 6,000    SBRT_L_Lung 11/19/2021 4 @ [id number] 3 of 5 1,200 6,000                Reference Point Last Treated On Elapsed Days Most Recent Session Dose (cGy) Total Dose (cGy)    SBRT_LUL 11/19/2021 4 @ [id number] 1,200 3,600    SBRT_LUL CP 11/19/2021 4 @ [id number] 1,219 3,656    SBRT_L_Lung 11/19/2021 4 @ [id number] 1,200 3,600    SBRT_L_Lung CP 11/19/2021 4 @ [id number] 1,236 3,707                Historical Treatments (Plans: 2)    Plan Last Treated On Elapsed Days Fractions Treated Prescribed Fraction Dose (cGy) Prescribed Total Dose (cGy)   Anus 3/5/2020 38 @ [id number] 28 of 28 180 5,040   Anus1 1/20/2021  @  0 of 28 180 5,040              Reference Point Last Treated On Elapsed Days Most Recent Session Dose (cGy) Total Dose (cGy)   Anus 3/5/2020 38 @ [id number] -- 5,040   Anus CP 3/5/2020 38 @ [id number] -- 4,321   Anus 1/20/2021  @  -- 0   Anus CP 1/20/2021  @  -- 0                      STEREOTACTIC PROCEDURE NOTE:    Called by [employer] machine to verify treatment parameters including:  treatment site, treatment dose, and treatment setup prior to stereotactic treatment..    Patient was placed in the treatment position with use of immobilization device and  laser guidance. CBCT images were acquired for target localization.  Images were reviewed in the axial, coronal, and saggital views and shifts were made as necessary to ensure that patient position matched simulation position.       Treatment delivered per  prescription.  The medical physicist was present throughout the set-up, verification and treatment delivery to oversee the procedure and ensure all parameters agreed with the computerized plan.    I have personally reviewed the relevant data, performed the target localization, and determined all relevant factors for this patient’s simulation.

## 2021-11-22 ENCOUNTER — HOSPITAL ENCOUNTER (OUTPATIENT)
Dept: RADIATION ONCOLOGY | Facility: MEDICAL CENTER | Age: 81
End: 2021-11-22
Payer: COMMERCIAL

## 2021-11-22 LAB
CHEMOTHERAPY INFUSION START DATE: NORMAL
CHEMOTHERAPY RECORDS: 12
CHEMOTHERAPY RECORDS: 12
CHEMOTHERAPY RECORDS: 6000
CHEMOTHERAPY RECORDS: 6000
CHEMOTHERAPY RECORDS: NORMAL
CHEMOTHERAPY RX CANCER: NORMAL
DATE 1ST CHEMO CANCER: NORMAL
RAD ONC ARIA COURSE LAST TREATMENT DATE: NORMAL
RAD ONC ARIA COURSE TREATMENT ELAPSED DAYS: NORMAL
RAD ONC ARIA REFERENCE POINT DOSAGE GIVEN TO DATE: 48
RAD ONC ARIA REFERENCE POINT DOSAGE GIVEN TO DATE: 48
RAD ONC ARIA REFERENCE POINT DOSAGE GIVEN TO DATE: 48.74
RAD ONC ARIA REFERENCE POINT DOSAGE GIVEN TO DATE: 49.42
RAD ONC ARIA REFERENCE POINT ID: NORMAL
RAD ONC ARIA REFERENCE POINT SESSION DOSAGE GIVEN: 12
RAD ONC ARIA REFERENCE POINT SESSION DOSAGE GIVEN: 12
RAD ONC ARIA REFERENCE POINT SESSION DOSAGE GIVEN: 12.19
RAD ONC ARIA REFERENCE POINT SESSION DOSAGE GIVEN: 12.36

## 2021-11-22 PROCEDURE — 77280 THER RAD SIMULAJ FIELD SMPL: CPT | Performed by: RADIOLOGY

## 2021-11-22 PROCEDURE — 77280 THER RAD SIMULAJ FIELD SMPL: CPT | Mod: 26 | Performed by: RADIOLOGY

## 2021-11-22 PROCEDURE — 77373 STRTCTC BDY RAD THER TX DLVR: CPT | Performed by: RADIOLOGY

## 2021-11-24 ENCOUNTER — HOSPITAL ENCOUNTER (OUTPATIENT)
Dept: RADIATION ONCOLOGY | Facility: MEDICAL CENTER | Age: 81
End: 2021-11-24
Payer: COMMERCIAL

## 2021-11-24 VITALS
SYSTOLIC BLOOD PRESSURE: 138 MMHG | OXYGEN SATURATION: 92 % | TEMPERATURE: 97.8 F | HEART RATE: 81 BPM | BODY MASS INDEX: 20.19 KG/M2 | WEIGHT: 114 LBS | DIASTOLIC BLOOD PRESSURE: 90 MMHG

## 2021-11-24 LAB
CHEMOTHERAPY INFUSION START DATE: NORMAL
CHEMOTHERAPY INFUSION START DATE: NORMAL
CHEMOTHERAPY INFUSION STOP DATE: NORMAL
CHEMOTHERAPY RECORDS: 12
CHEMOTHERAPY RECORDS: 6000
CHEMOTHERAPY RECORDS: NORMAL
CHEMOTHERAPY RX CANCER: NORMAL
CHEMOTHERAPY RX CANCER: NORMAL
DATE 1ST CHEMO CANCER: NORMAL
DATE 1ST CHEMO CANCER: NORMAL
RAD ONC ARIA COURSE LAST TREATMENT DATE: NORMAL
RAD ONC ARIA COURSE LAST TREATMENT DATE: NORMAL
RAD ONC ARIA COURSE TREATMENT ELAPSED DAYS: NORMAL
RAD ONC ARIA COURSE TREATMENT ELAPSED DAYS: NORMAL
RAD ONC ARIA REFERENCE POINT DOSAGE GIVEN TO DATE: 60
RAD ONC ARIA REFERENCE POINT DOSAGE GIVEN TO DATE: 60.93
RAD ONC ARIA REFERENCE POINT DOSAGE GIVEN TO DATE: 60.93
RAD ONC ARIA REFERENCE POINT DOSAGE GIVEN TO DATE: 61.78
RAD ONC ARIA REFERENCE POINT DOSAGE GIVEN TO DATE: 61.78
RAD ONC ARIA REFERENCE POINT ID: NORMAL
RAD ONC ARIA REFERENCE POINT SESSION DOSAGE GIVEN: 12
RAD ONC ARIA REFERENCE POINT SESSION DOSAGE GIVEN: 12
RAD ONC ARIA REFERENCE POINT SESSION DOSAGE GIVEN: 12.19
RAD ONC ARIA REFERENCE POINT SESSION DOSAGE GIVEN: 12.36

## 2021-11-24 PROCEDURE — 77373 STRTCTC BDY RAD THER TX DLVR: CPT | Performed by: RADIOLOGY

## 2021-11-24 PROCEDURE — 77280 THER RAD SIMULAJ FIELD SMPL: CPT | Mod: 26 | Performed by: RADIOLOGY

## 2021-11-24 PROCEDURE — 77280 THER RAD SIMULAJ FIELD SMPL: CPT | Performed by: RADIOLOGY

## 2021-11-24 ASSESSMENT — FIBROSIS 4 INDEX: FIB4 SCORE: 2.87

## 2021-11-24 NOTE — ON TREATMENT VISIT
ON TREATMENT  NOTE  RADIATION ONCOLOGY DEPARTMENT    Patient name:  Josy Grove    Primary Physician:  TIM Amezquita MRN: [id number]  CSN: [id number]   Referring physician:  Dulce Jeter M.D. : 1940, 80 y.o.     ENCOUNTER DATE:  21    DIAGNOSIS:  Anal cancer (HCC)  Staging form: Anus, AJCC 8th Edition  - Clinical stage from 9/3/2021: Stage IV (cT2, cN0, pM1) - Signed by Sha Casper M.D. on 9/3/2021      TREATMENT SUMMARY:  Aria Treatment Information        Some values may be hidden. Unless noted otherwise, only the newest values recorded on each date are displayed.         Aria Treatment Summary 21   Course First Treatment Date 11/15/2021   Course Last Treatment Date 2021   SBRT_LUL Plan from Course C2_SBRT_LLung   Fraction 5 of 5   Elapsed Course Days 9 @ [id number]   Prescribed Fraction Dose 1,200 cGy   Prescribed Total Dose 6,000 cGy   SBRT_L_Lung Plan from Course C2_SBRT_LLung   Fraction 5 of 5   Elapsed Course Days  @ [id number]   Prescribed Fraction Dose 1,200 cGy   Prescribed Total Dose 6,000 cGy   SBRT_LUL Reference Point from Course C2_SBRT_LLung   Elapsed Course Days  @ [id number]   Session Dose 1,200 cGy   Total Dose 6,000 cGy   SBRT_LUL CP Reference Point from Course C2_SBRT_LLung   Elapsed Course Days  @ [id number]   Session Dose 1,219 cGy   Total Dose 6,093 cGy   SBRT_L_Lung Reference Point from Course C2_SBRT_LLung   Elapsed Course Days  @    Session Dose 1,200 cGy   Total Dose 6,000 cGy   SBRT_L_Lung CP Reference Point from Course C2_SBRT_LLung   Elapsed Course Days  @    Session Dose 1,236 cGy   Total Dose 6,178 cGy   Anus1 Plan from Course Eval   Anus Reference Point from Course Eval   Anus CP Reference Point from Course Eval   Anus Plan from Course C1_anus   Anus Reference Point from Course C1_anus   Anus CP Reference Point from Course C1_anus             SUBJECTIVE:  Well appearing      VITAL SIGNS:  KPS: 100, Fully  active, able to carry on all pre-disease performed without restriction (ECOG equivalent 0)  Encounter Vitals  Temperature: 36.6 °C (97.8 °F)  Temp src: Temporal  Blood Pressure : 138/90  Pulse: 81  Pulse Oximetry: 92 %  Weight: 51.7 kg (114 lb)  Pain Assessment 11/4/2021 9/3/2021   Pain Score 0 0   Some recent data might be hidden          PHYSICAL EXAM:    Physical Exam  Constitutional:       General: She is not in acute distress.  Pulmonary:      Breath sounds: Normal air entry.   Skin:     Findings: No erythema.   Neurological:      Mental Status: She is alert.          Toxicity Assessment 11/24/2021 3/4/2020 2/26/2020 2/19/2020 2/12/2020 2/5/2020 1/29/2020   Toxicity Assessment Chest Female Pelvis Female Pelvis Female Pelvis Female Pelvis Female Pelvis Female Pelvis   Fatigue (lethargy, malaise, asthenia) Moderate (e.g., decrease in performance status by 1 ECOG level or 20% [place]) or causing difficulty performing some activities Moderate (e.g., decrease in performance status by 1 ECOG level or 20% [place]) or causing difficulty performing some activities Moderate (e.g., decrease in performance status by 1 ECOG level or 20% [place]) or causing difficulty performing some activities Moderate (e.g., decrease in performance status by 1 ECOG level or 20% [place]) or causing difficulty performing some activities Moderate (e.g., decrease in performance status by 1 ECOG level or 20% [place]) or causing difficulty performing some activities Increased fatigue over baseline, but not altering normal activities Increased fatigue over baseline, but not altering normal activities   Radiation Dermatitis Faint erythema or dry desquamation Faint erythema or dry desquamation Faint erythema or dry desquamation Faint erythema or dry desquamation Faint erythema or dry desquamation None None   Anorexia Loss of appetite Loss of appetite Loss of appetite Loss of appetite Oral intake significantly decreased Loss of appetite  None   Colitis - None None None None None None   Constipation - Requiring stool softener or dietary modification Requiring stool softener or dietary modification Requiring stool softener or dietary modification None None None   Dehydration - None None Requiring IV fluid replacement (brief) None None None   Diarrhea w/o Colostomy - Increase of 4-6 stools/day, or nocternal stools None None Increase of <4 stools/day over pre-treatment None None   Flatulence - None None None None None None   Nausea - Able to eat None None Able to eat - None   Proctitis - None None None None None None   Vomiting - None None None None None None   Dyspepsia/heartburn None - - - - - -   Dysphagia, Esophagitis, odynophagoa (painful swallowing) None - - - - - -   RT Dysphagia-Esophageal None - - - - - -   RT - Pain due to RT - Moderate pain, pain or analgesics interfering with function, but not interfering with activities of daily living Mild pain not interfering with function None Mild pain not interfering with function Mild pain not interfering with function None   Tumor Pain (onset or exacerbation of tumor pain due to treatment) - Moderate pain, pain or analgesics interfering with function, but not interfering with activities of daily living Mild pain not interfering with function Mild pain not interfering with function Mild pain not interfering with function Mild pain not interfering with function Mild pain not interfering with function   Dysuria (painful urination) - Mild symptoms requiring no intervention Mild symptoms requiring no intervention None Mild symptoms requiring no intervention None None   Urinary Frequency - Normal Increase in frequency up to 2x normal Increase in frequency up to 2x normal Normal Normal Normal   Urinary Urgency - None Present None None None None   Bladder Spasms - Absent Absent Absent Absent Absent Absent   Incontinence - None None None None None None   Urinary Retention - Hesitency or dribbling, but no  significant residual urine and/or retention occuring during the immediate postoperative period Normal Normal Normal Normal Normal   Vaginitis (not due to infection) - None None None Mild, not requiring treatment None None   Cough Mild, relieved by non-prescription medication - - - - - -   Dyspnea Normal - - - - - -   Vaginal Bleeding - None None None None None None   Vaginal Dryness - Normal Normal Normal - Normal Normal       CURRENT MEDICATIONS:    Current Outpatient Medications:   •  zonisamide (ZONEGRAN) 50 MG capsule, Take 50 mg by mouth., Disp: , Rfl:   •  B Complex Vitamins (VITAMIN B-COMPLEX PO), Take  by mouth., Disp: , Rfl:   •  ondansetron (ZOFRAN ODT) 8 MG TABLET DISPERSIBLE, dissolve 1 tablet ON TONGUE every 12 hours if needed for nausea and vomiting (Patient not taking: Reported on 11/4/2021), Disp: , Rfl:   •  prochlorperazine (COMPAZINE) 10 MG Tab, Take  by mouth every 6 hours as needed. (Patient not taking: Reported on 11/4/2021), Disp: , Rfl:   •  polyethylene glycol/lytes (MIRALAX) 17 g Pack, Take 17 g by mouth every day., Disp: , Rfl:   •  HYDROcodone-acetaminophen (NORCO) 5-325 MG Tab per tablet, Take 0.5-1 Tablets by mouth 2 times a day as needed (pain)., Disp: , Rfl:   •  ketorolac (TORADOL) 10 MG Tab, Take 1 tablet by mouth every 6 hours as needed for Moderate Pain (Prior to hydrocodone/acetaminophen for moderate pain). (Patient not taking: Reported on 3/23/2021), Disp: 15 tablet, Rfl: 0  •  SUMAtriptan (IMITREX) 25 MG Tab tablet, Take 1 tablet by mouth every 2 hours as needed for Migraine., Disp: 15 tablet, Rfl: 3  •  gabapentin (NEURONTIN) 100 MG Cap, Take 100 mg by mouth 2 (two) times a day., Disp: , Rfl:   •  SPIRIVA RESPIMAT 2.5 MCG/ACT Aero Soln, Inhale 2.5 mcg every day., Disp: , Rfl:   •  docusate sodium (COLACE) 100 MG Cap, Take 100 mg by mouth 2 times a day. (Patient not taking: Reported on 11/4/2021), Disp: , Rfl:   •  vitamin D (CHOLECALCIFEROL) 1000 Unit (25 mcg) Tab, Take 1,000  Units by mouth every day. (Patient not taking: Reported on 11/4/2021), Disp: , Rfl:   •  Multiple Vitamins-Minerals (CENTRUM SILVER 50+WOMEN PO), Take 1 tablet by mouth every day., Disp: , Rfl:   •  apixaban (ELIQUIS) 5mg Tab, Take 5 mg by mouth 2 Times a Day., Disp: , Rfl:   •  fluticasone-salmeterol (WIXELA INHUB) 500-50 MCG/DOSE AEROSOL POWDER, BREATH ACTIVATED, Inhale 1 Puff every 12 hours. Indications: Asthma, Disp: , Rfl:   •  digoxin (LANOXIN) 125 MCG Tab, Take 125 mcg by mouth every day with lunch., Disp: , Rfl:   •  atorvastatin (LIPITOR) 20 MG Tab, Take 20 mg by mouth every day., Disp: , Rfl:   •  zolpidem (AMBIEN) 10 MG Tab, Take 10 mg by mouth at bedtime as needed for Sleep., Disp: , Rfl:     LABORATORY DATA:   Lab Results   Component Value Date/Time    SODIUM 140 05/06/2021 11:51 AM    POTASSIUM 4.1 05/06/2021 11:51 AM    CHLORIDE 105 05/06/2021 11:51 AM    CO2 27 05/06/2021 11:51 AM    GLUCOSE 94 05/06/2021 11:51 AM    BUN 15 05/06/2021 11:51 AM    CREATININE 0.72 05/06/2021 11:51 AM         Lab Results   Component Value Date/Time    WBC 7.3 03/25/2021 02:11 AM    HEMOGLOBIN 11.3 (L) 03/25/2021 02:11 AM    HEMATOCRIT 33.8 (L) 03/25/2021 02:11 AM    .0 (H) 03/25/2021 02:11 AM    PLATELETCT 128 (L) 03/25/2021 02:11 AM        RADIOLOGY DATA:  No results found.    IMPRESSION:  Anal cancer (HCC)  Staging form: Anus, AJCC 8th Edition  - Clinical stage from 9/3/2021: Stage IV (cT2, cN0, pM1) - Signed by Sha Casper M.D. on 9/3/2021      PLAN:  No change in treatment plan    Disposition:  Treatment plan reviewed. Questions answered. Continue therapy outlined.     Sha Casper M.D.    No orders of the defined types were placed in this encounter.

## 2021-12-20 ENCOUNTER — HOSPITAL ENCOUNTER (OUTPATIENT)
Dept: RADIOLOGY | Facility: MEDICAL CENTER | Age: 81
End: 2021-12-20
Attending: INTERNAL MEDICINE
Payer: COMMERCIAL

## 2021-12-29 ENCOUNTER — HOSPITAL ENCOUNTER (OUTPATIENT)
Dept: RADIOLOGY | Facility: MEDICAL CENTER | Age: 81
End: 2021-12-29
Attending: INTERNAL MEDICINE
Payer: COMMERCIAL

## 2022-03-31 ENCOUNTER — HOSPITAL ENCOUNTER (OUTPATIENT)
Dept: RADIOLOGY | Facility: MEDICAL CENTER | Age: 82
End: 2022-03-31
Attending: INTERNAL MEDICINE
Payer: COMMERCIAL

## 2022-08-28 ENCOUNTER — HOSPITAL ENCOUNTER (OUTPATIENT)
Dept: RADIOLOGY | Facility: MEDICAL CENTER | Age: 82
End: 2022-08-28
Attending: INTERNAL MEDICINE
Payer: MEDICARE

## 2022-08-31 NOTE — DISCHARGE SUMMARY
Discharge Summary    CHIEF COMPLAINT ON ADMISSION  No chief complaint on file.      Reason for Admission  ANAL SQUAMOUS CELL CARCINOMA     Admission Date  10/15/2020    CODE STATUS  Prior    HPI & HOSPITAL COURSE  This is a 79 y.o. female here with progression of anal squamous cell carcinoma 6 months post chemoradiotherapy.  She underwent an abdominal perineal resection with rectus flap repair by plastic surgery.  She did very well following surgery.  Unfortunately the skin and subcutaneous tissue on her flap became necrotic and she required take back to the operating room for revision and primary closure of the skin of the perineum.  She was tolerating a regular diet throughout her stay.  Her stoma was functioning well.  She was doing well with stoma teaching.  She was ambulating in the [place] well.  Her pain was well managed with p.o. analgesia.       Therefore, she is discharged in good and stable condition to home with close outpatient follow-up.    The patient met 2-midnight criteria for an inpatient stay at the time of discharge.    Discharge Date  October 23, 2020    FOLLOW UP ITEMS POST DISCHARGE  Follow-up with me in 4 days  Follow-up with Dr. Reyes as scheduled    DISCHARGE DIAGNOSES  Active Problems:    * No active hospital problems. *  Resolved Problems:    * No resolved hospital problems. *      FOLLOW UP  Future Appointments   Date Time Provider Department Center   12/21/2020 11:00 AM Sha Casper M.D. RADT None     Dulce Jeter M.D.  [address] [facility] [address]  [place] [address]  [phone]    Schedule an appointment as soon as possible for a visit in 1 week      Mark Reyes M.D.  [address]  [place] [address]  [phone]    Schedule an appointment as soon as possible for a visit on 11/3/2020        MEDICATIONS ON DISCHARGE     Medication List      START taking these medications      Instructions   gabapentin 300 MG Caps  Commonly known as: NEURONTIN   Take 1 Cap by mouth 3  times a day for 4 days.  Dose: 300 mg     oxyCODONE immediate-release 5 MG Tabs  Commonly known as: ROXICODONE   Take 1 Tab by mouth every four hours as needed for up to 7 days.  Dose: 5 mg        CONTINUE taking these medications      Instructions   atorvastatin 20 MG Tabs  Commonly known as: LIPITOR   Take 80 mg by mouth.  Dose: 80 mg     CALCIUM PO   Take 750 mg by mouth every day.  Dose: 750 mg     digoxin 125 MCG Tabs  Commonly known as: LANOXIN   Take 125 mcg by mouth every day.  Dose: 125 mcg     Eliquis 5mg Tabs  Generic drug: apixaban   TAKE 1 TABLET BY MOUTH TWICE A DAY     Metamucil 28.3 % Powd  Generic drug: Psyllium   Take  by mouth 2 Times a Day. 1 teaspoon bid     montelukast 10 MG Tabs  Commonly known as: SINGULAIR   Take 10 mg by mouth every day.  Dose: 10 mg     nitroglycerin 0.4 MG Subl  Commonly known as: NITROSTAT   Place 0.4 mg under tongue.  Dose: 0.4 mg     SUMAtriptan 25 MG Tabs tablet  Commonly known as: IMITREX   Take 25 mg by mouth.  Dose: 25 mg     tiotropium 18 MCG Caps  Commonly known as: SPIRIVA   Inhale 18 mcg by mouth every day.  Dose: 18 mcg     vitamin D 1000 Unit (25 mcg) Tabs  Commonly known as: cholecalciferol   Take 1,000 Units by mouth every day.  Dose: 1,000 Units     vitamin e 400 UNIT Caps  Commonly known as: VITAMIN E   Take 400 Units by mouth every day.  Dose: 400 Units     Wixela Inhub 500-50 MCG/DOSE Aepb  Generic drug: fluticasone-salmeterol   Inhale 1 Puff by mouth every 12 hours.  Dose: 1 Puff     zolpidem 10 MG Tabs  Commonly known as: AMBIEN   Take 5 mg by mouth at bedtime as needed for Sleep.  Dose: 5 mg        STOP taking these medications    lidocaine 5 % Oint  Commonly known as: XYLOCAINE     metroNIDAZOLE 500 MG Tabs  Commonly known as: FLAGYL     neomycin 500 MG Tabs  Commonly known as: MYCIFRADIN     Stool Softener 100 MG Caps  Generic drug: docusate sodium            Allergies  Allergies   Allergen Reactions   • Codeine Nausea       DIET  Orders Placed  This Encounter   Procedures   • Diet Order Regular     Standing Status:   Standing     Number of Occurrences:   1     Order Specific Question:   Diet:     Answer:   Regular [1]       ACTIVITY  Light duty.  10-lb lifting restriction    CONSULTATIONS  Wound care    PROCEDURES  Robotic abdominal perineal resection  Rectus abdominis flap    LABORATORY  Lab Results   Component Value Date    SODIUM 137 10/21/2020    POTASSIUM 4.0 10/21/2020    CHLORIDE 106 10/21/2020    CO2 24 10/21/2020    GLUCOSE 105 (H) 10/21/2020    BUN 19 10/21/2020    CREATININE 0.77 10/21/2020        Lab Results   Component Value Date    WBC 5.5 10/21/2020    HEMOGLOBIN 10.8 (L) 10/21/2020    HEMATOCRIT 32.6 (L) 10/21/2020    PLATELETCT 192 10/21/2020        Total time of the discharge process exceeds 30 minutes.   None

## 2022-12-20 ENCOUNTER — HOSPITAL ENCOUNTER (OUTPATIENT)
Dept: RADIOLOGY | Facility: MEDICAL CENTER | Age: 82
End: 2022-12-20
Attending: INTERNAL MEDICINE
Payer: MEDICARE

## 2023-03-11 ENCOUNTER — HOSPITAL ENCOUNTER (OUTPATIENT)
Dept: RADIOLOGY | Facility: MEDICAL CENTER | Age: 83
End: 2023-03-11
Attending: INTERNAL MEDICINE
Payer: MEDICARE

## 2023-06-05 ENCOUNTER — HOSPITAL ENCOUNTER (OUTPATIENT)
Dept: RADIOLOGY | Facility: MEDICAL CENTER | Age: 83
End: 2023-06-05
Attending: INTERNAL MEDICINE
Payer: MEDICARE

## 2023-06-05 DIAGNOSIS — C21.1 MALIGNANT NEOPLASM OF ANAL CANAL (HCC): ICD-10-CM

## 2023-06-05 PROCEDURE — A9552 F18 FDG: HCPCS

## 2023-06-20 ENCOUNTER — HOSPITAL ENCOUNTER (OUTPATIENT)
Facility: MEDICAL CENTER | Age: 83
End: 2023-06-20
Attending: INTERNAL MEDICINE
Payer: MEDICARE

## 2023-06-20 PROCEDURE — 80053 COMPREHEN METABOLIC PANEL: CPT

## 2023-06-20 PROCEDURE — 84443 ASSAY THYROID STIM HORMONE: CPT

## 2023-06-20 PROCEDURE — 82378 CARCINOEMBRYONIC ANTIGEN: CPT

## 2023-06-21 ENCOUNTER — HOSPITAL ENCOUNTER (OUTPATIENT)
Dept: RADIATION ONCOLOGY | Facility: MEDICAL CENTER | Age: 83
End: 2023-06-30
Attending: RADIOLOGY
Payer: MEDICARE

## 2023-06-21 LAB
ALBUMIN SERPL BCP-MCNC: 4.2 G/DL (ref 3.2–4.9)
ALBUMIN/GLOB SERPL: 1.9 G/DL
ALP SERPL-CCNC: 112 U/L (ref 30–99)
ALT SERPL-CCNC: 13 U/L (ref 2–50)
ANION GAP SERPL CALC-SCNC: 12 MMOL/L (ref 7–16)
AST SERPL-CCNC: 13 U/L (ref 12–45)
BILIRUB SERPL-MCNC: 0.5 MG/DL (ref 0.1–1.5)
BUN SERPL-MCNC: 10 MG/DL (ref 8–22)
CALCIUM ALBUM COR SERPL-MCNC: 9.4 MG/DL (ref 8.5–10.5)
CALCIUM SERPL-MCNC: 9.6 MG/DL (ref 8.5–10.5)
CEA SERPL-MCNC: 2.2 NG/ML (ref 0–3)
CHLORIDE SERPL-SCNC: 106 MMOL/L (ref 96–112)
CO2 SERPL-SCNC: 27 MMOL/L (ref 20–33)
CREAT SERPL-MCNC: 0.73 MG/DL (ref 0.5–1.4)
GFR SERPLBLD CREATININE-BSD FMLA CKD-EPI: 82 ML/MIN/1.73 M 2
GLOBULIN SER CALC-MCNC: 2.2 G/DL (ref 1.9–3.5)
GLUCOSE SERPL-MCNC: 118 MG/DL (ref 65–99)
POTASSIUM SERPL-SCNC: 4 MMOL/L (ref 3.6–5.5)
PROT SERPL-MCNC: 6.4 G/DL (ref 6–8.2)
SODIUM SERPL-SCNC: 145 MMOL/L (ref 135–145)
TSH SERPL DL<=0.005 MIU/L-ACNC: 2.25 UIU/ML (ref 0.38–5.33)

## 2023-06-21 PROCEDURE — 99442 PR PHYSICIAN TELEPHONE EVALUATION 11-20 MIN: CPT | Mod: 95 | Performed by: RADIOLOGY

## 2023-06-21 NOTE — PROGRESS NOTES
Telephone Appointment Visit   This telephone visit was initiated by the patient and they verbally consented in her house in [place].    Reason for Call:  Lab Follow-up    HPI:    Patient originally presented in April 2019 with some rectal bleeding.  She was originally seen by Dr. Edilson Quinn who noted anal fissure and hemorrhoids and referred her to Dr. Dulce Jeter ([facility]) for anoscopy which showed hard mass exophytic palpated from the anal verge to the top of the sphincter in the left lateral position fixed and punch biopsy December 18, 2018 shows invasive squamous cell carcinoma moderately differentiated.  She underwent MRI rectum January 3, 2020 which showed a 3.9 x 2 cm anal canal lesion semicircumferential extending from 1:00 to 8 o'clock position extending from the anal verge into the lower rectum.  Anteriorly the lesion abuts the lower vagina without definitive evidence of vaginal invasion.  CT chest abdomen pelvis January 10, 2020 showed no evidence of metastatic disease.  A hypo-dense 1.4 cm nodule below the left hemidiaphragm medial to the spleen it is unlikely to represent a solitary metastatic lesion in this location.  PET CT scan done January 16, 2020 showed abnormal oval-shaped solid mass in the left side of the anus extending to the level of the lower rectum with marketed activity.  No adenopathy or elevated raghav activity noted.  Patient currently is complaining of severe anal pain and occasional bleeding.        3/20/20  Patient improving post treatment. Rx for anusol for proctitis eRx to [facility]. PETCT and CONCHITA in 3 months follow up ~June 2020.     6/11/20  Patient still having some loose bowel movements and fecal incontinence as well as some pain in the anal rectal area.  She is using the lidocaine jelly with some relief.  She also complains of some bladder irritation and just general discomfort in her lower abdomen.  PET/CT shows good response with  just mild activity in the anal area at this time.     9/14/20  Patient is doing well and her inflammation is improving.  She does not have any diarrhea at this time she does have a little bit of rectal pain which she uses her lidocaine for.  She has no abdominal pain or cystitis at this time.  She was seen by Dr. Jeter who was following her left lateral lesion which shows some induration and is due for an MRI next week for follow-up.     12/21/20  Patient underwent APR by Dr. Dulce Jeter (Colorectal Surgery) on October 15, 2020 with flap by Dr. Reyes.  Patient was found to have persistent disease clinical T2 with no nodes involved.  She underwent repeat PET CT scan recently which showed some uptake within the left lower lobe and is undergoing CT-guided biopsy next week.  Overall she still has lower pelvic pain and some urinary incontinence for who she previously saw Dr. [place] Jeter (Uro-Gyn).  She says her weight has been stable and actually she has been increasing her weight recently.     5/20/21  Patient has had issues with peritoneal hernia through her APR flap and underwent mesh revision.  She has resumed her chemotherapy with Dr. Vogel and is tolerating well and her weight is overall stable.  She is having a CT scan with Dr. Vogel on Friday which we will review.  She does have continued issues with urinary incontinence but denies any bowel diarrhea through ostomy bag.  She still does have some wound healing issues from her recent hernia repair.     9/3/21  She had CT-guided biopsy of left lower lobe lung lesion January 1921 which showed metastatic anal squamous cell carcinoma.  Patient has been doing well she completed 6 cycles of carbotaxol July 13, 2021.  She has been feeling well and had repeat CT chest abdomen pelvis which showed left lower lobe metastasis measuring 1.1 cm previously 1 cm May 28, 2021 and 1.4 cm December 7, 2020.  Patient also had mild enlargement of subpleural pulmonary nodule  in the inferior left lower lobe 27 mm previously 4 mm on May 28, 2021 and 4 mm on February 20, 2021.       11/4/21  Patient is doing well she did have interval CT chest abdomen [facility] October [facility], 2021 which did show interval growth of the 2 lung lesions as well as a third lung lesion adjacent to the larger left lower lobe lesion.  No evidence of other distant metastatic disease.  She has been feeling well denies any cough shortness of breath or hemoptysis.  Her weight has been stable.  She does have some general fatigue but is doing overall pretty well.    Interval History:  Patient completed SBRT to LLL lung lesions 11/24/21. She has been on keytruda doing well but had PETCT 6/2023 which shows 4 new lesions. 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    Labs / Images Reviewed:   Results for orders placed during the hospital encounter of 06/05/23    [id number] BASE TO MID-THIGH    Impression  1.  Peripherally hypermetabolic structure in the inferior pelvis posteriorly, to the LEFT of midline, likely represents bowel loop related to prior surgery, however recurrent tumor is difficult to entirely exclude.  2.  Bilateral pulmonary metastatic disease.      Results for orders placed during the hospital encounter of 09/28/20    [id number] BASE TO MID-THIGH    Impression  1.  There is abnormally elevated activity within a 2 cm lesion centered at the anus which appears significantly increased compared to the most recent PET CT exam. Activity is now 6.2 SUV. Prior exam was 3.3 SUV. These findings are suspicious for residual  or recurrent neoplasm of the anus.    2.  No evidence of adenopathy or elevated raghav activity within the pelvis.    3.  No PET/CT evidence of distant metastatic disease.      Results for orders placed during the hospital encounter of 06/08/20    [id number] BASE TO MID-THIGH    Impression  1.  Mild residual activity in the anus may represent inflammation. Residual  tumor cannot be excluded.    2.  No distant metastatic disease is identified.      Results for orders placed during the hospital encounter of 01/16/20    [id number] BASE TO MID-THIGH    Impression  1.  Abnormal oval-shaped solid mass centered in the left side of the anus extending to the level of the lower rectum is again identified. This mass demonstrates markedly elevated activity consistent with anal carcinoma.    2.  No adenopathy or elevated raghav activity identified.    3.  No PET/CT evidence of distant metastatic disease in the neck chest or abdomen.    4.  Small low-attenuation nodular lesion in the left upper quadrant of the abdomen is again identified. No elevated activity is noted.       Assessment and Plan:      Cancer Staging   Anal cancer (HCC)  Staging form: Anus, AJCC 8th Edition  - Clinical stage from 9/3/2021: Stage IV (cT2, cN0, pM1) - Signed by Sha Casper M.D. 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    Total Time Spent (mins): 11mins    Sha Casper M.D.

## 2023-07-10 ENCOUNTER — HOSPITAL ENCOUNTER (OUTPATIENT)
Dept: RADIATION ONCOLOGY | Facility: MEDICAL CENTER | Age: 83
End: 2023-07-31
Attending: RADIOLOGY
Payer: MEDICARE

## 2023-07-10 VITALS
SYSTOLIC BLOOD PRESSURE: 98 MMHG | WEIGHT: 98 LBS | OXYGEN SATURATION: 93 % | DIASTOLIC BLOOD PRESSURE: 71 MMHG | TEMPERATURE: 97.3 F | BODY MASS INDEX: 17.36 KG/M2 | HEART RATE: 81 BPM

## 2023-07-10 DIAGNOSIS — C21.0 ANAL CANCER (HCC): Chronic | ICD-10-CM

## 2023-07-10 PROCEDURE — 99214 OFFICE O/P EST MOD 30 MIN: CPT | Performed by: RADIOLOGY

## 2023-07-10 ASSESSMENT — PAIN SCALES - GENERAL: PAINLEVEL: NO PAIN

## 2023-07-10 NOTE — PROGRESS NOTES
Patient was seen today in clinic with Dr. Casper for follow up.  Vitals signs and weight were obtained and pain assessment was completed.  Allergies and medications were reviewed with the patient.       Vitals/Pain:  Vitals:    07/10/23 1444   BP: 98/71   BP Location: Left arm   Patient Position: Sitting   BP Cuff Size: Adult   Pulse: 81   Temp: 36.3 °C (97.3 °F)   TempSrc: Temporal   SpO2: 93%   Weight: 44.5 kg (98 lb)   Pain Score: No pain        Allergies:   Patient has no known allergies.    Current Medications:  Current Outpatient Medications   Medication Sig Dispense Refill    zonisamide (ZONEGRAN) 50 MG capsule Take 50 mg by mouth.      B Complex Vitamins (VITAMIN B-COMPLEX PO) Take  by mouth.      ondansetron (ZOFRAN ODT) 8 MG TABLET DISPERSIBLE dissolve 1 tablet ON TONGUE every 12 hours if needed for nausea and vomiting (Patient not taking: Reported on 11/4/2021)      prochlorperazine (COMPAZINE) 10 MG Tab Take  by mouth every 6 hours as needed. (Patient not taking: Reported on 11/4/2021)      polyethylene glycol/lytes (MIRALAX) 17 g Pack Take 17 g by mouth every day.      HYDROcodone-acetaminophen (NORCO) 5-325 MG Tab per tablet Take 0.5-1 Tablets by mouth 2 times a day as needed (pain).      ketorolac (TORADOL) 10 MG Tab Take 1 tablet by mouth every 6 hours as needed for Moderate Pain (Prior to hydrocodone/acetaminophen for moderate pain). (Patient not taking: Reported on 3/23/2021) 15 tablet 0    SUMAtriptan (IMITREX) 25 MG Tab tablet Take 1 tablet by mouth every 2 hours as needed for Migraine. 15 tablet 3    gabapentin (NEURONTIN) 100 MG Cap Take 100 mg by mouth 2 (two) times a day.      SPIRIVA RESPIMAT 2.5 MCG/ACT Aero Soln Inhale 2.5 mcg every day.      docusate sodium (COLACE) 100 MG Cap Take 100 mg by mouth 2 times a day. (Patient not taking: Reported on 11/4/2021)      vitamin D (CHOLECALCIFEROL) 1000 Unit (25 mcg) Tab Take 1,000 Units by mouth every day. (Patient not taking: Reported on 11/4/2021)       Multiple Vitamins-Minerals (CENTRUM SILVER 50+WOMEN PO) Take 1 tablet by mouth every day.      apixaban (ELIQUIS) 5mg Tab Take 5 mg by mouth 2 Times a Day.      fluticasone-salmeterol (WIXELA INHUB) 500-50 MCG/DOSE AEROSOL POWDER, BREATH ACTIVATED Inhale 1 Puff every 12 hours. Indications: Asthma      digoxin (LANOXIN) 125 MCG Tab Take 125 mcg by mouth every day with lunch.      atorvastatin (LIPITOR) 20 MG Tab Take 20 mg by mouth every day.      zolpidem (AMBIEN) 10 MG Tab Take 10 mg by mouth at bedtime as needed for Sleep.       No current facility-administered medications for this encounter.           Evangelina Mckeon, Med Ass't

## 2023-07-10 NOTE — PROGRESS NOTES
RADIATION ONCOLOGY FOLLOW-UP    Patient name:  Josy Grove    Primary Physician:  TIM Amezquita MRN: [id number]  [facility]: [id number]   Referring physician:  Dulce Jeter M.D.  : 1940, 82 y.o.     DATE OF SERVICE: 7/10/2023    IDENTIFICATION:   A 82 y.o. female with    Anal cancer (HCC)  Staging form: Anus, AJCC 8th Edition  - Clinical stage from 9/3/2021: Stage IV (cT2, cN0, pM1) - Signed by Sha Casper M.D. on 9/3/2021      RADIATION SUMMARY:  Radiation Oncology          2021   Aria Course Treatment Dates   Course First Treatment Date 11/15/2021   11/15/2021    Course Last Treatment Date 2021    Aria Treatment Summary   SBRT_LUL  Plan from Course C2_SBRT_LLung   Fraction 4 of 5 5 of 5    5 of 5   Elapsed Course Days  @  9 @ [id number]    9 @ [id number]   Prescribed Fraction Dose 1,200 cGy 1,200 cGy    1,200 cGy   Prescribed Total Dose 6,000 cGy 6,000 cGy    6,000 cGy   SBRT_L_Lung  Plan from Course C2_SBRT_LLung   Fraction 4 of 5 5 of 5    5 of 5   Elapsed Course Days  @  9 @ [id number]    9 @ [id number]   Prescribed Fraction Dose 1,200 cGy 1,200 cGy    1,200 cGy   Prescribed Total Dose 6,000 cGy 6,000 cGy    6,000 cGy   SBRT_LUL  Reference Point from Course C2_SBRT_LLung   Elapsed Course Days 7 @ [id number] 9 @ [id number]    9 @ [id number]   Session Dose 1,200 cGy 1,200 cGy    --   Total Dose 4,800 cGy 6,000 cGy    6,000 cGy   SBRT_LUL CP  Reference Point from Course C2_SBRT_LLung   Elapsed Course Days 7 @ [id number] 9 @ [id number]    9 @ [id number]   Session Dose 1,219 cGy 1,219 cGy    --   Total Dose 4,874 cGy 6,093 cGy    6,093 cGy   SBRT_L_Lung  Reference Point from Course C2_SBRT_LLung   Elapsed Course Days 7 @ [id number] 9 @ [id number]    9 @ [id number]   Session Dose 1,200 cGy 1,200 cGy    --   Total Dose 4,800 cGy 6,000 cGy    6,000 cGy   SBRT_L_Lung CP  Reference Point from Course C2_SBRT_LLung    Elapsed Course Days 7 @ [id number] 9 @ [id number]    9 @ [id number]   Session Dose 1,236 cGy 1,236 cGy    --   Total Dose 4,942 cGy 6,178 cGy    6,178 cGy      Details          More values are hidden. Newest values shown. Go to activity for more data.                HISTORY OF PRESENT ILLNESS:  Patient originally presented in April 2019 with some rectal bleeding.  She was originally seen by Dr. Edilson Quinn who noted anal fissure and hemorrhoids and referred her to Dr. Dulce Jeter ([facility]) for anoscopy which showed hard mass exophytic palpated from the anal verge to the top of the sphincter in the left lateral position fixed and punch biopsy December 18, 2018 shows invasive squamous cell carcinoma moderately differentiated.  She underwent MRI rectum January 3, 2020 which showed a 3.9 x 2 cm anal canal lesion semicircumferential extending from 1:00 to 8 o'clock position extending from the anal verge into the lower rectum.  Anteriorly the lesion abuts the lower vagina without definitive evidence of vaginal invasion.  CT chest abdomen pelvis January 10, 2020 showed no evidence of metastatic disease.  A hypo-dense 1.4 cm nodule below the left hemidiaphragm medial to the spleen it is unlikely to represent a solitary metastatic lesion in this location.  PET CT scan done January 16, 2020 showed abnormal oval-shaped solid mass in the left side of the anus extending to the level of the lower rectum with marketed activity.  No adenopathy or elevated raghav activity noted.  Patient currently is complaining of severe anal pain and occasional bleeding.        3/20/20  Patient improving post treatment. Rx for anusol for proctitis eRx to [place] ([place]. PETCT and CONCHITA in 3 months follow up ~June 2020.     6/11/20  Patient still having some loose bowel movements and fecal incontinence as well as some pain in the anal rectal area.  She is using the lidocaine jelly with some relief.  She also  complains of some bladder irritation and just general discomfort in her lower abdomen.  PET/CT shows good response with just mild activity in the anal area at this time.     9/14/20  Patient is doing well and her inflammation is improving.  She does not have any diarrhea at this time she does have a little bit of rectal pain which she uses her lidocaine for.  She has no abdominal pain or cystitis at this time.  She was seen by Dr. Jeter who was following her left lateral lesion which shows some induration and is due for an MRI next week for follow-up.     12/21/20  Patient underwent APR by Dr. Dulce Jeter (Colorectal Surgery) on October 15, 2020 with flap by Dr. Reyes.  Patient was found to have persistent disease clinical T2 with no nodes involved.  She underwent repeat PET CT scan recently which showed some uptake within the left lower lobe and is undergoing CT-guided biopsy next week.  Overall she still has lower pelvic pain and some urinary incontinence for who she previously saw Dr. Montana Jeter (Uro-Gyn).  She says her weight has been stable and actually she has been increasing her weight recently.     5/20/21  Patient has had issues with peritoneal hernia through her APR flap and underwent mesh revision.  She has resumed her chemotherapy with Dr. Vogel and is tolerating well and her weight is overall stable.  She is having a CT scan with Dr. Vogel on Friday which we will review.  She does have continued issues with urinary incontinence but denies any bowel diarrhea through ostomy bag.  She still does have some wound healing issues from her recent hernia repair.     9/3/21  She had CT-guided biopsy of left lower lobe lung lesion January 1921 which showed metastatic anal squamous cell carcinoma.  Patient has been doing well she completed 6 cycles of carbotaxol July 13, 2021.  She has been feeling well and had repeat CT chest abdomen pelvis which showed left lower lobe metastasis measuring 1.1 cm  previously 1 cm May 28, 2021 and 1.4 cm December 7, 2020.  Patient also had mild enlargement of subpleural pulmonary nodule in the inferior left lower lobe 27 mm previously 4 mm on May 28, 2021 and 4 mm on February 20, 2021.       11/4/21  Patient is doing well she did have interval CT chest abdomen pelvis scan [facility] on October 28, 2021 which did show interval growth of the 2 lung lesions as well as a third lung lesion adjacent to the larger left lower lobe lesion.  No evidence of other distant metastatic disease.  She has been feeling well denies any cough shortness of breath or hemoptysis.  Her weight has been stable.  She does have some general fatigue but is doing overall pretty well.    6/21/23 Patient completed SBRT to LLL lung lesions 11/24/21. She has been on keytruda doing well but had PETCT 6/2023 which shows 4 new lesions. 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          INTERVAL HISTORY:  Patient here discussed PET CT scan from June 2023 which shows for new lung lesions.  At this point she is asymptomatic.      PROBLEM LIST:  Patient Active Problem List   Diagnosis    Degeneration of lumbar intervertebral disc    Anal cancer (HCC)    Hiatal hernia    Asthma    High cholesterol    Nausea and vomiting    Anemia    Arrhythmia    Cancer (HCC)    Drug-induced constipation    Sepsis (HCC)    Leukocytosis    Posterior perineal hernia    Thrombocythemia    Paroxysmal atrial fibrillation (HCC)    History of 2019 novel coronavirus disease (COVID-19)    Headache    Malignant neoplasm metastatic to left lung (HCC)    Anal fissure and fistula    Diverticulitis of colon    Arthropathy    Allergic rhinitis       CURRENT MEDICATIONS:  Current Outpatient Medications   Medication Sig Dispense Refill    zonisamide (ZONEGRAN) 50 MG capsule Take 50 mg by mouth.      B Complex Vitamins (VITAMIN B-COMPLEX PO) Take  by mouth.      ondansetron (ZOFRAN ODT) 8 MG TABLET DISPERSIBLE dissolve 1 tablet ON TONGUE  every 12 hours if needed for nausea and vomiting (Patient not taking: Reported on 11/4/2021)      prochlorperazine (COMPAZINE) 10 MG Tab Take  by mouth every 6 hours as needed. (Patient not taking: Reported on 11/4/2021)      polyethylene glycol/lytes (MIRALAX) 17 g Pack Take 17 g by mouth every day.      HYDROcodone-acetaminophen (NORCO) 5-325 MG Tab per tablet Take 0.5-1 Tablets by mouth 2 times a day as needed (pain).      ketorolac (TORADOL) 10 MG Tab Take 1 tablet by mouth every 6 hours as needed for Moderate Pain (Prior to hydrocodone/acetaminophen for moderate pain). (Patient not taking: Reported on 3/23/2021) 15 tablet 0    SUMAtriptan (IMITREX) 25 MG Tab tablet Take 1 tablet by mouth every 2 hours as needed for Migraine. 15 tablet 3    gabapentin (NEURONTIN) 100 MG Cap Take 100 mg by mouth 2 (two) times a day.      SPIRIVA RESPIMAT 2.5 MCG/ACT Aero Soln Inhale 2.5 mcg every day.      docusate sodium (COLACE) 100 MG Cap Take 100 mg by mouth 2 times a day. (Patient not taking: Reported on 11/4/2021)      vitamin D (CHOLECALCIFEROL) 1000 Unit (25 mcg) Tab Take 1,000 Units by mouth every day. (Patient not taking: Reported on 11/4/2021)      Multiple Vitamins-Minerals (CENTRUM SILVER 50+WOMEN PO) Take 1 tablet by mouth every day.      apixaban (ELIQUIS) 5mg Tab Take 5 mg by mouth 2 Times a Day.      fluticasone-salmeterol (WIXELA INHUB) 500-50 MCG/DOSE AEROSOL POWDER, BREATH ACTIVATED Inhale 1 Puff every 12 hours. Indications: Asthma      digoxin (LANOXIN) 125 MCG Tab Take 125 mcg by mouth every day with lunch.      atorvastatin (LIPITOR) 20 MG Tab Take 20 mg by mouth every day.      zolpidem (AMBIEN) 10 MG Tab Take 10 mg by mouth at bedtime as needed for Sleep.       No current facility-administered medications for this encounter.       ALLERGIES:  Patient has no known allergies.    REVIEW OF SYSTEMS:    A complete review of system taken. Pertinent items in HPI.  All others negative.    PHYSICAL  EXAM:  PERFORMANCE STATUS:  ECOG 1    BP 98/71 (BP Location: Left arm, Patient Position: Sitting, BP Cuff Size: Adult)   Pulse 81   Temp 36.3 °C (97.3 °F) (Temporal)   Wt 44.5 kg (98 lb)   LMP  (LMP Unknown)   SpO2 93%   BMI 17.36 kg/m²   Physical Exam  Vitals reviewed.   HENT:      Head: Normocephalic.   Eyes:      Pupils: Pupils are equal, round, and reactive to light.   Cardiovascular:      Rate and Rhythm: Normal rate.   Pulmonary:      Effort: Pulmonary effort is normal.   Abdominal:      General: Abdomen is flat.   Musculoskeletal:         General: Normal range of motion.   Neurological:      General: No focal deficit present.      Mental Status: She is alert.   Psychiatric:         Mood and Affect: Mood normal.         LABORATORY DATA:   Lab Results   Component Value Date/Time    WBC 7.3 03/25/2021 02:11 AM    RBC 3.25 (L) 03/25/2021 02:11 AM    HEMOGLOBIN 11.3 (L) 03/25/2021 02:11 AM    HEMATOCRIT 33.8 (L) 03/25/2021 02:11 AM    .0 (H) 03/25/2021 02:11 AM    MCH 34.8 (H) 03/25/2021 02:11 AM    MCHC 33.4 (L) 03/25/2021 02:11 AM    RDW 61.8 (H) 03/25/2021 02:11 AM    PLATELETCT 128 (L) 03/25/2021 02:11 AM    MPV 9.8 03/25/2021 02:11 AM    NEUTSPOLYS 69.00 03/25/2021 02:11 AM    LYMPHOCYTES 12.20 (L) 03/25/2021 02:11 AM    MONOCYTES 17.90 (H) 03/25/2021 02:11 AM    EOSINOPHILS 0.30 03/25/2021 02:11 AM    BASOPHILS 0.30 03/25/2021 02:11 AM      Lab Results   Component Value Date/Time    SODIUM 145 06/20/2023 02:22 PM    POTASSIUM 4.0 06/20/2023 02:22 PM    CHLORIDE 106 06/20/2023 02:22 PM    CO2 27 06/20/2023 02:22 PM    GLUCOSE 118 (H) 06/20/2023 02:22 PM    BUN 10 06/20/2023 02:22 PM    CREATININE 0.73 06/20/2023 02:22 PM         RADIOLOGY DATA:  [id number] BASE TO MID-THIGH    Result Date: 6/6/2023  1.  Peripherally hypermetabolic structure in the inferior pelvis posteriorly, to the LEFT of midline, likely represents bowel loop related to prior surgery, however recurrent tumor is difficult to  entirely exclude. 2.  Bilateral pulmonary metastatic disease.      IMPRESSION:    A 82 y.o. with   Anal cancer (HCC)  Staging form: Anus, AJCC 8th Edition  - Clinical stage from 9/3/2021: Stage IV (cT2, cN0, pM1) - Signed by Sha Casper M.D. on 9/3/2021      CANCER STATUS:  Disease Progression Distant    RECOMMENDATIONS:   I discussed options of SBRT for oligo progressive disease in 4 spots in the lung versus hospice and patient would like to move forward with palliative care/hospice at this time.  I explained if she changes her mind anytime we would welcome her back to discuss radiation options.    Thank you for the opportunity to participate in her care.  If any questions or comments, please do not hesitate in calling.    Orders Placed This Encounter    Referral to Hospice

## 2024-02-03 NOTE — ANESTHESIA PROCEDURE NOTES
Peripheral Block    Date/Time: 10/15/2020 7:59 AM  Performed by: Adolfo Goncalves M.D.  Authorized by: Adolfo Goncalves M.D.     Patient Location:  OR  Start Time:  10/15/2020 7:59 AM  End Time:  10/15/2020 8:04 AM  Reason for Block: at surgeon's request and post-op pain management    patient identified, IV checked, site marked, risks and benefits discussed, surgical consent, monitors and equipment checked, pre-op evaluation and timeout performed    Patient Position:  Supine  Prep: ChloraPrep    Monitoring:  Heart rate, continuous pulse ox and cardiac monitor  Block Region:  Trunk  Trunk - Block Type:  Abdominal plane block - TAP block    Laterality:  Bilateral  Procedures: ultrasound guided  Image captured, interpreted and electronically stored.  Strength:  1 %  Dose:  3 ml  Block Type:  Single-shot  Needle Length:  100mm  Needle Gauge:  21 G  Needle Localization:  Ultrasound guidance  Injection Assessment:  Negative aspiration for heme, incremental injection and local visualized surrounding nerve on ultrasound  Evidence of intravascular injection: No     US Guided Transversus Abdominis Plane (TAP) Block   US probe placed at the anterior axillary line between the costal margin and iliac crest in an axial plane. External Oblique, Internal Oblique (IO) and Transversis Abdominis (TA) muscles identified.  Needle inserted anteromedial to probe in an in plane approach and advanced under direct visualization to TAP between IO and TA muscled.  After negative aspiration LA injected with ease and visualized spreading in TAP plane. Printed images on chart.         0 = swallows foods/liquids without difficulty

## (undated) DEVICE — SUTURE 3-0 VICRYL PLUS SH - 8X 18 INCH (12/BX)

## (undated) DEVICE — TRAY CATHETER FOLEY URINE METER W/STATLOCK 350ML (10EA/CA)

## (undated) DEVICE — SEALER VESSEL EXTEND FROM DAVINCI ENERGY (6EA/BX)

## (undated) DEVICE — NEEDLE INSFL 120MM 14GA VRRS - (20/BX)

## (undated) DEVICE — SUTURE GENERAL

## (undated) DEVICE — DRAPE UNDER BUTTOCK LRG (40/CA)

## (undated) DEVICE — SUTURE 0 VICRYL PLUS CT-2 - 27 INCH (36/BX)

## (undated) DEVICE — GOWN WARMING STANDARD FLEX - (30/CA)

## (undated) DEVICE — TRAY SRGPRP PVP IOD WT PRP - (20/CA)

## (undated) DEVICE — DRAPE LAPAROTOMY T SHEET - (12EA/CA)

## (undated) DEVICE — ELECTRODE DUAL RETURN W/ CORD - (50/PK)

## (undated) DEVICE — HEAD HOLDER JUNIOR/ADULT

## (undated) DEVICE — MASK ANESTHESIA ADULT  - (100/CA)

## (undated) DEVICE — NEPTUNE 4 PORT MANIFOLD - (20/PK)

## (undated) DEVICE — DRESSING LEUKOMED STERILE 11.75X4IN - (50/CA)

## (undated) DEVICE — SLEEVE, VASO, THIGH, MED

## (undated) DEVICE — REDUCER XI STAPLER 12MM TO 8MM (6EA/BX)

## (undated) DEVICE — DRAPE MAYO STAND - (30/CA)

## (undated) DEVICE — DRESSING NON-ADHERING 8 X 3 - (50/BX)

## (undated) DEVICE — PACK MINOR BASIN - (2EA/CA)

## (undated) DEVICE — SUTURE 2-0 VICRYL PLUS SH - 8 X 18 INCH (12/BX)

## (undated) DEVICE — GOWN SURGEONS X-LARGE - DISP. (30/CA)

## (undated) DEVICE — TROCAR 5X100 BLADED Z-THREAD - KII (6/BX)

## (undated) DEVICE — PACK DAVINCI LOW ANTERIOR RESECTION (1EA/CA)

## (undated) DEVICE — SUTURE 4-0 MONOCRYL PLUS PS-2 - 27 INCH (36/BX)

## (undated) DEVICE — SUTURE 1 SILK 2 X 60 (36PK/BX)

## (undated) DEVICE — VESSELOOP MAXI BLUE STERILE- SURG-I-LOOP (10EA/BX)

## (undated) DEVICE — SUCTION INSTRUMENT YANKAUER BULBOUS TIP W/O VENT (50EA/CA)

## (undated) DEVICE — PAD OR TABLE DA VINCI 2IN X 20IN X 72IN - (12EA/CA)

## (undated) DEVICE — BOVIE BLADE COATED - (50/PK)

## (undated) DEVICE — TROCAR Z THREAD12MM OPTICAL - NON BLADED (6/BX)

## (undated) DEVICE — MANIFOLD NEPTUNE 1 PORT (20/PK)

## (undated) DEVICE — GLOVE BIOGEL SZ 6.5 SURGICAL PF LTX (50PR/BX 4BX/CA)

## (undated) DEVICE — CHLORAPREP 26 ML APPLICATOR - ORANGE TINT(25/CA)

## (undated) DEVICE — SENSOR SPO2 NEO LNCS ADHESIVE (20/BX) SEE USER NOTES

## (undated) DEVICE — TROCAR 5X100 NON BLADED Z-TH - READ KII (6/BX)

## (undated) DEVICE — DRAPE COLUMN  BOX OF 20

## (undated) DEVICE — LIGASURE LAPAROSCOPIC 5MM - (6EA/CA)

## (undated) DEVICE — SODIUM CHL IRRIGATION 0.9% 1000ML (12EA/CA)

## (undated) DEVICE — GLOVE BIOGEL SZ 7.5 SURGICAL PF LTX - (50PR/BX 4BX/CA)

## (undated) DEVICE — KIT ANESTHESIA W/CIRCUIT & 3/LT BAG W/FILTER (20EA/CA)

## (undated) DEVICE — PROTECTOR ULNA NERVE - (36PR/CA)

## (undated) DEVICE — SUTURE 0 COATED VICRYL 6-18IN - (12PK/BX)

## (undated) DEVICE — KIT 2.25IN COL ILSTM 2 PC DRN - 57MM 2 1/4 INCH THIS IS FOR THE OR ONLY (5/BX)

## (undated) DEVICE — TUBE NG SALEM SUMP 16FR (50EA/CA)

## (undated) DEVICE — DEPRESSOR TONGUE ADLT STERILE - 6 IN (100EA/BX)

## (undated) DEVICE — GLOVE SZ 6.5 BIOGEL PI MICRO - PF LF (50PR/BX)

## (undated) DEVICE — ARMREST CRADLE FOAM - (2PR/PK 12PR/CA)

## (undated) DEVICE — CANNULA W/ SUPPLY TUBING O2 - (50/CA)

## (undated) DEVICE — TUBING CLEARLINK DUO-VENT - C-FLO (48EA/CA)

## (undated) DEVICE — DERMABOND ADVANCED - (12EA/BX)

## (undated) DEVICE — TROCAR LAPSCP 100MM 12MM NTHRD - (6/BX)

## (undated) DEVICE — PEN SKIN MARKER W/RULER - (50EA/BX)

## (undated) DEVICE — SET TUBING PNEUMOCLEAR HIGH FLOW SMOKE EVACUATION (10EA/BX)

## (undated) DEVICE — SUTURE 0 PROLENE SH 30 (36PK/BX)"

## (undated) DEVICE — CANISTER SUCTION RIGID RED 1500CC (40EA/CA)

## (undated) DEVICE — TUBE CONNECTING SUCTION - CLEAR PLASTIC STERILE 72 IN (50EA/CA)

## (undated) DEVICE — PACK MAJOR BASIN - (2EA/CA)

## (undated) DEVICE — ELECTRODE 850 FOAM ADHESIVE - HYDROGEL RADIOTRNSPRNT (50/PK)

## (undated) DEVICE — LEGGING LITHOTOMY 31 X 48 IN - (2EA/PK 20PK/CA)

## (undated) DEVICE — GLOVE BIOGEL PI INDICATOR SZ 6.5 SURGICAL PF LF - (50/BX 4BX/CA)

## (undated) DEVICE — SUTURE2-0 27IN VCRL ANTI VIOL (36PK/BX)

## (undated) DEVICE — CANNULA W/SEAL 5X100 Z-THRE - ADED KII (12/BX)

## (undated) DEVICE — SET EXTENSION WITH 2 PORTS (48EA/CA) ***PART #2C8610 IS A SUBSTITUTE*****

## (undated) DEVICE — SUTURE 3-0 ETHILON FS-1 - (36/BX) 30 INCH

## (undated) DEVICE — CANISTER SUCTION 3000ML MECHANICAL FILTER AUTO SHUTOFF MEDI-VAC NONSTERILE LF DISP  (40EA/CA)

## (undated) DEVICE — BANDAGE ROLL STERILE BULKEE 4.5 IN X 4 YD (100EA/CA)

## (undated) DEVICE — GAUZE FLUFF STERILE 2-PLY 36 X 36 (100EA/CA)

## (undated) DEVICE — PACK LAP CHOLE OR - (2EA/CA)

## (undated) DEVICE — MASK WITH FACE SHIELD (25/BX 4BX/CA)

## (undated) DEVICE — SET LEADWIRE 5 LEAD BEDSIDE DISPOSABLE ECG (1SET OF 5/EA)

## (undated) DEVICE — COVER TIP ENDOWRIST HOT SHEAR - (10EA/BX) DA VINCI

## (undated) DEVICE — GLOVE BIOGEL PI ORTHO SZ 6 SURGICAL PF LF (40PR/BX)

## (undated) DEVICE — DRESSING TEGADERM 8 X 12 - (10/BX 8BX/CA)

## (undated) DEVICE — TOWELS CLOTH SURGICAL - (4/PK 20PK/CA)

## (undated) DEVICE — LACTATED RINGERS INJ 1000 ML - (14EA/CA 60CA/PF)

## (undated) DEVICE — CLIP SM INTNL HRZN TI ESCP LGT - (24EA/PK 25PK/BX)

## (undated) DEVICE — BLADE SURGICAL #15 - (50/BX 3BX/CA)

## (undated) DEVICE — SUTURE 2-0 VICRYL PLUS CT-1 36 (36PK/BX)"

## (undated) DEVICE — SUTURE 4-0 MONOCRYL PLUSPC-3 - 18 INCH (12/BX)

## (undated) DEVICE — DRAPE LARGE 3 QUARTER - (20/CA)

## (undated) DEVICE — SUTURE 3-0 ETHILON FSLX 30 (36PK/BX)"

## (undated) DEVICE — SUTURE 0 VICRYL PLUS CT-2 - 8 X 18 INCH (12/BX)

## (undated) DEVICE — BRIEF STRETCH MATERNITY M/L - FITS 20-60IN (5EA/BG 20BG/CA)

## (undated) DEVICE — NEEDLE MONOPTY 14GAX16CM - (10EA/CA)

## (undated) DEVICE — SUTURE NABSB 2-0 KS 30IN PRLN BLUE (36PK/BX)

## (undated) DEVICE — KIT 2.75IN COL ILSTM 2 PC DRN - 70MM 2 3/4 INCH THIS IS FOR THE OR ONLY (5/BX)

## (undated) DEVICE — SUTURE 3-0 CHROMIC GUT SH 27 (36PK/BX)"

## (undated) DEVICE — GLOVE BIOGEL PI ORTHO SZ 7.5 PF LF (40PR/BX)

## (undated) DEVICE — CLIP MED INTNL HRZN TI ESCP - (25/BX)

## (undated) DEVICE — CATHETER IV 20 GA X 1-1/4 ---SURG.& SDS ONLY--- (50EA/BX)

## (undated) DEVICE — TUBING O2 7FT TIP SMTH BORE - (50/CA)

## (undated) DEVICE — SUTURE 4-0 PROLENE PS-2 18 (36PK/BX)"

## (undated) DEVICE — TUBE E-T HI-LO CUFF 7.0MM (10EA/PK)

## (undated) DEVICE — SUTURE PLASTIC

## (undated) DEVICE — JELLY SURGILUBE STERILE TUBE 4.25 OZ (1/EA)

## (undated) DEVICE — CONTAINER, SPECIMEN, STERILE

## (undated) DEVICE — TUBE CONNECT SUCTION CLEAR 120 X 1/4" (50EA/CA)"

## (undated) DEVICE — GLOVE BIOGEL PI INDICATOR SZ 7.0 SURGICAL PF LF - (50/BX 4BX/CA)

## (undated) DEVICE — KIT SIGMOIDOSCOPE W/BULB AND - SUCTION (1/PK 10PK/CA)

## (undated) DEVICE — STAPLER SKIN DISP - (6/BX 10BX/CA) VISISTAT

## (undated) DEVICE — SUCTION INSTRUMENT YANKAUER OPEN TIP W/O VENT (50EA/CA)

## (undated) DEVICE — DRAPE ARM  BOX OF 20

## (undated) DEVICE — GLOVE BIOGEL SZ 8.5 SURGICAL PF LTX - (50PR/BX 4BX/CA)

## (undated) DEVICE — SUTURE 0 VICRYL PLUS CT-1 - 36 INCH (36/BX)

## (undated) DEVICE — ROBOTIC SURGERY SERVICES

## (undated) DEVICE — SEAL CANNULA STAPLER 12 MM (10EA/BX)

## (undated) DEVICE — SUTURE 0 VICRYL PLUS UR-6 - 27 INCH (36/BX)

## (undated) DEVICE — KIT CUSTOM PROCEDURE SINGLE FOR ENDO  (15/CA)

## (undated) DEVICE — PAD LAP STERILE 18 X 18 - (5/PK 40PK/CA)

## (undated) DEVICE — SCISSORS 5MM CVD (6EA/BX)

## (undated) DEVICE — Device

## (undated) DEVICE — WATER IRRIGATION STERILE 1000ML (12EA/CA)

## (undated) DEVICE — SEAL 5MM-8MM UNIVERSAL  BOX OF 10

## (undated) DEVICE — OBTURATOR BLADELESS STANDARD 8MM (6EA/BX)

## (undated) DEVICE — TUBE NG SALEM SUMP 14FR (50EA/BX)

## (undated) DEVICE — SUTURE 4-0 MONOCRYL PLUS PS-1 - 27 INCH (36/BX)

## (undated) DEVICE — SUTURE 0 PROLENE M06 C/R (12EA/BX)

## (undated) DEVICE — GLOVE BIOGEL SZ 7 SURGICAL PF LTX - (50PR/BX 4BX/CA)

## (undated) DEVICE — SUTURE 1 VICRYL PLUSCT-1 27IN - (36/BX)

## (undated) DEVICE — CATHETER IV 18 GA X 1-3/4 ---SURG.& SDS ONLY---

## (undated) DEVICE — GLOVE BIOGEL SZ 8 SURGICAL PF LTX - (50PR/BX 4BX/CA)